# Patient Record
Sex: FEMALE | Race: WHITE | Employment: OTHER | URBAN - METROPOLITAN AREA
[De-identification: names, ages, dates, MRNs, and addresses within clinical notes are randomized per-mention and may not be internally consistent; named-entity substitution may affect disease eponyms.]

---

## 2017-01-04 ENCOUNTER — TELEPHONE (OUTPATIENT)
Dept: CARDIOLOGY CLINIC | Age: 67
End: 2017-01-04

## 2017-01-04 ENCOUNTER — ANTI-COAG VISIT (OUTPATIENT)
Dept: CARDIOLOGY | Age: 67
End: 2017-01-04

## 2017-01-04 DIAGNOSIS — I48.20 CHRONIC ATRIAL FIBRILLATION (HCC): ICD-10-CM

## 2017-01-04 LAB — INR BLD: 1.6

## 2017-01-10 LAB — INR BLD: 2.7

## 2017-01-11 ENCOUNTER — ANTI-COAG VISIT (OUTPATIENT)
Dept: CARDIOLOGY CLINIC | Age: 67
End: 2017-01-11

## 2017-01-11 DIAGNOSIS — I48.20 CHRONIC ATRIAL FIBRILLATION (HCC): ICD-10-CM

## 2017-01-24 LAB — INR BLD: 2.6

## 2017-01-25 ENCOUNTER — ANTI-COAG VISIT (OUTPATIENT)
Dept: CARDIOLOGY CLINIC | Age: 67
End: 2017-01-25

## 2017-01-25 DIAGNOSIS — I48.20 CHRONIC ATRIAL FIBRILLATION (HCC): ICD-10-CM

## 2017-01-31 ENCOUNTER — ANTI-COAG VISIT (OUTPATIENT)
Dept: CARDIOLOGY CLINIC | Age: 67
End: 2017-01-31

## 2017-01-31 DIAGNOSIS — I48.20 CHRONIC ATRIAL FIBRILLATION (HCC): ICD-10-CM

## 2017-01-31 LAB — INR BLD: 2.6

## 2017-02-24 ENCOUNTER — TELEPHONE (OUTPATIENT)
Dept: CARDIOLOGY CLINIC | Age: 67
End: 2017-02-24

## 2017-02-28 ENCOUNTER — ANTI-COAG VISIT (OUTPATIENT)
Dept: CARDIOLOGY CLINIC | Age: 67
End: 2017-02-28

## 2017-02-28 DIAGNOSIS — I48.20 CHRONIC ATRIAL FIBRILLATION (HCC): ICD-10-CM

## 2017-02-28 LAB — INR BLD: 2.7

## 2017-03-10 ENCOUNTER — OFFICE VISIT (OUTPATIENT)
Dept: INTERNAL MEDICINE | Age: 67
End: 2017-03-10

## 2017-03-10 VITALS
SYSTOLIC BLOOD PRESSURE: 134 MMHG | DIASTOLIC BLOOD PRESSURE: 76 MMHG | WEIGHT: 202 LBS | TEMPERATURE: 98.4 F | HEIGHT: 67 IN | BODY MASS INDEX: 31.71 KG/M2 | RESPIRATION RATE: 16 BRPM | HEART RATE: 64 BPM

## 2017-03-10 DIAGNOSIS — Z01.818 PRE-OP EXAM: Primary | ICD-10-CM

## 2017-03-10 DIAGNOSIS — Z86.79 S/P ABLATION OF ATRIAL FLUTTER: ICD-10-CM

## 2017-03-10 DIAGNOSIS — K66.0 ABDOMINAL ADHESIONS: ICD-10-CM

## 2017-03-10 DIAGNOSIS — D05.81: ICD-10-CM

## 2017-03-10 DIAGNOSIS — Z98.890 S/P ABLATION OF ATRIAL FLUTTER: ICD-10-CM

## 2017-03-10 DIAGNOSIS — I48.20 CHRONIC ATRIAL FIBRILLATION (HCC): ICD-10-CM

## 2017-03-10 DIAGNOSIS — K43.9 VENTRAL HERNIA WITHOUT OBSTRUCTION OR GANGRENE: ICD-10-CM

## 2017-03-10 DIAGNOSIS — R13.19 OTHER DYSPHAGIA: ICD-10-CM

## 2017-03-10 PROCEDURE — 99215 OFFICE O/P EST HI 40 MIN: CPT | Performed by: INTERNAL MEDICINE

## 2017-03-10 ASSESSMENT — ENCOUNTER SYMPTOMS
ABDOMINAL PAIN: 1
CONSTIPATION: 1
RESPIRATORY NEGATIVE: 1
EYES NEGATIVE: 1
ALLERGIC/IMMUNOLOGIC NEGATIVE: 1
ABDOMINAL DISTENTION: 1
BACK PAIN: 1

## 2017-04-03 LAB — INR BLD: 3

## 2017-04-04 ENCOUNTER — ANTI-COAG VISIT (OUTPATIENT)
Dept: CARDIOLOGY | Age: 67
End: 2017-04-04

## 2017-04-04 DIAGNOSIS — I48.20 CHRONIC ATRIAL FIBRILLATION (HCC): ICD-10-CM

## 2017-05-02 ENCOUNTER — ANTI-COAG VISIT (OUTPATIENT)
Dept: CARDIOLOGY | Age: 67
End: 2017-05-02

## 2017-05-02 DIAGNOSIS — I48.20 CHRONIC ATRIAL FIBRILLATION (HCC): ICD-10-CM

## 2017-05-02 LAB — INR BLD: 3

## 2017-05-04 ENCOUNTER — ANTI-COAG VISIT (OUTPATIENT)
Dept: CARDIOLOGY CLINIC | Age: 67
End: 2017-05-04

## 2017-05-04 DIAGNOSIS — I48.20 CHRONIC ATRIAL FIBRILLATION (HCC): ICD-10-CM

## 2017-05-04 LAB — INR BLD: 3

## 2017-05-17 ENCOUNTER — OFFICE VISIT (OUTPATIENT)
Dept: INTERNAL MEDICINE | Age: 67
End: 2017-05-17

## 2017-05-17 VITALS
RESPIRATION RATE: 16 BRPM | SYSTOLIC BLOOD PRESSURE: 142 MMHG | WEIGHT: 200 LBS | DIASTOLIC BLOOD PRESSURE: 86 MMHG | BODY MASS INDEX: 31.8 KG/M2 | HEART RATE: 68 BPM

## 2017-05-17 DIAGNOSIS — M47.814 FACET ARTHROPATHY, THORACIC: ICD-10-CM

## 2017-05-17 DIAGNOSIS — M54.6 CHRONIC MIDLINE THORACIC BACK PAIN: Primary | ICD-10-CM

## 2017-05-17 DIAGNOSIS — G89.29 CHRONIC MIDLINE THORACIC BACK PAIN: Primary | ICD-10-CM

## 2017-05-17 PROCEDURE — 99214 OFFICE O/P EST MOD 30 MIN: CPT | Performed by: INTERNAL MEDICINE

## 2017-05-17 RX ORDER — WARFARIN SODIUM 5 MG/1
TABLET ORAL
COMMUNITY
End: 2019-02-07 | Stop reason: SDUPTHER

## 2017-05-17 RX ORDER — PREDNISONE 20 MG/1
TABLET ORAL
Qty: 20 TABLET | Refills: 0 | Status: SHIPPED | OUTPATIENT
Start: 2017-05-17 | End: 2017-05-27

## 2017-05-17 RX ORDER — WARFARIN SODIUM 10 MG/1
10 TABLET ORAL
COMMUNITY
End: 2017-06-15 | Stop reason: SDUPTHER

## 2017-05-17 ASSESSMENT — ENCOUNTER SYMPTOMS
EYES NEGATIVE: 1
BACK PAIN: 1
RESPIRATORY NEGATIVE: 1
ALLERGIC/IMMUNOLOGIC NEGATIVE: 1

## 2017-06-06 LAB — INR BLD: 3.1

## 2017-06-07 ENCOUNTER — OFFICE VISIT (OUTPATIENT)
Dept: INTERNAL MEDICINE | Age: 67
End: 2017-06-07

## 2017-06-07 ENCOUNTER — ANTI-COAG VISIT (OUTPATIENT)
Dept: CARDIOLOGY CLINIC | Age: 67
End: 2017-06-07

## 2017-06-07 VITALS
WEIGHT: 202 LBS | HEART RATE: 68 BPM | BODY MASS INDEX: 32.08 KG/M2 | SYSTOLIC BLOOD PRESSURE: 148 MMHG | RESPIRATION RATE: 16 BRPM | DIASTOLIC BLOOD PRESSURE: 86 MMHG

## 2017-06-07 DIAGNOSIS — M47.814 FACET ARTHROPATHY, THORACIC: Primary | ICD-10-CM

## 2017-06-07 DIAGNOSIS — I48.20 CHRONIC ATRIAL FIBRILLATION (HCC): ICD-10-CM

## 2017-06-07 PROCEDURE — 99213 OFFICE O/P EST LOW 20 MIN: CPT | Performed by: INTERNAL MEDICINE

## 2017-06-07 ASSESSMENT — ENCOUNTER SYMPTOMS
ALLERGIC/IMMUNOLOGIC NEGATIVE: 1
EYES NEGATIVE: 1
RESPIRATORY NEGATIVE: 1
BACK PAIN: 1

## 2017-06-15 ENCOUNTER — OFFICE VISIT (OUTPATIENT)
Dept: CARDIOLOGY CLINIC | Age: 67
End: 2017-06-15

## 2017-06-15 VITALS
SYSTOLIC BLOOD PRESSURE: 152 MMHG | WEIGHT: 201 LBS | HEART RATE: 72 BPM | DIASTOLIC BLOOD PRESSURE: 80 MMHG | BODY MASS INDEX: 31.92 KG/M2

## 2017-06-15 DIAGNOSIS — Z98.890 S/P ABLATION OF ATRIAL FLUTTER: ICD-10-CM

## 2017-06-15 DIAGNOSIS — Z86.79 S/P ABLATION OF ATRIAL FIBRILLATION: ICD-10-CM

## 2017-06-15 DIAGNOSIS — Z98.890 S/P ABLATION OF ATRIAL FIBRILLATION: ICD-10-CM

## 2017-06-15 DIAGNOSIS — I48.0 PAROXYSMAL ATRIAL FIBRILLATION (HCC): Primary | ICD-10-CM

## 2017-06-15 DIAGNOSIS — Z86.79 S/P ABLATION OF ATRIAL FLUTTER: ICD-10-CM

## 2017-06-15 DIAGNOSIS — I48.92 ATRIAL FLUTTER, UNSPECIFIED TYPE (HCC): ICD-10-CM

## 2017-06-15 PROCEDURE — 99214 OFFICE O/P EST MOD 30 MIN: CPT | Performed by: INTERNAL MEDICINE

## 2017-06-15 RX ORDER — WARFARIN SODIUM 10 MG/1
10 TABLET ORAL DAILY
Qty: 90 TABLET | Refills: 3 | Status: SHIPPED | OUTPATIENT
Start: 2017-06-15 | End: 2018-04-27 | Stop reason: SDUPTHER

## 2017-06-15 RX ORDER — EZETIMIBE 10 MG/1
TABLET ORAL
Qty: 90 TABLET | Refills: 3 | Status: SHIPPED | OUTPATIENT
Start: 2017-06-15 | End: 2018-04-27 | Stop reason: SDUPTHER

## 2017-06-15 RX ORDER — FLECAINIDE ACETATE 100 MG/1
100 TABLET ORAL 2 TIMES DAILY
Qty: 180 TABLET | Refills: 3 | Status: SHIPPED | OUTPATIENT
Start: 2017-06-15 | End: 2017-08-07 | Stop reason: SDUPTHER

## 2017-06-15 RX ORDER — DIGOXIN 250 MCG
TABLET ORAL
Qty: 90 TABLET | Refills: 3 | Status: SHIPPED | OUTPATIENT
Start: 2017-06-15 | End: 2018-05-27 | Stop reason: SDUPTHER

## 2017-06-16 RX ORDER — WARFARIN SODIUM 10 MG/1
TABLET ORAL
Qty: 100 TABLET | Refills: 3 | Status: SHIPPED | OUTPATIENT
Start: 2017-06-16 | End: 2018-05-31 | Stop reason: SDUPTHER

## 2017-07-04 LAB
INR BLD: 2.6
PROTHROMBIN TIME: 25.8 SEC (ref 9–11.5)

## 2017-07-05 ENCOUNTER — ANTI-COAG VISIT (OUTPATIENT)
Dept: CARDIOLOGY CLINIC | Age: 67
End: 2017-07-05

## 2017-07-05 DIAGNOSIS — I48.20 CHRONIC ATRIAL FIBRILLATION (HCC): ICD-10-CM

## 2017-07-13 ENCOUNTER — OFFICE VISIT (OUTPATIENT)
Dept: INTERNAL MEDICINE | Age: 67
End: 2017-07-13

## 2017-07-13 VITALS
DIASTOLIC BLOOD PRESSURE: 76 MMHG | BODY MASS INDEX: 32.24 KG/M2 | RESPIRATION RATE: 16 BRPM | SYSTOLIC BLOOD PRESSURE: 146 MMHG | HEART RATE: 64 BPM | WEIGHT: 203 LBS

## 2017-07-13 DIAGNOSIS — M47.816 LUMBAR FACET ARTHROPATHY: Primary | ICD-10-CM

## 2017-07-13 DIAGNOSIS — M47.814 FACET ARTHROPATHY, THORACIC: ICD-10-CM

## 2017-07-13 DIAGNOSIS — M54.32 LEFT SCIATIC NERVE PAIN: ICD-10-CM

## 2017-07-13 PROCEDURE — 99213 OFFICE O/P EST LOW 20 MIN: CPT | Performed by: INTERNAL MEDICINE

## 2017-07-13 ASSESSMENT — ENCOUNTER SYMPTOMS
RESPIRATORY NEGATIVE: 1
ALLERGIC/IMMUNOLOGIC NEGATIVE: 1
BACK PAIN: 1
EYES NEGATIVE: 1

## 2017-08-08 RX ORDER — FLECAINIDE ACETATE 100 MG/1
100 TABLET ORAL 2 TIMES DAILY
Qty: 180 TABLET | Refills: 3 | Status: SHIPPED | OUTPATIENT
Start: 2017-08-08 | End: 2018-04-27 | Stop reason: SDUPTHER

## 2017-08-12 LAB
INR BLD: 3.3
PROTHROMBIN TIME: 32.9 SEC (ref 9–11.5)

## 2017-08-14 ENCOUNTER — ANTI-COAG VISIT (OUTPATIENT)
Dept: CARDIOLOGY CLINIC | Age: 67
End: 2017-08-14

## 2017-08-14 DIAGNOSIS — I48.20 CHRONIC ATRIAL FIBRILLATION (HCC): ICD-10-CM

## 2017-08-28 ENCOUNTER — ANTI-COAG VISIT (OUTPATIENT)
Dept: CARDIOLOGY CLINIC | Age: 67
End: 2017-08-28

## 2017-08-28 DIAGNOSIS — I48.20 CHRONIC ATRIAL FIBRILLATION (HCC): ICD-10-CM

## 2017-09-11 ENCOUNTER — ANTI-COAG VISIT (OUTPATIENT)
Dept: CARDIOLOGY CLINIC | Age: 67
End: 2017-09-11

## 2017-09-11 DIAGNOSIS — I48.20 CHRONIC ATRIAL FIBRILLATION (HCC): ICD-10-CM

## 2017-10-06 ENCOUNTER — ANTI-COAG VISIT (OUTPATIENT)
Dept: CARDIOLOGY CLINIC | Age: 67
End: 2017-10-06

## 2017-10-06 DIAGNOSIS — I48.0 PAROXYSMAL ATRIAL FIBRILLATION (HCC): ICD-10-CM

## 2017-10-06 NOTE — PATIENT INSTRUCTIONS
Old Dose: Take 12.5 mg Wed and Fri; take 10 mg all other days  New Dose: No change  Recheck in 1 month    Spoke with pt, instructions given

## 2017-11-15 ENCOUNTER — ANTI-COAG VISIT (OUTPATIENT)
Dept: CARDIOLOGY CLINIC | Age: 67
End: 2017-11-15

## 2017-11-15 DIAGNOSIS — I48.0 PAROXYSMAL ATRIAL FIBRILLATION (HCC): ICD-10-CM

## 2017-11-20 ENCOUNTER — OFFICE VISIT (OUTPATIENT)
Dept: INTERNAL MEDICINE | Age: 67
End: 2017-11-20

## 2017-11-20 VITALS
BODY MASS INDEX: 30.62 KG/M2 | DIASTOLIC BLOOD PRESSURE: 88 MMHG | SYSTOLIC BLOOD PRESSURE: 150 MMHG | WEIGHT: 202 LBS | OXYGEN SATURATION: 97 % | HEIGHT: 68 IN | HEART RATE: 64 BPM

## 2017-11-20 DIAGNOSIS — Z86.79 S/P ABLATION OF ATRIAL FLUTTER: ICD-10-CM

## 2017-11-20 DIAGNOSIS — E78.6 LOW LEVEL OF HIGH DENSITY LIPOPROTEIN (HDL): ICD-10-CM

## 2017-11-20 DIAGNOSIS — Z00.00 MEDICARE ANNUAL WELLNESS VISIT, SUBSEQUENT: ICD-10-CM

## 2017-11-20 DIAGNOSIS — D05.81: ICD-10-CM

## 2017-11-20 DIAGNOSIS — I48.0 PAROXYSMAL ATRIAL FIBRILLATION (HCC): ICD-10-CM

## 2017-11-20 DIAGNOSIS — I48.0 PAROXYSMAL ATRIAL FIBRILLATION (HCC): Primary | ICD-10-CM

## 2017-11-20 DIAGNOSIS — Z98.890 S/P ABLATION OF ATRIAL FLUTTER: ICD-10-CM

## 2017-11-20 DIAGNOSIS — R03.0 ELEVATED BLOOD PRESSURE READING: ICD-10-CM

## 2017-11-20 LAB
A/G RATIO: 1.7 (ref 1.1–2.2)
ALBUMIN SERPL-MCNC: 4.5 G/DL (ref 3.4–5)
ALP BLD-CCNC: 98 U/L (ref 40–129)
ALT SERPL-CCNC: 18 U/L (ref 10–40)
ANION GAP SERPL CALCULATED.3IONS-SCNC: 16 MMOL/L (ref 3–16)
AST SERPL-CCNC: 16 U/L (ref 15–37)
BILIRUB SERPL-MCNC: 0.3 MG/DL (ref 0–1)
BUN BLDV-MCNC: 10 MG/DL (ref 7–20)
CALCIUM SERPL-MCNC: 9.4 MG/DL (ref 8.3–10.6)
CHLORIDE BLD-SCNC: 98 MMOL/L (ref 99–110)
CHOLESTEROL, TOTAL: 222 MG/DL (ref 0–199)
CO2: 26 MMOL/L (ref 21–32)
CREAT SERPL-MCNC: <0.5 MG/DL (ref 0.6–1.2)
GFR AFRICAN AMERICAN: >60
GFR NON-AFRICAN AMERICAN: >60
GLOBULIN: 2.6 G/DL
GLUCOSE BLD-MCNC: 90 MG/DL (ref 70–99)
HCT VFR BLD CALC: 38.8 % (ref 36–48)
HDLC SERPL-MCNC: 61 MG/DL (ref 40–60)
HEMOGLOBIN: 13 G/DL (ref 12–16)
LDL CHOLESTEROL CALCULATED: 121 MG/DL
MCH RBC QN AUTO: 30.6 PG (ref 26–34)
MCHC RBC AUTO-ENTMCNC: 33.6 G/DL (ref 31–36)
MCV RBC AUTO: 90.9 FL (ref 80–100)
PDW BLD-RTO: 13.8 % (ref 12.4–15.4)
PLATELET # BLD: 301 K/UL (ref 135–450)
PMV BLD AUTO: 7.9 FL (ref 5–10.5)
POTASSIUM SERPL-SCNC: 4.3 MMOL/L (ref 3.5–5.1)
RBC # BLD: 4.27 M/UL (ref 4–5.2)
SODIUM BLD-SCNC: 140 MMOL/L (ref 136–145)
TOTAL PROTEIN: 7.1 G/DL (ref 6.4–8.2)
TRIGL SERPL-MCNC: 202 MG/DL (ref 0–150)
VLDLC SERPL CALC-MCNC: 40 MG/DL
WBC # BLD: 7.2 K/UL (ref 4–11)

## 2017-11-20 PROCEDURE — G8427 DOCREV CUR MEDS BY ELIG CLIN: HCPCS | Performed by: INTERNAL MEDICINE

## 2017-11-20 PROCEDURE — 1123F ACP DISCUSS/DSCN MKR DOCD: CPT | Performed by: INTERNAL MEDICINE

## 2017-11-20 PROCEDURE — 1036F TOBACCO NON-USER: CPT | Performed by: INTERNAL MEDICINE

## 2017-11-20 PROCEDURE — G8399 PT W/DXA RESULTS DOCUMENT: HCPCS | Performed by: INTERNAL MEDICINE

## 2017-11-20 PROCEDURE — 1090F PRES/ABSN URINE INCON ASSESS: CPT | Performed by: INTERNAL MEDICINE

## 2017-11-20 PROCEDURE — G8484 FLU IMMUNIZE NO ADMIN: HCPCS | Performed by: INTERNAL MEDICINE

## 2017-11-20 PROCEDURE — G8417 CALC BMI ABV UP PARAM F/U: HCPCS | Performed by: INTERNAL MEDICINE

## 2017-11-20 PROCEDURE — 3017F COLORECTAL CA SCREEN DOC REV: CPT | Performed by: INTERNAL MEDICINE

## 2017-11-20 PROCEDURE — 4040F PNEUMOC VAC/ADMIN/RCVD: CPT | Performed by: INTERNAL MEDICINE

## 2017-11-20 PROCEDURE — 99213 OFFICE O/P EST LOW 20 MIN: CPT | Performed by: INTERNAL MEDICINE

## 2017-11-20 ASSESSMENT — ENCOUNTER SYMPTOMS
DIARRHEA: 0
NAUSEA: 0
VOMITING: 0
ABDOMINAL PAIN: 0
CHEST TIGHTNESS: 0
SHORTNESS OF BREATH: 0
BLOOD IN STOOL: 0
COUGH: 0

## 2017-11-20 NOTE — PROGRESS NOTES
Outpatient Note    Patient:  Koko Casanova                                               :   Age: 69 y.o. MRN: [id number]  Date : 2017    History Obtained From:  patient, electronic medical record    CHIEF COMPLAINT:    Chief Complaint   Patient presents with    Dysphagia     establish care    Hernia       HISTORY OF PRESENT ILLNESS:   The patient is a 69 y.o. female who presents to be established and for his annual physical exam.   Pt denies CP/SOB/palpitations/abdominal pain/N/V.      Past Medical History:        Diagnosis Date    Allergic     SEASONAL    Atrial fibrillation ([facility] [address] H/O    Cancer ([facility] [address]  AND     BREAST right    Hammertoe     Migraine        Past Surgical History:        Procedure Laterality Date    ANKLE FRACTURE SURGERY  7165,5912    APPENDECTOMY  1983    ATRIAL ABLATION SURGERY  X3    Cryoablation for atrial fib    AXILLARY SURGERY Right     lymph node    BREAST BIOPSY  2011    BREAST BIOPSY  2011    left breast bx - benign     BREAST CYST EXCISION  2003,    BREAST LUMPECTOMY  2009    CARDIAC CATHETERIZATION  2005,2006    COLONOSCOPY  ,,,,    COLONOSCOPY  2011    Dr. Tila Earl - benign polyps     COSMETIC SURGERY      FOOT SURGERY Right 1/15/16    CORRECTION OF HAMMERTOES 1-5 RIGHT FOOT, WEIL OSTEOTOMY 2,3    FOOT SURGERY Right 2016    EXTENSOR TENDON LENGTHENING 4TH AND 5TH TOES RIGHT FOOT    FOOT SURGERY Right 2016    CORRECTION HAMMERTOES 1ST, 4TH AND 5TH DIGITS RIGHT FOOT WITH    HERNIA REPAIR  [id number]    HYSTERECTOMY  1977    JOINT REPLACEMENT  2015    LEFT KNEE    KNEE SURGERY  2015    left knee replacement    LIPOMA RESECTION  2006    MASTECTOMY  3-15-11    bilateral    NEUROMA SURGERY  ,,, ,     Left foot    OTHER SURGICAL HISTORY  , 1984 X 2, 1994 X 2, 2006    Bowel obstructions    RHINOPLASTY  1976    RHINOPLASTY  1995 SYSTEMS:  Review of Systems   Constitutional: Negative for activity change, appetite change, chills and fever. HENT: Negative for congestion, hearing loss and tinnitus. Eyes: Negative for visual disturbance. Respiratory: Negative for cough, chest tightness and shortness of breath. Cardiovascular: Negative for chest pain and palpitations. Gastrointestinal: Negative for abdominal pain, blood in stool, diarrhea, nausea and vomiting. Endocrine: Negative for polyuria. Genitourinary: Negative for difficulty urinating, dysuria and hematuria. Musculoskeletal: Negative for arthralgias. Skin: Negative for rash. Neurological: Negative for weakness, numbness and headaches. Psychiatric/Behavioral: Negative for dysphoric mood, self-injury and suicidal ideas. The patient is not nervous/anxious. Screening:  Colon cancer screenin polyp- due this year     Cervical Cancer screening : last PAP smear     Need screening for lung cancer? No    Need screening for HIV ? No    Annual wellness visit:  1) Patient reports > 2 fall in the past year ? No  2) Patient has safety precautious to prevent falls at home? Yes.     safety precautions tips were given  3) patient reports anxiety/ depression? No  4) patient report limiting vision difficulties? No      She has seen an eye doctor recently   5) patient report limiting hearing difficulties? No  6) patient report eating well and satisfactory diet? Yes - healthy diet tips were given   7) patient reports physical activity? Yes recommended walking 30 min/ day for 5 day a week   8) patient has a living well? Yes living will forms and explanations were       Given  9) Patient lives at: house , by herself       Immunization:    Immunization History   Administered Date(s) Administered    Influenza Vaccine, unspecified formulation 10/24/2015, 10/24/2016    Influenza Virus Vaccine 10/01/2009, 10/06/2010, 10/01/2013, 10/01/2014    Influenza, High Dose 2017   

## 2017-12-04 ENCOUNTER — TELEPHONE (OUTPATIENT)
Dept: CARDIOLOGY CLINIC | Age: 67
End: 2017-12-04

## 2017-12-04 DIAGNOSIS — I48.91 ATRIAL FIBRILLATION, UNSPECIFIED TYPE (HCC): ICD-10-CM

## 2017-12-07 RX ORDER — WARFARIN SODIUM 5 MG/1
TABLET ORAL
Qty: 30 TABLET | Refills: 5 | Status: SHIPPED | OUTPATIENT
Start: 2017-12-07 | End: 2018-05-31 | Stop reason: SDUPTHER

## 2017-12-19 DIAGNOSIS — I48.0 PAROXYSMAL ATRIAL FIBRILLATION (HCC): Primary | ICD-10-CM

## 2017-12-20 ENCOUNTER — ANTI-COAG VISIT (OUTPATIENT)
Dept: CARDIOLOGY CLINIC | Age: 67
End: 2017-12-20

## 2017-12-20 DIAGNOSIS — I48.0 PAROXYSMAL ATRIAL FIBRILLATION (HCC): ICD-10-CM

## 2017-12-20 LAB
INR BLD: 2.2
PROTHROMBIN TIME: 22.2 SEC (ref 9–11.5)

## 2017-12-21 ENCOUNTER — OFFICE VISIT (OUTPATIENT)
Dept: CARDIOLOGY CLINIC | Age: 67
End: 2017-12-21

## 2017-12-21 VITALS
SYSTOLIC BLOOD PRESSURE: 128 MMHG | BODY MASS INDEX: 31.17 KG/M2 | DIASTOLIC BLOOD PRESSURE: 82 MMHG | WEIGHT: 205 LBS | HEART RATE: 62 BPM

## 2017-12-21 DIAGNOSIS — Z86.79 S/P ABLATION OF ATRIAL FLUTTER: ICD-10-CM

## 2017-12-21 DIAGNOSIS — I48.0 PAROXYSMAL ATRIAL FIBRILLATION (HCC): Primary | ICD-10-CM

## 2017-12-21 DIAGNOSIS — Z98.890 S/P ABLATION OF ATRIAL FLUTTER: ICD-10-CM

## 2017-12-21 DIAGNOSIS — Z86.79 S/P ABLATION OF ATRIAL FIBRILLATION: ICD-10-CM

## 2017-12-21 DIAGNOSIS — Z98.890 S/P ABLATION OF ATRIAL FIBRILLATION: ICD-10-CM

## 2017-12-21 DIAGNOSIS — I48.3 TYPICAL ATRIAL FLUTTER (HCC): ICD-10-CM

## 2017-12-21 PROCEDURE — G8427 DOCREV CUR MEDS BY ELIG CLIN: HCPCS | Performed by: INTERNAL MEDICINE

## 2017-12-21 PROCEDURE — G8484 FLU IMMUNIZE NO ADMIN: HCPCS | Performed by: INTERNAL MEDICINE

## 2017-12-21 PROCEDURE — 99214 OFFICE O/P EST MOD 30 MIN: CPT | Performed by: INTERNAL MEDICINE

## 2017-12-21 PROCEDURE — 1036F TOBACCO NON-USER: CPT | Performed by: INTERNAL MEDICINE

## 2017-12-21 PROCEDURE — 1123F ACP DISCUSS/DSCN MKR DOCD: CPT | Performed by: INTERNAL MEDICINE

## 2017-12-21 PROCEDURE — G8399 PT W/DXA RESULTS DOCUMENT: HCPCS | Performed by: INTERNAL MEDICINE

## 2017-12-21 PROCEDURE — 1090F PRES/ABSN URINE INCON ASSESS: CPT | Performed by: INTERNAL MEDICINE

## 2017-12-21 PROCEDURE — 4040F PNEUMOC VAC/ADMIN/RCVD: CPT | Performed by: INTERNAL MEDICINE

## 2017-12-21 PROCEDURE — G8417 CALC BMI ABV UP PARAM F/U: HCPCS | Performed by: INTERNAL MEDICINE

## 2017-12-21 PROCEDURE — 3017F COLORECTAL CA SCREEN DOC REV: CPT | Performed by: INTERNAL MEDICINE

## 2017-12-21 NOTE — PROGRESS NOTES
fee  Subjective:      Patient ID: Lisa Chowdhury is a 69 y.o. female. HPI Tamiko Cervantes is being seen for cardiac follow up for afib/arrhythmia. Doing well. Rhythm stable. No pnd or orthopnea. No palp/syncope. No sob. Continues right sided cp at times. Unpredictable. Unchanged.      Past Medical History:   Diagnosis Date    Allergic     SEASONAL    Atrial fibrillation ([address] H/O    Cancer ([facility] [address] 2009 AND 2011    BREAST right    Hammertoe     Migraine      Past Surgical History:   Procedure Laterality Date    ANKLE FRACTURE SURGERY  6121,3746    APPENDECTOMY  03/1983    ATRIAL ABLATION SURGERY  X3    Cryoablation for atrial fib    AXILLARY SURGERY Right     lymph node    BREAST BIOPSY  01/07/2011    BREAST BIOPSY  7/12/2011    left breast bx - benign     BREAST CYST EXCISION  06/2003,    BREAST LUMPECTOMY  03/30/2009    CARDIAC CATHETERIZATION  5/2005,01/2006    COLONOSCOPY  1993,1995,1999,2003,2006    COLONOSCOPY  11/29/2011    Dr. Lyn Preciado - benign polyps     COSMETIC SURGERY      FOOT SURGERY Right 1/15/16    CORRECTION OF HAMMERTOES 1-5 RIGHT FOOT, WEIL OSTEOTOMY 2,3    FOOT SURGERY Right 05/12/2016    EXTENSOR TENDON LENGTHENING 4TH AND 5TH TOES RIGHT FOOT    FOOT SURGERY Right 08/11/2016    CORRECTION HAMMERTOES 1ST, 4TH AND 5TH DIGITS RIGHT FOOT WITH    HERNIA REPAIR  [id number]    HYSTERECTOMY  12/1977    JOINT REPLACEMENT  June 2015    LEFT KNEE    KNEE SURGERY  6/09/2015    left knee replacement    LIPOMA RESECTION  09/2006    MASTECTOMY  3-15-11    bilateral    NEUROMA SURGERY  1987,1988,1989,1992 , 1995    Left foot    OTHER SURGICAL HISTORY  1983, 1984 X 2, 1994 X 2, 2006    Bowel obstructions    RHINOPLASTY  11/1976    RHINOPLASTY  1995    THORACOTOMY  1998    TONSILLECTOMY  1956    UPPER GASTROINTESTINAL ENDOSCOPY  [id number]    VARICOSE VEIN SURGERY  11/2001     Social History     Social History    Marital status:      Spouse name: N/A    Number of children: 2    Years of education: 15     Occupational History    [employer] finish       Social History Main Topics    Smoking status: Former Smoker     Packs/day: 0.25     Years: 5.00     Types: Cigarettes     Quit date: 1/1/1988    Smokeless tobacco: Never Used      Comment: QUIT 1982    Alcohol use No    Drug use: No    Sexual activity: No     Other Topics Concern    Not on file     Social History Narrative    No narrative on file       Review of Systems   Constitutional: Negative for activity change, appetite change and fatigue. Respiratory: Negative for cough, choking, chest tightness and shortness of breath. Cardiovascular: Negative for chest pain, palpitations and leg swelling. Denies PND or orthopnea. No tachycardia or syncope. Neurological: Negative for dizziness, syncope and light-headedness. Psychiatric/Behavioral: Negative for behavioral problems, confusion and agitation. Other systems reviewed negative as done    Objective:   Physical Exam     Vitals:    12/21/17 1002   BP: 128/82   Pulse: 62         Constitutional: She is oriented to person, place, and time. She appears well-developed and well-nourished. No distress. HENT:   Head: Normocephalic and atraumatic. Eyes: Conjunctivae and EOM are normal. Right eye exhibits no discharge. Left eye exhibits no discharge. Neck: Normal range of motion. No JVD present. Cardiovascular: Normal rate, regular rhythm, S1 normal, S2 normal and normal heart sounds. Exam reveals no gallop. 1/6 syst murmur heard. Pulses:       Radial pulses are 2+ on the right side, and 2+ on the left side. Pulmonary/Chest: Effort normal and breath sounds normal. She has no rales. Abdominal: Soft. Bowel sounds are normal. There is no tenderness. Musculoskeletal: Normal range of motion. She exhibits no edema. Neurological: She is alert and oriented to person, place, and time. Skin: Skin is warm and dry.    Psychiatric:

## 2018-02-02 ENCOUNTER — ANTI-COAG VISIT (OUTPATIENT)
Dept: CARDIOLOGY CLINIC | Age: 68
End: 2018-02-02

## 2018-02-02 DIAGNOSIS — I48.0 PAROXYSMAL ATRIAL FIBRILLATION (HCC): ICD-10-CM

## 2018-02-02 LAB
INR BLD: 3.6
PROTHROMBIN TIME: 35.2 SEC (ref 9–11.5)

## 2018-02-09 ENCOUNTER — ANTI-COAG VISIT (OUTPATIENT)
Dept: CARDIOLOGY CLINIC | Age: 68
End: 2018-02-09

## 2018-02-09 DIAGNOSIS — I48.0 PAROXYSMAL ATRIAL FIBRILLATION (HCC): ICD-10-CM

## 2018-02-09 LAB
INR BLD: 2.5
PROTHROMBIN TIME: 25.3 SEC (ref 9–11.5)

## 2018-02-09 NOTE — PATIENT INSTRUCTIONS
Old Dose: 10 mg everyday    New Dose: no change   Recheck in 2 weeks  Spoke with pt, instructions given

## 2018-02-23 ENCOUNTER — ANTI-COAG VISIT (OUTPATIENT)
Dept: CARDIOLOGY CLINIC | Age: 68
End: 2018-02-23

## 2018-02-23 DIAGNOSIS — I48.0 PAROXYSMAL ATRIAL FIBRILLATION (HCC): ICD-10-CM

## 2018-03-13 ENCOUNTER — OFFICE VISIT (OUTPATIENT)
Dept: INTERNAL MEDICINE | Age: 68
End: 2018-03-13

## 2018-03-13 VITALS
HEIGHT: 67 IN | SYSTOLIC BLOOD PRESSURE: 162 MMHG | OXYGEN SATURATION: 96 % | HEART RATE: 62 BPM | DIASTOLIC BLOOD PRESSURE: 100 MMHG | WEIGHT: 206 LBS | BODY MASS INDEX: 32.33 KG/M2

## 2018-03-13 DIAGNOSIS — E78.5 HYPERLIPIDEMIA, UNSPECIFIED HYPERLIPIDEMIA TYPE: ICD-10-CM

## 2018-03-13 DIAGNOSIS — Z00.00 MEDICARE ANNUAL WELLNESS VISIT, SUBSEQUENT: Primary | ICD-10-CM

## 2018-03-13 DIAGNOSIS — I48.0 PAROXYSMAL ATRIAL FIBRILLATION (HCC): ICD-10-CM

## 2018-03-13 DIAGNOSIS — Z23 NEED FOR PNEUMOCOCCAL VACCINE: ICD-10-CM

## 2018-03-13 DIAGNOSIS — D05.81: ICD-10-CM

## 2018-03-13 DIAGNOSIS — I10 ESSENTIAL HYPERTENSION: ICD-10-CM

## 2018-03-13 PROBLEM — Z90.13 S/P BILATERAL MASTECTOMY: Status: ACTIVE | Noted: 2018-03-13

## 2018-03-13 PROCEDURE — G0009 ADMIN PNEUMOCOCCAL VACCINE: HCPCS | Performed by: INTERNAL MEDICINE

## 2018-03-13 PROCEDURE — 90732 PPSV23 VACC 2 YRS+ SUBQ/IM: CPT | Performed by: INTERNAL MEDICINE

## 2018-03-13 PROCEDURE — G0439 PPPS, SUBSEQ VISIT: HCPCS | Performed by: INTERNAL MEDICINE

## 2018-03-13 RX ORDER — LISINOPRIL 20 MG/1
20 TABLET ORAL DAILY
Qty: 30 TABLET | Refills: 2 | Status: SHIPPED | OUTPATIENT
Start: 2018-03-13 | End: 2018-03-23 | Stop reason: SDUPTHER

## 2018-03-13 ASSESSMENT — LIFESTYLE VARIABLES: HOW OFTEN DO YOU HAVE A DRINK CONTAINING ALCOHOL: 0

## 2018-03-13 ASSESSMENT — ENCOUNTER SYMPTOMS
ABDOMINAL PAIN: 0
NAUSEA: 0
DIARRHEA: 0
CHEST TIGHTNESS: 0
SHORTNESS OF BREATH: 0
COUGH: 0
BLOOD IN STOOL: 0
VOMITING: 0

## 2018-03-13 ASSESSMENT — PATIENT HEALTH QUESTIONNAIRE - PHQ9: SUM OF ALL RESPONSES TO PHQ QUESTIONS 1-9: 0

## 2018-03-13 NOTE — PROGRESS NOTES
SURGERY  2015    left knee replacement    LIPOMA RESECTION  2006    MASTECTOMY  3-15-11    bilateral    NEUROMA SURGERY  [id number] , 1995    Left foot    OTHER SURGICAL HISTORY  , 1984 X 2, 1994 X 2, 2006    Bowel obstructions    RHINOPLASTY  1976    RHINOPLASTY      THORACOTOMY  1998    TONSILLECTOMY  195    UPPER GASTROINTESTINAL ENDOSCOPY  [id number]    VARICOSE VEIN SURGERY  2001       Family History:       Problem Relation Age of Onset    Cancer Mother      Breast cancer with metastatic disease- at 80    Cancer Father       at [de-identified] of bladder cancer and eye cancer    Cancer Brother       at 64 of throat,lung,bone spinal column and brain cancer       Social History:   TOBACCO:   reports that she quit smoking about 30 years ago. Her smoking use included Cigarettes. She has a 1.25 pack-year smoking history. She has never used smokeless tobacco.  ETOH:   reports that she does not drink alcohol. OCCUPATION:  she used to work in [employer]. She used to work with formaldehyde , resins , occasional mask protection     Allergies:  Keflex [cephalexin]; Novocain [procaine]; Amiodarone; Cephalexin; Penicillins; and Sulfa antibiotics    Current Medications:    Prior to Admission medications    Medication Sig Start Date End Date Taking?  Authorizing Provider   lisinopril (PRINIVIL;ZESTRIL) 20 MG tablet Take 1 tablet by mouth daily 3/13/18  Yes Rylee Mccarthy MD   JANTOVEN 5 MG tablet TAKE 1 TABLET BY MOUTH ONE TIME A DAY  17   Leonor Fields MD   flecainide [facility]) 100 MG tablet Take 1 tablet by mouth 2 times daily 17   Leonor Fields MD   warfarin (COUMADIN) 10 MG tablet TAKE 1 TABLET DAILY OR AS DIRECTED BY DOCTOR 17   Leonor Fields MD   warfarin (COUMADIN) 10 MG tablet Take 1 tablet by mouth daily Taking 12.5 mg on Monday, Wednesday and Friday and 10 mg all other days 6/15/17   Leonor Fields MD   digoxin with 10 mg tablet for 12.5 mg oral.  Historical Provider, MD   acetaminophen (TYLENOL) 325 MG tablet Take 325 mg by mouth every 6 hours as needed. As need for headaches. Historical Provider, MD   vitamin D (CHOLECALCIFEROL) 1000 UNIT TABS tablet Take 1,000 Units by mouth daily.     Historical Provider, MD       Past Medical History:   Diagnosis Date    Allergic     SEASONAL    Atrial fibrillation ([address] H/O    Cancer ([facility] [address] 2009 AND 2011    BREAST right    Essential hypertension 3/13/2018    Hammertoe     Migraine        Past Surgical History:   Procedure Laterality Date    ANKLE FRACTURE SURGERY  2004,2008    APPENDECTOMY  03/1983    ATRIAL ABLATION SURGERY  X3    Cryoablation for atrial fib    AXILLARY SURGERY Right     lymph node    BREAST BIOPSY  01/07/2011    BREAST BIOPSY  7/12/2011    left breast bx - benign     BREAST CYST EXCISION  06/2003,    BREAST LUMPECTOMY  03/30/2009    CARDIAC CATHETERIZATION  5/2005,01/2006    COLONOSCOPY  1993,1995,1999,2003,2006    COLONOSCOPY  11/29/2011    Dr. Pete Kilgore - benign polyps     COSMETIC SURGERY      FOOT SURGERY Right 1/15/16    CORRECTION OF HAMMERTOES 1-5 RIGHT FOOT, WEIL OSTEOTOMY 2,3    FOOT SURGERY Right 05/12/2016    EXTENSOR TENDON LENGTHENING 4TH AND 5TH TOES RIGHT FOOT    FOOT SURGERY Right 08/11/2016    CORRECTION HAMMERTOES 1ST, 4TH AND 5TH DIGITS RIGHT FOOT WITH    HERNIA REPAIR  [id number]    HYSTERECTOMY  12/1977    JOINT REPLACEMENT  June 2015    LEFT KNEE    KNEE SURGERY  6/09/2015    left knee replacement    LIPOMA RESECTION  09/2006    MASTECTOMY  3-15-11    bilateral    NEUROMA SURGERY  1987,1988,1989,1992 , 1995    Left foot    OTHER SURGICAL HISTORY  1983, 1984 X 2, 1994 X 2, 2006    Bowel obstructions    RHINOPLASTY  11/1976    RHINOPLASTY  1995    THORACOTOMY  1998    TONSILLECTOMY  1956    UPPER GASTROINTESTINAL ENDOSCOPY  [id number],2389    VARICOSE VEIN SURGERY  11/2001       Family History 02/26/2014    Zoster Live (Zostavax) 08/24/2015    Zoster Subunit (Shingrix) 08/01/2015        Health Maintenance   Topic Date Due    Hepatitis C screen  1950    Shingles Vaccine (2 of 2 - 2 Dose Series) 02/01/2016    Pneumococcal low/med risk (2 of 2 - PPSV23) 01/13/2017    A1C test (Diabetic or Prediabetic)  02/27/2018    Colon cancer screen colonoscopy  11/29/2021    Lipid screen  11/20/2022    DTaP/Tdap/Td vaccine (2 - Td) 02/26/2024    DEXA (modify frequency per FRAX score)  Completed    Flu vaccine  Completed       Recommendations for Preventive Services Due: see orders.   Recommended screening schedule for the next 5-10 years is provided to the patient in written form: see Patient Instructions/AVS.

## 2018-03-13 NOTE — PROGRESS NOTES
MD   vitamin D (CHOLECALCIFEROL) 1000 UNIT TABS tablet Take 1,000 Units by mouth daily. Historical Provider, MD       REVIEW OF SYSTEMS:  Review of Systems   Constitutional: Negative for activity change, appetite change, chills and fever. HENT: Negative for congestion, hearing loss and tinnitus. Eyes: Negative for visual disturbance. Respiratory: Negative for cough, chest tightness and shortness of breath. Cardiovascular: Negative for chest pain and palpitations. Gastrointestinal: Negative for abdominal pain, blood in stool, diarrhea, nausea and vomiting. Endocrine: Negative for polyuria. Genitourinary: Negative for difficulty urinating, dysuria and hematuria. Musculoskeletal: Negative for arthralgias. Skin: Negative for rash. Neurological: Negative for weakness, numbness and headaches. Psychiatric/Behavioral: Negative for dysphoric mood, self-injury and suicidal ideas. The patient is not nervous/anxious. Screening:  Colon cancer screenin polyp- due this year     breast cancer screening: last mammogram ***    Cervical Cancer screening : last PAP smear ***    Need screening for lung cancer? {YES / YJ:48225}    Need screening for HIV ? {YES / GT:64481}    Last eye exam: ***, {norm/abn:63263}      Annual wellness visit:  1) Patient reports > 2 fall in the past year ? No  2) Patient has safety precautious to prevent falls at home? Yes.     safety precautions tips were given  3) patient reports anxiety/ depression? No  4) patient report limiting vision difficulties? No      She has seen an eye doctor recently   5) patient report limiting hearing difficulties? No  6) patient report eating well and satisfactory diet? Yes - healthy diet tips were given   7) patient reports physical activity? Yes recommended walking 30 min/ day for 5 day a week   8) patient has a living well? Yes   9) Patient lives at: house , by herself       Immunization:    Immunization History   Administered Date(s) and normal reflexes. She displays no tremor. No cranial nerve deficit or sensory deficit. She exhibits normal muscle tone. GCS eye subscore is 4. GCS verbal subscore is 5. GCS motor subscore is 6. Normal CN II-XII   Skin: She is not diaphoretic. No erythema. Psychiatric: She has a normal mood and affect. Her behavior is normal. Judgment and thought content normal.        Assessment/Plan:   Venu Telles was seen today for medicare awv. 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 Pt was informed that in urgent cases with difficulties     in scheduling- he can come to the office and he will be seen as soon as possible. - Anticipated follow up in {NUMBERS 0-10:00645} months    Additional patients instructions (if given): There are no Patient Instructions on file for this visit.

## 2018-03-23 ENCOUNTER — ANTI-COAG VISIT (OUTPATIENT)
Dept: CARDIOLOGY CLINIC | Age: 68
End: 2018-03-23

## 2018-03-23 ENCOUNTER — TELEPHONE (OUTPATIENT)
Dept: INTERNAL MEDICINE | Age: 68
End: 2018-03-23

## 2018-03-23 DIAGNOSIS — I48.0 PAROXYSMAL ATRIAL FIBRILLATION (HCC): ICD-10-CM

## 2018-03-23 DIAGNOSIS — I10 ESSENTIAL HYPERTENSION: ICD-10-CM

## 2018-03-23 RX ORDER — FUROSEMIDE 20 MG/1
20 TABLET ORAL DAILY
Qty: 60 TABLET | Refills: 3 | Status: SHIPPED | OUTPATIENT
Start: 2018-03-23 | End: 2018-07-03 | Stop reason: SDUPTHER

## 2018-03-23 RX ORDER — LISINOPRIL 20 MG/1
40 TABLET ORAL DAILY
Qty: 60 TABLET | Refills: 2 | Status: SHIPPED | OUTPATIENT
Start: 2018-03-23 | End: 2018-04-12 | Stop reason: SINTOL

## 2018-03-23 NOTE — TELEPHONE ENCOUNTER
Patient notified  Rx sent for increased dose/Pended to MD to sign    Patient stated everyone that's on a BP medication that she knows Is also on a water pill. Patient states she has gained 6lb since her last visit and wants to know if she should be on a water pill.

## 2018-04-02 ENCOUNTER — ANTI-COAG VISIT (OUTPATIENT)
Dept: CARDIOLOGY CLINIC | Age: 68
End: 2018-04-02

## 2018-04-02 DIAGNOSIS — I48.0 PAROXYSMAL ATRIAL FIBRILLATION (HCC): ICD-10-CM

## 2018-04-03 ENCOUNTER — PATIENT MESSAGE (OUTPATIENT)
Dept: INTERNAL MEDICINE | Age: 68
End: 2018-04-03

## 2018-04-05 DIAGNOSIS — E78.2 MIXED HYPERLIPIDEMIA: Primary | ICD-10-CM

## 2018-04-05 RX ORDER — ROSUVASTATIN CALCIUM 10 MG/1
10 TABLET, COATED ORAL NIGHTLY
Qty: 30 TABLET | Refills: 3 | Status: SHIPPED | OUTPATIENT
Start: 2018-04-05 | End: 2018-05-31

## 2018-04-06 ENCOUNTER — ANTI-COAG VISIT (OUTPATIENT)
Dept: CARDIOLOGY CLINIC | Age: 68
End: 2018-04-06

## 2018-04-06 ENCOUNTER — TELEPHONE (OUTPATIENT)
Dept: CARDIOLOGY CLINIC | Age: 68
End: 2018-04-06

## 2018-04-06 DIAGNOSIS — I48.0 PAROXYSMAL ATRIAL FIBRILLATION (HCC): ICD-10-CM

## 2018-04-12 RX ORDER — AMLODIPINE BESYLATE 10 MG/1
10 TABLET ORAL DAILY
Qty: 30 TABLET | Refills: 3 | Status: SHIPPED | OUTPATIENT
Start: 2018-04-12 | End: 2018-07-03 | Stop reason: SDUPTHER

## 2018-04-13 ENCOUNTER — ANTI-COAG VISIT (OUTPATIENT)
Dept: CARDIOLOGY CLINIC | Age: 68
End: 2018-04-13

## 2018-04-13 DIAGNOSIS — I48.0 PAROXYSMAL ATRIAL FIBRILLATION (HCC): ICD-10-CM

## 2018-04-27 RX ORDER — FLECAINIDE ACETATE 100 MG/1
100 TABLET ORAL 2 TIMES DAILY
Qty: 180 TABLET | Refills: 3 | Status: SHIPPED | OUTPATIENT
Start: 2018-04-27 | End: 2018-10-18 | Stop reason: SDUPTHER

## 2018-04-27 RX ORDER — EZETIMIBE 10 MG/1
TABLET ORAL
Qty: 90 TABLET | Refills: 3 | Status: SHIPPED | OUTPATIENT
Start: 2018-04-27 | End: 2019-02-07 | Stop reason: SDUPTHER

## 2018-04-27 RX ORDER — WARFARIN SODIUM 10 MG/1
10 TABLET ORAL DAILY
Qty: 90 TABLET | Refills: 3 | Status: SHIPPED | OUTPATIENT
Start: 2018-04-27 | End: 2019-02-07 | Stop reason: SDUPTHER

## 2018-04-30 ENCOUNTER — ANTI-COAG VISIT (OUTPATIENT)
Dept: CARDIOLOGY CLINIC | Age: 68
End: 2018-04-30

## 2018-04-30 DIAGNOSIS — I48.0 PAROXYSMAL ATRIAL FIBRILLATION (HCC): ICD-10-CM

## 2018-05-04 ENCOUNTER — ANTI-COAG VISIT (OUTPATIENT)
Dept: CARDIOLOGY CLINIC | Age: 68
End: 2018-05-04

## 2018-05-04 DIAGNOSIS — I48.0 PAROXYSMAL ATRIAL FIBRILLATION (HCC): ICD-10-CM

## 2018-05-11 ENCOUNTER — ANTI-COAG VISIT (OUTPATIENT)
Dept: CARDIOLOGY CLINIC | Age: 68
End: 2018-05-11

## 2018-05-11 DIAGNOSIS — I48.0 PAROXYSMAL ATRIAL FIBRILLATION (HCC): ICD-10-CM

## 2018-05-18 ENCOUNTER — ANTI-COAG VISIT (OUTPATIENT)
Dept: CARDIOLOGY CLINIC | Age: 68
End: 2018-05-18

## 2018-05-18 DIAGNOSIS — I48.0 PAROXYSMAL ATRIAL FIBRILLATION (HCC): ICD-10-CM

## 2018-05-25 ENCOUNTER — ANTI-COAG VISIT (OUTPATIENT)
Dept: CARDIOLOGY CLINIC | Age: 68
End: 2018-05-25

## 2018-05-25 DIAGNOSIS — I48.0 PAROXYSMAL ATRIAL FIBRILLATION (HCC): Primary | ICD-10-CM

## 2018-05-25 DIAGNOSIS — I48.0 PAROXYSMAL ATRIAL FIBRILLATION (HCC): ICD-10-CM

## 2018-05-29 ENCOUNTER — TELEPHONE (OUTPATIENT)
Dept: PHARMACY | Facility: CLINIC | Age: 68
End: 2018-05-29

## 2018-05-29 RX ORDER — DIGOXIN 250 UG/1
TABLET ORAL
Qty: 90 TABLET | Refills: 3 | Status: SHIPPED | OUTPATIENT
Start: 2018-05-29 | End: 2019-02-07 | Stop reason: SDUPTHER

## 2018-05-31 ENCOUNTER — ANTI-COAG VISIT (OUTPATIENT)
Dept: PHARMACY | Facility: CLINIC | Age: 68
End: 2018-05-31

## 2018-05-31 ENCOUNTER — HOSPITAL ENCOUNTER (OUTPATIENT)
Dept: OTHER | Age: 68
Discharge: HOME | End: 2018-05-31
Attending: INTERNAL MEDICINE | Admitting: INTERNAL MEDICINE

## 2018-05-31 DIAGNOSIS — I48.0 PAROXYSMAL ATRIAL FIBRILLATION (HCC): ICD-10-CM

## 2018-05-31 LAB — INTERNATIONAL NORMALIZATION RATIO, POC: 4.2

## 2018-06-01 ENCOUNTER — HOSPITAL ENCOUNTER (OUTPATIENT)
Dept: OTHER | Age: 68
Discharge: HOME | End: 2018-06-30
Attending: INTERNAL MEDICINE | Admitting: INTERNAL MEDICINE

## 2018-06-06 ENCOUNTER — ANTI-COAG VISIT (OUTPATIENT)
Dept: PHARMACY | Facility: CLINIC | Age: 68
End: 2018-06-06

## 2018-06-06 DIAGNOSIS — I48.0 PAROXYSMAL ATRIAL FIBRILLATION (HCC): ICD-10-CM

## 2018-06-06 LAB — INTERNATIONAL NORMALIZATION RATIO, POC: 2.2

## 2018-06-14 ENCOUNTER — ANTI-COAG VISIT (OUTPATIENT)
Dept: PHARMACY | Facility: CLINIC | Age: 68
End: 2018-06-14

## 2018-06-14 DIAGNOSIS — I48.0 PAROXYSMAL ATRIAL FIBRILLATION (HCC): ICD-10-CM

## 2018-06-14 LAB — INTERNATIONAL NORMALIZATION RATIO, POC: 3.1

## 2018-06-22 ENCOUNTER — ANTI-COAG VISIT (OUTPATIENT)
Dept: PHARMACY | Facility: CLINIC | Age: 68
End: 2018-06-22

## 2018-06-22 ENCOUNTER — OFFICE VISIT (OUTPATIENT)
Dept: INTERNAL MEDICINE | Age: 68
End: 2018-06-22

## 2018-06-22 VITALS
WEIGHT: 207 LBS | SYSTOLIC BLOOD PRESSURE: 138 MMHG | HEART RATE: 68 BPM | DIASTOLIC BLOOD PRESSURE: 82 MMHG | OXYGEN SATURATION: 96 % | BODY MASS INDEX: 32.91 KG/M2

## 2018-06-22 DIAGNOSIS — Z00.00 ROUTINE ADULT HEALTH MAINTENANCE: ICD-10-CM

## 2018-06-22 DIAGNOSIS — D05.81: ICD-10-CM

## 2018-06-22 DIAGNOSIS — I10 ESSENTIAL HYPERTENSION: Primary | ICD-10-CM

## 2018-06-22 DIAGNOSIS — I48.0 PAROXYSMAL ATRIAL FIBRILLATION (HCC): ICD-10-CM

## 2018-06-22 DIAGNOSIS — Z90.13 S/P BILATERAL MASTECTOMY: ICD-10-CM

## 2018-06-22 LAB — INTERNATIONAL NORMALIZATION RATIO, POC: 2.4

## 2018-06-22 PROCEDURE — G8417 CALC BMI ABV UP PARAM F/U: HCPCS | Performed by: INTERNAL MEDICINE

## 2018-06-22 PROCEDURE — G8399 PT W/DXA RESULTS DOCUMENT: HCPCS | Performed by: INTERNAL MEDICINE

## 2018-06-22 PROCEDURE — 1090F PRES/ABSN URINE INCON ASSESS: CPT | Performed by: INTERNAL MEDICINE

## 2018-06-22 PROCEDURE — G8427 DOCREV CUR MEDS BY ELIG CLIN: HCPCS | Performed by: INTERNAL MEDICINE

## 2018-06-22 PROCEDURE — 1036F TOBACCO NON-USER: CPT | Performed by: INTERNAL MEDICINE

## 2018-06-22 PROCEDURE — 4040F PNEUMOC VAC/ADMIN/RCVD: CPT | Performed by: INTERNAL MEDICINE

## 2018-06-22 PROCEDURE — 1123F ACP DISCUSS/DSCN MKR DOCD: CPT | Performed by: INTERNAL MEDICINE

## 2018-06-22 PROCEDURE — 3017F COLORECTAL CA SCREEN DOC REV: CPT | Performed by: INTERNAL MEDICINE

## 2018-06-22 PROCEDURE — 99213 OFFICE O/P EST LOW 20 MIN: CPT | Performed by: INTERNAL MEDICINE

## 2018-06-22 ASSESSMENT — ENCOUNTER SYMPTOMS
VOMITING: 0
SHORTNESS OF BREATH: 0
NAUSEA: 0
CHEST TIGHTNESS: 0
ABDOMINAL PAIN: 0

## 2018-07-01 ENCOUNTER — HOSPITAL ENCOUNTER (OUTPATIENT)
Dept: OTHER | Age: 68
Discharge: HOME | End: 2018-07-01
Attending: INTERNAL MEDICINE | Admitting: INTERNAL MEDICINE

## 2018-07-03 DIAGNOSIS — I10 ESSENTIAL HYPERTENSION: Primary | ICD-10-CM

## 2018-07-03 RX ORDER — FUROSEMIDE 20 MG/1
20 TABLET ORAL DAILY
Qty: 90 TABLET | Refills: 3 | Status: SHIPPED | OUTPATIENT
Start: 2018-07-03 | End: 2019-02-07 | Stop reason: SDUPTHER

## 2018-07-03 RX ORDER — AMLODIPINE BESYLATE 10 MG/1
10 TABLET ORAL DAILY
Qty: 90 TABLET | Refills: 3 | Status: SHIPPED | OUTPATIENT
Start: 2018-07-03 | End: 2018-07-24 | Stop reason: SDUPTHER

## 2018-07-06 ENCOUNTER — ANTI-COAG VISIT (OUTPATIENT)
Dept: PHARMACY | Facility: CLINIC | Age: 68
End: 2018-07-06

## 2018-07-06 DIAGNOSIS — I48.0 PAROXYSMAL ATRIAL FIBRILLATION (HCC): ICD-10-CM

## 2018-07-06 LAB — INTERNATIONAL NORMALIZATION RATIO, POC: 1.6

## 2018-07-06 NOTE — PROGRESS NOTES
Yoan Rueda is a 69 y.o. female with PMHx significant for A-fib, HTN, breast CA who presents to clinic 7/6/2018 for anticoagulation monitoring and adjustment. Patient has been taking warfarin for a-fib for at least 20 years.      Anticoagulation Indication(s):  Afib    Referring Physician:  Dr. Deana Centeno  Goal INR Range:  2-3  Duration of Anticoagulation Therapy:  Indefinite  Time of day dose taken:  PM  Product patient has at home:  warfarin 5 mg (peach) + 10 mg (white)    Recent INR Results:  Lab Results   Component Value Date    INR 2.4 06/22/2018    INR 3.1 06/14/2018    INR 2.2 06/06/2018    INR 4.2 05/31/2018       INR Summary                            Warfarin regimen (mg)  Date INR   A/P    Sun Mon Tue Wed Thu Fri Sat Mg/wk  7/6 1.6 Below goal, increase  7.5 10 7.5 7.5 7.5 10 7.5 57.5  6/22 2.4 At goal, no change  7.5 10 7.5 7.5 7.5 7.5 7.5 55  6/14 3.1 Above goal, decrease  7.5 10 7.5 7.5 7.5 7.5 7.5 55  6/6 2.2 At goal, increase  7.5 10 7.5 7.5 7.5 10 7.5 57.5  5/31 4.2 Above goal, hold + dec 7.5 7.5 7.5 7.5 0/7.5 7.5 7.5 52.5  5/24 3.8 Above goal, dec per cardio 10 7.5 10 7.5 10 7.5 7.5 60  5/17 2.6 At goal, cont per cardio 10 7.5 10 7.5 10 7.5 10 62.5    Last CBC:  Lab Results   Component Value Date    RBC 4.27 11/20/2017    HGB 13.0 11/20/2017    HCT 38.8 11/20/2017    MCV 90.9 11/20/2017    MCH 30.6 11/20/2017    MPV 7.9 11/20/2017    RDW 13.8 11/20/2017     11/20/2017       Patient History:  Recent hospitalizations/HC visits 6/22 Dr. Stefanie Collins   Recent medication changes Med rec reviewed at first visit and is up-to-date: removed Crestor and Vitamin D as patient is not taking   Medications taken regularly that may interact with warfarin or alter INR No significant drug interactions identified   Warfarin dose taken as prescribed Yes - uses pillbox   Signs/symptoms of bleeding No h/o bleeding reported   Vitamin K intake Normally has ~2 servings of green, leafy vegetables per

## 2018-07-19 DIAGNOSIS — I48.0 PAROXYSMAL ATRIAL FIBRILLATION (HCC): Primary | ICD-10-CM

## 2018-07-20 ENCOUNTER — ANTI-COAG VISIT (OUTPATIENT)
Dept: CARDIOLOGY CLINIC | Age: 68
End: 2018-07-20

## 2018-07-20 DIAGNOSIS — I48.0 PAROXYSMAL ATRIAL FIBRILLATION (HCC): ICD-10-CM

## 2018-07-20 LAB
INR BLD: 1.8
PROTHROMBIN TIME: 18.1 SEC (ref 9–11.5)

## 2018-07-24 RX ORDER — AMLODIPINE BESYLATE 10 MG/1
TABLET ORAL
Qty: 30 TABLET | Refills: 2 | Status: SHIPPED | OUTPATIENT
Start: 2018-07-24 | End: 2019-02-07 | Stop reason: SDUPTHER

## 2018-07-27 LAB
INR BLD: 1.6
PROTHROMBIN TIME: 16.5 SEC (ref 9–11.5)

## 2018-08-01 ENCOUNTER — TELEPHONE (OUTPATIENT)
Dept: PHARMACY | Age: 68
End: 2018-08-01

## 2018-08-01 ENCOUNTER — ANTI-COAG VISIT (OUTPATIENT)
Dept: CARDIOLOGY CLINIC | Age: 68
End: 2018-08-01

## 2018-08-01 DIAGNOSIS — I48.0 PAROXYSMAL ATRIAL FIBRILLATION (HCC): ICD-10-CM

## 2018-08-01 NOTE — PATIENT INSTRUCTIONS
AF 2.0-3.0  Old dose: Take 10 mg on Mondays and Fridays, Take 7.5 mg all other days  New Dose: 10 mg Monday, Tuesday, Wed and Friday; 7.5 all other days. (This is what Dr. Narciso Diaz office advised)  Recheck: Monday at coumadin clinic. Pt was between insurances and will now resume with coumadin clinic.

## 2018-08-06 ENCOUNTER — ANTI-COAG VISIT (OUTPATIENT)
Dept: PHARMACY | Age: 68
End: 2018-08-06
Payer: MEDICARE

## 2018-08-06 ENCOUNTER — OFFICE VISIT (OUTPATIENT)
Dept: CARDIOLOGY CLINIC | Age: 68
End: 2018-08-06

## 2018-08-06 VITALS
SYSTOLIC BLOOD PRESSURE: 136 MMHG | WEIGHT: 210 LBS | HEART RATE: 72 BPM | BODY MASS INDEX: 33.39 KG/M2 | DIASTOLIC BLOOD PRESSURE: 70 MMHG

## 2018-08-06 DIAGNOSIS — Z98.890 S/P ABLATION OF ATRIAL FLUTTER: ICD-10-CM

## 2018-08-06 DIAGNOSIS — Z86.79 S/P ABLATION OF ATRIAL FIBRILLATION: ICD-10-CM

## 2018-08-06 DIAGNOSIS — I48.0 PAROXYSMAL ATRIAL FIBRILLATION (HCC): ICD-10-CM

## 2018-08-06 DIAGNOSIS — Z98.890 S/P ABLATION OF ATRIAL FIBRILLATION: ICD-10-CM

## 2018-08-06 DIAGNOSIS — I48.0 PAROXYSMAL ATRIAL FIBRILLATION (HCC): Primary | ICD-10-CM

## 2018-08-06 DIAGNOSIS — I48.3 TYPICAL ATRIAL FLUTTER (HCC): ICD-10-CM

## 2018-08-06 DIAGNOSIS — Z86.79 S/P ABLATION OF ATRIAL FLUTTER: ICD-10-CM

## 2018-08-06 LAB — INTERNATIONAL NORMALIZATION RATIO, POC: 2.1

## 2018-08-06 PROCEDURE — 3017F COLORECTAL CA SCREEN DOC REV: CPT | Performed by: INTERNAL MEDICINE

## 2018-08-06 PROCEDURE — 1090F PRES/ABSN URINE INCON ASSESS: CPT | Performed by: INTERNAL MEDICINE

## 2018-08-06 PROCEDURE — G8417 CALC BMI ABV UP PARAM F/U: HCPCS | Performed by: INTERNAL MEDICINE

## 2018-08-06 PROCEDURE — 99211 OFF/OP EST MAY X REQ PHY/QHP: CPT

## 2018-08-06 PROCEDURE — 99214 OFFICE O/P EST MOD 30 MIN: CPT | Performed by: INTERNAL MEDICINE

## 2018-08-06 PROCEDURE — G8427 DOCREV CUR MEDS BY ELIG CLIN: HCPCS | Performed by: INTERNAL MEDICINE

## 2018-08-06 PROCEDURE — 1123F ACP DISCUSS/DSCN MKR DOCD: CPT | Performed by: INTERNAL MEDICINE

## 2018-08-06 PROCEDURE — 1101F PT FALLS ASSESS-DOCD LE1/YR: CPT | Performed by: INTERNAL MEDICINE

## 2018-08-06 PROCEDURE — 85610 PROTHROMBIN TIME: CPT

## 2018-08-06 PROCEDURE — 1036F TOBACCO NON-USER: CPT | Performed by: INTERNAL MEDICINE

## 2018-08-06 PROCEDURE — 4040F PNEUMOC VAC/ADMIN/RCVD: CPT | Performed by: INTERNAL MEDICINE

## 2018-08-06 PROCEDURE — G8399 PT W/DXA RESULTS DOCUMENT: HCPCS | Performed by: INTERNAL MEDICINE

## 2018-08-22 ENCOUNTER — ANTI-COAG VISIT (OUTPATIENT)
Dept: PHARMACY | Age: 68
End: 2018-08-22
Payer: MEDICARE

## 2018-08-22 DIAGNOSIS — I48.0 PAROXYSMAL ATRIAL FIBRILLATION (HCC): ICD-10-CM

## 2018-08-22 LAB — INR BLD: 2

## 2018-08-22 PROCEDURE — 85610 PROTHROMBIN TIME: CPT

## 2018-08-22 PROCEDURE — 99211 OFF/OP EST MAY X REQ PHY/QHP: CPT

## 2018-09-19 ENCOUNTER — ANTI-COAG VISIT (OUTPATIENT)
Dept: PHARMACY | Age: 68
End: 2018-09-19
Payer: MEDICARE

## 2018-09-19 DIAGNOSIS — I48.0 PAROXYSMAL ATRIAL FIBRILLATION (HCC): ICD-10-CM

## 2018-09-19 LAB — INR BLD: 2.8

## 2018-09-19 PROCEDURE — 85610 PROTHROMBIN TIME: CPT

## 2018-09-19 PROCEDURE — 99211 OFF/OP EST MAY X REQ PHY/QHP: CPT

## 2018-10-17 ENCOUNTER — TELEPHONE (OUTPATIENT)
Dept: CARDIOLOGY CLINIC | Age: 68
End: 2018-10-17

## 2018-10-17 NOTE — TELEPHONE ENCOUNTER
Pt is requesting a paper RX for Flecanide 100 mg bid    90 day supply    Wants a paper Rx to  so she can compare prices as she has no RX coverage at this time    PLease  Call her to advise

## 2018-10-18 ENCOUNTER — ANTI-COAG VISIT (OUTPATIENT)
Dept: PHARMACY | Age: 68
End: 2018-10-18
Payer: MEDICARE

## 2018-10-18 DIAGNOSIS — I48.0 PAROXYSMAL ATRIAL FIBRILLATION (HCC): ICD-10-CM

## 2018-10-18 LAB — INTERNATIONAL NORMALIZATION RATIO, POC: 3.1

## 2018-10-18 PROCEDURE — 99211 OFF/OP EST MAY X REQ PHY/QHP: CPT

## 2018-10-18 PROCEDURE — 85610 PROTHROMBIN TIME: CPT

## 2018-10-18 RX ORDER — FLECAINIDE ACETATE 100 MG/1
100 TABLET ORAL 2 TIMES DAILY
Qty: 180 TABLET | Refills: 3 | Status: SHIPPED | OUTPATIENT
Start: 2018-10-18 | End: 2018-10-18 | Stop reason: SDUPTHER

## 2018-10-18 RX ORDER — FLECAINIDE ACETATE 100 MG/1
100 TABLET ORAL 2 TIMES DAILY
Qty: 180 TABLET | Refills: 3 | Status: SHIPPED | OUTPATIENT
Start: 2018-10-18 | End: 2019-02-07 | Stop reason: SDUPTHER

## 2018-10-18 RX ORDER — FLECAINIDE ACETATE 100 MG/1
100 TABLET ORAL 2 TIMES DAILY
Qty: 180 TABLET | Refills: 3 | Status: SHIPPED | OUTPATIENT
Start: 2018-10-18 | End: 2018-11-26 | Stop reason: SDUPTHER

## 2018-10-18 NOTE — PROGRESS NOTES
- uses pillbox   Signs/symptoms of bleeding No h/o bleeding reported   Vitamin K intake Normally has ~2 servings of green, leafy vegetables per week (cabbage, brittany slaw, broccoli)   Recent vomiting/diarrhea/fever, changes in weight or activity level None reported   Tobacco or alcohol use Patient quit smoking 40+ years ago  Patient denies alcohol use   Upcoming surgeries or procedures None reported     Assessment/Plan:  Patient's INR was slightly supratherapeutic today (3.1), but overall appears stable on current warfarin regimen. Patient denies extra/incorrect warfarin doses, diet changes, and medication changes since last visit. Patient was instructed to continue warfarin 10 mg on Mon/Tue/Wed/Fri, and 7.5 mg all other days. Repeat INR in 4 weeks. Patient was reminded to maintain consistent vitamin K intake and call with any bleeding, medication changes, or fever/vomiting/diarrhea. History  INR was therapeutic Aug-Feb (6 months) on dose of 12.5 mg Wed/Fri, and 10 mg all other days. Dose was adjusted slightly to 10 mg daily in February, then back to 12.5 mg once weekly in March. INR started spiking in late April, and warfarin dose reductions were necessary throughout May. Patient understands dosing directions and information discussed. Dosing schedule and follow up appointment given to patient. Progress note routed to referring physician's office. Patient acknowledges working in consult agreement with pharmacist as referred by his/her physician. Next Clinic Appointment:  11/15    Please call [facility] at [phone] with any questions. Thanks! Georges Kanner.  Mina Berg, PharmD  [facility]  Ph: [phone]  10/18/2018 9:23 AM

## 2018-11-16 ENCOUNTER — ANTI-COAG VISIT (OUTPATIENT)
Dept: PHARMACY | Age: 68
End: 2018-11-16
Payer: MEDICARE

## 2018-11-16 DIAGNOSIS — I48.0 PAROXYSMAL ATRIAL FIBRILLATION (HCC): ICD-10-CM

## 2018-11-16 LAB — INTERNATIONAL NORMALIZATION RATIO, POC: 1.8

## 2018-11-16 PROCEDURE — 99211 OFF/OP EST MAY X REQ PHY/QHP: CPT

## 2018-11-16 PROCEDURE — 85610 PROTHROMBIN TIME: CPT

## 2018-11-16 NOTE — PROGRESS NOTES
interactions identified   Warfarin dose taken as prescribed Yes - uses pillbox   Signs/symptoms of bleeding No h/o bleeding reported   Vitamin K intake Normally has ~2 servings of green, leafy vegetables per week (cabbage, brittany slaw, broccoli)   Recent vomiting/diarrhea/fever, changes in weight or activity level None reported   Tobacco or alcohol use Patient quit smoking 40+ years ago  Patient denies alcohol use   Upcoming surgeries or procedures None reported     Assessment/Plan:  Patient's INR was subtherapeutic today (1.8), but overall appears stable on current warfarin regimen. Patient denies missed/incorrect warfarin doses and medication changes since last visit, but does endorse eating more greens than usual in the past week. Patient was instructed to continue warfarin 10 mg on Mon/Tue/Wed/Fri, and 7.5 mg all other days. Repeat INR in 4 weeks (prefers monthly visits due to cost burden). Patient was reminded to maintain consistent vitamin K intake and call with any bleeding, medication changes, or fever/vomiting/diarrhea. History  INR was therapeutic Aug-Feb (6 months) on dose of 12.5 mg Wed/Fri, and 10 mg all other days. Dose was adjusted slightly to 10 mg daily in February, then back to 12.5 mg once weekly in March. INR started spiking in late April, and warfarin dose reductions were necessary throughout May. Patient understands dosing directions and information discussed. Dosing schedule and follow up appointment given to patient. Progress note routed to referring physician's office. Patient acknowledges working in consult agreement with pharmacist as referred by his/her physician. Next Clinic Appointment:  12/14    Please call [facility] at [phone] with any questions. Thanks! Yumi Mirza.  Antonio Khan, PharmD  [facility]  Ph: [phone]  11/16/2018 9:28 AM

## 2018-11-20 NOTE — PROGRESS NOTES
Historical Provider, MD        Allergies:  Keflex [cephalexin]; Novocain [procaine]; Amiodarone; Cephalexin; Penicillins; and Sulfa antibiotics     ROS:   Review of Systems   Constitutional: Positive for fatigue. Respiratory: Positive for shortness of breath. Cardiovascular: Positive for chest pain, palpitations and leg swelling. Gastrointestinal: Negative. Genitourinary: Negative. Musculoskeletal: Positive for myalgias. Skin: Negative. Neurological: Negative. Hematological: Negative. Psychiatric/Behavioral: Negative. Physical Examination:    Vitals:    11/26/18 0935   BP: 124/80   Pulse: 56   Weight: 208 lb (94.3 kg)           Physical Exam   Constitutional: She is oriented to person, place, and time. She appears well-developed and well-nourished. HENT:   Head: Normocephalic and atraumatic. Eyes: Pupils are equal, round, and reactive to light. EOM are normal.   Neck: Normal range of motion. Neck supple. Cardiovascular: Normal rate, regular rhythm, normal heart sounds and intact distal pulses. Pulmonary/Chest: Effort normal and breath sounds normal.   Abdominal: Soft. Musculoskeletal: Normal range of motion. Neurological: She is alert and oriented to person, place, and time. Skin: Skin is warm and dry. Psychiatric: She has a normal mood and affect.  Her behavior is normal. Judgment and thought content normal.       Lab Data:    CBC:   Lab Results   Component Value Date    WBC 7.2 11/20/2017    WBC 8.1 05/23/2017    WBC 8.0 05/24/2016    WBC 8.1 05/04/2016    WBC 8.2 11/24/2015    WBC 6.6 02/27/2014    RBC 4.27 11/20/2017    RBC 4.26 05/23/2017    RBC 4.27 05/24/2016    RBC 4.18 05/04/2016    RBC 3.89 11/24/2015    HGB 13.0 11/20/2017    HGB 12.0 05/23/2017    HGB 12.2 05/24/2016    HCT 38.8 11/20/2017    HCT 39.2 05/23/2017    HCT 39.2 05/24/2016    MCV 90.9 11/20/2017    MCV 92.1 05/23/2017    MCV 91.7 05/24/2016    RDW 13.8 11/20/2017    RDW 13.4 05/23/2017    RDW and reduction of cardiac risk factors. Assessment/Plan:    1. Paroxysmal atrial fibrillation (HCC) - holter monitor, check dig level, labs   2. Hypervolemia, unspecified hypervolemia type - continue lasix, check echo   3. Chest pain, unspecified type - ST             Instructions:   1. Medications: Continue current medications  2. Labs: today  3. Referrals: echo, stress test, and monitor  4. Follow up: with Dr. Kalpesh Kwan after testing        I appreciate the opportunity of cooperating in the care of this individual.    Zain Sierra CNP, 11/20/2018, 2:58 PM    QUALITY MEASURES  1. Tobacco Cessation Counseling: NA  2. Retake of BP if >140/90:   NA  3. Documentation to PCP/referring for new patient:  Sent to PCP at close of office visit  4. CAD patient on anti-platelet:NA  11. CAD patient on STATIN therapy:  NA  6. Patient with CHF and aFib on anticoagulation:  Yes   7. Patient Education:  Yes   8. BB for LVSD :NA   9. ACE/ARB for LVSD:  NA   10.  Left Ventricular Ejection Fraction (LVEF) Assessment:  Yes

## 2018-11-26 ENCOUNTER — OFFICE VISIT (OUTPATIENT)
Dept: CARDIOLOGY CLINIC | Age: 68
End: 2018-11-26
Payer: MEDICARE

## 2018-11-26 VITALS
BODY MASS INDEX: 33.07 KG/M2 | DIASTOLIC BLOOD PRESSURE: 80 MMHG | SYSTOLIC BLOOD PRESSURE: 124 MMHG | WEIGHT: 208 LBS | HEART RATE: 56 BPM

## 2018-11-26 DIAGNOSIS — I50.9 ACUTE CONGESTIVE HEART FAILURE, UNSPECIFIED HEART FAILURE TYPE (HCC): ICD-10-CM

## 2018-11-26 DIAGNOSIS — E87.70 HYPERVOLEMIA, UNSPECIFIED HYPERVOLEMIA TYPE: ICD-10-CM

## 2018-11-26 DIAGNOSIS — I50.31 ACUTE DIASTOLIC CONGESTIVE HEART FAILURE (HCC): ICD-10-CM

## 2018-11-26 DIAGNOSIS — R07.9 CHEST PAIN, UNSPECIFIED TYPE: ICD-10-CM

## 2018-11-26 DIAGNOSIS — I48.0 PAROXYSMAL ATRIAL FIBRILLATION (HCC): Primary | ICD-10-CM

## 2018-11-26 PROBLEM — R07.89 OTHER CHEST PAIN: Status: ACTIVE | Noted: 2018-11-26

## 2018-11-26 LAB
ANION GAP SERPL CALCULATED.3IONS-SCNC: 15 MMOL/L (ref 3–16)
BUN BLDV-MCNC: 9 MG/DL (ref 7–20)
CALCIUM SERPL-MCNC: 9.4 MG/DL (ref 8.3–10.6)
CHLORIDE BLD-SCNC: 99 MMOL/L (ref 99–110)
CO2: 27 MMOL/L (ref 21–32)
CREAT SERPL-MCNC: <0.5 MG/DL (ref 0.6–1.2)
GFR AFRICAN AMERICAN: >60
GFR NON-AFRICAN AMERICAN: >60
GLUCOSE BLD-MCNC: 102 MG/DL (ref 70–99)
HCT VFR BLD CALC: 40.8 % (ref 36–48)
HEMOGLOBIN: 13.5 G/DL (ref 12–16)
MCH RBC QN AUTO: 29.7 PG (ref 26–34)
MCHC RBC AUTO-ENTMCNC: 33.2 G/DL (ref 31–36)
MCV RBC AUTO: 89.5 FL (ref 80–100)
PDW BLD-RTO: 14.1 % (ref 12.4–15.4)
PLATELET # BLD: 319 K/UL (ref 135–450)
PMV BLD AUTO: 8.1 FL (ref 5–10.5)
POTASSIUM SERPL-SCNC: 4 MMOL/L (ref 3.5–5.1)
PRO-BNP: 66 PG/ML (ref 0–124)
RBC # BLD: 4.55 M/UL (ref 4–5.2)
SODIUM BLD-SCNC: 141 MMOL/L (ref 136–145)
T3 TOTAL: 1.43 NG/ML (ref 0.8–2)
T4 TOTAL: 9.2 UG/DL (ref 4.5–10.9)
TSH SERPL DL<=0.05 MIU/L-ACNC: 1.05 UIU/ML (ref 0.27–4.2)
WBC # BLD: 7 K/UL (ref 4–11)

## 2018-11-26 PROCEDURE — G8484 FLU IMMUNIZE NO ADMIN: HCPCS | Performed by: NURSE PRACTITIONER

## 2018-11-26 PROCEDURE — 4040F PNEUMOC VAC/ADMIN/RCVD: CPT | Performed by: NURSE PRACTITIONER

## 2018-11-26 PROCEDURE — 1101F PT FALLS ASSESS-DOCD LE1/YR: CPT | Performed by: NURSE PRACTITIONER

## 2018-11-26 PROCEDURE — 1123F ACP DISCUSS/DSCN MKR DOCD: CPT | Performed by: NURSE PRACTITIONER

## 2018-11-26 PROCEDURE — G8417 CALC BMI ABV UP PARAM F/U: HCPCS | Performed by: NURSE PRACTITIONER

## 2018-11-26 PROCEDURE — 3017F COLORECTAL CA SCREEN DOC REV: CPT | Performed by: NURSE PRACTITIONER

## 2018-11-26 PROCEDURE — G8427 DOCREV CUR MEDS BY ELIG CLIN: HCPCS | Performed by: NURSE PRACTITIONER

## 2018-11-26 PROCEDURE — 99214 OFFICE O/P EST MOD 30 MIN: CPT | Performed by: NURSE PRACTITIONER

## 2018-11-26 PROCEDURE — 1036F TOBACCO NON-USER: CPT | Performed by: NURSE PRACTITIONER

## 2018-11-26 PROCEDURE — G8399 PT W/DXA RESULTS DOCUMENT: HCPCS | Performed by: NURSE PRACTITIONER

## 2018-11-26 PROCEDURE — 1090F PRES/ABSN URINE INCON ASSESS: CPT | Performed by: NURSE PRACTITIONER

## 2018-11-26 ASSESSMENT — ENCOUNTER SYMPTOMS
SHORTNESS OF BREATH: 1
GASTROINTESTINAL NEGATIVE: 1

## 2018-11-30 ENCOUNTER — HOSPITAL ENCOUNTER (OUTPATIENT)
Dept: NON INVASIVE DIAGNOSTICS | Age: 68
Discharge: HOME | End: 2018-11-30
Payer: MEDICARE

## 2018-11-30 DIAGNOSIS — I50.9 ACUTE CONGESTIVE HEART FAILURE, UNSPECIFIED HEART FAILURE TYPE (HCC): ICD-10-CM

## 2018-11-30 DIAGNOSIS — R07.9 CHEST PAIN, UNSPECIFIED TYPE: ICD-10-CM

## 2018-11-30 DIAGNOSIS — E87.70 HYPERVOLEMIA, UNSPECIFIED HYPERVOLEMIA TYPE: ICD-10-CM

## 2018-11-30 DIAGNOSIS — I48.0 PAROXYSMAL ATRIAL FIBRILLATION (HCC): ICD-10-CM

## 2018-11-30 LAB
LV EF: 58 %
LVEF MODALITY: NORMAL

## 2018-11-30 PROCEDURE — 93306 TTE W/DOPPLER COMPLETE: CPT

## 2018-12-04 PROCEDURE — 0298T PR EXT ECG > 48HR TO 21 DAY REVIEW AND INTERPRETATN: CPT | Performed by: INTERNAL MEDICINE

## 2018-12-10 DIAGNOSIS — I48.0 PAROXYSMAL ATRIAL FIBRILLATION (HCC): ICD-10-CM

## 2018-12-14 ENCOUNTER — ANTI-COAG VISIT (OUTPATIENT)
Dept: PHARMACY | Age: 68
End: 2018-12-14
Payer: MEDICARE

## 2018-12-14 DIAGNOSIS — I48.0 PAROXYSMAL ATRIAL FIBRILLATION (HCC): ICD-10-CM

## 2018-12-14 LAB — INTERNATIONAL NORMALIZATION RATIO, POC: 2.1

## 2018-12-14 PROCEDURE — 99211 OFF/OP EST MAY X REQ PHY/QHP: CPT

## 2018-12-14 PROCEDURE — 85610 PROTHROMBIN TIME: CPT

## 2018-12-14 NOTE — PROGRESS NOTES
may interact with warfarin or alter INR No significant drug interactions identified   Warfarin dose taken as prescribed Yes - uses pillbox   Signs/symptoms of bleeding No h/o bleeding reported   Vitamin K intake Normally has ~2 servings of green, leafy vegetables per week (cabbage, brittany slaw, broccoli)   Recent vomiting/diarrhea/fever, changes in weight or activity level None reported   Tobacco or alcohol use Patient quit smoking 40+ years ago  Patient denies alcohol use   Upcoming surgeries or procedures None reported     Assessment/Plan:  Patient's INR was therapeutic today (2.1), and overall appears stable on current warfarin regimen. Patient was instructed to continue warfarin 10 mg on Mon/Tue/Wed/Fri, and 7.5 mg all other days. Repeat INR in 5 weeks (prefers monthly visits due to cost burden). Patient was reminded to maintain consistent vitamin K intake and call with any bleeding, medication changes, or fever/vomiting/diarrhea. Patient understands dosing directions and information discussed. Dosing schedule and follow up appointment given to patient. Progress note routed to referring physician's office. Patient acknowledges working in consult agreement with pharmacist as referred by his/her physician. Next Clinic Appointment:  1/18    Please call [facility] at [phone] with any questions. Thanks! Remus Petties.  [facility], PharmD  [facility]  Ph: [phone]  12/14/2018 9:35 AM

## 2019-01-18 ENCOUNTER — ANTI-COAG VISIT (OUTPATIENT)
Dept: PHARMACY | Age: 69
End: 2019-01-18
Payer: MEDICARE

## 2019-01-18 DIAGNOSIS — I48.0 PAROXYSMAL ATRIAL FIBRILLATION (HCC): ICD-10-CM

## 2019-01-18 LAB — INTERNATIONAL NORMALIZATION RATIO, POC: 2.9

## 2019-01-18 PROCEDURE — 85610 PROTHROMBIN TIME: CPT

## 2019-01-18 PROCEDURE — 99211 OFF/OP EST MAY X REQ PHY/QHP: CPT

## 2019-01-29 ENCOUNTER — TELEPHONE (OUTPATIENT)
Dept: INTERNAL MEDICINE CLINIC | Age: 69
End: 2019-01-29

## 2019-02-07 ENCOUNTER — OFFICE VISIT (OUTPATIENT)
Dept: CARDIOLOGY CLINIC | Age: 69
End: 2019-02-07
Payer: MEDICARE

## 2019-02-07 VITALS
BODY MASS INDEX: 33.23 KG/M2 | DIASTOLIC BLOOD PRESSURE: 70 MMHG | SYSTOLIC BLOOD PRESSURE: 146 MMHG | WEIGHT: 209 LBS | HEART RATE: 55 BPM

## 2019-02-07 DIAGNOSIS — Z98.890 S/P ABLATION OF ATRIAL FLUTTER: ICD-10-CM

## 2019-02-07 DIAGNOSIS — Z98.890 S/P ABLATION OF ATRIAL FIBRILLATION: ICD-10-CM

## 2019-02-07 DIAGNOSIS — Z86.79 S/P ABLATION OF ATRIAL FLUTTER: ICD-10-CM

## 2019-02-07 DIAGNOSIS — I48.0 PAROXYSMAL ATRIAL FIBRILLATION (HCC): Primary | ICD-10-CM

## 2019-02-07 DIAGNOSIS — Z86.79 S/P ABLATION OF ATRIAL FIBRILLATION: ICD-10-CM

## 2019-02-07 DIAGNOSIS — I10 ESSENTIAL HYPERTENSION: ICD-10-CM

## 2019-02-07 DIAGNOSIS — I48.3 TYPICAL ATRIAL FLUTTER (HCC): ICD-10-CM

## 2019-02-07 PROCEDURE — 1036F TOBACCO NON-USER: CPT | Performed by: INTERNAL MEDICINE

## 2019-02-07 PROCEDURE — 1123F ACP DISCUSS/DSCN MKR DOCD: CPT | Performed by: INTERNAL MEDICINE

## 2019-02-07 PROCEDURE — 3017F COLORECTAL CA SCREEN DOC REV: CPT | Performed by: INTERNAL MEDICINE

## 2019-02-07 PROCEDURE — G8484 FLU IMMUNIZE NO ADMIN: HCPCS | Performed by: INTERNAL MEDICINE

## 2019-02-07 PROCEDURE — G8417 CALC BMI ABV UP PARAM F/U: HCPCS | Performed by: INTERNAL MEDICINE

## 2019-02-07 PROCEDURE — 1090F PRES/ABSN URINE INCON ASSESS: CPT | Performed by: INTERNAL MEDICINE

## 2019-02-07 PROCEDURE — 4040F PNEUMOC VAC/ADMIN/RCVD: CPT | Performed by: INTERNAL MEDICINE

## 2019-02-07 PROCEDURE — G8399 PT W/DXA RESULTS DOCUMENT: HCPCS | Performed by: INTERNAL MEDICINE

## 2019-02-07 PROCEDURE — G8428 CUR MEDS NOT DOCUMENT: HCPCS | Performed by: INTERNAL MEDICINE

## 2019-02-07 PROCEDURE — 1101F PT FALLS ASSESS-DOCD LE1/YR: CPT | Performed by: INTERNAL MEDICINE

## 2019-02-07 PROCEDURE — 99214 OFFICE O/P EST MOD 30 MIN: CPT | Performed by: INTERNAL MEDICINE

## 2019-02-07 PROCEDURE — 93000 ELECTROCARDIOGRAM COMPLETE: CPT | Performed by: INTERNAL MEDICINE

## 2019-02-07 RX ORDER — FUROSEMIDE 20 MG/1
20 TABLET ORAL DAILY
Qty: 90 TABLET | Refills: 3 | Status: SHIPPED | OUTPATIENT
Start: 2019-02-07 | End: 2019-12-30

## 2019-02-07 RX ORDER — WARFARIN SODIUM 10 MG/1
10 TABLET ORAL DAILY
Qty: 90 TABLET | Refills: 3 | Status: SHIPPED | OUTPATIENT
Start: 2019-02-07 | End: 2019-12-30

## 2019-02-07 RX ORDER — EZETIMIBE 10 MG/1
TABLET ORAL
Qty: 90 TABLET | Refills: 3 | Status: SHIPPED | OUTPATIENT
Start: 2019-02-07 | End: 2020-03-17

## 2019-02-07 RX ORDER — DIGOXIN 250 MCG
250 TABLET ORAL DAILY
Qty: 90 TABLET | Refills: 3 | Status: SHIPPED | OUTPATIENT
Start: 2019-02-07 | End: 2019-12-30

## 2019-02-07 RX ORDER — WARFARIN SODIUM 5 MG/1
5 TABLET ORAL DAILY
Qty: 90 TABLET | Refills: 3 | Status: SHIPPED | OUTPATIENT
Start: 2019-02-07 | End: 2019-02-14 | Stop reason: SDUPTHER

## 2019-02-07 RX ORDER — FLECAINIDE ACETATE 100 MG/1
100 TABLET ORAL 2 TIMES DAILY
Qty: 180 TABLET | Refills: 3 | Status: SHIPPED | OUTPATIENT
Start: 2019-02-07 | End: 2019-05-01 | Stop reason: SDUPTHER

## 2019-02-07 RX ORDER — AMLODIPINE BESYLATE 10 MG/1
10 TABLET ORAL DAILY
Qty: 90 TABLET | Refills: 3 | Status: SHIPPED | OUTPATIENT
Start: 2019-02-07 | End: 2019-12-30

## 2019-02-08 ENCOUNTER — NURSE ONLY (OUTPATIENT)
Dept: CARDIOLOGY CLINIC | Age: 69
End: 2019-02-08

## 2019-02-08 ENCOUNTER — OFFICE VISIT (OUTPATIENT)
Dept: CARDIOLOGY CLINIC | Age: 69
End: 2019-02-08
Payer: MEDICARE

## 2019-02-08 VITALS
WEIGHT: 209.2 LBS | BODY MASS INDEX: 30.98 KG/M2 | HEIGHT: 69 IN | SYSTOLIC BLOOD PRESSURE: 126 MMHG | HEART RATE: 58 BPM | DIASTOLIC BLOOD PRESSURE: 86 MMHG

## 2019-02-08 DIAGNOSIS — I48.3 TYPICAL ATRIAL FLUTTER (HCC): Primary | ICD-10-CM

## 2019-02-08 DIAGNOSIS — M79.601 RIGHT ARM PAIN: ICD-10-CM

## 2019-02-08 DIAGNOSIS — R94.31 ABNORMAL ECG: ICD-10-CM

## 2019-02-08 DIAGNOSIS — I10 ESSENTIAL HYPERTENSION: ICD-10-CM

## 2019-02-08 DIAGNOSIS — I48.0 PAROXYSMAL ATRIAL FIBRILLATION (HCC): ICD-10-CM

## 2019-02-08 PROCEDURE — G8399 PT W/DXA RESULTS DOCUMENT: HCPCS | Performed by: INTERNAL MEDICINE

## 2019-02-08 PROCEDURE — G8417 CALC BMI ABV UP PARAM F/U: HCPCS | Performed by: INTERNAL MEDICINE

## 2019-02-08 PROCEDURE — 1101F PT FALLS ASSESS-DOCD LE1/YR: CPT | Performed by: INTERNAL MEDICINE

## 2019-02-08 PROCEDURE — 93000 ELECTROCARDIOGRAM COMPLETE: CPT | Performed by: INTERNAL MEDICINE

## 2019-02-08 PROCEDURE — G8427 DOCREV CUR MEDS BY ELIG CLIN: HCPCS | Performed by: INTERNAL MEDICINE

## 2019-02-08 PROCEDURE — 4040F PNEUMOC VAC/ADMIN/RCVD: CPT | Performed by: INTERNAL MEDICINE

## 2019-02-08 PROCEDURE — 1090F PRES/ABSN URINE INCON ASSESS: CPT | Performed by: INTERNAL MEDICINE

## 2019-02-08 PROCEDURE — 1036F TOBACCO NON-USER: CPT | Performed by: INTERNAL MEDICINE

## 2019-02-08 PROCEDURE — 99204 OFFICE O/P NEW MOD 45 MIN: CPT | Performed by: INTERNAL MEDICINE

## 2019-02-08 PROCEDURE — G8484 FLU IMMUNIZE NO ADMIN: HCPCS | Performed by: INTERNAL MEDICINE

## 2019-02-08 PROCEDURE — 3017F COLORECTAL CA SCREEN DOC REV: CPT | Performed by: INTERNAL MEDICINE

## 2019-02-08 PROCEDURE — 1123F ACP DISCUSS/DSCN MKR DOCD: CPT | Performed by: INTERNAL MEDICINE

## 2019-02-13 ENCOUNTER — TELEPHONE (OUTPATIENT)
Dept: CARDIOLOGY CLINIC | Age: 69
End: 2019-02-13

## 2019-02-14 RX ORDER — WARFARIN SODIUM 5 MG/1
TABLET ORAL
Qty: 90 TABLET | Refills: 3 | Status: SHIPPED | OUTPATIENT
Start: 2019-02-14 | End: 2020-04-06 | Stop reason: SDUPTHER

## 2019-02-19 ENCOUNTER — HOSPITAL ENCOUNTER (OUTPATIENT)
Dept: NON INVASIVE DIAGNOSTICS | Age: 69
Discharge: HOME | End: 2019-02-19
Payer: MEDICARE

## 2019-02-19 ENCOUNTER — HOSPITAL ENCOUNTER (OUTPATIENT)
Dept: NUCLEAR MEDICINE | Age: 69
Discharge: HOME | End: 2019-02-19
Payer: MEDICARE

## 2019-02-19 DIAGNOSIS — R94.31 ABNORMAL ECG: ICD-10-CM

## 2019-02-19 DIAGNOSIS — I48.3 TYPICAL ATRIAL FLUTTER (HCC): ICD-10-CM

## 2019-02-19 DIAGNOSIS — M79.601 RIGHT ARM PAIN: ICD-10-CM

## 2019-02-19 DIAGNOSIS — I10 ESSENTIAL HYPERTENSION: ICD-10-CM

## 2019-02-19 DIAGNOSIS — I48.0 PAROXYSMAL ATRIAL FIBRILLATION (HCC): ICD-10-CM

## 2019-02-19 LAB
LV EF: 75 %
LVEF MODALITY: NORMAL

## 2019-02-19 PROCEDURE — 93017 CV STRESS TEST TRACING ONLY: CPT

## 2019-02-19 PROCEDURE — 78452 HT MUSCLE IMAGE SPECT MULT: CPT

## 2019-02-19 PROCEDURE — 3430000000 HC RX DIAGNOSTIC RADIOPHARMACEUTICAL: Performed by: INTERNAL MEDICINE

## 2019-02-19 PROCEDURE — A9502 TC99M TETROFOSMIN: HCPCS | Performed by: INTERNAL MEDICINE

## 2019-02-19 PROCEDURE — 2580000003 HC RX 258: Performed by: INTERNAL MEDICINE

## 2019-02-19 RX ORDER — SODIUM CHLORIDE 0.9 % (FLUSH) 0.9 %
10 SYRINGE (ML) INJECTION PRN
Status: DISCONTINUED | OUTPATIENT
Start: 2019-02-19 | End: 2019-02-20 | Stop reason: HOSPADM

## 2019-02-19 RX ADMIN — TETROFOSMIN 30 MILLICURIE: 1.38 INJECTION, POWDER, LYOPHILIZED, FOR SOLUTION INTRAVENOUS at 10:13

## 2019-02-19 RX ADMIN — TETROFOSMIN 10 MILLICURIE: 1.38 INJECTION, POWDER, LYOPHILIZED, FOR SOLUTION INTRAVENOUS at 08:41

## 2019-02-19 RX ADMIN — Medication 10 ML: at 10:13

## 2019-02-19 RX ADMIN — Medication 10 ML: at 08:42

## 2019-03-01 ENCOUNTER — ANTI-COAG VISIT (OUTPATIENT)
Dept: PHARMACY | Age: 69
End: 2019-03-01
Payer: MEDICARE

## 2019-03-01 LAB — INTERNATIONAL NORMALIZATION RATIO, POC: 2.8

## 2019-03-01 PROCEDURE — 99211 OFF/OP EST MAY X REQ PHY/QHP: CPT | Performed by: PHARMACIST

## 2019-03-01 PROCEDURE — 85610 PROTHROMBIN TIME: CPT | Performed by: PHARMACIST

## 2019-03-12 ENCOUNTER — TELEPHONE (OUTPATIENT)
Dept: CARDIOLOGY CLINIC | Age: 69
End: 2019-03-12

## 2019-03-19 PROCEDURE — 93228 REMOTE 30 DAY ECG REV/REPORT: CPT | Performed by: INTERNAL MEDICINE

## 2019-03-20 DIAGNOSIS — I48.0 PAROXYSMAL ATRIAL FIBRILLATION (HCC): ICD-10-CM

## 2019-04-11 ENCOUNTER — ANTI-COAG VISIT (OUTPATIENT)
Dept: PHARMACY | Age: 69
End: 2019-04-11
Payer: MEDICARE

## 2019-04-11 ENCOUNTER — OFFICE VISIT (OUTPATIENT)
Dept: CARDIOLOGY CLINIC | Age: 69
End: 2019-04-11
Payer: MEDICARE

## 2019-04-11 VITALS
DIASTOLIC BLOOD PRESSURE: 80 MMHG | WEIGHT: 212 LBS | HEART RATE: 68 BPM | BODY MASS INDEX: 31.31 KG/M2 | SYSTOLIC BLOOD PRESSURE: 118 MMHG

## 2019-04-11 DIAGNOSIS — I48.92 ATRIAL FLUTTER, UNSPECIFIED TYPE (HCC): ICD-10-CM

## 2019-04-11 DIAGNOSIS — Z98.890 S/P ABLATION OF ATRIAL FLUTTER: ICD-10-CM

## 2019-04-11 DIAGNOSIS — Z98.890 S/P ABLATION OF ATRIAL FIBRILLATION: ICD-10-CM

## 2019-04-11 DIAGNOSIS — I48.0 PAROXYSMAL ATRIAL FIBRILLATION (HCC): ICD-10-CM

## 2019-04-11 DIAGNOSIS — I48.0 PAROXYSMAL ATRIAL FIBRILLATION (HCC): Primary | ICD-10-CM

## 2019-04-11 DIAGNOSIS — Z86.79 S/P ABLATION OF ATRIAL FIBRILLATION: ICD-10-CM

## 2019-04-11 DIAGNOSIS — Z86.79 S/P ABLATION OF ATRIAL FLUTTER: ICD-10-CM

## 2019-04-11 LAB — INTERNATIONAL NORMALIZATION RATIO, POC: 2.2

## 2019-04-11 PROCEDURE — 85610 PROTHROMBIN TIME: CPT

## 2019-04-11 PROCEDURE — 99211 OFF/OP EST MAY X REQ PHY/QHP: CPT

## 2019-04-11 PROCEDURE — 99214 OFFICE O/P EST MOD 30 MIN: CPT | Performed by: INTERNAL MEDICINE

## 2019-04-11 PROCEDURE — G8417 CALC BMI ABV UP PARAM F/U: HCPCS | Performed by: INTERNAL MEDICINE

## 2019-04-11 PROCEDURE — G8399 PT W/DXA RESULTS DOCUMENT: HCPCS | Performed by: INTERNAL MEDICINE

## 2019-04-11 PROCEDURE — 1123F ACP DISCUSS/DSCN MKR DOCD: CPT | Performed by: INTERNAL MEDICINE

## 2019-04-11 PROCEDURE — 3017F COLORECTAL CA SCREEN DOC REV: CPT | Performed by: INTERNAL MEDICINE

## 2019-04-11 PROCEDURE — G8427 DOCREV CUR MEDS BY ELIG CLIN: HCPCS | Performed by: INTERNAL MEDICINE

## 2019-04-11 PROCEDURE — 1090F PRES/ABSN URINE INCON ASSESS: CPT | Performed by: INTERNAL MEDICINE

## 2019-04-11 PROCEDURE — 1036F TOBACCO NON-USER: CPT | Performed by: INTERNAL MEDICINE

## 2019-04-11 PROCEDURE — 4040F PNEUMOC VAC/ADMIN/RCVD: CPT | Performed by: INTERNAL MEDICINE

## 2019-04-11 NOTE — PROGRESS NOTES
fee  Subjective:      Patient ID: Law Snow is a 77 y.o. female. FAMILIA Grant is being seen for cardiac follow up for afib/arrhythmia. Continues with p afib. Occurring couple times a wk. Has been having for 3 months. No pnd or orthopnea. No palp/syncope. No sob. No chest pain.          Past Medical History:   Diagnosis Date    Acute diastolic congestive heart failure ([facility] 11/26/2018    Allergic     SEASONAL    Atrial fibrillation ([address] H/O    Cancer ([facility] [facility] 2009 AND 2011    BREAST right    Essential hypertension 3/13/2018    Hammertoe     Migraine      Past Surgical History:   Procedure Laterality Date    ANKLE FRACTURE SURGERY  2004,2008    APPENDECTOMY  03/1983    ATRIAL ABLATION SURGERY  X3    Cryoablation for atrial fib    AXILLARY SURGERY Right     lymph node    BREAST BIOPSY  01/07/2011    BREAST BIOPSY  7/12/2011    left breast bx - benign     BREAST CYST EXCISION  06/2003,    BREAST LUMPECTOMY  03/30/2009    CARDIAC CATHETERIZATION  5/2005,01/2006    COLONOSCOPY  1993,1995,1999,2003,2006    COLONOSCOPY  11/29/2011    Dr. Arnel Ambrose - benign polyps     COSMETIC SURGERY      FOOT SURGERY Right 1/15/16    CORRECTION OF HAMMERTOES 1-5 RIGHT FOOT, WEIL OSTEOTOMY 2,3    FOOT SURGERY Right 05/12/2016    EXTENSOR TENDON LENGTHENING 4TH AND 5TH TOES RIGHT FOOT    FOOT SURGERY Right 08/11/2016    CORRECTION HAMMERTOES 1ST, 4TH AND 5TH DIGITS RIGHT FOOT WITH    HERNIA REPAIR  [id number]    HYSTERECTOMY  12/1977    JOINT REPLACEMENT  June 2015    LEFT KNEE    KNEE SURGERY  6/09/2015    left knee replacement    LIPOMA RESECTION  09/2006    MASTECTOMY  3-15-11    bilateral    NEUROMA SURGERY  1987,1988,1989,1992 , 1995    Left foot    OTHER SURGICAL HISTORY  1983, 1984 X 2, 1994 X 2, 2006    Bowel obstructions    RHINOPLASTY  11/1976    RHINOPLASTY  1995    THORACOTOMY  1998    TONSILLECTOMY  1956    UPPER GASTROINTESTINAL ENDOSCOPY  [id number]    VARICOSE VEIN SURGERY  2001     Social History     Socioeconomic History    Marital status:      Spouse name: Not on file    Number of children: 2    Years of education: 15    Highest education level: Not on file   Occupational History    Occupation: [employer] finish    Social Needs    Financial resource strain: Not on file    Food insecurity:     Worry: Not on file     Inability: Not on file    Transportation needs:     Medical: Not on file     Non-medical: Not on file   Tobacco Use    Smoking status: Former Smoker     Packs/day: 0.25     Years: 8.00     Pack years: 2.00     Types: Cigarettes     Start date:      Last attempt to quit: 1988     Years since quittin.2    Smokeless tobacco: Never Used    Tobacco comment: QUIT    Substance and Sexual Activity    Alcohol use: No    Drug use: No    Sexual activity: Never   Lifestyle    Physical activity:     Days per week: Not on file     Minutes per session: Not on file    Stress: Not on file   Relationships    Social connections:     Talks on phone: Not on file     Gets together: Not on file     Attends Restorationism service: Not on file     Active member of club or organization: Not on file     Attends meetings of clubs or organizations: Not on file     Relationship status: Not on file    Intimate partner violence:     Fear of current or ex partner: Not on file     Emotionally abused: Not on file     Physically abused: Not on file     Forced sexual activity: Not on file   Other Topics Concern    Not on file   Social History Narrative    Not on file       Family hx reviewed, noncontributory      Wt 210    Review of Systems   Constitutional: Negative for activity change, appetite change and fatigue. Respiratory: Negative for cough, choking, chest tightness and shortness of breath. Cardiovascular: Negative for chest pain, palpitations and leg swelling. Denies PND or orthopnea. No tachycardia or syncope. Neurological: Negative for dizziness, syncope and light-headedness. Psychiatric/Behavioral: Negative for behavioral problems, confusion and agitation. Other systems reviewed negative as done    Objective:   Physical Exam     Constitutional: She is oriented to person, place, and time. She appears well-developed and well-nourished. No distress. HENT:   Head: Normocephalic and atraumatic. Eyes: Conjunctivae and EOM are normal. Right eye exhibits no discharge. Left eye exhibits no discharge. Neck: Normal range of motion. No JVD present. Cardiovascular: Normal rate, regular rhythm, S1 normal, S2 normal and normal heart sounds. Exam reveals no gallop. 1/6 syst murmur heard. Pulses:       Radial pulses are 2+ on the right side, and 2+ on the left side. Pulmonary/Chest: Effort normal and breath sounds normal. She has no rales. Abdominal: Soft. Bowel sounds are normal. There is no tenderness. Musculoskeletal: Normal range of motion. She exhibits no edema. Neurological: She is alert and oriented to person, place, and time. Skin: Skin is warm and dry. Psychiatric: She has a normal mood and affect. Her behavior is normal.       Assessment:       Diagnosis Orders   1. Paroxysmal atrial fibrillation (HCC)     2. Atrial flutter, unspecified type ([address]     3. S/P ablation of atrial fibrillation     4. S/P ablation of atrial flutter             Plan:      Continues with P afib 2x weekly. Relatively brief. BP good. No exertional sx. On AC, no bleeding issues. EP following. No changes. Reviewed previous records and testing including myoview3/19 and 30 day monitor. Follow up 3 months. Routine INR.

## 2019-04-11 NOTE — PROGRESS NOTES
Yoan Rueda is a 77 y.o. female with PMHx significant for A-fib, HTN, breast CA who presents to clinic 4/11/2019 for anticoagulation monitoring and adjustment. Patient has been taking warfarin for a-fib for at least 20 years.      Anticoagulation Indication(s):  Afib    Referring Physician:  Dr. Juanjose Kinsey  Goal INR Range:  2-3  Duration of Anticoagulation Therapy:  Indefinite  Time of day dose taken:  PM  Product patient has at home:  warfarin 5 mg (peach) + 10 mg (white)    INR Summary                            Warfarin regimen (mg)  Date INR   A/P    Sun Mon Tue Wed Thu Fri Sat Mg/wk  4/11 2.2 At goal, no change  7.5 10 10 10 7.5 10 7.5 62.5  3/1 2.8 At goal, no change  7.5 10 10 10 7.5 10 7.5 62.5  1/18 2.9 At goal, no change  7.5 10 10 10 7.5 10 7.5 62.5  12/14 2.1 At goal, no change  7.5 10 10 10 7.5 10 7.5 62.5  11/16 1.8 Below goal, continue  7.5 10 10 10 7.5 10 7.5 62.5  10/18 3.1 Above goal, continue  7.5 10 10 10 7.5 10 7.5 62.5  9/19 2.8 At goal, no change  7.5 10 10 10 7.5 10 7.5 62.5  8/22 2.0 At goal, no change  7.5 10 10 10 7.5 10 7.5 62.5  8/6 2.1 At goal, no change  7.5 10 10 10 7.5 10 7.5 62.5  7/26 1.6 Per cardio, increase  7.5 10 10 10 7.5 10 7.5 62.5   7/19 1.8 Per cardio, no change  7.5 10 7.5 7.5 7.5 10 7.5 57.5  7/6 1.6 Below goal, increase  7.5 10 7.5 7.5 7.5 10 7.5 57.5  6/22 2.4 At goal, no change  7.5 10 7.5 7.5 7.5 7.5 7.5 55  6/14 3.1 Above goal, decrease  7.5 10 7.5 7.5 7.5 7.5 7.5 55  6/6 2.2 At goal, increase  7.5 10 7.5 7.5 7.5 10 7.5 57.5  5/31 4.2 Above goal, hold + dec 7.5 7.5 7.5 7.5 0/7.5 7.5 7.5 52.5  5/24 3.8 Above goal, dec per cardio 10 7.5 10 7.5 10 7.5 7.5 60  5/17 2.6 At goal, cont per cardio 10 7.5 10 7.5 10 7.5 10 62.5    Last CBC:  Lab Results   Component Value Date    RBC 4.55 11/26/2018    HGB 13.5 11/26/2018    HCT 40.8 11/26/2018    MCV 89.5 11/26/2018    MCH 29.7 11/26/2018    MPV 8.1 11/26/2018    RDW 14.1 11/26/2018     11/26/2018       Patient History:  Recent hospitalizations/HC visits 4/11 Dr. Marleny Alford  2/8 Dr. Yeung Began: no med changes   Recent medication changes None reported today   Medications taken regularly that may interact with warfarin or alter INR No significant drug interactions identified   Warfarin dose taken as prescribed Yes - uses pillbox   Signs/symptoms of bleeding No h/o bleeding reported   Vitamin K intake Normally has ~2 servings of green, leafy vegetables per week (cabbage, brittany slaw, broccoli)   Recent vomiting/diarrhea/fever, changes in weight or activity level Started working out at [employer] 5 days/week   Tobacco or alcohol use Patient quit smoking 40+ years ago  Patient denies alcohol use   Upcoming surgeries or procedures None reported     Assessment/Plan:  Patient's INR was therapeutic today (2.2), and overall stable on current warfarin regimen. Patient was instructed to continue warfarin 10 mg on Mon/Tue/Wed/Fri, and 7.5 mg all other days. Repeat INR in 6 weeks (prefers extended visits due to cost burden). Patient was reminded to maintain consistent vitamin K intake and call with any bleeding, medication changes, or fever/vomiting/diarrhea. Patient understands dosing directions and information discussed. Dosing schedule and follow up appointment given to patient. Progress note routed to referring physician's office. Patient acknowledges working in consult agreement with pharmacist as referred by his/her physician. Next Clinic Appointment:  5/23    Please call [facility] at [phone] with any questions. Thanks! Paulino Pagan, PharmD  [facility]  Ph: [phone]  4/11/2019 9:17 AM

## 2019-04-30 NOTE — PROGRESS NOTES
Wednesday and Friday and 10 mg all other days 90 tablet 3    flecainide (TAMBOCOR) 100 MG tablet Take 1 tablet by mouth 2 times daily 180 tablet 3    furosemide (LASIX) 20 MG tablet Take 1 tablet by mouth daily 90 tablet 3    amLODIPine (NORVASC) 10 MG tablet Take 1 tablet by mouth daily 90 tablet 3    ezetimibe (ZETIA) 10 MG tablet TAKE 1 TABLET DAILY 90 tablet 3    acetaminophen (TYLENOL) 325 MG tablet Take 325 mg by mouth every 6 hours as needed. As need for headaches. No current facility-administered medications for this visit. ECG 5/1/19, personally reviewed. SB 65, QRS 95 sec    Echo 11/30/18  Summary   Left ventricular cavity size is normal. There is mild concentric left   ventricular hypertrophy. Overall left ventricular systolic function appears   normal with an ejection fraction of 55-60%. No regional wall motion   abnormalities are noted.   Indeterminate diastolic function.   Trivial mitral regurgitation is present.   The aortic valve leaflets appear slightly thickened but opens well.   Mild tricuspid regurgitation.   Estimated pulmonary artery systolic pressure is normal at 30 mmHg assuming a   right atrial pressure of 8 mmHg. Camila [place] 2/19/19  Summary    There is normal isotope uptake at stress and rest. There is no evidence of    myocardial ischemia or scar.    Normal LV size and systolic function.    Left ventricular ejection fraction of 75 %.    There are no regional wall motion abnormalities.    Normal myocardial perfusion study.           Objective:   Physical Exam   Constitutional: She is oriented to person, place, and time. She appears well-developed and well-nourished. HENT:   Head: Normocephalic and atraumatic. Eyes: Pupils are equal, round, and reactive to light. Neck: Normal range of motion. Cardiovascular: Normal rate, regular rhythm,II/VI systolic ejection murmur  Pulmonary/Chest: Effort normal and breath sounds normal.   Abdominal: Soft. No tenderness. Musculoskeletal: Normal range of motion. She exhibits no edema. Neurological: She is alert and oriented to person, place, and time. Skin: Skin is warm and dry. Psychiatric: She has a normal mood and affect. BP (!) 152/82   Pulse 64   Ht 5' 9\" (1.753 m)   Wt 210 lb (95.3 kg)   BMI 31.01 kg/m²       Assessment:      1. Atrial flutter - S/P RFCA in 2005 (Dr. Tierney Talavera) and 2006 (myself)  2. PAF - s/p RFCA in 2013 (Dr. Cj Michelle). 30 day MCOT showed AF burden of 4% with conversion pauses up to 3.5 sec. Discussed repeat ablation vs increasing flecainide dose to 150 mg BID. She is not interested in repeat ablation at this time. Plan:      1. Increase flecainide to 150 mg BID  2. Obtain ECG after 5th dose of flecainide   3. Encouraged use of ParQnow mobile device to monitor for AF episodes  4. Follow up in 3 months     Thank you for the opportunity to participate in the care of this patient. Stephane Richey RN, am scribing for and in the presence of Dr. Corina Christopher. 05/01/19 2:35 PM  Scooter Iraheta RN    I, Dr. Corina Christopher, personally performed the services described in this documentation as scribed by Scooter Iraheta RN in my presence, and it is both accurate and complete.       Geovany Hogue M.D.

## 2019-05-01 ENCOUNTER — OFFICE VISIT (OUTPATIENT)
Dept: CARDIOLOGY CLINIC | Age: 69
End: 2019-05-01
Payer: MEDICARE

## 2019-05-01 VITALS
DIASTOLIC BLOOD PRESSURE: 82 MMHG | HEART RATE: 64 BPM | SYSTOLIC BLOOD PRESSURE: 152 MMHG | WEIGHT: 210 LBS | BODY MASS INDEX: 31.1 KG/M2 | HEIGHT: 69 IN

## 2019-05-01 DIAGNOSIS — Z86.79 S/P ABLATION OF ATRIAL FLUTTER: ICD-10-CM

## 2019-05-01 DIAGNOSIS — I48.0 PAROXYSMAL ATRIAL FIBRILLATION (HCC): Primary | ICD-10-CM

## 2019-05-01 DIAGNOSIS — Z98.890 S/P ABLATION OF ATRIAL FIBRILLATION: ICD-10-CM

## 2019-05-01 DIAGNOSIS — Z86.79 S/P ABLATION OF ATRIAL FIBRILLATION: ICD-10-CM

## 2019-05-01 DIAGNOSIS — I10 ESSENTIAL HYPERTENSION: ICD-10-CM

## 2019-05-01 DIAGNOSIS — I48.3 TYPICAL ATRIAL FLUTTER (HCC): ICD-10-CM

## 2019-05-01 DIAGNOSIS — Z98.890 S/P ABLATION OF ATRIAL FLUTTER: ICD-10-CM

## 2019-05-01 PROCEDURE — 93000 ELECTROCARDIOGRAM COMPLETE: CPT | Performed by: INTERNAL MEDICINE

## 2019-05-01 PROCEDURE — 1123F ACP DISCUSS/DSCN MKR DOCD: CPT | Performed by: INTERNAL MEDICINE

## 2019-05-01 PROCEDURE — 3017F COLORECTAL CA SCREEN DOC REV: CPT | Performed by: INTERNAL MEDICINE

## 2019-05-01 PROCEDURE — G8417 CALC BMI ABV UP PARAM F/U: HCPCS | Performed by: INTERNAL MEDICINE

## 2019-05-01 PROCEDURE — 1036F TOBACCO NON-USER: CPT | Performed by: INTERNAL MEDICINE

## 2019-05-01 PROCEDURE — 99214 OFFICE O/P EST MOD 30 MIN: CPT | Performed by: INTERNAL MEDICINE

## 2019-05-01 PROCEDURE — G8399 PT W/DXA RESULTS DOCUMENT: HCPCS | Performed by: INTERNAL MEDICINE

## 2019-05-01 PROCEDURE — G8427 DOCREV CUR MEDS BY ELIG CLIN: HCPCS | Performed by: INTERNAL MEDICINE

## 2019-05-01 PROCEDURE — 4040F PNEUMOC VAC/ADMIN/RCVD: CPT | Performed by: INTERNAL MEDICINE

## 2019-05-01 PROCEDURE — 1090F PRES/ABSN URINE INCON ASSESS: CPT | Performed by: INTERNAL MEDICINE

## 2019-05-01 RX ORDER — FLECAINIDE ACETATE 150 MG/1
150 TABLET ORAL 2 TIMES DAILY
Qty: 180 TABLET | Refills: 3 | Status: SHIPPED | OUTPATIENT
Start: 2019-05-01 | End: 2020-04-01 | Stop reason: SDUPTHER

## 2019-05-06 ENCOUNTER — NURSE ONLY (OUTPATIENT)
Dept: CARDIOLOGY CLINIC | Age: 69
End: 2019-05-06
Payer: MEDICARE

## 2019-05-06 DIAGNOSIS — I10 ESSENTIAL HYPERTENSION: ICD-10-CM

## 2019-05-06 DIAGNOSIS — I48.0 PAROXYSMAL ATRIAL FIBRILLATION (HCC): Primary | ICD-10-CM

## 2019-05-06 DIAGNOSIS — R94.31 ABNORMAL ECG: ICD-10-CM

## 2019-05-06 PROCEDURE — 93000 ELECTROCARDIOGRAM COMPLETE: CPT | Performed by: INTERNAL MEDICINE

## 2019-05-23 ENCOUNTER — ANTI-COAG VISIT (OUTPATIENT)
Dept: PHARMACY | Age: 69
End: 2019-05-23
Payer: MEDICARE

## 2019-05-23 DIAGNOSIS — I48.0 PAROXYSMAL ATRIAL FIBRILLATION (HCC): ICD-10-CM

## 2019-05-23 LAB — INTERNATIONAL NORMALIZATION RATIO, POC: 1.8

## 2019-05-23 PROCEDURE — 85610 PROTHROMBIN TIME: CPT

## 2019-05-23 PROCEDURE — 99211 OFF/OP EST MAY X REQ PHY/QHP: CPT

## 2019-05-23 NOTE — PROGRESS NOTES
patient. Progress note routed to referring physician's office. Patient acknowledges working in consult agreement with pharmacist as referred by his/her physician. Next Clinic Appointment:  7/3    Please call [facility] at [phone] with any questions. Thanks! Dolores Kim.  Shasha Quiroz, PharmD  [facility]  Ph: [phone]  5/23/2019 9:02 AM

## 2019-07-03 ENCOUNTER — ANTI-COAG VISIT (OUTPATIENT)
Dept: PHARMACY | Age: 69
End: 2019-07-03
Payer: MEDICARE

## 2019-07-03 DIAGNOSIS — I48.0 PAROXYSMAL ATRIAL FIBRILLATION (HCC): ICD-10-CM

## 2019-07-03 LAB — INTERNATIONAL NORMALIZATION RATIO, POC: 2.1

## 2019-07-03 PROCEDURE — 99211 OFF/OP EST MAY X REQ PHY/QHP: CPT

## 2019-07-03 PROCEDURE — 85610 PROTHROMBIN TIME: CPT

## 2019-07-03 NOTE — PROGRESS NOTES
Management Clinic at [phone] with any questions. Thanks! Schuyler Parker.  Christian Fox, PharmD  [facility]  Ph: [phone]  7/3/2019 9:33 AM

## 2019-07-31 NOTE — PROGRESS NOTES
mouth daily or as directed by physician 90 tablet 3    digoxin (DIGOX) 250 MCG tablet Take 1 tablet by mouth daily 90 tablet 3    warfarin (COUMADIN) 10 MG tablet Take 1 tablet by mouth daily Taking 12.5 mg on Monday, Wednesday and Friday and 10 mg all other days 90 tablet 3    furosemide (LASIX) 20 MG tablet Take 1 tablet by mouth daily 90 tablet 3    amLODIPine (NORVASC) 10 MG tablet Take 1 tablet by mouth daily 90 tablet 3    ezetimibe (ZETIA) 10 MG tablet TAKE 1 TABLET DAILY 90 tablet 3    acetaminophen (TYLENOL) 325 MG tablet Take 325 mg by mouth every 6 hours as needed. As need for headaches. No current facility-administered medications for this visit. ECG 8/12/19, personally reviewed. SB 58, QRS 99 sec    Echo 11/30/18  Summary   Left ventricular cavity size is normal. There is mild concentric left   ventricular hypertrophy. Overall left ventricular systolic function appears   normal with an ejection fraction of 55-60%. No regional wall motion   abnormalities are noted.   Indeterminate diastolic function.   Trivial mitral regurgitation is present.   The aortic valve leaflets appear slightly thickened but opens well.   Mild tricuspid regurgitation.   Estimated pulmonary artery systolic pressure is normal at 30 mmHg assuming a   right atrial pressure of 8 mmHg. Jennifer  2/19/19  Summary    There is normal isotope uptake at stress and rest. There is no evidence of    myocardial ischemia or scar.    Normal LV size and systolic function.    Left ventricular ejection fraction of 75 %.    There are no regional wall motion abnormalities.    Normal myocardial perfusion study.           Objective:   Physical Exam   Constitutional: She is oriented to person, place, and time. She appears well-developed and well-nourished. HENT:   Head: Normocephalic and atraumatic. Eyes: Pupils are equal, round, and reactive to light. Neck: Normal range of motion.    Cardiovascular: Normal rate, regular

## 2019-08-02 ENCOUNTER — OFFICE VISIT (OUTPATIENT)
Dept: CARDIOLOGY CLINIC | Age: 69
End: 2019-08-02
Payer: MEDICARE

## 2019-08-02 VITALS
WEIGHT: 207 LBS | DIASTOLIC BLOOD PRESSURE: 80 MMHG | SYSTOLIC BLOOD PRESSURE: 130 MMHG | HEART RATE: 60 BPM | BODY MASS INDEX: 30.57 KG/M2

## 2019-08-02 DIAGNOSIS — I48.3 TYPICAL ATRIAL FLUTTER (HCC): ICD-10-CM

## 2019-08-02 DIAGNOSIS — Z86.79 S/P ABLATION OF ATRIAL FIBRILLATION: ICD-10-CM

## 2019-08-02 DIAGNOSIS — I48.0 PAROXYSMAL ATRIAL FIBRILLATION (HCC): Primary | ICD-10-CM

## 2019-08-02 DIAGNOSIS — Z86.79 S/P ABLATION OF ATRIAL FLUTTER: ICD-10-CM

## 2019-08-02 DIAGNOSIS — Z98.890 S/P ABLATION OF ATRIAL FIBRILLATION: ICD-10-CM

## 2019-08-02 DIAGNOSIS — Z98.890 S/P ABLATION OF ATRIAL FLUTTER: ICD-10-CM

## 2019-08-02 PROCEDURE — 1036F TOBACCO NON-USER: CPT | Performed by: INTERNAL MEDICINE

## 2019-08-02 PROCEDURE — 4040F PNEUMOC VAC/ADMIN/RCVD: CPT | Performed by: INTERNAL MEDICINE

## 2019-08-02 PROCEDURE — 99214 OFFICE O/P EST MOD 30 MIN: CPT | Performed by: INTERNAL MEDICINE

## 2019-08-02 PROCEDURE — G8417 CALC BMI ABV UP PARAM F/U: HCPCS | Performed by: INTERNAL MEDICINE

## 2019-08-02 PROCEDURE — 1090F PRES/ABSN URINE INCON ASSESS: CPT | Performed by: INTERNAL MEDICINE

## 2019-08-02 PROCEDURE — G8427 DOCREV CUR MEDS BY ELIG CLIN: HCPCS | Performed by: INTERNAL MEDICINE

## 2019-08-02 PROCEDURE — 1123F ACP DISCUSS/DSCN MKR DOCD: CPT | Performed by: INTERNAL MEDICINE

## 2019-08-02 PROCEDURE — G8399 PT W/DXA RESULTS DOCUMENT: HCPCS | Performed by: INTERNAL MEDICINE

## 2019-08-02 PROCEDURE — 3017F COLORECTAL CA SCREEN DOC REV: CPT | Performed by: INTERNAL MEDICINE

## 2019-08-02 NOTE — PROGRESS NOTES
orthopnea. No tachycardia or syncope. Neurological: Negative for dizziness, syncope and light-headedness. Psychiatric/Behavioral: Negative for behavioral problems, confusion and agitation. All other systems reviewed negative as done    Objective:   Physical Exam     Constitutional: She is oriented to person, place, and time. She appears well-developed and well-nourished. No distress. HENT:   Head: Normocephalic and atraumatic. Eyes: Conjunctivae and EOM are normal. Right eye exhibits no discharge. Left eye exhibits no discharge. Neck: Normal range of motion. No JVD present. Cardiovascular: Normal rate, regular rhythm, S1 normal, S2 normal and normal heart sounds. Exam reveals no gallop. 1/6 syst murmur heard. Pulses:       Radial pulses are 2+ on the right side, and 2+ on the left side. Pulmonary/Chest: Effort normal and breath sounds normal. She has no rales. Abdominal: Soft. Bowel sounds are normal. There is no tenderness. Musculoskeletal: Normal range of motion. She exhibits no edema. Neurological: She is alert and oriented to person, place, and time. Skin: Skin is warm and dry. Psychiatric: She has a normal mood and affect. Her behavior is normal.       Assessment:       Diagnosis Orders   1. Paroxysmal atrial fibrillation (HCC)     2. Typical atrial flutter ([address]     3. S/P ablation of atrial fibrillation     4. S/P ablation of atrial flutter             Plan:      Continues with P afib 2x weekly. Relatively brief. BP good. No exertional sx. On AC, no bleeding issues. EP following. No changes. Reviewed previous records and testing including myoview3/19 and 30 day monitor. Follow up 6 months. Routine INR.

## 2019-08-12 ENCOUNTER — OFFICE VISIT (OUTPATIENT)
Dept: CARDIOLOGY CLINIC | Age: 69
End: 2019-08-12
Payer: MEDICARE

## 2019-08-12 ENCOUNTER — ANTI-COAG VISIT (OUTPATIENT)
Dept: PHARMACY | Age: 69
End: 2019-08-12
Payer: MEDICARE

## 2019-08-12 VITALS
HEIGHT: 69 IN | DIASTOLIC BLOOD PRESSURE: 82 MMHG | SYSTOLIC BLOOD PRESSURE: 160 MMHG | HEART RATE: 58 BPM | WEIGHT: 207.6 LBS | BODY MASS INDEX: 30.75 KG/M2

## 2019-08-12 DIAGNOSIS — I48.3 TYPICAL ATRIAL FLUTTER (HCC): ICD-10-CM

## 2019-08-12 DIAGNOSIS — I10 ESSENTIAL HYPERTENSION: ICD-10-CM

## 2019-08-12 DIAGNOSIS — I48.0 PAROXYSMAL ATRIAL FIBRILLATION (HCC): Primary | ICD-10-CM

## 2019-08-12 DIAGNOSIS — I48.0 PAROXYSMAL ATRIAL FIBRILLATION (HCC): ICD-10-CM

## 2019-08-12 LAB — INTERNATIONAL NORMALIZATION RATIO, POC: 2

## 2019-08-12 PROCEDURE — G8399 PT W/DXA RESULTS DOCUMENT: HCPCS | Performed by: INTERNAL MEDICINE

## 2019-08-12 PROCEDURE — 1036F TOBACCO NON-USER: CPT | Performed by: INTERNAL MEDICINE

## 2019-08-12 PROCEDURE — 3017F COLORECTAL CA SCREEN DOC REV: CPT | Performed by: INTERNAL MEDICINE

## 2019-08-12 PROCEDURE — 1090F PRES/ABSN URINE INCON ASSESS: CPT | Performed by: INTERNAL MEDICINE

## 2019-08-12 PROCEDURE — G8417 CALC BMI ABV UP PARAM F/U: HCPCS | Performed by: INTERNAL MEDICINE

## 2019-08-12 PROCEDURE — 1123F ACP DISCUSS/DSCN MKR DOCD: CPT | Performed by: INTERNAL MEDICINE

## 2019-08-12 PROCEDURE — G8427 DOCREV CUR MEDS BY ELIG CLIN: HCPCS | Performed by: INTERNAL MEDICINE

## 2019-08-12 PROCEDURE — 85610 PROTHROMBIN TIME: CPT

## 2019-08-12 PROCEDURE — 99211 OFF/OP EST MAY X REQ PHY/QHP: CPT

## 2019-08-12 PROCEDURE — 99214 OFFICE O/P EST MOD 30 MIN: CPT | Performed by: INTERNAL MEDICINE

## 2019-08-12 PROCEDURE — 93000 ELECTROCARDIOGRAM COMPLETE: CPT | Performed by: INTERNAL MEDICINE

## 2019-08-12 PROCEDURE — 4040F PNEUMOC VAC/ADMIN/RCVD: CPT | Performed by: INTERNAL MEDICINE

## 2019-09-25 ENCOUNTER — ANTI-COAG VISIT (OUTPATIENT)
Dept: PHARMACY | Age: 69
End: 2019-09-25
Payer: MEDICARE

## 2019-09-25 DIAGNOSIS — I48.0 PAROXYSMAL ATRIAL FIBRILLATION (HCC): ICD-10-CM

## 2019-09-25 LAB — INTERNATIONAL NORMALIZATION RATIO, POC: 2.3

## 2019-09-25 PROCEDURE — 85610 PROTHROMBIN TIME: CPT

## 2019-09-25 PROCEDURE — 99211 OFF/OP EST MAY X REQ PHY/QHP: CPT

## 2019-09-25 NOTE — PROGRESS NOTES
Yoan Rueda is a 68 y.o. female with PMHx significant for A-fib, HTN, breast CA who presents to clinic 9/25/2019 for anticoagulation monitoring and adjustment. Patient has been taking warfarin for a-fib for at least 20 years.      Anticoagulation Indication(s):  Afib    Referring Physician:  Dr. Vaishnavi Mcmahon  Goal INR Range:  2-3  Duration of Anticoagulation Therapy:  Indefinite  Time of day dose taken:  PM  Product patient has at home:  warfarin 5 mg (peach) + 10 mg (white)    INR Summary                            Warfarin regimen (mg)  Date INR   A/P    Sun Mon Tue Wed Thu Fri Sat Mg/wk  9/25 2.3 At goal, no change  7.5 10 10 10 7.5 10 7.5 62.5  8/12 2.0 At goal, no change  7.5 10 10 10 7.5 10 7.5 62.5  7/3 2.1 At goal, no change  7.5 10 10 10 7.5 10 7.5 62.5  5/23 1.8 Below goal, continue  7.5 10 10 10 7.5 10 7.5 62.5    4/11 2.2 At goal, no change  7.5 10 10 10 7.5 10 7.5 62.5  3/1 2.8 At goal, no change  7.5 10 10 10 7.5 10 7.5 62.5  1/18 2.9 At goal, no change  7.5 10 10 10 7.5 10 7.5 62.5  12/14 2.1 At goal, no change  7.5 10 10 10 7.5 10 7.5 62.5  11/16 1.8 Below goal, continue  7.5 10 10 10 7.5 10 7.5 62.5  10/18 3.1 Above goal, continue  7.5 10 10 10 7.5 10 7.5 62.5  9/19 2.8 At goal, no change  7.5 10 10 10 7.5 10 7.5 62.5  8/22 2.0 At goal, no change  7.5 10 10 10 7.5 10 7.5 62.5  8/6 2.1 At goal, no change  7.5 10 10 10 7.5 10 7.5 62.5  7/26 1.6 Per cardio, increase  7.5 10 10 10 7.5 10 7.5 62.5   7/19 1.8 Per cardio, no change  7.5 10 7.5 7.5 7.5 10 7.5 57.5  7/6 1.6 Below goal, increase  7.5 10 7.5 7.5 7.5 10 7.5 57.5  6/22 2.4 At goal, no change  7.5 10 7.5 7.5 7.5 7.5 7.5 55  6/14 3.1 Above goal, decrease  7.5 10 7.5 7.5 7.5 7.5 7.5 55  6/6 2.2 At goal, increase  7.5 10 7.5 7.5 7.5 10 7.5 57.5  5/31 4.2 Above goal, hold + dec 7.5 7.5 7.5 7.5 0/7.5 7.5 7.5 52.5  5/24 3.8 Above goal, dec per cardio 10 7.5 10 7.5 10 7.5 7.5 60  5/17 2.6 At goal, cont per

## 2019-11-13 ENCOUNTER — ANTI-COAG VISIT (OUTPATIENT)
Dept: PHARMACY | Age: 69
End: 2019-11-13
Payer: MEDICARE

## 2019-11-13 DIAGNOSIS — I48.0 PAROXYSMAL ATRIAL FIBRILLATION (HCC): ICD-10-CM

## 2019-11-13 LAB — INTERNATIONAL NORMALIZATION RATIO, POC: 2.5

## 2019-11-13 PROCEDURE — 85610 PROTHROMBIN TIME: CPT

## 2019-11-13 PROCEDURE — 99211 OFF/OP EST MAY X REQ PHY/QHP: CPT

## 2019-12-30 ENCOUNTER — ANTI-COAG VISIT (OUTPATIENT)
Dept: PHARMACY | Age: 69
End: 2019-12-30
Payer: MEDICARE

## 2019-12-30 DIAGNOSIS — I48.0 PAROXYSMAL ATRIAL FIBRILLATION (HCC): ICD-10-CM

## 2019-12-30 LAB — INTERNATIONAL NORMALIZATION RATIO, POC: 2.9

## 2019-12-30 PROCEDURE — 85610 PROTHROMBIN TIME: CPT

## 2019-12-30 PROCEDURE — 99211 OFF/OP EST MAY X REQ PHY/QHP: CPT

## 2020-01-02 RX ORDER — DIGOXIN 250 MCG
TABLET ORAL
Qty: 90 TABLET | Refills: 3 | Status: SHIPPED | OUTPATIENT
Start: 2020-01-02 | End: 2020-11-23 | Stop reason: HOSPADM

## 2020-01-02 RX ORDER — FUROSEMIDE 20 MG/1
TABLET ORAL
Qty: 90 TABLET | Refills: 3 | Status: SHIPPED | OUTPATIENT
Start: 2020-01-02 | End: 2021-01-06

## 2020-01-02 RX ORDER — AMLODIPINE BESYLATE 10 MG/1
TABLET ORAL
Qty: 90 TABLET | Refills: 3 | Status: SHIPPED | OUTPATIENT
Start: 2020-01-02 | End: 2020-12-10

## 2020-01-02 RX ORDER — WARFARIN SODIUM 10 MG/1
TABLET ORAL
Qty: 90 TABLET | Refills: 3 | Status: SHIPPED | OUTPATIENT
Start: 2020-01-02 | End: 2020-03-31 | Stop reason: SDUPTHER

## 2020-02-07 ENCOUNTER — ANTI-COAG VISIT (OUTPATIENT)
Dept: PHARMACY | Age: 70
End: 2020-02-07
Payer: MEDICARE

## 2020-02-07 ENCOUNTER — OFFICE VISIT (OUTPATIENT)
Dept: CARDIOLOGY CLINIC | Age: 70
End: 2020-02-07
Payer: MEDICARE

## 2020-02-07 VITALS
DIASTOLIC BLOOD PRESSURE: 76 MMHG | SYSTOLIC BLOOD PRESSURE: 138 MMHG | BODY MASS INDEX: 31.19 KG/M2 | HEART RATE: 66 BPM | WEIGHT: 211.2 LBS

## 2020-02-07 LAB — INR BLD: 2

## 2020-02-07 PROCEDURE — G8484 FLU IMMUNIZE NO ADMIN: HCPCS | Performed by: INTERNAL MEDICINE

## 2020-02-07 PROCEDURE — 85610 PROTHROMBIN TIME: CPT

## 2020-02-07 PROCEDURE — G8417 CALC BMI ABV UP PARAM F/U: HCPCS | Performed by: INTERNAL MEDICINE

## 2020-02-07 PROCEDURE — 1036F TOBACCO NON-USER: CPT | Performed by: INTERNAL MEDICINE

## 2020-02-07 PROCEDURE — G9708 BILAT MAST/HX BI /UNILAT MAS: HCPCS | Performed by: INTERNAL MEDICINE

## 2020-02-07 PROCEDURE — 1090F PRES/ABSN URINE INCON ASSESS: CPT | Performed by: INTERNAL MEDICINE

## 2020-02-07 PROCEDURE — 1123F ACP DISCUSS/DSCN MKR DOCD: CPT | Performed by: INTERNAL MEDICINE

## 2020-02-07 PROCEDURE — 99211 OFF/OP EST MAY X REQ PHY/QHP: CPT

## 2020-02-07 PROCEDURE — 3017F COLORECTAL CA SCREEN DOC REV: CPT | Performed by: INTERNAL MEDICINE

## 2020-02-07 PROCEDURE — G8427 DOCREV CUR MEDS BY ELIG CLIN: HCPCS | Performed by: INTERNAL MEDICINE

## 2020-02-07 PROCEDURE — G8399 PT W/DXA RESULTS DOCUMENT: HCPCS | Performed by: INTERNAL MEDICINE

## 2020-02-07 PROCEDURE — 4040F PNEUMOC VAC/ADMIN/RCVD: CPT | Performed by: INTERNAL MEDICINE

## 2020-02-07 PROCEDURE — 99214 OFFICE O/P EST MOD 30 MIN: CPT | Performed by: INTERNAL MEDICINE

## 2020-02-07 NOTE — PROGRESS NOTES
SURGERY  2001     Social History     Socioeconomic History    Marital status:      Spouse name: Not on file    Number of children: 2    Years of education: 15    Highest education level: Not on file   Occupational History    Occupation: [employer] finish    Social Needs    Financial resource strain: Not on file    Food insecurity:     Worry: Not on file     Inability: Not on file    Transportation needs:     Medical: Not on file     Non-medical: Not on file   Tobacco Use    Smoking status: Former Smoker     Packs/day: 0.25     Years: 8.00     Pack years: 2.00     Types: Cigarettes     Start date:      Last attempt to quit: 1988     Years since quittin.1    Smokeless tobacco: Never Used    Tobacco comment: QUIT    Substance and Sexual Activity    Alcohol use: No    Drug use: No    Sexual activity: Never   Lifestyle    Physical activity:     Days per week: Not on file     Minutes per session: Not on file    Stress: Not on file   Relationships    Social connections:     Talks on phone: Not on file     Gets together: Not on file     Attends Voodoo service: Not on file     Active member of club or organization: Not on file     Attends meetings of clubs or organizations: Not on file     Relationship status: Not on file    Intimate partner violence:     Fear of current or ex partner: Not on file     Emotionally abused: Not on file     Physically abused: Not on file     Forced sexual activity: Not on file   Other Topics Concern    Not on file   Social History Narrative    Not on file       Family hx reviewed, denies FH cardiac issues    Vitals:    20 0915   BP: 138/76   Pulse: 66       Wt 211    Review of Systems   Constitutional: Negative for activity change, appetite change and fatigue. Respiratory: Negative for cough, choking, chest tightness and shortness of breath. Cardiovascular: Negative for chest pain, palpitations and leg swelling.         Denies

## 2020-02-07 NOTE — PROGRESS NOTES
Value Date    RBC 4.55 11/26/2018    HGB 13.5 11/26/2018    HCT 40.8 11/26/2018    MCV 89.5 11/26/2018    MCH 29.7 11/26/2018    MPV 8.1 11/26/2018    RDW 14.1 11/26/2018     11/26/2018       Patient History:  Recent hospitalizations/HC visits -11/19 colonoscopy: held warfarin x 5 days PTP  -8/12 Dr. Ronny Mcdowell: no med changes  -8/2 Dr. Rosita Cushing: no med changes   Recent medication changes None reported today   Medications taken regularly that may interact with warfarin or alter INR No significant drug interactions identified   Warfarin dose taken as prescribed Yes - uses pillbox   Signs/symptoms of bleeding No h/o bleeding reported   Vitamin K intake -Now doing Weight Watchers  -Normally has ~2 servings of green, leafy vegetables per week (cabbage, brittany slaw, broccoli)  2/7 - Patient reported eating more broccoli than usual. Several servings in past week   Recent vomiting/diarrhea/fever, changes in weight or activity level None reported   Tobacco or alcohol use Patient quit smoking 40+ years ago  Patient denies alcohol use   Upcoming surgeries or procedures None reported     Assessment/Plan:  Patient's INR was therapeutic today (2.0), and overall stable on current warfarin regimen. Patient denies any missed/extra warfarin doses or medication changes. Patient did report a change in diet as noted above. Patient was instructed to continue warfarin 10 mg daily, except 7.5 mg on Sun/Thur/Sat. Repeat INR in 6 weeks (prefers extended visits due to cost burden). Patient was reminded to maintain consistent vitamin K intake and call with any bleeding, medication changes, or fever/vomiting/diarrhea. Patient understands dosing directions and information discussed. Dosing schedule and follow up appointment given to patient. Progress note routed to referring physician's office. Patient acknowledges working in consult agreement with pharmacist as referred by his/her physician.      Next Clinic Appointment: 3/27    Please call [facility] at [phone] with any questions. Thanks!   Magali Goode  PharmD Candidate 2020 2/7/2020 8:29 AM

## 2020-02-24 NOTE — PROGRESS NOTES
Subjective:      Patient ID: Hao Bergman is a 68 y.o. female. HPI:  Jimenez Gan is a 59year old woman with a history of atrial arrhythmias. S/p remote RFCA for AFL in 2006. No recurrence of AFL since ablation. S/p cryo-ablation for AF (11/1/13, Dr. Brendan Rojas). 30 day event monitor (5/2014) showed frequent episodes of AF with 3 second conversion pause. She has been on flecainide since at least 2011.  3 day Holter (11/26-11/29/18) showed SR with average HR of 62 ( bpm). No AF noted. She had a normal Myoview in 2010. Family h/o CAD (father had an MI). 30 day MCOT (2/15-3/16/19) showed PAF with average rate of 104 ( bpm) when in AF, burden of 4%, post conversion pauses up to 3.5 sec. She is symptomatic with episodes of AF. No evidence of myocardial ischemia seen on Lexiscan (2/2019). Her flecainide was increased at last OV on 5/1/19 due to increase in AF episodes. Currently taking flecainide 150 mg BID, amlodipine 10 mg daily, digoxin 250 mcg daily, and warfarin managed by [facility]. Here today for follow up, presenting in SR. She is doing well, denies complaints of palpitations, dizziness, CP, SOB, orthopnea, presyncope, or syncope. She has been using [email or web address] mobile to monitor her rhythm and denies recurrence of AF/AFL. Review of Systems   Constitutional: Negative. HENT: Negative. Eyes: Negative. Respiratory: Negative. Cardiovascular: Negative. Gastrointestinal: Negative. Genitourinary: Negative. Musculoskeletal: Negative. Skin: Negative. Neurological: Negative. Hematological: Negative. Psychiatric/Behavioral: Negative. Current Outpatient Medications   Medication Sig Dispense Refill    warfarin (COUMADIN) 10 MG tablet TAKE 1 TABLET DAILY. TAKE 12.5 MG ON MONDAY, WEDNESDAY AND FRIDAY, AND 10 MG ALL OTHER DAYS.  90 tablet 3    furosemide (LASIX) 20 MG tablet TAKE 1 TABLET DAILY 90 tablet 3    amLODIPine (NORVASC) 10 MG tablet TAKE 1 TABLET DAILY 90 motion. She exhibits no edema. Neurological: She is alert and oriented to person, place, and time. Skin: Skin is warm and dry. Psychiatric: She has a normal mood and affect. BP (!) 142/70   Pulse 60   Wt 210 lb 9.6 oz (95.5 kg)   BMI 31.10 kg/m²     Assessment:      1. Atrial flutter - S/P RFCA in 2005 (Dr. Berna Ramey) and 2006 (myself)  2. PAF - s/p RFCA in 2013 (Dr. Brenton Krishnan). 30 day MCOT showed AF burden of 4% with conversion pauses up to 3.5 sec. Discussed repeat ablation vs increasing flecainide dose. She is not interested in repeat ablation. Flecainide was increased to 150 mg BID on 5/1/19 with significant symptomatic suppression of AF episodes. In SR today with stable QRS. No apparent recurrence of AF. Plan:      1. Continue flecainide 150 mg BID and warfarin  2. Continue use of [employer] mobile device to monitor rhythm during palpitations  3. Continue to follow up with Dr. Jaime Hawk in 6 months with ECG  4. Follow up in 1 year with me    I, Robbin Manriquez RN, am scribing for and in the presence of Dr. Bailey Meza. 02/25/20 2:30 PM  IGLESIA Tyler, Dr. Bailey Meza, personally performed the services described in this documentation as scribed by Robbin Manriquez RN in my presence, and it is both accurate and complete.     Bailey Meza M.D.

## 2020-02-25 ENCOUNTER — OFFICE VISIT (OUTPATIENT)
Dept: CARDIOLOGY CLINIC | Age: 70
End: 2020-02-25
Payer: MEDICARE

## 2020-02-25 VITALS
BODY MASS INDEX: 31.1 KG/M2 | DIASTOLIC BLOOD PRESSURE: 70 MMHG | WEIGHT: 210.6 LBS | HEART RATE: 60 BPM | SYSTOLIC BLOOD PRESSURE: 142 MMHG

## 2020-02-25 PROCEDURE — 1036F TOBACCO NON-USER: CPT | Performed by: INTERNAL MEDICINE

## 2020-02-25 PROCEDURE — G8417 CALC BMI ABV UP PARAM F/U: HCPCS | Performed by: INTERNAL MEDICINE

## 2020-02-25 PROCEDURE — 4040F PNEUMOC VAC/ADMIN/RCVD: CPT | Performed by: INTERNAL MEDICINE

## 2020-02-25 PROCEDURE — 1090F PRES/ABSN URINE INCON ASSESS: CPT | Performed by: INTERNAL MEDICINE

## 2020-02-25 PROCEDURE — G9708 BILAT MAST/HX BI /UNILAT MAS: HCPCS | Performed by: INTERNAL MEDICINE

## 2020-02-25 PROCEDURE — 3017F COLORECTAL CA SCREEN DOC REV: CPT | Performed by: INTERNAL MEDICINE

## 2020-02-25 PROCEDURE — 93000 ELECTROCARDIOGRAM COMPLETE: CPT | Performed by: INTERNAL MEDICINE

## 2020-02-25 PROCEDURE — G8484 FLU IMMUNIZE NO ADMIN: HCPCS | Performed by: INTERNAL MEDICINE

## 2020-02-25 PROCEDURE — G8399 PT W/DXA RESULTS DOCUMENT: HCPCS | Performed by: INTERNAL MEDICINE

## 2020-02-25 PROCEDURE — 1123F ACP DISCUSS/DSCN MKR DOCD: CPT | Performed by: INTERNAL MEDICINE

## 2020-02-25 PROCEDURE — G8427 DOCREV CUR MEDS BY ELIG CLIN: HCPCS | Performed by: INTERNAL MEDICINE

## 2020-02-25 PROCEDURE — 99214 OFFICE O/P EST MOD 30 MIN: CPT | Performed by: INTERNAL MEDICINE

## 2020-03-17 RX ORDER — EZETIMIBE 10 MG/1
TABLET ORAL
Qty: 90 TABLET | Refills: 3 | Status: SHIPPED | OUTPATIENT
Start: 2020-03-17 | End: 2021-03-29

## 2020-03-23 ENCOUNTER — ANTI-COAG VISIT (OUTPATIENT)
Dept: PHARMACY | Age: 70
End: 2020-03-23
Payer: MEDICARE

## 2020-03-23 LAB — INTERNATIONAL NORMALIZATION RATIO, POC: 2

## 2020-03-23 PROCEDURE — 99211 OFF/OP EST MAY X REQ PHY/QHP: CPT

## 2020-03-23 PROCEDURE — 85610 PROTHROMBIN TIME: CPT

## 2020-03-23 NOTE — PROGRESS NOTES
change  7.5 10 10 10 7.5 10 7.5 62.5    Last CBC:  Lab Results   Component Value Date    RBC 4.55 11/26/2018    HGB 13.5 11/26/2018    HCT 40.8 11/26/2018    MCV 89.5 11/26/2018    MCH 29.7 11/26/2018    MPV 8.1 11/26/2018    RDW 14.1 11/26/2018     11/26/2018       Patient History:  Recent hospitalizations/HC visits -11/19 colonoscopy: held warfarin x 5 days PTP  -8/12 Dr. Wilmer Gonzalez: no med changes  -8/2 Dr. Hubbard Mention: no med changes   Recent medication changes None reported today   Medications taken regularly that may interact with warfarin or alter INR No significant drug interactions identified   Warfarin dose taken as prescribed Yes - uses pillbox   Signs/symptoms of bleeding No h/o bleeding reported   Vitamin K intake -Now doing Weight Watchers  -Normally has ~2 servings of green, leafy vegetables per week (cabbage, brittany slaw, broccoli)  2/7,3/23 - Patient reported eating more broccoli than usual. Several servings in past week   Recent vomiting/diarrhea/fever, changes in weight or activity level None reported   Tobacco or alcohol use Patient quit smoking 40+ years ago  Patient denies alcohol use   Upcoming surgeries or procedures None reported     Assessment/Plan:  Patient's INR was therapeutic today (2.0), and overall stable on current warfarin regimen. Patient denies any missed/extra warfarin doses or medication changes. Patient did report a change in diet as noted above. Patient was instructed to continue warfarin 10 mg daily, except 7.5 mg on Sun/Thur/Sat. Repeat INR in 8 weeks (prefers extended visits due to cost burden) due to COVID-19 epidemic. Patient was reminded to maintain consistent vitamin K intake and call with any bleeding, medication changes, or fever/vomiting/diarrhea. Patient understands dosing directions and information discussed. Dosing schedule and follow up appointment given to patient. Progress note routed to referring physician's office.   Patient acknowledges working in

## 2020-04-02 RX ORDER — FLECAINIDE ACETATE 150 MG/1
150 TABLET ORAL 2 TIMES DAILY
Qty: 180 TABLET | Refills: 5 | Status: SHIPPED | OUTPATIENT
Start: 2020-04-02 | End: 2020-04-06 | Stop reason: SDUPTHER

## 2020-04-02 RX ORDER — WARFARIN SODIUM 10 MG/1
TABLET ORAL
Qty: 90 TABLET | Refills: 3 | Status: SHIPPED | OUTPATIENT
Start: 2020-04-02 | End: 2020-11-23 | Stop reason: HOSPADM

## 2020-04-03 NOTE — TELEPHONE ENCOUNTER
Requested Prescriptions     Pending Prescriptions Disp Refills    warfarin (COUMADIN) 5 MG tablet [Pharmacy Med Name: WARFARIN TABS 5MG] 90 tablet 3     Sig: TAKE 1 TABLET DAILY OR AS DIRECTED BY PHYSICIAN       Last Office Visit: 2/25/20    Next Office Visit: 8/13/20

## 2020-04-06 RX ORDER — WARFARIN SODIUM 5 MG/1
TABLET ORAL
Qty: 90 TABLET | Refills: 3 | Status: SHIPPED | OUTPATIENT
Start: 2020-04-06 | End: 2020-09-17

## 2020-04-07 RX ORDER — FLECAINIDE ACETATE 150 MG/1
150 TABLET ORAL 2 TIMES DAILY
Qty: 180 TABLET | Refills: 3 | Status: SHIPPED | OUTPATIENT
Start: 2020-04-07 | End: 2020-12-07 | Stop reason: SINTOL

## 2020-04-07 RX ORDER — WARFARIN SODIUM 5 MG/1
TABLET ORAL
Qty: 90 TABLET | Refills: 3 | Status: SHIPPED | OUTPATIENT
Start: 2020-04-07 | End: 2020-11-23 | Stop reason: HOSPADM

## 2020-05-29 ENCOUNTER — TELEPHONE (OUTPATIENT)
Dept: PHARMACY | Age: 70
End: 2020-05-29

## 2020-05-29 NOTE — TELEPHONE ENCOUNTER
Call placed to schedule patient for drive thru INR check. COVID-19 screening: negative  Make/model/color of car: black Keith Lev phone number: [phone]    Patient scheduled for drive thru INR check on 6/1 at 1400. Patient given address to drive-thru location: [address] [place] [address]. Patient instructed to wear a mask and stay in car for entire visit. Jose Roy.  Maria Victoria Galvez, PharmD, BCPS  [facility]  Ph: [phone]  5/29/2020 10:30 AM

## 2020-06-01 ENCOUNTER — ANTI-COAG VISIT (OUTPATIENT)
Dept: PHARMACY | Age: 70
End: 2020-06-01
Payer: MEDICARE

## 2020-06-01 LAB — INTERNATIONAL NORMALIZATION RATIO, POC: 2.2

## 2020-06-01 PROCEDURE — 99211 OFF/OP EST MAY X REQ PHY/QHP: CPT

## 2020-06-01 PROCEDURE — 85610 PROTHROMBIN TIME: CPT

## 2020-06-01 NOTE — PROGRESS NOTES
change  7.5 10 10 10 7.5 10 7.5 62.5  8/6 2.1 At goal, no change  7.5 10 10 10 7.5 10 7.5 62.5    Last CBC:  Lab Results   Component Value Date    RBC 4.55 11/26/2018    HGB 13.5 11/26/2018    HCT 40.8 11/26/2018    MCV 89.5 11/26/2018    MCH 29.7 11/26/2018    MPV 8.1 11/26/2018    RDW 14.1 11/26/2018     11/26/2018       Patient History:  Recent hospitalizations/HC visits -11/19 colonoscopy: held warfarin x 5 days PTP  -8/12 Dr. Dona Alas: no med changes  -8/2 Dr. Anthony Dickens: no med changes   Recent medication changes None reported today   Medications taken regularly that may interact with warfarin or alter INR No significant drug interactions identified   Warfarin dose taken as prescribed Yes - uses pillbox   Signs/symptoms of bleeding No h/o bleeding reported   Vitamin K intake -Now doing Weight Watchers  -Normally has ~2 servings of green, leafy vegetables per week (cabbage, brittany slaw, broccoli)   Recent vomiting/diarrhea/fever, changes in weight or activity level None reported   Tobacco or alcohol use Patient quit smoking 40+ years ago  Patient denies alcohol use   Upcoming surgeries or procedures None reported     Assessment/Plan:  Patient's INR was therapeutic today (2.2), and overall stable on current warfarin regimen. Patient denies any missed/extra warfarin doses or medication changes. She does report eating fewer greens than usual lately, but it has not affected her INR. Patient was instructed to continue warfarin 10 mg daily, except 7.5 mg on Sun/Thur/Sat. Repeat INR in 8 weeks due to COVID-19 pandemic (patient prefers extended visits due to cost burden). Patient was reminded to maintain consistent vitamin K intake and call with any bleeding, medication changes, or fever/vomiting/diarrhea. Patient understands dosing directions and information discussed. Dosing schedule and follow up appointment given to patient. Progress note routed to referring physician's office.   Patient acknowledges working in consult agreement with pharmacist as referred by his/her physician. Next Clinic Appointment:  8/3    Please call [facility] at [phone] with any questions. Thanks! Eileen Jasso.  Renae Retana, PharmD, [facility]  [facility]  Ph: [phone]  6/1/2020 2:03 PM    CLINICAL PHARMACY CONSULT: MED RECONCILIATION/REVIEW ADDENDUM    For Pharmacy Admin Tracking Only    PHSO: Yes  Total # of Interventions Recommended: 0  - Maintenance Safety Lab Monitoring #: 1  Total Interventions Accepted: 0  Time Spent (min): 15

## 2020-08-03 ENCOUNTER — TELEPHONE (OUTPATIENT)
Dept: PHARMACY | Age: 70
End: 2020-08-03

## 2020-08-03 NOTE — TELEPHONE ENCOUNTER
Patient called to cancel INR check today. She recently returned from a wedding in [place], and currently has fever of 101. She also reports finding out that another guest at the wedding reception was hospitalized for COVID-19. Since patient has fever and potential exposure to COVID-19, recommended that she get tested for COVID-19. Informed her that COVID-19 testing is available at [facility] by appointment, and that she may need a physician order. Provided her with [facility] main phone number ([phone]) to call for instructions. Laura Mclean.  Emerson Jon, PharmD, BCPS  [facility]  Ph: [phone]  8/3/2020 9:38 AM

## 2020-08-10 NOTE — TELEPHONE ENCOUNTER
Called patient to check in and LVM. Subsequently noticed in chart that she was admitted to [facility] 8/3-8/8 after a fall with head trauma 2/2 dizziness. Patient was found to be positive for COVID-19 and was discharged on dexamethasone and zinc.  On 8/9, she presented to [facility] with generalized weakness after another fall with head trauma. She remains admitted currently. Unknown Regan.  Bennie Esposito, PharmD, [facility]  [facility]  Ph: [phone]  8/10/2020 8:53 AM

## 2020-08-20 NOTE — TELEPHONE ENCOUNTER
Patient was discharged home on . She was treated for COVID-19 pneumonia, A-fib with RVR, and Klebsiella UTI. She was treated with 7-day courses of dexamethasone and Levaquin, which improved her symptoms. Medication changes on discharge include the followin. Start amiodarone 200 mg BID (increases INR)  2. Start diltiazem 120 mg daily  3. D/c digoxin  4. D/c flecainide  5. D/c dexamethasone     Patient's INR on day of discharge was 2.5. Amiodarone will likely increase INR level, so INR check is recommended within 1 week of discharge. However, patient's discharge summary states that she was instructed to self-quarantine for 2 weeks following hospital discharge. Another note states that patient was discharged with home health care through [facility] ([phone]). Will check in with home care agency to determine their plans for seeing patient. Bull Quinn.  Osito Huitron, PharmD, BCPS  [facility]  Ph: [phone]  2020 9:39 AM

## 2020-08-21 ENCOUNTER — TELEPHONE (OUTPATIENT)
Dept: CARDIOLOGY CLINIC | Age: 70
End: 2020-08-21

## 2020-08-21 NOTE — TELEPHONE ENCOUNTER
[address] [facility] ([phone]) and spoke with Jose Guadalupe Escoto. Patient was enrolled for home care yesterday. Ordered weekly INR checks for patient with results called to Blake Bowles at [phone]. First INR will be drawn at patient's next visit sometime next week (date unknown). Milagro Devine.  Mark Anthony Hu, PharmD, BCPS  [facility] [place] [facility]  Ph: [phone]  8/21/2020 9:30 AM

## 2020-08-21 NOTE — TELEPHONE ENCOUNTER
Pt was recently discharged on Wed from  for Covid and pneumonia. At that time she was in and out of AF during her stay and her medications were changed. She is in SR now with HR in the 70s and wants to resume her previous medications because the new meds are making her feel dizzy and lightheaded and sick. They stopped her flecainide and digoxin, and started her on Amio 200 BID and diltiazem 120 QD.      Please advise- Elle

## 2020-08-21 NOTE — TELEPHONE ENCOUNTER
The patient called and wants to know if she can be put back on her flecainide medication. The patient states that the doctors at [facility] ED changed her medication. Please call the patient back as soon as possible to advise. [phone].

## 2020-08-24 ENCOUNTER — ANTI-COAG VISIT (OUTPATIENT)
Dept: PHARMACY | Age: 70
End: 2020-08-24

## 2020-08-24 LAB — INR BLD: 3.8

## 2020-08-24 NOTE — TELEPHONE ENCOUNTER
Patient called to notify clinic that INR will be drawn at home health visit today. Paolo Baker.  Mike Garcia, PharmD, BCPS  [facility]  Ph: [phone]  8/24/2020 8:06 AM

## 2020-08-24 NOTE — PROGRESS NOTES
Yoan Rueda is a 68 y.o. female with PMHx significant for A-fib, CHF, HTN, breast CA who had INR drawn by [facility] on 8/24/2020 for anticoagulation monitoring and adjustment. Patient has been taking warfarin for a-fib for at least 20 years.      Anticoagulation Indication(s):  Afib    Referring Physician:  Dr. Jd Ravi  Goal INR Range:  2-3  Duration of Anticoagulation Therapy:  Indefinite  Time of day dose taken:  PM  Product patient has at home:  warfarin 5 mg (peach) + 10 mg (white)    [id number] Score for Atrial Fibrillation Stroke Risk   Risk   Factors  Component Value   C CHF Yes 1   H HTN Yes 1   A2 Age >= 75 No,  (73 y.o.) 0   D DM No 0   S2 Prior Stroke/TIA No 0   V Vascular Disease No 0   A Age 74-69 Yes,  (73 y.o.) 1   Sc Sex female 1    [id number]  Score  4   Score last updated 11/13/19 10:00 AM    INR Summary                            Warfarin regimen (mg)  Date INR   A/P    Sun Mon Tue Wed Thu Fri Sat Mg/wk  8/24 3.8 Above goal, hold x 1  7.5 0/10 10 10 7.5 10 7.5 62.5  6/1 2.2 At goal, no change  7.5 10 10 10 7.5 10 7.5 62.5  3/23 2.0 At goal, no change  7.5 10 10 10 7.5 10 7.5 62.5  2/7 2.0 At goal, no change  7.5 10 10 10 7.5 10 7.5 62.5  12/30 2.9 At goal, no change  7.5 10 10 10 7.5 10 7.5 62.5  11/13 2.5 At goal, no change  7.5 10 10 10 7.5 10 7.5 62.5  9/25 2.3 At goal, no change  7.5 10 10 10 7.5 10 7.5 62.5  8/12 2.0 At goal, no change  7.5 10 10 10 7.5 10 7.5 62.5  7/3 2.1 At goal, no change  7.5 10 10 10 7.5 10 7.5 62.5  5/23 1.8 Below goal, continue  7.5 10 10 10 7.5 10 7.5 62.5    4/11 2.2 At goal, no change  7.5 10 10 10 7.5 10 7.5 62.5  3/1 2.8 At goal, no change  7.5 10 10 10 7.5 10 7.5 62.5  1/18 2.9 At goal, no change  7.5 10 10 10 7.5 10 7.5 62.5  12/14 2.1 At goal, no change  7.5 10 10 10 7.5 10 7.5 62.5  11/16 1.8 Below goal, continue  7.5 10 10 10 7.5 10 7.5 62.5  10/18 3.1 Above goal, continue  7.5 10 10 10 7.5 10 7.5 62.5  9/19 2.8 At goal, no change  7.5 10 10 10 7.5 10 7.5 62.5  8/22 2.0 At goal, no change  7.5 10 10 10 7.5 10 7.5 62.5  8/6 2.1 At goal, no change  7.5 10 10 10 7.5 10 7.5 62.5    Last CBC:  Lab Results   Component Value Date    RBC 4.55 11/26/2018    HGB 13.5 11/26/2018    HCT 40.8 11/26/2018    MCV 89.5 11/26/2018    MCH 29.7 11/26/2018    MPV 8.1 11/26/2018    RDW 14.1 11/26/2018     11/26/2018       Patient History:  Recent hospitalizations/HC visits -8/9-8/19 Admit [address] [place] [address] for generalized weakness after another fall with head trauma: treated for COVID-19 pneumonia, A-fib with RVR, Klebsiella UTI; treated with 7-day courses of dexamethasone and Levaquin, which improved symptoms; cardiac meds adjusted as below    -8/3-8/8 Admit [address] [place] [address] for fall with head trauma 2/2 dizziness: found to be positive for COVID-19 and was discharged on dexamethasone and zinc   Recent medication changes 8/21 per Dr. Covarrubias Mess: okay to switch cardio meds back to old regimen (digoxin + flecainide instead of diltiazem + amio)     On discharge from [address] [place] [address] 8/19:  -start amiodarone 200 mg BID (increases INR)  -start diltiazem 120 mg daily  -d/c digoxin, flecainide, dexamethasone     On discharge from [address] [place] [address] 8/8:  -dexamethasone 6 mg BID x 6 days  -zinc, vitamin C, Zofran PRN   Medications taken regularly that may interact with warfarin or alter INR No significant drug interactions identified   Warfarin dose taken as prescribed Yes - uses pillbox   Signs/symptoms of bleeding No h/o bleeding reported   Vitamin K intake -Now doing Weight Watchers  -Normally has ~2 servings of green, leafy vegetables per week (cabbage, brittany slaw, broccoli)   Recent vomiting/diarrhea/fever, changes in weight or activity level None reported   Tobacco or alcohol use Patient quit smoking 40+ years ago  Patient denies alcohol use   Upcoming surgeries or procedures None reported     Assessment/Plan:  Patient's INR was drawn by [address] [place] [address] Vargas: [phone]) and was supratherapeutic today (3.8). Patient was recently hospitalized twice as detailed above, and was found to have COVID-19. During the second admission, her cardiac medications were adjusted due to A-fib. However, she only took the new regimen for a couple of days because her EP approved switching back to the old regimen on 8/21. Patient was not discharged on any antibiotics and dexamethasone was discontinued on 8/19. Unclear why INR is elevated today. RN was instructed to hold warfarin today, then resume previously stable warfarin dose of 10 mg daily, except 7.5 mg on Sun/Thur/Sat. Repeat INR in 1 week per [facility] (patient prefers extended visits due to cost burden). Patient was reminded to maintain consistent vitamin K intake and call with any bleeding, medication changes, or fever/vomiting/diarrhea. Patient understands dosing directions and information discussed. Dosing schedule and follow up appointment given to patient. Progress note routed to referring physician's office. Patient acknowledges working in consult agreement with pharmacist as referred by his/her physician. Next INR Check:  8/31    Please call [facility] at [phone] with any questions. Thanks! Claudia Kirk.  Josh Willett, MiguelinaD, [facility]  [place] [address] [facility]  Ph: [phone]  8/24/2020 11:08 AM    CLINICAL PHARMACY CONSULT: MED RECONCILIATION/REVIEW ADDENDUM    For Pharmacy Admin Tracking Only    PHSO: Yes  Total # of Interventions Recommended: 1  - Decreased Dose #: 1  - Maintenance Safety Lab Monitoring #: 1  Total Interventions Accepted: 1  Time Spent (min): 15

## 2020-08-27 ENCOUNTER — VIRTUAL VISIT (OUTPATIENT)
Dept: CARDIOLOGY CLINIC | Age: 70
End: 2020-08-27
Payer: MEDICARE

## 2020-08-27 PROCEDURE — 99214 OFFICE O/P EST MOD 30 MIN: CPT | Performed by: NURSE PRACTITIONER

## 2020-08-27 NOTE — PATIENT INSTRUCTIONS
She states her b/p has been elevated lately  / HR 72 at this time   She is on norvasc and she want taking it / started again today   She states she was in hosp at [facility] 8/8/18 - 8/19/20 with Kali  quarantined until next Wednesday     feels she is having PAF  /   remains on flecainide  / lanoxin / coumadin    Discussed DOAC/ wants to think about it    Blood work and fu with Dr. Wesly Mcdonough in approx 2 weeks

## 2020-08-27 NOTE — PROGRESS NOTES
Consent:  She & health care decision maker is aware that that he may receive a bill for this virual service, depending on his insurance coverage, and has provided verbal consent to proceed: yes   Documentation:  I communicated with the patient and/or health care decision maker about our discussions of care. Details of this discussion including any medical advice provided:    I affirm this is a Patient Initiated Episode with an Established Patient who has not had a related appointment within my department in the past 7 days or scheduled within the next 24 hours. Total Time:>20-25 minutes       [address]       In Patient  Progress note                       Mykelsoto Melinda APRN FNP [address]       Cardiology     [address]   Doxy. me virtual visit  Note    CC: Interval Hx: Ms. Diya Lewis follows cardiology for palpitations /PAF/ HTN/  Today Ms Diya Lewis states she has had palpitations wax and wanes and she feels it PAF and \"needs another ablation\"    remains on flecainide  / lanoxin / coumadin    She states her b/p has been elevated lately  / HR 72 at this time   She is on norvasc and she want taking it / started again today   She states she was in hosp at [facility] 8/8/18 - 8/19/20 with Kali  quarantined until next Wednesday     c/o recent fall while had COVID and PNA      PMH:  1. Atrial flutter - S/P RFCA in 2005 (Dr. Rosalinda Lund) and 2006 (myself)  2. PAF - s/p RFCA in 2013 (Dr. Juan J Harrison). 30 day MCOT showed AF burden of 4% with conversion pauses up to 3.5 sec. Discussed repeat ablation vs increasing flecainide dose. She is not interested in repeat ablation. Flecainide was increased to 150 mg BID on 5/1/19 with significant symptomatic suppression of AF episodes. In SR today with stable QRS. No apparent recurrence of AF.   I have reviewed notes / any lab work EKGs stress test, angiograms, & images reviewed  I  have spent >20 minutes with pt on phone or on video visit with the patient with more than 50% spent counseling and coordinating care this patient. Cyotaiscan 2/2019    There is normal isotope uptake at stress and rest. There is no evidence of     myocardial ischemia or scar.     Normal LV size and systolic function.     Left ventricular ejection fraction of 75 %.     There are no regional wall motion abnormalities.     Normal myocardial perfusion study. 11/2018 echo  Left ventricular cavity size is normal. There is mild concentric left   ventricular hypertrophy. Overall left ventricular systolic function appears   normal with an ejection fraction of 55-60%. No regional wall motion   abnormalities are noted. Indeterminate diastolic function. Trivial mitral regurgitation is present. The aortic valve leaflets appear slightly thickened but opens well. Mild tricuspid regurgitation. Estimated pulmonary artery systolic pressure is normal at 30 mmHg assuming a   right atrial pressure of 8 mmHg. Objective: There were no vitals taken for this visit. No intake or output data in the 24 hours ending 08/27/20 1226  Wt Readings from Last 3 Encounters:   02/25/20 210 lb 9.6 oz (95.5 kg)   02/07/20 211 lb 3.2 oz (95.8 kg)   08/12/19 207 lb 9.6 oz (94.2 kg)       Physical Exam:   There were no vitals taken for this visit.   BP Readings from Last 3 Encounters:   02/25/20 (!) 142/70   02/07/20 138/76   08/12/19 (!) 160/82     Pulse Readings from Last 3 Encounters:   02/25/20 60   02/07/20 66   08/12/19 58     No intake or output data in the 24 hours ending 08/27/20 1226  Wt Readings from Last 2 Encounters:   02/25/20 210 lb 9.6 oz (95.5 kg)   02/07/20 211 lb 3.2 oz (95.8 kg)     Constitutional: She is oriented to person, place, and time / in NAD   Vitals /wt per patient at home     Reviewed  available lab work,  EKGs, images, C       Assessment    PAF /aflutter hx   Today Ms Gabrielle Haines states she has had palpitations wax and wanes and she feels it PAF and \"needs another ablation\"    remains on flecainide  / lanoxin She states her b/p has been elevated lately  / HR 72 at this time   She is on norvasc and she want taking it / started again today   She states she was in hosp at [facility] 8/8/18 - 8/19/20 with Kali  quarantined until next Wednesday     feels she is having PAF  /   remains on flecainide  / lanoxin / coumadin    Discussed DOAC/ wants to think about it    Blood work and fu with Dr. Roque Carolina in approx 2 weeks    Will see Dr. Yumiko Ingram in 2-3 weeks d/t location     [facility] APRN, [address] [facility]

## 2020-08-31 ENCOUNTER — ANTI-COAG VISIT (OUTPATIENT)
Dept: PHARMACY | Age: 70
End: 2020-08-31

## 2020-08-31 LAB — INR BLD: 4.5

## 2020-08-31 NOTE — PROGRESS NOTES
Yoan Rueda is a 68 y.o. female with PMHx significant for A-fib, CHF, HTN, breast CA who had INR drawn by [facility] on 8/31/2020 for anticoagulation monitoring and adjustment. Patient has been taking warfarin for a-fib for at least 20 years.      Anticoagulation Indication(s):  Afib    Referring Physician:  Dr. Homa Guzman  Goal INR Range:  2-3  Duration of Anticoagulation Therapy:  Indefinite  Time of day dose taken:  PM  Product patient has at home:  warfarin 5 mg (peach) + 10 mg (white)    [id number] Score for Atrial Fibrillation Stroke Risk   Risk   Factors  Component Value   C CHF Yes 1   H HTN Yes 1   A2 Age >= 75 No,  (73 y.o.) 0   D DM No 0   S2 Prior Stroke/TIA No 0   V Vascular Disease No 0   A Age 74-69 Yes,  (73 y.o.) 1   Sc Sex female 1    [id number]  Score  4   Score last updated 11/13/19 10:00 AM    INR Summary                            Warfarin regimen (mg)  Date INR   A/P    Sun Mon Tue Wed Thu Fri Sat Mg/wk  8/31 4.5 Above goal, hold x 2   0 0 10 INR  8/24 3.8 Above goal, hold x 1  7.5 0/10 10 10 7.5 10 7.5 62.5  6/1 2.2 At goal, no change  7.5 10 10 10 7.5 10 7.5 62.5  3/23 2.0 At goal, no change  7.5 10 10 10 7.5 10 7.5 62.5  2/7 2.0 At goal, no change  7.5 10 10 10 7.5 10 7.5 62.5  12/30 2.9 At goal, no change  7.5 10 10 10 7.5 10 7.5 62.5  11/13 2.5 At goal, no change  7.5 10 10 10 7.5 10 7.5 62.5  9/25 2.3 At goal, no change  7.5 10 10 10 7.5 10 7.5 62.5  8/12 2.0 At goal, no change  7.5 10 10 10 7.5 10 7.5 62.5  7/3 2.1 At goal, no change  7.5 10 10 10 7.5 10 7.5 62.5  5/23 1.8 Below goal, continue  7.5 10 10 10 7.5 10 7.5 62.5    4/11 2.2 At goal, no change  7.5 10 10 10 7.5 10 7.5 62.5  3/1 2.8 At goal, no change  7.5 10 10 10 7.5 10 7.5 62.5  1/18 2.9 At goal, no change  7.5 10 10 10 7.5 10 7.5 62.5  12/14 2.1 At goal, no change  7.5 10 10 10 7.5 10 7.5 62.5  11/16 1.8 Below goal, continue  7.5 10 10 10 7.5 10 7.5 62.5  10/18 3.1 Above goal, continue  7.5 10 10 10 7.5 10 7.5 62.5  9/19 2.8 At goal, no change  7.5 10 10 10 7.5 10 7.5 62.5  8/22 2.0 At goal, no change  7.5 10 10 10 7.5 10 7.5 62.5  8/6 2.1 At goal, no change  7.5 10 10 10 7.5 10 7.5 62.5    Last CBC:  Lab Results   Component Value Date    RBC 4.55 11/26/2018    HGB 13.5 11/26/2018    HCT 40.8 11/26/2018    MCV 89.5 11/26/2018    MCH 29.7 11/26/2018    MPV 8.1 11/26/2018    RDW 14.1 11/26/2018     11/26/2018       Patient History:  Recent hospitalizations/HC visits -8/9-8/19 Admit [address] [place] [address] for generalized weakness after another fall with head trauma: treated for COVID-19 pneumonia, A-fib with RVR, Klebsiella UTI; treated with 7-day courses of dexamethasone and Levaquin, which improved symptoms; cardiac meds adjusted as below    -8/3-8/8 Admit [address] [place] [address] for fall with head trauma 2/2 dizziness: found to be positive for COVID-19 and was discharged on dexamethasone and zinc   Recent medication changes 8/21 per Dr. Chyna Irving: okay to switch cardio meds back to old regimen (digoxin + flecainide instead of diltiazem + amio)     On discharge from [address] [place] [address] 8/19:  -start amiodarone 200 mg BID (increases INR)  -start diltiazem 120 mg daily  -d/c digoxin, flecainide, dexamethasone     On discharge from [address] [place] [address] 8/8:  -dexamethasone 6 mg BID x 6 days  -zinc, vitamin C, Zofran PRN   Medications taken regularly that may interact with warfarin or alter INR No significant drug interactions identified   Warfarin dose taken as prescribed Yes - uses pillbox   Signs/symptoms of bleeding No h/o bleeding reported   Vitamin K intake -Now doing Weight Watchers  -Normally has ~2 servings of green, leafy vegetables per week (cabbage, brittany slaw, broccoli)   Recent vomiting/diarrhea/fever, changes in weight or activity level None reported   Tobacco or alcohol use Patient quit smoking 40+ years ago  Patient denies alcohol use   Upcoming surgeries or procedures None reported     Assessment/Plan:  Patient's INR was drawn by [facility] [address] [facility] RN Stefan Gut: [phone]) and was supratherapeutic today (4.5), despite holding a dose of warfarin last week. Spoke with patient and RN together over the phone. Patient was recently hospitalized twice as detailed above, and was found to have COVID-19. During the second admission, her cardiac medications were adjusted due to A-fib. However, she only took the new regimen for a couple of days because her EP approved switching back to the old regimen on 8/21. Patient was not discharged on any antibiotics and dexamethasone was discontinued on 8/19. Today, patient reports that she is eating well and feeling better. Unclear why INR remains elevated today. RN and patient were instructed to hold warfarin today and tomorrow, then resume previously stable warfarin dose of 10 mg daily, except 7.5 mg on Sun/Thur/Sat. Repeat INR in 3 days per [facility] (patient prefers extended visits due to cost burden). Patient was reminded to maintain consistent vitamin K intake and call with any bleeding, medication changes, or fever/vomiting/diarrhea. Patient understands dosing directions and information discussed. Dosing schedule and follow up appointment given to patient. Progress note routed to referring physician's office. Patient acknowledges working in consult agreement with pharmacist as referred by his/her physician. Next INR Check:  9/3    Please call [facility] at [phone] with any questions. Thanks! Bryn Li.  Chintan Huynh, MiguelinaD, [facility]  [facility]  Ph: [phone]  8/31/2020 10:46 AM    CLINICAL PHARMACY CONSULT: MED RECONCILIATION/REVIEW ADDENDUM    For Pharmacy Admin Tracking Only    PHSO: Yes  Total # of Interventions Recommended: 1  - Decreased Dose #: 1  - Maintenance Safety Lab Monitoring #: 1  Total Interventions Accepted: 1  Time Spent (min): 15

## 2020-09-03 ENCOUNTER — ANTI-COAG VISIT (OUTPATIENT)
Dept: PHARMACY | Age: 70
End: 2020-09-03

## 2020-09-03 LAB — INR BLD: 1.3

## 2020-09-03 NOTE — PROGRESS NOTES
Yoan Rueda is a 68 y.o. female with PMHx significant for A-fib, CHF, HTN, breast CA who had INR drawn by [facility] on 9/3/2020 for anticoagulation monitoring and adjustment. Patient has been taking warfarin for a-fib for at least 20 years.      Anticoagulation Indication(s):  Afib    Referring Physician:  Dr. Maile Feldman  Goal INR Range:  2-3  Duration of Anticoagulation Therapy:  Indefinite  Time of day dose taken:  PM  Product patient has at home:  warfarin 5 mg (peach) + 10 mg (white)    [id number] Score for Atrial Fibrillation Stroke Risk   Risk   Factors  Component Value   C CHF Yes 1   H HTN Yes 1   A2 Age >= 75 No,  (73 y.o.) 0   D DM No 0   S2 Prior Stroke/TIA No 0   V Vascular Disease No 0   A Age 74-69 Yes,  (73 y.o.) 1   Sc Sex female 1    [id number]  Score  4   Score last updated 11/13/19 10:00 AM    INR Summary                            Warfarin regimen (mg)  Date INR   A/P    Sun Mon Tue Wed Thu Fri Sat Mg/wk  9/3 1.3 Below goal, resume  7.5 10 10 10 7.5 10 7.5 62.5  8/31 4.5 Above goal, hold x 2   0 0 10 INR  8/24 3.8 Above goal, hold x 1  7.5 0/10 10 10 7.5 10 7.5 62.5  6/1 2.2 At goal, no change  7.5 10 10 10 7.5 10 7.5 62.5  3/23 2.0 At goal, no change  7.5 10 10 10 7.5 10 7.5 62.5  2/7 2.0 At goal, no change  7.5 10 10 10 7.5 10 7.5 62.5  12/30 2.9 At goal, no change  7.5 10 10 10 7.5 10 7.5 62.5  11/13 2.5 At goal, no change  7.5 10 10 10 7.5 10 7.5 62.5  9/25 2.3 At goal, no change  7.5 10 10 10 7.5 10 7.5 62.5  8/12 2.0 At goal, no change  7.5 10 10 10 7.5 10 7.5 62.5  7/3 2.1 At goal, no change  7.5 10 10 10 7.5 10 7.5 62.5  5/23 1.8 Below goal, continue  7.5 10 10 10 7.5 10 7.5 62.5    4/11 2.2 At goal, no change  7.5 10 10 10 7.5 10 7.5 62.5  3/1 2.8 At goal, no change  7.5 10 10 10 7.5 10 7.5 62.5  1/18 2.9 At goal, no change  7.5 10 10 10 7.5 10 7.5 62.5  12/14 2.1 At goal, no change  7.5 10 10 10 7.5 10 7.5 62.5  11/16 1.8 Below goal, [phone] with any questions. Thanks! Eleazar Flowers.  Nura Pope, PharmD, BCPS  [address] [facility]  Ph: [phone]  9/3/2020 10:16 AM    CLINICAL PHARMACY CONSULT: MED RECONCILIATION/REVIEW ADDENDUM    For Pharmacy Admin Tracking Only    PHSO: Yes  Total # of Interventions Recommended: 0  - Maintenance Safety Lab Monitoring #: 1  Total Interventions Accepted: 0  Time Spent (min): 15

## 2020-09-08 DIAGNOSIS — E55.9 VITAMIN D DEFICIENCY: ICD-10-CM

## 2020-09-08 DIAGNOSIS — I48.0 PAROXYSMAL ATRIAL FIBRILLATION (HCC): ICD-10-CM

## 2020-09-08 LAB
A/G RATIO: 1.8 (ref 1.1–2.2)
ALBUMIN SERPL-MCNC: 3.9 G/DL (ref 3.4–5)
ALP BLD-CCNC: 87 U/L (ref 40–129)
ALT SERPL-CCNC: 17 U/L (ref 10–40)
ANION GAP SERPL CALCULATED.3IONS-SCNC: 12 MMOL/L (ref 3–16)
AST SERPL-CCNC: 21 U/L (ref 15–37)
BILIRUB SERPL-MCNC: <0.2 MG/DL (ref 0–1)
BILIRUBIN DIRECT: <0.2 MG/DL (ref 0–0.3)
BILIRUBIN, INDIRECT: ABNORMAL MG/DL (ref 0–1)
BUN BLDV-MCNC: 10 MG/DL (ref 7–20)
CALCIUM SERPL-MCNC: 8.8 MG/DL (ref 8.3–10.6)
CHLORIDE BLD-SCNC: 100 MMOL/L (ref 99–110)
CHOLESTEROL, TOTAL: 195 MG/DL (ref 0–199)
CO2: 25 MMOL/L (ref 21–32)
CREAT SERPL-MCNC: <0.5 MG/DL (ref 0.6–1.2)
GFR AFRICAN AMERICAN: >60
GFR NON-AFRICAN AMERICAN: >60
GLOBULIN: 2.2 G/DL
GLUCOSE BLD-MCNC: 98 MG/DL (ref 70–99)
HCT VFR BLD CALC: 34.3 % (ref 36–48)
HDLC SERPL-MCNC: 38 MG/DL (ref 40–60)
HEMOGLOBIN: 11.5 G/DL (ref 12–16)
LDL CHOLESTEROL CALCULATED: 99 MG/DL
MAGNESIUM: 2.1 MG/DL (ref 1.8–2.4)
MCH RBC QN AUTO: 30.4 PG (ref 26–34)
MCHC RBC AUTO-ENTMCNC: 33.5 G/DL (ref 31–36)
MCV RBC AUTO: 90.8 FL (ref 80–100)
PDW BLD-RTO: 15.8 % (ref 12.4–15.4)
PLATELET # BLD: 384 K/UL (ref 135–450)
PMV BLD AUTO: 7.3 FL (ref 5–10.5)
POTASSIUM SERPL-SCNC: 4 MMOL/L (ref 3.5–5.1)
RBC # BLD: 3.77 M/UL (ref 4–5.2)
SODIUM BLD-SCNC: 137 MMOL/L (ref 136–145)
TOTAL PROTEIN: 6.1 G/DL (ref 6.4–8.2)
TRIGL SERPL-MCNC: 292 MG/DL (ref 0–150)
TSH REFLEX FT4: 1.17 UIU/ML (ref 0.27–4.2)
VITAMIN D 25-HYDROXY: 21.8 NG/ML
VLDLC SERPL CALC-MCNC: 58 MG/DL
WBC # BLD: 6.1 K/UL (ref 4–11)

## 2020-09-09 ENCOUNTER — TELEPHONE (OUTPATIENT)
Dept: CARDIOLOGY CLINIC | Age: 70
End: 2020-09-09

## 2020-09-09 ENCOUNTER — ANTI-COAG VISIT (OUTPATIENT)
Dept: PHARMACY | Age: 70
End: 2020-09-09

## 2020-09-09 LAB — INR BLD: 3.7

## 2020-09-09 NOTE — PROGRESS NOTES
Yoan Rueda is a 68 y.o. female with PMHx significant for A-fib, CHF, HTN, breast CA who had INR drawn by [facility] on 9/9/2020 for anticoagulation monitoring and adjustment. Patient has been taking warfarin for a-fib for at least 20 years.      Anticoagulation Indication(s):  Afib    Referring Physician:  Dr. Jamie Aviles  Goal INR Range:  2-3  Duration of Anticoagulation Therapy:  Indefinite  Time of day dose taken:  PM  Product patient has at home:  warfarin 5 mg (peach) + 10 mg (white)    [id number] Score for Atrial Fibrillation Stroke Risk   Risk   Factors  Component Value   C CHF Yes 1   H HTN Yes 1   A2 Age >= 75 No,  (73 y.o.) 0   D DM No 0   S2 Prior Stroke/TIA No 0   V Vascular Disease No 0   A Age 74-69 Yes,  (73 y.o.) 1   Sc Sex female 1    [id number]  Score  4   Score last updated 11/13/19 10:00 AM    INR Summary                            Warfarin regimen (mg)  Date INR   A/P    Sun Mon Tue Wed Thu Fri Sat Mg/wk  9/9 3.7 Above goal, hold x 1  7.5 10 10 0/10 7.5 10 7.5 62.5  9/3 1.3 Below goal, resume  7.5 10 10 10 7.5 10 7.5 62.5  8/31 4.5 Above goal, hold x 2   0 0 10 INR  8/24 3.8 Above goal, hold x 1  7.5 0/10 10 10 7.5 10 7.5 62.5  6/1 2.2 At goal, no change  7.5 10 10 10 7.5 10 7.5 62.5  3/23 2.0 At goal, no change  7.5 10 10 10 7.5 10 7.5 62.5  2/7 2.0 At goal, no change  7.5 10 10 10 7.5 10 7.5 62.5  12/30 2.9 At goal, no change  7.5 10 10 10 7.5 10 7.5 62.5  11/13 2.5 At goal, no change  7.5 10 10 10 7.5 10 7.5 62.5  9/25 2.3 At goal, no change  7.5 10 10 10 7.5 10 7.5 62.5  8/12 2.0 At goal, no change  7.5 10 10 10 7.5 10 7.5 62.5  7/3 2.1 At goal, no change  7.5 10 10 10 7.5 10 7.5 62.5  5/23 1.8 Below goal, continue  7.5 10 10 10 7.5 10 7.5 62.5    4/11 2.2 At goal, no change  7.5 10 10 10 7.5 10 7.5 62.5  3/1 2.8 At goal, no change  7.5 10 10 10 7.5 10 7.5 62.5  1/18 2.9 At goal, no change  7.5 10 10 10 7.5 10 7.5 62.5  12/14 2.1 At goal, no change  7.5 10 10 10 7.5 10 7.5 62.5  11/16 1.8 Below goal, continue  7.5 10 10 10 7.5 10 7.5 62.5  10/18 3.1 Above goal, continue  7.5 10 10 10 7.5 10 7.5 62.5  9/19 2.8 At goal, no change  7.5 10 10 10 7.5 10 7.5 62.5  8/22 2.0 At goal, no change  7.5 10 10 10 7.5 10 7.5 62.5  8/6 2.1 At goal, no change  7.5 10 10 10 7.5 10 7.5 62.5    Last CBC:  Lab Results   Component Value Date    RBC 3.77 (L) 09/08/2020    HGB 11.5 (L) 09/08/2020    HCT 34.3 (L) 09/08/2020    MCV 90.8 09/08/2020    MCH 30.4 09/08/2020    MPV 7.3 09/08/2020    RDW 15.8 (H) 09/08/2020     09/08/2020       Patient History:  Recent hospitalizations/HC visits -8/9-8/19 Admit [facility] for generalized weakness after another fall with head trauma: treated for COVID-19 pneumonia, A-fib with RVR, Klebsiella UTI; treated with 7-day courses of dexamethasone and Levaquin, which improved symptoms; cardiac meds adjusted as below    -8/3-8/8 Admit [facility] for fall with head trauma 2/2 dizziness: found to be positive for COVID-19 and was discharged on dexamethasone and zinc   Recent medication changes 8/21 per Dr. Gavino Barnes: okay to switch cardio meds back to old regimen (digoxin + flecainide instead of diltiazem + amio)     On discharge from [facility] 8/19:  -start amiodarone 200 mg BID (increases INR)  -start diltiazem 120 mg daily  -d/c digoxin, flecainide, dexamethasone     On discharge from [facility] 8/8:  -dexamethasone 6 mg BID x 6 days  -zinc, vitamin C, Zofran PRN   Medications taken regularly that may interact with warfarin or alter INR No significant drug interactions identified   Warfarin dose taken as prescribed Yes - uses pillbox   Signs/symptoms of bleeding No h/o bleeding reported   Vitamin K intake Normally has ~2 servings of green, leafy vegetables per week (cabbage, brittany slaw, broccoli)   Recent vomiting/diarrhea/fever, changes in weight or activity level None reported   Tobacco or alcohol use Patient quit smoking 40+ years ago  Patient denies alcohol use   Upcoming surgeries or procedures None reported     Assessment/Plan:  Patient's INR was drawn by [address] [place] [address] Jose Exon: [phone]) and was supratherapeutic today (3.7). Spoke with patient over the phone. INR has been fluctuating widely since she was discharged from the hospital.  Over the past 10 days, INR dropped from 4.5 to 1.3, then increased back to 3.7. She has not eaten any dark, leafy greens since last INR check. Typically, she eats ~2 servings of greens/week. Potentially, INR is elevated due to decreased vitamin K intake? Patient was recently hospitalized twice as detailed above, and was found to have COVID-19. During the second admission, her cardiac medications were adjusted due to A-fib. However, she only took the new regimen for a couple of days because her EP approved switching back to the old regimen on 8/21. Patient was not discharged on any antibiotics and dexamethasone was discontinued on 8/19. Given that patient's INR has historically been very stable on current warfarin regimen, I hesitate to adjust the maintenance dose. RN and patient were instructed to hold warfarin today, then continue previously stable dose of 10 mg daily, except 7.5 mg on Sun/Thur/Sat. Patient will resume her normal vitamin K intake. Repeat INR in 1 week per [facility] (patient prefers extended visits due to cost burden). Patient was reminded to maintain consistent vitamin K intake and call with any bleeding, medication changes, or fever/vomiting/diarrhea. Patient understands dosing directions and information discussed. Dosing schedule and follow up appointment given to patient. Progress note routed to referring physician's office. Patient acknowledges working in consult agreement with pharmacist as referred by his/her physician. Next INR Check:  9/16    Please call [facility] at [phone] with any questions. Thanks! Sudhir Robles.  Nano Thao, PharmD, [facility]  Ph: [phone]  9/9/2020 2:19 PM    CLINICAL PHARMACY CONSULT: MED RECONCILIATION/REVIEW ADDENDUM    For Pharmacy Admin Tracking Only    PHSO: Yes  Total # of Interventions Recommended: 1  - Decreased Dose #: 1  - Maintenance Safety Lab Monitoring #: 1  Total Interventions Accepted: 1  Time Spent (min): 30

## 2020-09-16 ENCOUNTER — ANTI-COAG VISIT (OUTPATIENT)
Dept: PHARMACY | Age: 70
End: 2020-09-16

## 2020-09-16 LAB — INR BLD: 3.3

## 2020-09-16 NOTE — PROGRESS NOTES
Cardiac Electrophysiology Consultation  Via  Telehealth Evaluation - Audio/Visual (during QHUNL-49 public health emergency)        Date: 2020  Reason for Consultation: AF  Consult Requesting Physician: No att. providers found     Chief Complaint:   Chief Complaint   Patient presents with    Atrial Fibrillation      HPI:  Flor Dominguez (:  1950) is a 65 y.o. female who has requested an audio/video evaluation and has a history of PAF. PMH significant for HTN, s/p RFCA for AFL in , s/p cryo-ablation for AF (13, Dr. Miguel Shanks). She has been on flecainide since at least  and was increased to 150 mg BID in 2019 with relatively adequate suppression of AF. Interval History: Today, she is being seen as a referral from [place] NP. She has noticed an increase in palpitations lately with episodes that can last up to a week. Review of the e[employer]aring[employer] mobile device tracings confirm episodes of AF. She was positive for COVID the beginning of Aug and since that time, she has had trouble with her voice. She is retired , assembling laminate countertops. During this episodes of atrial fibrillation, she is highly symptomatic with palpitations, shortness of breath and easy fatigability. She feels that she is so exhausted and could not live like this. After recovery from [facility], she does have hoarseness of voice intermittently. She still have loss of smell and taste.     Past Medical History:   Diagnosis Date    Acute diastolic congestive heart failure ([address] 2018    Allergic     SEASONAL    Atrial fibrillation ([address] H/O    Cancer ([address]  AND     BREAST right    Essential hypertension 3/13/2018    Hammertoe     Migraine         Past Surgical History:   Procedure Laterality Date    ANKLE FRACTURE SURGERY  ,    APPENDECTOMY  1983    ATRIAL ABLATION SURGERY  X3    Cryoablation for atrial fib    AXILLARY SURGERY Right     lymph node    BREAST BIOPSY 01/07/2011    BREAST BIOPSY  7/12/2011    left breast bx - benign     BREAST CYST EXCISION  06/2003,    BREAST LUMPECTOMY  03/30/2009    CARDIAC CATHETERIZATION  5/2005,01/2006    COLONOSCOPY  1993,1995,1999,2003,2006    COLONOSCOPY  11/29/2011    Dr. Arline Lovell - benign polyps     COSMETIC SURGERY      FOOT SURGERY Right 1/15/16    CORRECTION OF HAMMERTOES 1-5 RIGHT FOOT, WEIL OSTEOTOMY 2,3    FOOT SURGERY Right 05/12/2016    EXTENSOR TENDON LENGTHENING 4TH AND 5TH TOES RIGHT FOOT    FOOT SURGERY Right 08/11/2016    CORRECTION HAMMERTOES 1ST, 4TH AND 5TH DIGITS RIGHT FOOT WITH    HERNIA REPAIR  [id number]    HYSTERECTOMY  12/1977    JOINT REPLACEMENT  June 2015    LEFT KNEE    KNEE SURGERY  6/09/2015    left knee replacement    LIPOMA RESECTION  09/2006    MASTECTOMY  3-15-11    bilateral    NEUROMA SURGERY  1987,1988,1989,1992 , 1995    Left foot    OTHER SURGICAL HISTORY  1983, 1984 X 2, 1994 X 2, 2006    Bowel obstructions    RHINOPLASTY  11/1976    RHINOPLASTY  1995    THORACOTOMY  1998    TONSILLECTOMY  1956    UPPER GASTROINTESTINAL ENDOSCOPY  [id number]    VARICOSE VEIN SURGERY  11/2001       Allergies: Allergies   Allergen Reactions    Keflex [Cephalexin] Shortness Of Breath and Nausea Only    Novocain [Procaine] Shortness Of Breath and Swelling     LIDOCAINE OK    Amiodarone Other (See Comments)     dizziness    Cephalexin     Penicillins Swelling    Sulfa Antibiotics Nausea Only and Swelling       Medication:   Prior to Admission medications    Medication Sig Start Date End Date Taking? Authorizing Provider   warfarin (COUMADIN) 5 MG tablet TAKE 1 TABLET DAILY OR AS DIRECTED BY PHYSICIAN 4/7/20  Yes Keli Castañeda MD   flecainide [facility]) 150 MG tablet Take 1 tablet by mouth 2 times daily 4/7/20  Yes MAILE Byrne - CNP   warfarin (COUMADIN) 10 MG tablet TAKE 1 TABLET DAILY.  TAKE 12.5 MG ON MONDAY, WEDNESDAY AND FRIDAY, AND 10 MG ALL OTHER DAYS.  Patient taking differently: TAKE 1 TABLET DAILY. TAKE 12.5 MG ON MONDAY,Tuesday, WEDNESDAY AND FRIDAY, AND 10 MG ALL OTHER DAYS. 4/2/20  Yes Cheryl Rivera MD   ezetimibe (ZETIA) 10 MG tablet TAKE 1 TABLET BY MOUTH ONE TIME A DAY 3/17/20  Yes MAILE Laureano CNP   furosemide (LASIX) 20 MG tablet TAKE 1 TABLET DAILY 1/2/20  Yes Cheryl Rivera MD   amLODIPine (NORVASC) 10 MG tablet TAKE 1 TABLET DAILY 1/2/20  Yes Cheryl Rivera MD   digoxin (LANOXIN) 250 MCG tablet TAKE 1 TABLET DAILY 1/2/20  Yes Cheryl Rivera MD   acetaminophen (TYLENOL) 325 MG tablet Take 325 mg by mouth every 6 hours as needed. As need for headaches. Yes Historical Provider, MD       Social History:   reports that she quit smoking about 32 years ago. Her smoking use included cigarettes. She started smoking about 40 years ago. She has a 2.00 pack-year smoking history. She has never used smokeless tobacco. She reports that she does not drink alcohol or use drugs. Family History:  family history includes Cancer in her brother, father, and mother. Reviewed. Denies family history of sudden cardiac death, arrhythmia, premature CAD    Review of System:    · General ROS: negative for - chills, fever   · Psychological ROS: negative for - anxiety or depression  · Ophthalmic ROS: negative for - eye pain or loss of vision  · ENT ROS: negative for - epistaxis, headaches, nasal discharge, sore throat   · Allergy and Immunology ROS: negative for - hives, nasal congestion   · Hematological and Lymphatic ROS: negative for - bleeding problems, blood clots, bruising or jaundice  · Endocrine ROS: negative for - skin changes, temperature intolerance or unexpected weight changes  · Respiratory ROS: negative for - cough, hemoptysis, pleuritic pain, SOB, sputum changes or wheezing  · Cardiovascular ROS: Per HPI.    · Gastrointestinal ROS: negative for - abdominal pain, blood in stools, diarrhea, hematemesis, melena, nausea/vomiting or swallowing difficulty/pain  · Genito-Urinary ROS: negative for - dysuria or incontinence  · Musculoskeletal ROS: negative for - joint swelling or muscle pain  · Neurological ROS: negative for - confusion, dizziness, gait disturbance, headaches, numbness/tingling, seizures, speech problems, tremors, visual changes or weakness  · Dermatological ROS: negative for - rash        PHYSICAL EXAMINATION:  Vital Signs: (As obtained by patient/caregiver or practitioner observation)    There were no vitals filed for this visit. Constitutional: [x] Appears well-developed and well-nourished [] No apparent distress      [x] Abnormal-   Mental status  [x] Alert and awake  [] Oriented to person/place/time []Able to follow commands      Eyes:  EOM    [x]  Normal  [] Abnormal-  Sclera  [x]  Normal  [] Abnormal -         Discharge [x]  None visible  [] Abnormal -    HENT:   [x] Normocephalic, atraumatic. [] Abnormal   [x] Mouth/Throat: Mucous membranes are moist.     External Ears [x] Normal  [] Abnormal-     Neck: [] No visualized mass     Pulmonary/Chest: [x] Respiratory effort normal.  [x] No visualized signs of difficulty breathing or respiratory distress        [] Abnormal-      Musculoskeletal:   [x] Normal gait with no signs of ataxia         [x] Normal range of motion of neck        [] Abnormal-       Neurological:        [x] No Facial Asymmetry (Cranial nerve 7 motor function) (limited exam to video visit)          [x] No gaze palsy        [] Abnormal-         Skin:        [x] No significant exanthematous lesions or discoloration noted on facial skin         [] Abnormal-            Psychiatric:       [x] Normal Affect [x] No Hallucinations        [] Abnormal-     Labs:  Reviewed. ECG: deferred due to VV     Studies:   1.  30 day event monitor (5/2014):  SR with frequent episodes of AF with 3 second conversion pause. 3 day Holter (11/26-11/29/18):  SR with average HR of 62 ( bpm). No AF noted.        30 day MCOT (2/15-3/16/19):  SR with PAF burden of 4%, AF average rate of 104 ( bpm), post conversion pauses up to 3.5 sec. 2. Echo 11/30/18:   Summary   Left ventricular cavity size is normal. There is mild concentric left   ventricular hypertrophy. Overall left ventricular systolic function appears   normal with an ejection fraction of 55-60%. No regional wall motion   abnormalities are noted. Indeterminate diastolic function. Trivial mitral regurgitation is present. The aortic valve leaflets appear slightly thickened but opens well. Mild tricuspid regurgitation. Estimated pulmonary artery systolic pressure is normal at 30 mmHg assuming a   right atrial pressure of 8 mmHg. 3. Stress Test 2/19/19:    Summary     There is normal isotope uptake at stress and rest. There is no evidence of     myocardial ischemia or scar.     Normal LV size and systolic function.     Left ventricular ejection fraction of 75 %.     There are no regional wall motion abnormalities.     Normal myocardial perfusion study. The MCOT, echocardiogram, stress test, and coronary angiography/PCI were reviewed by myself and used for my plan of care. Procedures:  1. RFA/PVI of AF    ASSESSMENT/PLAN:  1. Highly symptomatic and recurrent PAF, s/p cyro-ablation 2013  2. Typical AFL, s/p RFA 2006  3. HTN, stable on amlodipine  4. COVID-19, resolving  5. Hoarseness of voice, new, probably secondary to COVID    On flecainide 150 mg BID and warfarin managed by [facility]  Frequent episodes of breakthrough AF despite flecainide  Discussed the options of changing antiarrythmic vs catheter ablation. We educated the patient that atrial fibrillation and atrial flutter are both progressive diseases, with more frequent episodes that will ensue. Subsequent episodes usually become more sustained to the extent that many individuals would then develop persistent atrial fibrillation/atrial flutter.  Once persistence is reached, permanent atrial dysrhythmia is inevitable. Treatment options including cardioversion, anti-arrhythmic medication therapy, rate control strategy, oral anticoagulation, and atrial fibrillation ablation were discussed with patient. Risks, benefits and alternative of each treatment options were explained. We discussed different management options for both atrial tachydysrhythmia including their risks and benefits. These options include use of cardioversion (mainly for persisting atrial fibrillation or atrial flutter) which provides an effective immediate therapy with success rates of 75% or higher, but it provides no short nor long term efficacy. Anti-arrhythmic medications provide a very effective short term therapy, but even with our most potent anti-arrhythmic medication there is limited long term efficacy (clinical studies have shown that 40% of patients remain atrial fibrillation-free after 4 years of follow-up after starting one of the more powerful anti-arrhythmic medication (amiodarone), and, if extrapolated, may have further diminishing success as time goes on). Against atrial flutter, any anti-arrhythmic medication would be nearly ineffective. Atrial fibrillation and atrial flutter ablation is a potentially curative therapy with very reasonable success rate after a first time procedure and with improving success rates with subsequent procedures. The risks, benefits and alternatives of the ablation procedure were discussed with the patient. The risks including, but not limited to, the risks of bleeding, infection, radiation exposure, injury to vascular, cardiac and surrounding structures (including pneumothorax), stroke, cardiac perforation, tamponade, need for emergent open heart surgery, need for pacemaker implantation, injury to the phrenic nerve, injury to the esophagus, myocardial infarction and death were discussed in detail.      The patient wishes to proceed with both ablations, one for right atrial flutter and the other for persistent atrial fibrillation. We will also schedule the atrial fibrillation and atrial flutter ablation with general anaesthesia. He will need to hold flecainide for 2 days prior to. He will continue Coumadin as per our protocol. Will schedule for RF ablation with [employer] system. He would need Cardiac CTA, 3 days prior to procedure for pulmonary vein mapping. Will order BMP, CBC, PT/INR prior to the procedure. Discussed changing from warfarin to Eliquis 5 mg BID. She will call her insurance company to see what the cost will be. If it is not cost prohibitive, we can make that change. Follow up in 1 week and 1 month with Emmy Soriano NP, after the ablation  Follow up in 3 months with me    Thank you for allowing me to participate in the care of Singh Lockhart. All questions and concerns were addressed to the patient/family. Alternatives to my treatment were discussed. Hamzah Samson MD, personally performed the services prescribed in this documentation as scribed by Ms. Shira Maya RN in my presence and it is both accurate and complete. Xena Byrne RN, am scribing for and in the presence of Dr. Mickie Soto. 09/17/20 2:08 PM  Shira Maya RN    Singh Lockhart is a 65 y.o. female being evaluated by a Virtual Visit (video visit) encounter to address concerns as mentioned above. A caregiver was present when appropriate. Due to this being a TeleHealth encounter (During ZKCQM-48 public health emergency), evaluation of the following organ systems was limited: Vitals/Constitutional/EENT/Resp/CV/GI//MS/Neuro/Skin/Heme-Lymph-Imm.  Pursuant to the emergency declaration under the [address] [facility] [address] [facility] waiver authority and the [employer] General Act, this Virtual Visit was conducted with patient's (and/or legal guardian's) consent, to reduce the patient's risk of exposure to COVID-19 and provide necessary medical care. The patient (and/or legal guardian) has also been advised to contact this office for worsening conditions or problems, and seek emergency medical treatment and/or call 911 if deemed necessary. Services were provided through a video synchronous discussion virtually to substitute for in-person clinic visit. Patient and provider were located at their individual distinct locations. [facility]  Cardiac Electrophysiology  [address] [place] [address] [phone]  [employer]        An electronic signature was used to authenticate this note.

## 2020-09-16 NOTE — PROGRESS NOTES
Yoan Rueda is a 65 y.o. female with PMHx significant for A-fib, CHF, HTN, breast CA who had INR drawn by [facility] on 9/16/2020 for anticoagulation monitoring and adjustment. Patient has been taking warfarin for a-fib for at least 20 years.      Anticoagulation Indication(s):  Afib    Referring Physician:  Dr. Natasha Pink  Goal INR Range:  2-3  Duration of Anticoagulation Therapy:  Indefinite  Time of day dose taken:  PM  Product patient has at home:  warfarin 5 mg (peach) + 10 mg (white)    [id number] Score for Atrial Fibrillation Stroke Risk   Risk   Factors  Component Value   C CHF Yes 1   H HTN Yes 1   A2 Age >= 75 No,  (71 y.o.) 0   D DM No 0   S2 Prior Stroke/TIA No 0   V Vascular Disease No 0   A Age 74-69 Yes,  (71 y.o.) 1   Sc Sex female 1    [id number]  Score  4   Score last updated 11/13/19 10:00 AM    INR Summary                            Warfarin regimen (mg)  Date INR   A/P    Sun Mon Tue Wed Thu Fri Sat Mg/wk  9/16 3.3 Above goal, reduce x 1 7.5 10 10 5/10 7.5 10 7.5 62.5  9/9 3.7 Above goal, hold x 1  7.5 10 10 0/10 7.5 10 7.5 62.5  9/3 1.3 Below goal, resume  7.5 10 10 10 7.5 10 7.5 62.5  8/31 4.5 Above goal, hold x 2   0 0 10 INR  8/24 3.8 Above goal, hold x 1  7.5 0/10 10 10 7.5 10 7.5 62.5  6/1 2.2 At goal, no change  7.5 10 10 10 7.5 10 7.5 62.5  3/23 2.0 At goal, no change  7.5 10 10 10 7.5 10 7.5 62.5  2/7 2.0 At goal, no change  7.5 10 10 10 7.5 10 7.5 62.5  12/30 2.9 At goal, no change  7.5 10 10 10 7.5 10 7.5 62.5  11/13 2.5 At goal, no change  7.5 10 10 10 7.5 10 7.5 62.5  9/25 2.3 At goal, no change  7.5 10 10 10 7.5 10 7.5 62.5  8/12 2.0 At goal, no change  7.5 10 10 10 7.5 10 7.5 62.5  7/3 2.1 At goal, no change  7.5 10 10 10 7.5 10 7.5 62.5  5/23 1.8 Below goal, continue  7.5 10 10 10 7.5 10 7.5 62.5    4/11 2.2 At goal, no change  7.5 10 10 10 7.5 10 7.5 62.5  3/1 2.8 At goal, no change  7.5 10 10 10 7.5 10 7.5 62.5  1/18 2.9 At goal, no change  7.5 10 10 10 7.5 10 7.5 62.5  12/14 2.1 At goal, no change  7.5 10 10 10 7.5 10 7.5 62.5  11/16 1.8 Below goal, continue  7.5 10 10 10 7.5 10 7.5 62.5  10/18 3.1 Above goal, continue  7.5 10 10 10 7.5 10 7.5 62.5  9/19 2.8 At goal, no change  7.5 10 10 10 7.5 10 7.5 62.5  8/22 2.0 At goal, no change  7.5 10 10 10 7.5 10 7.5 62.5  8/6 2.1 At goal, no change  7.5 10 10 10 7.5 10 7.5 62.5    Last CBC:  Lab Results   Component Value Date    RBC 3.77 (L) 09/08/2020    HGB 11.5 (L) 09/08/2020    HCT 34.3 (L) 09/08/2020    MCV 90.8 09/08/2020    MCH 30.4 09/08/2020    MPV 7.3 09/08/2020    RDW 15.8 (H) 09/08/2020     09/08/2020       Patient History:  Recent hospitalizations/HC visits -8/9-8/19 Admit [address] [place] [address] for generalized weakness after another fall with head trauma: treated for COVID-19 pneumonia, A-fib with RVR, Klebsiella UTI; treated with 7-day courses of dexamethasone and Levaquin, which improved symptoms; cardiac meds adjusted as below    -8/3-8/8 Admit [address] [place] [address] for fall with head trauma 2/2 dizziness: found to be positive for COVID-19 and was discharged on dexamethasone and zinc   Recent medication changes 8/21 per Dr. Murali Santana: okay to switch cardio meds back to old regimen (digoxin + flecainide instead of diltiazem + amio)     On discharge from [address] [place] [address] 8/19:  -start amiodarone 200 mg BID (increases INR)  -start diltiazem 120 mg daily  -d/c digoxin, flecainide, dexamethasone     On discharge from [address] [place] [address] 8/8:  -dexamethasone 6 mg BID x 6 days  -zinc, vitamin C, Zofran PRN   Medications taken regularly that may interact with warfarin or alter INR No significant drug interactions identified   Warfarin dose taken as prescribed Yes - uses pillbox   Signs/symptoms of bleeding No h/o bleeding reported   Vitamin K intake Normally has ~2 servings of green, leafy vegetables per week (cabbage, brittany slaw, broccoli)   Recent vomiting/diarrhea/fever, changes in weight or activity level None reported   Tobacco or alcohol use Patient quit smoking 40+ years ago  Patient denies alcohol use   Upcoming surgeries or procedures None reported     Assessment/Plan:  Patient's INR was drawn by [facility]: [phone]) and was supratherapeutic today (3.3). INR has been fluctuating since she was discharged from the hospital.  Patient reports eating less dark, leafy greens since last INR check, but has been getting back to her normal diet more recently. Typically, she eats ~2 servings of greens/week. Patient was recently hospitalized twice, and was found to have COVID-19. During the second admission, her cardiac medications were adjusted due to A-fib. However, she only took the new regimen for a couple of days because her EP approved switching back to the old regimen on 8/21. Patient was not discharged on any antibiotics and dexamethasone was discontinued on 8/19. Given that patient's INR has historically been very stable on current warfarin regimen, I hesitate to adjust the maintenance dose. RN and patient were instructed to take 5 mg today (instead of 10 mg), then continue previously stable dose of 10 mg daily, except 7.5 mg on Sun/Thur/Sat. Patient will resume her normal vitamin K intake. Repeat INR in 1 week per [facility] (patient prefers extended visits due to cost burden). Patient was reminded to maintain consistent vitamin K intake and call with any bleeding, medication changes, or fever/vomiting/diarrhea. Patient understands dosing directions and information discussed. Dosing schedule and follow up appointment given to patient. Progress note routed to referring physician's office. Patient acknowledges working in consult agreement with pharmacist as referred by his/her physician. Next INR Check:  9/23    Please call [facility] at [phone] with any questions. Thanks!   Annie Hernandez, PharmD  PGY1 Pharmacy Resident  [facility]  Ph:

## 2020-09-17 ENCOUNTER — VIRTUAL VISIT (OUTPATIENT)
Dept: CARDIOLOGY CLINIC | Age: 70
End: 2020-09-17
Payer: MEDICARE

## 2020-09-17 PROCEDURE — 1090F PRES/ABSN URINE INCON ASSESS: CPT | Performed by: INTERNAL MEDICINE

## 2020-09-17 PROCEDURE — 1036F TOBACCO NON-USER: CPT | Performed by: INTERNAL MEDICINE

## 2020-09-17 PROCEDURE — 99215 OFFICE O/P EST HI 40 MIN: CPT | Performed by: INTERNAL MEDICINE

## 2020-09-17 PROCEDURE — G8417 CALC BMI ABV UP PARAM F/U: HCPCS | Performed by: INTERNAL MEDICINE

## 2020-09-17 PROCEDURE — G8399 PT W/DXA RESULTS DOCUMENT: HCPCS | Performed by: INTERNAL MEDICINE

## 2020-09-17 PROCEDURE — 4040F PNEUMOC VAC/ADMIN/RCVD: CPT | Performed by: INTERNAL MEDICINE

## 2020-09-17 PROCEDURE — G8427 DOCREV CUR MEDS BY ELIG CLIN: HCPCS | Performed by: INTERNAL MEDICINE

## 2020-09-17 PROCEDURE — 1123F ACP DISCUSS/DSCN MKR DOCD: CPT | Performed by: INTERNAL MEDICINE

## 2020-09-17 PROCEDURE — G9708 BILAT MAST/HX BI /UNILAT MAS: HCPCS | Performed by: INTERNAL MEDICINE

## 2020-09-17 PROCEDURE — 3017F COLORECTAL CA SCREEN DOC REV: CPT | Performed by: INTERNAL MEDICINE

## 2020-09-17 NOTE — LETTER
[facility], [place], operated by [facility]  EP Procedure Sheet  9/17/20    [place]  1950    Physician: Dr. Celina Riley    EP Procedures   Pacemaker implant x EP Study    ICD implant  Atrial flutter ablation     Biv implant x Atrial fibrillation ablation    Generator Change  SVT ablation    Lead revision  VT ablation    Lead extraction +/- upgrade  AVN ablation    Loop implant  Cardioversion     Other:   ELSIE     Equipment   [employer]tronic   PAVEL Mapping System    [place] x [address]  CryoAblation    Biotronik  Laser Lead Extraction    Special Equipment       EP Procedures Scheduling Request  Time Requested  0730   Specific Day 11/23/20   Anesthesia xxx   CT surgery backup    Location [facility]     Pre-Procedure Labs / Imaging  x PT/INR  Type & cross   x CBC  Units PRBC   x BMP/Mg  Units FFP    Venogram  CXR    Echo x Pulmonary CTA for Pulmonary vein mapping     Patient Instructions

## 2020-09-21 ENCOUNTER — PREP FOR PROCEDURE (OUTPATIENT)
Dept: CARDIOLOGY CLINIC | Age: 70
End: 2020-09-21

## 2020-09-21 RX ORDER — SODIUM CHLORIDE 0.9 % (FLUSH) 0.9 %
10 SYRINGE (ML) INJECTION PRN
Status: CANCELLED | OUTPATIENT
Start: 2020-09-21

## 2020-09-21 RX ORDER — SODIUM CHLORIDE 9 MG/ML
INJECTION, SOLUTION INTRAVENOUS CONTINUOUS
Status: CANCELLED | OUTPATIENT
Start: 2020-09-21

## 2020-09-21 RX ORDER — SODIUM CHLORIDE 0.9 % (FLUSH) 0.9 %
10 SYRINGE (ML) INJECTION EVERY 12 HOURS SCHEDULED
Status: CANCELLED | OUTPATIENT
Start: 2020-09-21

## 2020-09-30 ENCOUNTER — ANTI-COAG VISIT (OUTPATIENT)
Dept: PHARMACY | Age: 70
End: 2020-09-30

## 2020-09-30 ENCOUNTER — OFFICE VISIT (OUTPATIENT)
Dept: CARDIOLOGY CLINIC | Age: 70
End: 2020-09-30
Payer: MEDICARE

## 2020-09-30 VITALS
DIASTOLIC BLOOD PRESSURE: 80 MMHG | BODY MASS INDEX: 29.83 KG/M2 | HEART RATE: 60 BPM | WEIGHT: 202 LBS | TEMPERATURE: 98.2 F | SYSTOLIC BLOOD PRESSURE: 132 MMHG

## 2020-09-30 LAB — INR BLD: 3.9

## 2020-09-30 PROCEDURE — G8427 DOCREV CUR MEDS BY ELIG CLIN: HCPCS | Performed by: INTERNAL MEDICINE

## 2020-09-30 PROCEDURE — G8399 PT W/DXA RESULTS DOCUMENT: HCPCS | Performed by: INTERNAL MEDICINE

## 2020-09-30 PROCEDURE — G8417 CALC BMI ABV UP PARAM F/U: HCPCS | Performed by: INTERNAL MEDICINE

## 2020-09-30 PROCEDURE — 3017F COLORECTAL CA SCREEN DOC REV: CPT | Performed by: INTERNAL MEDICINE

## 2020-09-30 PROCEDURE — 1036F TOBACCO NON-USER: CPT | Performed by: INTERNAL MEDICINE

## 2020-09-30 PROCEDURE — G9708 BILAT MAST/HX BI /UNILAT MAS: HCPCS | Performed by: INTERNAL MEDICINE

## 2020-09-30 PROCEDURE — 1090F PRES/ABSN URINE INCON ASSESS: CPT | Performed by: INTERNAL MEDICINE

## 2020-09-30 PROCEDURE — 1123F ACP DISCUSS/DSCN MKR DOCD: CPT | Performed by: INTERNAL MEDICINE

## 2020-09-30 PROCEDURE — 4040F PNEUMOC VAC/ADMIN/RCVD: CPT | Performed by: INTERNAL MEDICINE

## 2020-09-30 PROCEDURE — 99214 OFFICE O/P EST MOD 30 MIN: CPT | Performed by: INTERNAL MEDICINE

## 2020-09-30 NOTE — PROGRESS NOTES
Yoan Rueda is a 65 y.o. female with PMHx significant for A-fib, CHF, HTN, breast CA who had INR drawn by [facility] on 9/30/2020 for anticoagulation monitoring and adjustment. Patient has been taking warfarin for a-fib for at least 20 years.      Anticoagulation Indication(s):  Afib    Referring Physician:  Dr. Amaya Jc  Goal INR Range:  2-3  Duration of Anticoagulation Therapy:  Indefinite  Time of day dose taken:  PM  Product patient has at home:  warfarin 5 mg (peach) + 10 mg (white)    [id number] Score for Atrial Fibrillation Stroke Risk   Risk   Factors  Component Value   C CHF Yes 1   H HTN Yes 1   A2 Age >= 75 No,  (71 y.o.) 0   D DM No 0   S2 Prior Stroke/TIA No 0   V Vascular Disease No 0   A Age 74-69 Yes,  (71 y.o.) 1   Sc Sex female 1    [id number]  Score  4   Score last updated 11/13/19 10:00 AM    INR Summary                            Warfarin regimen (mg)  Date INR   A/P    Sun Mon Tue Wed Thu Fri Sat Mg/wk  9/30 3.9 Above goal, hold + dec 7.5 7.5 7.5 0/7.5 7.5 7.5 7.5 52.5  9/23 3.8 Above goal, hold + dec 7.5 7.5 10 0/7.5 7.5 10 7.5 57.5  9/16 3.3 Above goal, reduce x 1 7.5 10 10 5/10 7.5 10 7.5 62.5  9/9 3.7 Above goal, hold x 1  7.5 10 10 0/10 7.5 10 7.5 62.5  9/3 1.3 Below goal, resume  7.5 10 10 10 7.5 10 7.5 62.5  8/31 4.5 Above goal, hold x 2   0 0 10 INR  8/24 3.8 Above goal, hold x 1  7.5 0/10 10 10 7.5 10 7.5 62.5  8/3-8/19  Hospitalized  6/1 2.2 At goal, no change  7.5 10 10 10 7.5 10 7.5 62.5  3/23 2.0 At goal, no change  7.5 10 10 10 7.5 10 7.5 62.5  2/7 2.0 At goal, no change  7.5 10 10 10 7.5 10 7.5 62.5  12/30 2.9 At goal, no change  7.5 10 10 10 7.5 10 7.5 62.5  11/13 2.5 At goal, no change  7.5 10 10 10 7.5 10 7.5 62.5  9/25 2.3 At goal, no change  7.5 10 10 10 7.5 10 7.5 62.5  8/12 2.0 At goal, no change  7.5 10 10 10 7.5 10 7.5 62.5  7/3 2.1 At goal, no change  7.5 10 10 10 7.5 10 7.5 62.5  5/23 1.8 Below goal, continue  7.5 10 10 10 7.5 10 7.5 62.5 4/11 2.2 At goal, no change  7.5 10 10 10 7.5 10 7.5 62.5  3/1 2.8 At goal, no change  7.5 10 10 10 7.5 10 7.5 62.5  1/18 2.9 At goal, no change  7.5 10 10 10 7.5 10 7.5 62.5  12/14 2.1 At goal, no change  7.5 10 10 10 7.5 10 7.5 62.5  11/16 1.8 Below goal, continue  7.5 10 10 10 7.5 10 7.5 62.5  10/18 3.1 Above goal, continue  7.5 10 10 10 7.5 10 7.5 62.5  9/19 2.8 At goal, no change  7.5 10 10 10 7.5 10 7.5 62.5  8/22 2.0 At goal, no change  7.5 10 10 10 7.5 10 7.5 62.5  8/6 2.1 At goal, no change  7.5 10 10 10 7.5 10 7.5 62.5    Last CBC:  Lab Results   Component Value Date    RBC 3.77 (L) 09/08/2020    HGB 11.5 (L) 09/08/2020    HCT 34.3 (L) 09/08/2020    MCV 90.8 09/08/2020    MCH 30.4 09/08/2020    MPV 7.3 09/08/2020    RDW 15.8 (H) 09/08/2020     09/08/2020       Patient History:  Recent hospitalizations/HC visits -8/9-8/19 Admit [address] [place] [address] for generalized weakness after another fall with head trauma: treated for COVID-19 pneumonia, A-fib with RVR, Klebsiella UTI; treated with 7-day courses of dexamethasone and Levaquin, which improved symptoms; cardiac meds adjusted as below    -8/3-8/8 Admit [address] [place] [address] for fall with head trauma 2/2 dizziness: found to be positive for COVID-19 and was discharged on dexamethasone and zinc   Recent medication changes 8/21 per Dr. Gavino Barnes: okay to switch cardio meds back to old regimen (digoxin + flecainide instead of diltiazem + amio)     On discharge from [address] [place] [address] 8/19:  -start amiodarone 200 mg BID (increases INR)  -start diltiazem 120 mg daily  -d/c digoxin, flecainide, dexamethasone     On discharge from [address] [place] [address] 8/8:  -dexamethasone 6 mg BID x 6 days  -zinc, vitamin C, Zofran PRN   Medications taken regularly that may interact with warfarin or alter INR No significant drug interactions identified   Warfarin dose taken as prescribed Yes - uses pillbox   Signs/symptoms of bleeding No h/o bleeding reported   Vitamin K intake Normally has ~2 servings of green, leafy vegetables per week (cabbage, brittany slaw, broccoli)   Recent vomiting/diarrhea/fever, changes in weight or activity level None reported   Tobacco or alcohol use Patient quit smoking 40+ years ago  Patient denies alcohol use   Upcoming surgeries or procedures None reported     Assessment/Plan:  Patient's INR was drawn by Pepe L[facility]pez Offer: [phone]) and was supratherapeutic again today (3.9), despite held warfarin dose and maintenance dose reduction last week. INR has been fluctuating since patient was discharged from the hospital, but overall is trending high. She denies any warfarin dosing errors or medication changes since last check. She reports eating the normal amount of green, leafy vegetables since last check (~2 servings/week). Patient was recently hospitalized twice, and was found to have COVID-19. During the second admission, her cardiac medications were adjusted due to A-fib. However, she only took the new regimen for a couple of days because her EP approved switching back to the old regimen on 8/21. Patient was not discharged on any antibiotics and dexamethasone was discontinued on 8/19. Although patient's INR has historically been very stable on warfarin 62.5 mg/week, it continues to trend high. Therefore, RN was instructed to hold warfarin today, then decrease dose to 7.5 mg daily. Repeat INR in 1 week per [facility] (patient prefers extended visits due to cost burden). Patient was reminded to maintain consistent vitamin K intake and call with any bleeding, medication changes, or fever/vomiting/diarrhea. Patient understands dosing directions and information discussed. Dosing schedule and follow up appointment given to patient. Progress note routed to referring physician's office. Patient acknowledges working in consult agreement with pharmacist as referred by his/her physician.      Next INR Check:  10/7    Please call [facility] at [phone] with any questions. Thanks! Félix Wiseman.  Robbin Wetzel, PharmD, BCPS  [facility]  Ph: [phone]  9/30/2020 12:00 PM    CLINICAL PHARMACY CONSULT: MED RECONCILIATION/REVIEW ADDENDUM    For Pharmacy Admin Tracking Only    PHSO: Yes  Total # of Interventions Recommended: 1  - Decreased Dose #: 1  - Maintenance Safety Lab Monitoring #: 1  Total Interventions Accepted: 1  Time Spent (min): 15

## 2020-09-30 NOTE — PROGRESS NOTES
fee  Subjective:      Patient ID: Regis Armstrong is a 65 y.o. female. HPI Aliyah Romero is being seen for cardiac follow up for afib/arrhythmia. INR issues. Was hosp x 2 for COVID in August.  Rhythm more frequent. Was on amio out of the hospital.  Now back on flecanide. Feeling good. No pnd or orthopnea. No palp/syncope. No sob. No chest pain.          Past Medical History:   Diagnosis Date    Acute diastolic congestive heart failure ([address] 11/26/2018    Allergic     SEASONAL    Atrial fibrillation ([facility] H/O    Cancer ([facility] [address] 2009 AND 2011    BREAST right    Essential hypertension 3/13/2018    Hammertoe     Migraine      Past Surgical History:   Procedure Laterality Date    ANKLE FRACTURE SURGERY  2004,2008    APPENDECTOMY  03/1983    ATRIAL ABLATION SURGERY  X3    Cryoablation for atrial fib    AXILLARY SURGERY Right     lymph node    BREAST BIOPSY  01/07/2011    BREAST BIOPSY  7/12/2011    left breast bx - benign     BREAST CYST EXCISION  06/2003,    BREAST LUMPECTOMY  03/30/2009    CARDIAC CATHETERIZATION  5/2005,01/2006    COLONOSCOPY  1993,1995,1999,2003,2006    COLONOSCOPY  11/29/2011    Dr. Lisy Sinha - benign polyps     COSMETIC SURGERY      FOOT SURGERY Right 1/15/16    CORRECTION OF HAMMERTOES 1-5 RIGHT FOOT, WEIL OSTEOTOMY 2,3    FOOT SURGERY Right 05/12/2016    EXTENSOR TENDON LENGTHENING 4TH AND 5TH TOES RIGHT FOOT    FOOT SURGERY Right 08/11/2016    CORRECTION HAMMERTOES 1ST, 4TH AND 5TH DIGITS RIGHT FOOT WITH    HERNIA REPAIR  [id number]    HYSTERECTOMY  12/1977    JOINT REPLACEMENT  June 2015    LEFT KNEE    KNEE SURGERY  6/09/2015    left knee replacement    LIPOMA RESECTION  09/2006    MASTECTOMY  3-15-11    bilateral    NEUROMA SURGERY  1987,1988,1989,1992 , 1995    Left foot    OTHER SURGICAL HISTORY  1983, 1984 X 2, 1994 X 2, 2006    Bowel obstructions    RHINOPLASTY  11/1976    RHINOPLASTY  1995    THORACOTOMY  1998    TONSILLECTOMY  1956    UPPER breath. Cardiovascular: Negative for chest pain, palpitations and leg swelling. Denies PND or orthopnea. No tachycardia or syncope. Neurological: Negative for dizziness, syncope and light-headedness. Psychiatric/Behavioral: Negative for behavioral problems, confusion and agitation. All other systems reviewed negative as done    Objective:   Physical Exam     Constitutional: She is oriented to person, place, and time. She appears well-developed and well-nourished. No distress. HENT:   Head: Normocephalic and atraumatic. Eyes: Conjunctivae and EOM are normal. Right eye exhibits no discharge. Left eye exhibits no discharge. Neck: Normal range of motion. No JVD present. Cardiovascular: Normal rate, regular rhythm, S1 normal, S2 normal and normal heart sounds. Exam reveals no gallop. 1/6 syst murmur heard. Pulses:       Radial pulses are 2+ on the right side, and 2+ on the left side. Pulmonary/Chest: Effort normal and breath sounds normal. She has no rales. Abdominal: Soft. Bowel sounds are normal. There is no tenderness. Musculoskeletal: Normal range of motion. She exhibits no edema. Neurological: She is alert and oriented to person, place, and time. Skin: Skin is warm and dry. Psychiatric: She has a normal mood and affect. Her behavior is normal.       Assessment:       Diagnosis Orders   1. Paroxysmal atrial fibrillation (HCC)     2. Typical atrial flutter ([address]     3. S/P ablation of atrial flutter     4. S/P ablation of atrial fibrillation             Plan:      Hosp with COVID in August. Went home and had to go back. Continues with P afib rare. Relatively brief. BP good. No exertional sx. Exercising. On AC, no bleeding issues. INR difficult to control recently. EP following. No changes. Reviewed previous records and testing including myoview 3/19 and 30 day monitor. Follow up 3 -4 wks. Routine INR. (changed to 7.5mg daily) j to have ablation in Nov per Dr Franklin Bal. May have to increase meds. Will try lopressor 12.5 mg bid.

## 2020-10-07 ENCOUNTER — ANTI-COAG VISIT (OUTPATIENT)
Dept: PHARMACY | Age: 70
End: 2020-10-07

## 2020-10-07 LAB — INR BLD: 2.1

## 2020-10-07 NOTE — PROGRESS NOTES
Yoan Rueda is a 65 y.o. female with PMHx significant for A-fib, CHF, HTN, breast CA who had INR drawn by [facility] on 10/7/2020 for anticoagulation monitoring and adjustment. Patient has been taking warfarin for a-fib for at least 20 years.      Anticoagulation Indication(s):  Afib    Referring Physician:  Dr. Lamin Gudino  Goal INR Range:  2-3  Duration of Anticoagulation Therapy:  Indefinite  Time of day dose taken:  PM  Product patient has at home:  warfarin 5 mg (peach) + 10 mg (white)    [id number] Score for Atrial Fibrillation Stroke Risk   Risk   Factors  Component Value   C CHF Yes 1   H HTN Yes 1   A2 Age >= 75 No,  (71 y.o.) 0   D DM No 0   S2 Prior Stroke/TIA No 0   V Vascular Disease No 0   A Age 74-69 Yes,  (71 y.o.) 1   Sc Sex female 1    [id number]  Score  4   Score last updated 11/13/19 10:00 AM    INR Summary                            Warfarin regimen (mg)  Date INR   A/P    Sun Mon Tue Wed Thu Fri Sat Mg/wk  10/7 2.1 At goal, no change  7.5 7.5 7.5 0/7.5 7.5 7.5 7.5 52.5  9/30 3.9 Above goal, hold + dec 7.5 7.5 7.5 0/7.5 7.5 7.5 7.5 52.5  9/23 3.8 Above goal, hold + dec 7.5 7.5 10 0/7.5 7.5 10 7.5 57.5  9/16 3.3 Above goal, reduce x 1 7.5 10 10 5/10 7.5 10 7.5 62.5  9/9 3.7 Above goal, hold x 1  7.5 10 10 0/10 7.5 10 7.5 62.5  9/3 1.3 Below goal, resume  7.5 10 10 10 7.5 10 7.5 62.5  8/31 4.5 Above goal, hold x 2   0 0 10 INR  8/24 3.8 Above goal, hold x 1  7.5 0/10 10 10 7.5 10 7.5 62.5  8/3-8/19  Hospitalized  6/1 2.2 At goal, no change  7.5 10 10 10 7.5 10 7.5 62.5  3/23 2.0 At goal, no change  7.5 10 10 10 7.5 10 7.5 62.5  2/7 2.0 At goal, no change  7.5 10 10 10 7.5 10 7.5 62.5  12/30 2.9 At goal, no change  7.5 10 10 10 7.5 10 7.5 62.5  11/13 2.5 At goal, no change  7.5 10 10 10 7.5 10 7.5 62.5  9/25 2.3 At goal, no change  7.5 10 10 10 7.5 10 7.5 62.5  8/12 2.0 At goal, no change  7.5 10 10 10 7.5 10 7.5 62.5  7/3 2.1 At goal, no change  7.5 10 10 10 7.5 10 7.5 62.5  5/23 1.8 Below goal, continue  7.5 10 10 10 7.5 10 7.5 62.5    4/11 2.2 At goal, no change  7.5 10 10 10 7.5 10 7.5 62.5  3/1 2.8 At goal, no change  7.5 10 10 10 7.5 10 7.5 62.5  1/18 2.9 At goal, no change  7.5 10 10 10 7.5 10 7.5 62.5  12/14 2.1 At goal, no change  7.5 10 10 10 7.5 10 7.5 62.5  11/16 1.8 Below goal, continue  7.5 10 10 10 7.5 10 7.5 62.5  10/18 3.1 Above goal, continue  7.5 10 10 10 7.5 10 7.5 62.5  9/19 2.8 At goal, no change  7.5 10 10 10 7.5 10 7.5 62.5  8/22 2.0 At goal, no change  7.5 10 10 10 7.5 10 7.5 62.5  8/6 2.1 At goal, no change  7.5 10 10 10 7.5 10 7.5 62.5    Last CBC:  Lab Results   Component Value Date    RBC 3.77 (L) 09/08/2020    HGB 11.5 (L) 09/08/2020    HCT 34.3 (L) 09/08/2020    MCV 90.8 09/08/2020    MCH 30.4 09/08/2020    MPV 7.3 09/08/2020    RDW 15.8 (H) 09/08/2020     09/08/2020       Patient History:  Recent hospitalizations/HC visits -8/9-8/19 Admit [address] [place] [address] for generalized weakness after another fall with head trauma: treated for COVID-19 pneumonia, A-fib with RVR, Klebsiella UTI; treated with 7-day courses of dexamethasone and Levaquin, which improved symptoms; cardiac meds adjusted as below    -8/3-8/8 Admit [address] [place] [address] for fall with head trauma 2/2 dizziness: found to be positive for COVID-19 and was discharged on dexamethasone and zinc   Recent medication changes 8/21 per Dr. Jb Beverly: okay to switch cardio meds back to old regimen (digoxin + flecainide instead of diltiazem + amio)     On discharge from [address] [place] [address] 8/19:  -start amiodarone 200 mg BID (increases INR)  -start diltiazem 120 mg daily  -d/c digoxin, flecainide, dexamethasone     On discharge from [address] [place] [address] 8/8:  -dexamethasone 6 mg BID x 6 days  -zinc, vitamin C, Zofran PRN   Medications taken regularly that may interact with warfarin or alter INR No significant drug interactions identified   Warfarin dose taken as prescribed Yes - uses pillbox   Signs/symptoms of bleeding No Thanks!   Yuly Alaniz, PharmD  PGY1 Pharmacy Resident   10/7/2020 3:18 PM    CLINICAL PHARMACY CONSULT: MED RECONCILIATION/REVIEW ADDENDUM    For Pharmacy Admin Tracking Only    PHSO: Yes  Total # of Interventions Recommended: 0  Total Interventions Accepted: 0  Time Spent (min): 15

## 2020-10-14 ENCOUNTER — TELEPHONE (OUTPATIENT)
Dept: PHARMACY | Age: 70
End: 2020-10-14

## 2020-10-14 ENCOUNTER — ANTI-COAG VISIT (OUTPATIENT)
Dept: PHARMACY | Age: 70
End: 2020-10-14

## 2020-10-14 LAB — INR BLD: 2.4

## 2020-10-14 NOTE — TELEPHONE ENCOUNTER
Patient called stating she will no longer be getting home health services. Scheduled f/u appointment in clinic in 2 weeks.      Nahum Cline, PharmD, BCPS  [facility]  [place]: [phone]  [place]: [phone]  10/14/2020 11:46 AM

## 2020-10-14 NOTE — PROGRESS NOTES
Yoan Rueda is a 65 y.o. female with PMHx significant for A-fib, CHF, HTN, breast CA who had INR drawn by [facility] on 10/14/2020 for anticoagulation monitoring and adjustment. Patient has been taking warfarin for a-fib for at least 20 years.      Anticoagulation Indication(s):  Afib    Referring Physician:  Dr. Ellie Hashimoto  Goal INR Range:  2-3  Duration of Anticoagulation Therapy:  Indefinite  Time of day dose taken:  PM  Product patient has at home:  warfarin 5 mg (peach) + 10 mg (white)    [id number] Score for Atrial Fibrillation Stroke Risk   Risk   Factors  Component Value   C CHF Yes 1   H HTN Yes 1   A2 Age >= 75 No,  (71 y.o.) 0   D DM No 0   S2 Prior Stroke/TIA No 0   V Vascular Disease No 0   A Age 74-69 Yes,  (71 y.o.) 1   Sc Sex female 1    [id number]  Score  4   Score last updated 11/13/19 10:00 AM    INR Summary                            Warfarin regimen (mg)  Date INR   A/P    Sun Mon Tue Wed Thu Fri Sat Mg/wk  10/14 2.4 At goal, no change  7.5 7.5 7.5 7.5 7.5 7.5 7.5 52.5  10/7 2.1 At goal, no change  7.5 7.5 7.5 7.5 7.5 7.5 7.5 52.5  9/30 3.9 Above goal, hold + dec 7.5 7.5 7.5 0/7.5 7.5 7.5 7.5 52.5  9/23 3.8 Above goal, hold + dec 7.5 7.5 10 0/7.5 7.5 10 7.5 57.5  9/16 3.3 Above goal, reduce x 1 7.5 10 10 5/10 7.5 10 7.5 62.5  9/9 3.7 Above goal, hold x 1  7.5 10 10 0/10 7.5 10 7.5 62.5  9/3 1.3 Below goal, resume  7.5 10 10 10 7.5 10 7.5 62.5  8/31 4.5 Above goal, hold x 2   0 0 10 INR  8/24 3.8 Above goal, hold x 1  7.5 0/10 10 10 7.5 10 7.5 62.5  8/3-8/19  Hospitalized  6/1 2.2 At goal, no change  7.5 10 10 10 7.5 10 7.5 62.5  3/23 2.0 At goal, no change  7.5 10 10 10 7.5 10 7.5 62.5  2/7 2.0 At goal, no change  7.5 10 10 10 7.5 10 7.5 62.5  12/30 2.9 At goal, no change  7.5 10 10 10 7.5 10 7.5 62.5  11/13 2.5 At goal, no change  7.5 10 10 10 7.5 10 7.5 62.5  9/25 2.3 At goal, no change  7.5 10 10 10 7.5 10 7.5 62.5  8/12 2.0 At goal, no change  7.5 10 10 10 7.5 10 7.5 62.5  7/3 2.1 At goal, no change  7.5 10 10 10 7.5 10 7.5 62.5  5/23 1.8 Below goal, continue  7.5 10 10 10 7.5 10 7.5 62.5    4/11 2.2 At goal, no change  7.5 10 10 10 7.5 10 7.5 62.5  3/1 2.8 At goal, no change  7.5 10 10 10 7.5 10 7.5 62.5  1/18 2.9 At goal, no change  7.5 10 10 10 7.5 10 7.5 62.5  12/14 2.1 At goal, no change  7.5 10 10 10 7.5 10 7.5 62.5  11/16 1.8 Below goal, continue  7.5 10 10 10 7.5 10 7.5 62.5  10/18 3.1 Above goal, continue  7.5 10 10 10 7.5 10 7.5 62.5  9/19 2.8 At goal, no change  7.5 10 10 10 7.5 10 7.5 62.5  8/22 2.0 At goal, no change  7.5 10 10 10 7.5 10 7.5 62.5  8/6 2.1 At goal, no change  7.5 10 10 10 7.5 10 7.5 62.5    Last CBC:  Lab Results   Component Value Date    RBC 3.77 (L) 09/08/2020    HGB 11.5 (L) 09/08/2020    HCT 34.3 (L) 09/08/2020    MCV 90.8 09/08/2020    MCH 30.4 09/08/2020    MPV 7.3 09/08/2020    RDW 15.8 (H) 09/08/2020     09/08/2020       Patient History:  Recent hospitalizations/HC visits -8/9-8/19 Admit [address] [place] [address] for generalized weakness after another fall with head trauma: treated for COVID-19 pneumonia, A-fib with RVR, Klebsiella UTI; treated with 7-day courses of dexamethasone and Levaquin, which improved symptoms; cardiac meds adjusted as below    -8/3-8/8 Admit [address] [place] [address] for fall with head trauma 2/2 dizziness: found to be positive for COVID-19 and was discharged on dexamethasone and zinc   Recent medication changes 8/21 per Dr. Betsy Fitzpatrick: okay to switch cardio meds back to old regimen (digoxin + flecainide instead of diltiazem + amio)     On discharge from [address] [place] [address] 8/19:  -start amiodarone 200 mg BID (increases INR)  -start diltiazem 120 mg daily  -d/c digoxin, flecainide, dexamethasone     On discharge from [address] [place] [address] 8/8:  -dexamethasone 6 mg BID x 6 days  -zinc, vitamin C, Zofran PRN   Medications taken regularly that may interact with warfarin or alter INR No significant drug interactions identified   Warfarin dose taken as prescribed Yes - uses pillbox   Signs/symptoms of bleeding No h/o bleeding reported   Vitamin K intake Normally has ~2 servings of green, leafy vegetables per week (cabbage, brittany slaw, broccoli)   Recent vomiting/diarrhea/fever, changes in weight or activity level None reported   Tobacco or alcohol use Patient quit smoking 40+ years ago  Patient denies alcohol use   Upcoming surgeries or procedures None reported     Assessment/Plan:  Patient's INR was drawn by Pepe Rosas: [phone]) and was therapeutic today (2.1). INR has been fluctuating since patient was discharged from the hospital.  She denies any warfarin dosing errors or medication changes since last check. She reports eating the normal amount of green, leafy vegetables since last check (~2 servings/week). Patient was recently hospitalized twice, and was found to have COVID-19. During the second admission, her cardiac medications were adjusted due to A-fib. However, she only took the new regimen for a couple of days because her EP approved switching back to the old regimen on 8/21. Patient was not discharged on any antibiotics and dexamethasone was discontinued on 8/19. Patient's INR has been trending high for several weeks, but has now been stable the past 2 weeks. RN was instructed to continue dose of 7.5 mg daily. Repeat INR in 1 week per [facility] (patient prefers extended visits due to cost burden). Patient was reminded to maintain consistent vitamin K intake and call with any bleeding, medication changes, or fever/vomiting/diarrhea. Patient understands dosing directions and information discussed. Dosing schedule and follow up appointment given to patient. Progress note routed to referring physician's office. Patient acknowledges working in consult agreement with pharmacist as referred by his/her physician.      Next INR Check:  10/21    Please call [facility] at [phone] with any questions. Thanks!   Shakir Bradford, PharmD, BCPS  [facility]  [place]: [phone]  [place]: [phone]  10/14/2020 10:57 AM    CLINICAL PHARMACY CONSULT: MED RECONCILIATION/REVIEW ADDENDUM    For Pharmacy Admin Tracking Only    PHSO: Yes  Total # of Interventions Recommended: 0  Total Interventions Accepted: 0  Time Spent (min): 15

## 2020-10-19 ENCOUNTER — OFFICE VISIT (OUTPATIENT)
Dept: PRIMARY CARE CLINIC | Age: 70
End: 2020-10-19
Payer: MEDICARE

## 2020-10-19 VITALS
RESPIRATION RATE: 16 BRPM | WEIGHT: 204.6 LBS | BODY MASS INDEX: 30.3 KG/M2 | SYSTOLIC BLOOD PRESSURE: 114 MMHG | DIASTOLIC BLOOD PRESSURE: 72 MMHG | TEMPERATURE: 98 F | OXYGEN SATURATION: 98 % | HEIGHT: 69 IN | HEART RATE: 68 BPM

## 2020-10-19 PROBLEM — E55.9 VITAMIN D DEFICIENCY: Status: ACTIVE | Noted: 2020-10-19

## 2020-10-19 PROBLEM — R73.03 PREDIABETES: Status: ACTIVE | Noted: 2020-10-19

## 2020-10-19 PROBLEM — E78.2 MIXED HYPERLIPIDEMIA: Status: ACTIVE | Noted: 2020-10-19

## 2020-10-19 PROBLEM — K59.00 CONSTIPATION: Status: ACTIVE | Noted: 2020-10-19

## 2020-10-19 LAB — HBA1C MFR BLD: 5.4 %

## 2020-10-19 PROCEDURE — 83036 HEMOGLOBIN GLYCOSYLATED A1C: CPT | Performed by: INTERNAL MEDICINE

## 2020-10-19 PROCEDURE — G8399 PT W/DXA RESULTS DOCUMENT: HCPCS | Performed by: INTERNAL MEDICINE

## 2020-10-19 PROCEDURE — 99214 OFFICE O/P EST MOD 30 MIN: CPT | Performed by: INTERNAL MEDICINE

## 2020-10-19 PROCEDURE — G8427 DOCREV CUR MEDS BY ELIG CLIN: HCPCS | Performed by: INTERNAL MEDICINE

## 2020-10-19 PROCEDURE — G9708 BILAT MAST/HX BI /UNILAT MAS: HCPCS | Performed by: INTERNAL MEDICINE

## 2020-10-19 PROCEDURE — 1036F TOBACCO NON-USER: CPT | Performed by: INTERNAL MEDICINE

## 2020-10-19 PROCEDURE — G8417 CALC BMI ABV UP PARAM F/U: HCPCS | Performed by: INTERNAL MEDICINE

## 2020-10-19 PROCEDURE — 1090F PRES/ABSN URINE INCON ASSESS: CPT | Performed by: INTERNAL MEDICINE

## 2020-10-19 PROCEDURE — 3017F COLORECTAL CA SCREEN DOC REV: CPT | Performed by: INTERNAL MEDICINE

## 2020-10-19 PROCEDURE — 1123F ACP DISCUSS/DSCN MKR DOCD: CPT | Performed by: INTERNAL MEDICINE

## 2020-10-19 PROCEDURE — G8482 FLU IMMUNIZE ORDER/ADMIN: HCPCS | Performed by: INTERNAL MEDICINE

## 2020-10-19 PROCEDURE — 4040F PNEUMOC VAC/ADMIN/RCVD: CPT | Performed by: INTERNAL MEDICINE

## 2020-10-19 RX ORDER — FAMOTIDINE 20 MG
1 TABLET ORAL DAILY
COMMUNITY
Start: 2020-10-19

## 2020-10-19 RX ORDER — ZOSTER VACCINE RECOMBINANT, ADJUVANTED 50 MCG/0.5
0.5 KIT INTRAMUSCULAR SEE ADMIN INSTRUCTIONS
Qty: 0.5 ML | Refills: 1 | Status: SHIPPED | OUTPATIENT
Start: 2020-10-19 | End: 2020-11-23

## 2020-10-19 SDOH — ECONOMIC STABILITY: FOOD INSECURITY: WITHIN THE PAST 12 MONTHS, YOU WORRIED THAT YOUR FOOD WOULD RUN OUT BEFORE YOU GOT MONEY TO BUY MORE.: NEVER TRUE

## 2020-10-19 SDOH — ECONOMIC STABILITY: INCOME INSECURITY: HOW HARD IS IT FOR YOU TO PAY FOR THE VERY BASICS LIKE FOOD, HOUSING, MEDICAL CARE, AND HEATING?: NOT HARD AT ALL

## 2020-10-19 SDOH — ECONOMIC STABILITY: FOOD INSECURITY: WITHIN THE PAST 12 MONTHS, THE FOOD YOU BOUGHT JUST DIDN'T LAST AND YOU DIDN'T HAVE MONEY TO GET MORE.: NEVER TRUE

## 2020-10-19 SDOH — ECONOMIC STABILITY: TRANSPORTATION INSECURITY
IN THE PAST 12 MONTHS, HAS LACK OF TRANSPORTATION KEPT YOU FROM MEETINGS, WORK, OR FROM GETTING THINGS NEEDED FOR DAILY LIVING?: NO

## 2020-10-19 SDOH — ECONOMIC STABILITY: TRANSPORTATION INSECURITY
IN THE PAST 12 MONTHS, HAS THE LACK OF TRANSPORTATION KEPT YOU FROM MEDICAL APPOINTMENTS OR FROM GETTING MEDICATIONS?: NO

## 2020-10-19 ASSESSMENT — ENCOUNTER SYMPTOMS
SINUS PRESSURE: 0
CONSTIPATION: 1
VOMITING: 0
SHORTNESS OF BREATH: 1
WHEEZING: 0
CHEST TIGHTNESS: 0
DIARRHEA: 0
BLOOD IN STOOL: 0
ABDOMINAL PAIN: 0
COUGH: 0
NAUSEA: 0
SORE THROAT: 0
RHINORRHEA: 0

## 2020-10-19 ASSESSMENT — PATIENT HEALTH QUESTIONNAIRE - PHQ9
SUM OF ALL RESPONSES TO PHQ QUESTIONS 1-9: 0
SUM OF ALL RESPONSES TO PHQ QUESTIONS 1-9: 0
1. LITTLE INTEREST OR PLEASURE IN DOING THINGS: 0
SUM OF ALL RESPONSES TO PHQ9 QUESTIONS 1 & 2: 0
SUM OF ALL RESPONSES TO PHQ QUESTIONS 1-9: 0
2. FEELING DOWN, DEPRESSED OR HOPELESS: 0

## 2020-10-19 NOTE — PATIENT INSTRUCTIONS
1. Essential hypertension  -stable  -Continue same medications  -Low sodium diet  -Regular aerobic exercise as tolerated    2. Paroxysmal atrial fibrillation (HCC)  -Continue same medications prescribed by Cardiology  -Continue care per Cardiology    3. Vitamin D deficiency  -Continue Vitamin D, Cholecalciferol, 25 MCG (1000 UT) CAPS; Take 1,000 Units by mouth daily  - Vitamin D 25 Hydroxy; Future    4. Mixed hyperlipidemia  -Continue same medications  -Low fat, low cholesterol diet  -Regular aerobic exercise as tolerated    5. Constipation, unspecified constipation type  -Colace 100mg 2 times daily  -Miralax once daily as needed  -Metamucil 1 tablespoon daily  -high fiber diet    6. At high risk for falls  On the basis of positive falls risk screening, assessment and plan is as follows: home safety tips provided. 7. Need for shingles vaccine  -Patient given prescription for Shingrix vaccine to have done at their pharmacy  - zoster recombinant adjuvanted vaccine [facility]) 50 MCG/0.5ML SUSR injection; Inject 0.5 mLs into the muscle See Admin Instructions 1 dose now and repeat in 2-6 months  Dispense: 0.5 mL; Refill: 1    8. Need for hepatitis C screening test  - HEPATITIS C ANTIBODY; Future    9. Prediabetes  - Hemoglobin A1c of 5.4% is normal and shows improvement  -Low carbohydrate diet  -Regular aerobic exercise  - POCT glycosylated hemoglobin (Hb A1C)    Patient Education        Constipation: Care Instructions  Your Care Instructions     Constipation means that you have a hard time passing stools (bowel movements). People pass stools from 3 times a day to once every 3 days. What is normal for you may be different. Constipation may occur with pain in the rectum and cramping. The pain may get worse when you try to pass stools. Sometimes there are small amounts of bright red blood on toilet paper or the surface of stools. This is because of enlarged veins near the rectum (hemorrhoids).   A few changes in your diet and lifestyle may help you avoid ongoing constipation. Your doctor may also prescribe medicine to help loosen your stool. Some medicines can cause constipation. These include pain medicines and antidepressants. Tell your doctor about all the medicines you take. Your doctor may want to make a medicine change to ease your symptoms. Follow-up care is a key part of your treatment and safety. Be sure to make and go to all appointments, and call your doctor if you are having problems. It's also a good idea to know your test results and keep a list of the medicines you take. How can you care for yourself at home? · Drink plenty of fluids, enough so that your urine is light yellow or clear like water. If you have kidney, heart, or liver disease and have to limit fluids, talk with your doctor before you increase the amount of fluids you drink. · Include high-fiber foods in your diet each day. These include fruits, vegetables, beans, and whole grains. · Get at least 30 minutes of exercise on most days of the week. Walking is a good choice. You also may want to do other activities, such as running, swimming, cycling, or playing tennis or team sports. · Take a fiber supplement, such as Citrucel or Metamucil, every day. Read and follow all instructions on the label. · Schedule time each day for a bowel movement. A daily routine may help. Take your time having your bowel movement. · Support your feet with a small step stool when you sit on the toilet. This helps flex your hips and places your pelvis in a squatting position. · Your doctor may recommend an over-the-counter laxative to relieve your constipation. Examples are Milk of Magnesia and MiraLax. Read and follow all instructions on the label. Do not use laxatives on a long-term basis. When should you call for help? Call your doctor now or seek immediate medical care if:    · You have new or worse belly pain.     · You have new or worse nausea or vomiting.   · You have blood in your stools. Watch closely for changes in your health, and be sure to contact your doctor if:    · Your constipation is getting worse.     · You do not get better as expected. Where can you learn more? Go to [email or web address] org and sign in to your [employer] account. Enter [phone] in the [facility] box to learn more about \"Constipation: Care Instructions. \"     If you do not have an account, please click on the \"Sign Up Now\" link. Current as of: June 26, 2019               Content Version: 12.6  © 5224-9206 [employer]. Care instructions adapted under license by [facility] ([facility]). If you have questions about a medical condition or this instruction, always ask your healthcare professional. [address] any warranty or liability for your use of this information. Patient Education        Preventing Falls: Care Instructions  Your Care Instructions     Getting around your home safely can be a challenge if you have injuries or health problems that make it easy for you to fall. Loose rugs and furniture in walkways are among the dangers for many older people who have problems walking or who have poor eyesight. People who have conditions such as arthritis, osteoporosis, or dementia also have to be careful not to fall. You can make your home safer with a few simple measures. Follow-up care is a key part of your treatment and safety. Be sure to make and go to all appointments, and call your doctor if you are having problems. It's also a good idea to know your test results and keep a list of the medicines you take. How can you care for yourself at home? Taking care of yourself  · You may get dizzy if you do not drink enough water. To prevent dehydration, drink plenty of fluids, enough so that your urine is light yellow or clear like water. Choose water and other caffeine-free clear liquids.  If you have kidney, heart, or liver disease and have to limit fluids, talk with your doctor before you increase the amount of fluids you drink. · Exercise regularly to improve your strength, muscle tone, and balance. Walk if you can. Swimming may be a good choice if you cannot walk easily. · Have your vision and hearing checked each year or any time you notice a change. If you have trouble seeing and hearing, you might not be able to avoid objects and could lose your balance. · Know the side effects of the medicines you take. Ask your doctor or pharmacist whether the medicines you take can affect your balance. Sleeping pills or sedatives can affect your balance. · Limit the amount of alcohol you drink. Alcohol can impair your balance and other senses. · Ask your doctor whether calluses or corns on your feet need to be removed. If you wear loose-fitting shoes because of calluses or corns, you can lose your balance and fall. · Talk to your doctor if you have numbness in your feet. Preventing falls at home  · Remove raised doorway thresholds, throw rugs, and clutter. Repair loose carpet or raised areas in the floor. · Move furniture and electrical cords to keep them out of walking paths. · Use nonskid floor wax, and wipe up spills right away, especially on ceramic tile floors. · If you use a walker or cane, put rubber tips on it. If you use crutches, clean the bottoms of them regularly with an abrasive pad, such as steel wool. · Keep your house well lit, especially [place], and outside walkways. Use night-lights in areas such as hallways and bathrooms. Add extra light switches or use remote switches (such as switches that go on or off when you clap your hands) to make it easier to turn lights on if you have to get up during the night. · Install sturdy handrails on stairways. · Move items in your cabinets so that the things you use a lot are on the lower shelves (about waist level).   · Keep a cordless phone and a flashlight with
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
## 2020-10-22 ENCOUNTER — OFFICE VISIT (OUTPATIENT)
Dept: CARDIOLOGY CLINIC | Age: 70
End: 2020-10-22
Payer: MEDICARE

## 2020-10-22 VITALS
SYSTOLIC BLOOD PRESSURE: 134 MMHG | HEART RATE: 60 BPM | DIASTOLIC BLOOD PRESSURE: 70 MMHG | WEIGHT: 203 LBS | BODY MASS INDEX: 29.98 KG/M2 | TEMPERATURE: 98 F

## 2020-10-22 PROCEDURE — 1123F ACP DISCUSS/DSCN MKR DOCD: CPT | Performed by: INTERNAL MEDICINE

## 2020-10-22 PROCEDURE — G8417 CALC BMI ABV UP PARAM F/U: HCPCS | Performed by: INTERNAL MEDICINE

## 2020-10-22 PROCEDURE — G8427 DOCREV CUR MEDS BY ELIG CLIN: HCPCS | Performed by: INTERNAL MEDICINE

## 2020-10-22 PROCEDURE — 3017F COLORECTAL CA SCREEN DOC REV: CPT | Performed by: INTERNAL MEDICINE

## 2020-10-22 PROCEDURE — 1036F TOBACCO NON-USER: CPT | Performed by: INTERNAL MEDICINE

## 2020-10-22 PROCEDURE — G9708 BILAT MAST/HX BI /UNILAT MAS: HCPCS | Performed by: INTERNAL MEDICINE

## 2020-10-22 PROCEDURE — 99214 OFFICE O/P EST MOD 30 MIN: CPT | Performed by: INTERNAL MEDICINE

## 2020-10-22 PROCEDURE — 1090F PRES/ABSN URINE INCON ASSESS: CPT | Performed by: INTERNAL MEDICINE

## 2020-10-22 PROCEDURE — 4040F PNEUMOC VAC/ADMIN/RCVD: CPT | Performed by: INTERNAL MEDICINE

## 2020-10-22 PROCEDURE — G8482 FLU IMMUNIZE ORDER/ADMIN: HCPCS | Performed by: INTERNAL MEDICINE

## 2020-10-22 PROCEDURE — G8399 PT W/DXA RESULTS DOCUMENT: HCPCS | Performed by: INTERNAL MEDICINE

## 2020-10-22 NOTE — PROGRESS NOTES
fee  Subjective:      Patient ID: Petra Bermudez is a 65 y.o. female. HPI Evelyn Medina is being seen for cardiac follow up for afib/arrhythmia. Did not tolerate metoprolol. Rhythm some better. Still breaking through. No pnd or orthopnea. No palp/syncope. No sob. No chest pain.          Past Medical History:   Diagnosis Date    Acute diastolic congestive heart failure ([address] 11/26/2018    Allergic     SEASONAL    Atrial fibrillation ([address] H/O    Cancer ([address] 2009 AND 2011    BREAST right    Essential hypertension 3/13/2018    Hammertoe     Migraine     Mixed hyperlipidemia 10/19/2020     Past Surgical History:   Procedure Laterality Date    ANKLE FRACTURE SURGERY  2004,2008    APPENDECTOMY  03/1983    ATRIAL ABLATION SURGERY  X3    Cryoablation for atrial fib    AXILLARY SURGERY Right     lymph node    BREAST BIOPSY  01/07/2011    BREAST BIOPSY  7/12/2011    left breast bx - benign     BREAST CYST EXCISION  06/2003,    BREAST LUMPECTOMY  03/30/2009    CARDIAC CATHETERIZATION  5/2005,01/2006    COLONOSCOPY  1993,1995,1999,2003,2006    COLONOSCOPY  11/29/2011    Dr. Liberty Abernathy - benign polyps     COSMETIC SURGERY      FOOT SURGERY Right 1/15/16    CORRECTION OF HAMMERTOES 1-5 RIGHT FOOT, WEIL OSTEOTOMY 2,3    FOOT SURGERY Right 05/12/2016    EXTENSOR TENDON LENGTHENING 4TH AND 5TH TOES RIGHT FOOT    FOOT SURGERY Right 08/11/2016    CORRECTION HAMMERTOES 1ST, 4TH AND 5TH DIGITS RIGHT FOOT WITH    HERNIA REPAIR  [id number]    HYSTERECTOMY  12/1977    JOINT REPLACEMENT  June 2015    LEFT KNEE    KNEE SURGERY  6/09/2015    left knee replacement    LIPOMA RESECTION  09/2006    MASTECTOMY  3-15-11    bilateral    NEUROMA SURGERY  1987,1988,1989,1992 , 1995    Left foot    OTHER SURGICAL HISTORY  1983, 1984 X 2, 1994 X 2, 2006    Bowel obstructions    RHINOPLASTY  11/1976    RHINOPLASTY  1995    THORACOTOMY  1998    TONSILLECTOMY  1956    UPPER GASTROINTESTINAL ENDOSCOPY [address] SURGERY  2001     Social History     Socioeconomic History    Marital status:      Spouse name: Not on file    Number of children: 2    Years of education: 15    Highest education level: Not on file   Occupational History    Occupation: [employer] finish    Social Needs    Financial resource strain: Not hard at all   Jonatan-Alisia insecurity     Worry: Never true     Inability: Never true   [employer] needs     Medical: No     Non-medical: No   Tobacco Use    Smoking status: Former Smoker     Packs/day: 0.25     Years: 8.00     Pack years: 2.00     Types: Cigarettes     Start date:      Last attempt to quit: 1988     Years since quittin.8    Smokeless tobacco: Never Used    Tobacco comment: QUIT    Substance and Sexual Activity    Alcohol use: No    Drug use: No    Sexual activity: Never   Lifestyle    Physical activity     Days per week: Not on file     Minutes per session: Not on file    Stress: Not on file   Relationships    Social connections     Talks on phone: Not on file     Gets together: Not on file     Attends Zoroastrian service: Not on file     Active member of club or organization: Not on file     Attends meetings of clubs or organizations: Not on file     Relationship status: Not on file    Intimate partner violence     Fear of current or ex partner: Not on file     Emotionally abused: Not on file     Physically abused: Not on file     Forced sexual activity: Not on file   Other Topics Concern    Not on file   Social History Narrative    Not on file       Family hx reviewed, denies FH cardiac issues    Vitals:    10/22/20 1012   BP: 134/70   Pulse: 60   Temp: 98 °F (36.7 °C)       Wt 203    Review of Systems   Constitutional: Negative for activity change, appetite change and fatigue. Respiratory: Negative for cough, choking, chest tightness and shortness of breath.     Cardiovascular: Negative for chest pain, palpitations and leg swelling. Denies PND or orthopnea. No tachycardia or syncope. Neurological: Negative for dizziness, syncope and light-headedness. Psychiatric/Behavioral: Negative for behavioral problems, confusion and agitation. All other systems reviewed negative as done    Objective:   Physical Exam     Constitutional: She is oriented to person, place, and time. She appears well-developed and well-nourished. No distress. HENT:   Head: Normocephalic and atraumatic. Eyes: Conjunctivae and EOM are normal. Right eye exhibits no discharge. Left eye exhibits no discharge. Neck: Normal range of motion. No JVD present. Cardiovascular: Normal rate, regular rhythm, S1 normal, S2 normal and normal heart sounds. Exam reveals no gallop. 1/6 syst murmur heard. Pulses:       Radial pulses are 2+ on the right side, and 2+ on the left side. Pulmonary/Chest: Effort normal and breath sounds normal. She has no rales. Abdominal: Soft. Bowel sounds are normal. There is no tenderness. Musculoskeletal: Normal range of motion. She exhibits no edema. Neurological: She is alert and oriented to person, place, and time. Skin: Skin is warm and dry. Psychiatric: She has a normal mood and affect. Her behavior is normal.       Assessment:       Diagnosis Orders   1. Paroxysmal atrial fibrillation (HCC)     2. Typical atrial flutter ([address]     3. S/P ablation of atrial flutter     4. S/P ablation of atrial fibrillation             Plan:      Continues with P afib still but better. Has appt for ablation in Nov.  Relatively brief. BP good. No exertional sx. On AC, no bleeding issues. INR difficult to control recently. EP following. No changes. Reviewed previous records and testing including myoview 3/19 and 30 day monitor. Follow up 3 months. Did not tolerate metoprolol but will continue current regimen until ablation.

## 2020-10-28 ENCOUNTER — ANTI-COAG VISIT (OUTPATIENT)
Dept: PHARMACY | Age: 70
End: 2020-10-28
Payer: MEDICARE

## 2020-10-28 LAB — INR BLD: 1.7

## 2020-10-28 PROCEDURE — 85610 PROTHROMBIN TIME: CPT

## 2020-10-28 PROCEDURE — 99212 OFFICE O/P EST SF 10 MIN: CPT

## 2020-10-28 NOTE — PROGRESS NOTES
Yoan Rueda is a 65 y.o. female with PMHx significant for A-fib, CHF, HTN, breast CA who presents to clinic on  10/28/2020 for anticoagulation monitoring and adjustment. Patient has been taking warfarin for a-fib for at least 20 years.      Anticoagulation Indication(s):  Afib    Referring Physician:  Dr. Linda Arredondo  Goal INR Range:  2-3  Duration of Anticoagulation Therapy:  Indefinite  Time of day dose taken:  PM  Product patient has at home:  warfarin 5 mg (peach) + 10 mg (white)    [id number] Score for Atrial Fibrillation Stroke Risk   Risk   Factors  Component Value   C CHF Yes 1   H HTN Yes 1   A2 Age >= 75 No,  (71 y.o.) 0   D DM No 0   S2 Prior Stroke/TIA No 0   V Vascular Disease No 0   A Age 74-69 Yes,  (71 y.o.) 1   Sc Sex female 1    [id number]  Score  4   Score last updated 11/13/19 10:00 AM    INR Summary                            Warfarin regimen (mg)  Date INR   A/P    Sun Mon Tue Wed Thu Fri Sat Mg/wk  10/28 1.7 Below goal, increase  7.5 7.5 7.5 10 7.5 10 7.5 57.5  10/14 2.4 At goal, no change  7.5 7.5 7.5 7.5 7.5 7.5 7.5 52.5  10/7 2.1 At goal, no change  7.5 7.5 7.5 7.5 7.5 7.5 7.5 52.5  9/30 3.9 Above goal, hold + dec 7.5 7.5 7.5 0/7.5 7.5 7.5 7.5 52.5  9/23 3.8 Above goal, hold + dec 7.5 7.5 10 0/7.5 7.5 10 7.5 57.5  9/16 3.3 Above goal, reduce x 1 7.5 10 10 5/10 7.5 10 7.5 62.5  9/9 3.7 Above goal, hold x 1  7.5 10 10 0/10 7.5 10 7.5 62.5  9/3 1.3 Below goal, resume  7.5 10 10 10 7.5 10 7.5 62.5  8/31 4.5 Above goal, hold x 2   0 0 10 INR  8/24 3.8 Above goal, hold x 1  7.5 0/10 10 10 7.5 10 7.5 62.5  8/3-8/19  Hospitalized  6/1 2.2 At goal, no change  7.5 10 10 10 7.5 10 7.5 62.5  3/23 2.0 At goal, no change  7.5 10 10 10 7.5 10 7.5 62.5  2/7 2.0 At goal, no change  7.5 10 10 10 7.5 10 7.5 62.5  12/30 2.9 At goal, no change  7.5 10 10 10 7.5 10 7.5 62.5  11/13 2.5 At goal, no change  7.5 10 10 10 7.5 10 7.5 62.5  9/25 2.3 At goal, no change  7.5 10 10 10 7.5 10 7.5 62.5  8/12 2.0 At goal, no change  7.5 10 10 10 7.5 10 7.5 62.5  7/3 2.1 At goal, no change  7.5 10 10 10 7.5 10 7.5 62.5  5/23 1.8 Below goal, continue  7.5 10 10 10 7.5 10 7.5 62.5    4/11 2.2 At goal, no change  7.5 10 10 10 7.5 10 7.5 62.5  3/1 2.8 At goal, no change  7.5 10 10 10 7.5 10 7.5 62.5  1/18 2.9 At goal, no change  7.5 10 10 10 7.5 10 7.5 62.5  12/14 2.1 At goal, no change  7.5 10 10 10 7.5 10 7.5 62.5  11/16 1.8 Below goal, continue  7.5 10 10 10 7.5 10 7.5 62.5  10/18 3.1 Above goal, continue  7.5 10 10 10 7.5 10 7.5 62.5  9/19 2.8 At goal, no change  7.5 10 10 10 7.5 10 7.5 62.5  8/22 2.0 At goal, no change  7.5 10 10 10 7.5 10 7.5 62.5  8/6 2.1 At goal, no change  7.5 10 10 10 7.5 10 7.5 62.5    Last CBC:  Lab Results   Component Value Date    RBC 3.77 (L) 09/08/2020    HGB 11.5 (L) 09/08/2020    HCT 34.3 (L) 09/08/2020    MCV 90.8 09/08/2020    MCH 30.4 09/08/2020    MPV 7.3 09/08/2020    RDW 15.8 (H) 09/08/2020     09/08/2020       Patient History:  Recent hospitalizations/HC visits -8/9-8/19 Admit [facility] for generalized weakness after another fall with head trauma: treated for COVID-19 pneumonia, A-fib with RVR, Klebsiella UTI; treated with 7-day courses of dexamethasone and Levaquin, which improved symptoms; cardiac meds adjusted as below    -8/3-8/8 Admit [facility] for fall with head trauma 2/2 dizziness: found to be positive for COVID-19 and was discharged on dexamethasone and zinc   Recent medication changes 10/19 prescribed vitamin D per PCP    8/21 per Dr. Jb Beverly: okay to switch cardio meds back to old regimen (digoxin + flecainide instead of diltiazem + amio)     On discharge from [facility] 8/19:  -start amiodarone 200 mg BID (increases INR)  -start diltiazem 120 mg daily  -d/c digoxin, flecainide, dexamethasone     On discharge from [facility] 8/8:  -dexamethasone 6 mg BID x 6 days  -zinc, vitamin C, Zofran PRN   Medications taken regularly that may interact with warfarin or alter INR No significant drug interactions identified   Warfarin dose taken as prescribed Yes - uses pillbox   Signs/symptoms of bleeding No h/o bleeding reported   Vitamin K intake Normally has ~2 servings of green, leafy vegetables per week (cabbage, brittany slaw, broccoli)   Recent vomiting/diarrhea/fever, changes in weight or activity level None reported   Tobacco or alcohol use Patient quit smoking 40+ years ago  Patient denies alcohol use   Upcoming surgeries or procedures 11/23 ablation per Dr. Starla Bautista @ [facility]: patient reports she will not be holding warfarin     Assessment/Plan:  Patient's INR was subtherapeutic today (1.7). INR has been fluctuating since patient was discharged from the hospital.  She denies any warfarin dosing errors or medication changes since last check. She reports eating the normal amount of green, leafy vegetables since last check (~2 servings/week). Patient was recently hospitalized twice, and was found to have COVID-19. During the second admission, her cardiac medications were adjusted due to A-fib. However, she only took the new regimen for a couple of days because her EP approved switching back to the old regimen on 8/21. Patient was not discharged on any antibiotics and dexamethasone was discontinued on 8/19. Patient's INR had been trending high for several weeks following hospitalization, then stabilized on lower dose of 7.5 mg daily. However, INR was stable on higher warfarin maintenance dose prior to hospitalization, so would expect to eventually resume patient's historical warfarin regimen. Patient was instructed to increase warfarin dose to 7.5 mg daily, except 10 mg on Wed+Fri. Repeat INR in 2 weeks. Patient was reminded to maintain consistent vitamin K intake and call with any bleeding, medication changes, or fever/vomiting/diarrhea. Patient understands dosing directions and information discussed.   Dosing schedule and follow up appointment given

## 2020-11-12 ENCOUNTER — ANTI-COAG VISIT (OUTPATIENT)
Dept: PHARMACY | Age: 70
End: 2020-11-12
Payer: MEDICARE

## 2020-11-12 LAB — INTERNATIONAL NORMALIZATION RATIO, POC: 1.8

## 2020-11-12 PROCEDURE — 99212 OFFICE O/P EST SF 10 MIN: CPT

## 2020-11-12 PROCEDURE — 85610 PROTHROMBIN TIME: CPT

## 2020-11-12 NOTE — PROGRESS NOTES
Yoan Rueda is a 65 y.o. female with PMHx significant for A-fib, CHF, HTN, breast CA who presents to clinic on  11/12/2020 for anticoagulation monitoring and adjustment. Patient has been taking warfarin for a-fib for at least 20 years.      Anticoagulation Indication(s):  Afib    Referring Physician:  Dr. Soila Scanlon  Goal INR Range:  2-3  Duration of Anticoagulation Therapy:  Indefinite  Time of day dose taken:  PM  Product patient has at home:  warfarin 5 mg (peach) + 10 mg (white)    [id number] Score for Atrial Fibrillation Stroke Risk   Risk   Factors  Component Value   C CHF Yes 1   H HTN Yes 1   A2 Age >= 75 No,  (71 y.o.) 0   D DM No 0   S2 Prior Stroke/TIA No 0   V Vascular Disease No 0   A Age 74-69 Yes,  (71 y.o.) 1   Sc Sex female 1    [id number]  Score  4   Score last updated 11/13/19 10:00 AM    INR Summary                            Warfarin regimen (mg)  Date INR   A/P    Sun Mon Tue Wed Thu Fri Sat Mg/wk  11/12 1.8 Below goal, increase  7.5 10 10 10 7.5 10 7.5 62.5  10/28 1.7 Below goal, increase  7.5 7.5 7.5 10 7.5 10 7.5 57.5  10/14 2.4 At goal, no change  7.5 7.5 7.5 7.5 7.5 7.5 7.5 52.5  10/7 2.1 At goal, no change  7.5 7.5 7.5 7.5 7.5 7.5 7.5 52.5  9/30 3.9 Above goal, hold + dec 7.5 7.5 7.5 0/7.5 7.5 7.5 7.5 52.5  9/23 3.8 Above goal, hold + dec 7.5 7.5 10 0/7.5 7.5 10 7.5 57.5  9/16 3.3 Above goal, reduce x 1 7.5 10 10 5/10 7.5 10 7.5 62.5  9/9 3.7 Above goal, hold x 1  7.5 10 10 0/10 7.5 10 7.5 62.5  9/3 1.3 Below goal, resume  7.5 10 10 10 7.5 10 7.5 62.5  8/31 4.5 Above goal, hold x 2   0 0 10 INR  8/24 3.8 Above goal, hold x 1  7.5 0/10 10 10 7.5 10 7.5 62.5  8/3-8/19  Hospitalized  6/1 2.2 At goal, no change  7.5 10 10 10 7.5 10 7.5 62.5  3/23 2.0 At goal, no change  7.5 10 10 10 7.5 10 7.5 62.5  2/7 2.0 At goal, no change  7.5 10 10 10 7.5 10 7.5 62.5  12/30 2.9 At goal, no change  7.5 10 10 10 7.5 10 7.5 62.5  11/13 2.5 At goal, no change  7.5 10 10 10 7.5 10 7.5 62.5  9/25 2.3 At goal, no change  7.5 10 10 10 7.5 10 7.5 62.5  8/12 2.0 At goal, no change  7.5 10 10 10 7.5 10 7.5 62.5  7/3 2.1 At goal, no change  7.5 10 10 10 7.5 10 7.5 62.5  5/23 1.8 Below goal, continue  7.5 10 10 10 7.5 10 7.5 62.5    4/11 2.2 At goal, no change  7.5 10 10 10 7.5 10 7.5 62.5  3/1 2.8 At goal, no change  7.5 10 10 10 7.5 10 7.5 62.5  1/18 2.9 At goal, no change  7.5 10 10 10 7.5 10 7.5 62.5  12/14 2.1 At goal, no change  7.5 10 10 10 7.5 10 7.5 62.5  11/16 1.8 Below goal, continue  7.5 10 10 10 7.5 10 7.5 62.5  10/18 3.1 Above goal, continue  7.5 10 10 10 7.5 10 7.5 62.5  9/19 2.8 At goal, no change  7.5 10 10 10 7.5 10 7.5 62.5  8/22 2.0 At goal, no change  7.5 10 10 10 7.5 10 7.5 62.5  8/6 2.1 At goal, no change  7.5 10 10 10 7.5 10 7.5 62.5    Last CBC:  Lab Results   Component Value Date    RBC 3.77 (L) 09/08/2020    HGB 11.5 (L) 09/08/2020    HCT 34.3 (L) 09/08/2020    MCV 90.8 09/08/2020    MCH 30.4 09/08/2020    MPV 7.3 09/08/2020    RDW 15.8 (H) 09/08/2020     09/08/2020       Patient History:  Recent hospitalizations/HC visits -8/9-8/19 Admit [facility] for generalized weakness after another fall with head trauma: treated for COVID-19 pneumonia, A-fib with RVR, Klebsiella UTI; treated with 7-day courses of dexamethasone and Levaquin, which improved symptoms; cardiac meds adjusted as below    -8/3-8/8 Admit [facility] for fall with head trauma 2/2 dizziness: found to be positive for COVID-19 and was discharged on dexamethasone and zinc   Recent medication changes 10/19 prescribed vitamin D per PCP   Medications taken regularly that may interact with warfarin or alter INR No significant drug interactions identified   Warfarin dose taken as prescribed Yes - uses pillbox   Signs/symptoms of bleeding No h/o bleeding reported   Vitamin K intake Normally has ~2 servings of green, leafy vegetables per week (cabbage, brittany slaw, broccoli)   Recent [phone]  11/12/2020 10:17 AM    CLINICAL PHARMACY CONSULT: MED RECONCILIATION/REVIEW ADDENDUM    For Pharmacy Admin Tracking Only    PHSO: Yes  Total # of Interventions Recommended: 1  - Increased Dose #: 1  - Maintenance Safety Lab Monitoring #: 1  Total Interventions Accepted: 1  Time Spent (min): 15

## 2020-11-16 ENCOUNTER — NURSE ONLY (OUTPATIENT)
Dept: CARDIOLOGY CLINIC | Age: 70
End: 2020-11-16

## 2020-11-16 ENCOUNTER — CARE COORDINATION (OUTPATIENT)
Dept: CARE COORDINATION | Age: 70
End: 2020-11-16

## 2020-11-16 NOTE — CARE COORDINATION
Reached patient by phone call to provide information regarding CM. Offered enrollment and patient declined. Plan  No further outreach is necessary    Nellie Snellen.  Jessica Kramer RN, BSN, [address] [place] [address]  [phone]

## 2020-11-17 ENCOUNTER — OFFICE VISIT (OUTPATIENT)
Dept: PRIMARY CARE CLINIC | Age: 70
End: 2020-11-17
Payer: MEDICARE

## 2020-11-17 ENCOUNTER — HOSPITAL ENCOUNTER (OUTPATIENT)
Dept: CT IMAGING | Age: 70
Discharge: HOME | End: 2020-11-17
Payer: MEDICARE

## 2020-11-17 LAB
GFR AFRICAN AMERICAN: >60
GFR NON-AFRICAN AMERICAN: >60
PERFORMED ON: NORMAL
POC CREATININE: 0.7 MG/DL (ref 0.6–1.2)
POC SAMPLE TYPE: NORMAL

## 2020-11-17 PROCEDURE — 6360000004 HC RX CONTRAST MEDICATION: Performed by: INTERNAL MEDICINE

## 2020-11-17 PROCEDURE — 82565 ASSAY OF CREATININE: CPT

## 2020-11-17 PROCEDURE — 71275 CT ANGIOGRAPHY CHEST: CPT

## 2020-11-17 PROCEDURE — 99211 OFF/OP EST MAY X REQ PHY/QHP: CPT | Performed by: NURSE PRACTITIONER

## 2020-11-17 RX ADMIN — IOPAMIDOL 85 ML: 755 INJECTION, SOLUTION INTRAVENOUS at 08:56

## 2020-11-18 LAB — SARS-COV-2: NOT DETECTED

## 2020-11-20 ENCOUNTER — ANESTHESIA EVENT (OUTPATIENT)
Dept: CARDIAC CATH/INVASIVE PROCEDURES | Age: 70
End: 2020-11-20

## 2020-11-23 ENCOUNTER — HOSPITAL ENCOUNTER (OUTPATIENT)
Dept: CARDIAC CATH/INVASIVE PROCEDURES | Age: 70
Discharge: HOME | End: 2020-11-25
Payer: MEDICARE

## 2020-11-23 ENCOUNTER — ANESTHESIA (OUTPATIENT)
Dept: CARDIAC CATH/INVASIVE PROCEDURES | Age: 70
End: 2020-11-23

## 2020-11-23 VITALS — WEIGHT: 202 LBS | HEIGHT: 69 IN | BODY MASS INDEX: 29.92 KG/M2 | TEMPERATURE: 98.4 F

## 2020-11-23 VITALS — OXYGEN SATURATION: 98 % | TEMPERATURE: 98.6 F | SYSTOLIC BLOOD PRESSURE: 161 MMHG | DIASTOLIC BLOOD PRESSURE: 75 MMHG

## 2020-11-23 LAB
EKG ATRIAL RATE: 312 BPM
EKG DIAGNOSIS: NORMAL
EKG Q-T INTERVAL: 266 MS
EKG QRS DURATION: 94 MS
EKG QTC CALCULATION (BAZETT): 344 MS
EKG R AXIS: -2 DEGREES
EKG T AXIS: 168 DEGREES
EKG VENTRICULAR RATE: 101 BPM
INR BLD: 2.48 (ref 0.86–1.14)
PROTHROMBIN TIME: 29 SEC (ref 10–13.2)

## 2020-11-23 PROCEDURE — 93622 COMP EP EVAL L VENTR PAC&REC: CPT | Performed by: INTERNAL MEDICINE

## 2020-11-23 PROCEDURE — C1730 CATH, EP, 19 OR FEW ELECT: HCPCS

## 2020-11-23 PROCEDURE — 93005 ELECTROCARDIOGRAM TRACING: CPT | Performed by: INTERNAL MEDICINE

## 2020-11-23 PROCEDURE — 93010 ELECTROCARDIOGRAM REPORT: CPT | Performed by: INTERNAL MEDICINE

## 2020-11-23 PROCEDURE — 85610 PROTHROMBIN TIME: CPT

## 2020-11-23 PROCEDURE — 93622 COMP EP EVAL L VENTR PAC&REC: CPT

## 2020-11-23 PROCEDURE — 93613 INTRACARDIAC EPHYS 3D MAPG: CPT | Performed by: INTERNAL MEDICINE

## 2020-11-23 PROCEDURE — 6360000002 HC RX W HCPCS: Performed by: NURSE ANESTHETIST, CERTIFIED REGISTERED

## 2020-11-23 PROCEDURE — C1732 CATH, EP, DIAG/ABL, 3D/VECT: HCPCS

## 2020-11-23 PROCEDURE — C1894 INTRO/SHEATH, NON-LASER: HCPCS

## 2020-11-23 PROCEDURE — 2580000003 HC RX 258: Performed by: ANESTHESIOLOGY

## 2020-11-23 PROCEDURE — 93656 COMPRE EP EVAL ABLTJ ATR FIB: CPT | Performed by: INTERNAL MEDICINE

## 2020-11-23 PROCEDURE — 6370000000 HC RX 637 (ALT 250 FOR IP): Performed by: INTERNAL MEDICINE

## 2020-11-23 PROCEDURE — 93613 INTRACARDIAC EPHYS 3D MAPG: CPT

## 2020-11-23 PROCEDURE — 93655 ICAR CATH ABLTJ DSCRT ARRHYT: CPT | Performed by: INTERNAL MEDICINE

## 2020-11-23 PROCEDURE — 3700000001 HC ADD 15 MINUTES (ANESTHESIA)

## 2020-11-23 PROCEDURE — 6360000002 HC RX W HCPCS

## 2020-11-23 PROCEDURE — 93662 INTRACARDIAC ECG (ICE): CPT | Performed by: INTERNAL MEDICINE

## 2020-11-23 PROCEDURE — C1759 CATH, INTRA ECHOCARDIOGRAPHY: HCPCS

## 2020-11-23 PROCEDURE — 2720000010 HC SURG SUPPLY STERILE

## 2020-11-23 PROCEDURE — 93657 TX L/R ATRIAL FIB ADDL: CPT | Performed by: INTERNAL MEDICINE

## 2020-11-23 PROCEDURE — 93662 INTRACARDIAC ECG (ICE): CPT

## 2020-11-23 PROCEDURE — 2580000003 HC RX 258: Performed by: NURSE ANESTHETIST, CERTIFIED REGISTERED

## 2020-11-23 PROCEDURE — G0269 OCCLUSIVE DEVICE IN VEIN ART: HCPCS

## 2020-11-23 PROCEDURE — 93657 TX L/R ATRIAL FIB ADDL: CPT

## 2020-11-23 PROCEDURE — 3700000000 HC ANESTHESIA ATTENDED CARE

## 2020-11-23 PROCEDURE — 93656 COMPRE EP EVAL ABLTJ ATR FIB: CPT

## 2020-11-23 PROCEDURE — 2500000003 HC RX 250 WO HCPCS

## 2020-11-23 PROCEDURE — 85347 COAGULATION TIME ACTIVATED: CPT

## 2020-11-23 PROCEDURE — 2709999900 HC NON-CHARGEABLE SUPPLY

## 2020-11-23 PROCEDURE — 2500000003 HC RX 250 WO HCPCS: Performed by: NURSE ANESTHETIST, CERTIFIED REGISTERED

## 2020-11-23 RX ORDER — ACETAMINOPHEN 325 MG/1
650 TABLET ORAL EVERY 4 HOURS PRN
Status: DISCONTINUED | OUTPATIENT
Start: 2020-11-23 | End: 2020-11-26 | Stop reason: HOSPADM

## 2020-11-23 RX ORDER — SODIUM CHLORIDE 0.9 % (FLUSH) 0.9 %
10 SYRINGE (ML) INJECTION PRN
Status: DISCONTINUED | OUTPATIENT
Start: 2020-11-23 | End: 2020-11-26 | Stop reason: HOSPADM

## 2020-11-23 RX ORDER — ONDANSETRON 2 MG/ML
INJECTION INTRAMUSCULAR; INTRAVENOUS PRN
Status: DISCONTINUED | OUTPATIENT
Start: 2020-11-23 | End: 2020-11-23 | Stop reason: SDUPTHER

## 2020-11-23 RX ORDER — LIDOCAINE HYDROCHLORIDE 20 MG/ML
INJECTION, SOLUTION INFILTRATION; PERINEURAL PRN
Status: DISCONTINUED | OUTPATIENT
Start: 2020-11-23 | End: 2020-11-23 | Stop reason: SDUPTHER

## 2020-11-23 RX ORDER — SODIUM CHLORIDE 0.9 % (FLUSH) 0.9 %
10 SYRINGE (ML) INJECTION EVERY 12 HOURS SCHEDULED
Status: DISCONTINUED | OUTPATIENT
Start: 2020-11-23 | End: 2020-11-26 | Stop reason: HOSPADM

## 2020-11-23 RX ORDER — SODIUM CHLORIDE 9 MG/ML
INJECTION, SOLUTION INTRAVENOUS CONTINUOUS
Status: DISCONTINUED | OUTPATIENT
Start: 2020-11-23 | End: 2020-11-26 | Stop reason: HOSPADM

## 2020-11-23 RX ORDER — PROPOFOL 10 MG/ML
INJECTION, EMULSION INTRAVENOUS PRN
Status: DISCONTINUED | OUTPATIENT
Start: 2020-11-23 | End: 2020-11-23 | Stop reason: SDUPTHER

## 2020-11-23 RX ORDER — SODIUM CHLORIDE, SODIUM LACTATE, POTASSIUM CHLORIDE, CALCIUM CHLORIDE 600; 310; 30; 20 MG/100ML; MG/100ML; MG/100ML; MG/100ML
INJECTION, SOLUTION INTRAVENOUS CONTINUOUS
Status: DISCONTINUED | OUTPATIENT
Start: 2020-11-23 | End: 2020-11-26 | Stop reason: HOSPADM

## 2020-11-23 RX ORDER — PROTAMINE SULFATE 10 MG/ML
INJECTION, SOLUTION INTRAVENOUS PRN
Status: DISCONTINUED | OUTPATIENT
Start: 2020-11-23 | End: 2020-11-23 | Stop reason: SDUPTHER

## 2020-11-23 RX ORDER — FENTANYL CITRATE 50 UG/ML
INJECTION, SOLUTION INTRAMUSCULAR; INTRAVENOUS PRN
Status: DISCONTINUED | OUTPATIENT
Start: 2020-11-23 | End: 2020-11-23 | Stop reason: SDUPTHER

## 2020-11-23 RX ORDER — METOPROLOL SUCCINATE 50 MG/1
50 TABLET, EXTENDED RELEASE ORAL DAILY
Qty: 30 TABLET | Refills: 3 | Status: SHIPPED | OUTPATIENT
Start: 2020-11-23 | End: 2020-12-02

## 2020-11-23 RX ORDER — ROCURONIUM BROMIDE 10 MG/ML
INJECTION, SOLUTION INTRAVENOUS PRN
Status: DISCONTINUED | OUTPATIENT
Start: 2020-11-23 | End: 2020-11-23 | Stop reason: SDUPTHER

## 2020-11-23 RX ORDER — SUCCINYLCHOLINE/SOD CL,ISO/PF 100 MG/5ML
SYRINGE (ML) INTRAVENOUS PRN
Status: DISCONTINUED | OUTPATIENT
Start: 2020-11-23 | End: 2020-11-23 | Stop reason: SDUPTHER

## 2020-11-23 RX ORDER — HEPARIN SODIUM 10000 [USP'U]/ML
INJECTION, SOLUTION INTRAVENOUS; SUBCUTANEOUS PRN
Status: DISCONTINUED | OUTPATIENT
Start: 2020-11-23 | End: 2020-11-23 | Stop reason: SDUPTHER

## 2020-11-23 RX ORDER — EPHEDRINE SULFATE 50 MG/ML
INJECTION INTRAVENOUS PRN
Status: DISCONTINUED | OUTPATIENT
Start: 2020-11-23 | End: 2020-11-23 | Stop reason: SDUPTHER

## 2020-11-23 RX ADMIN — PHENYLEPHRINE HYDROCHLORIDE 100 MCG: 10 INJECTION, SOLUTION INTRAMUSCULAR; INTRAVENOUS; SUBCUTANEOUS at 08:48

## 2020-11-23 RX ADMIN — EPHEDRINE SULFATE 5 MG: 50 INJECTION INTRAVENOUS at 08:42

## 2020-11-23 RX ADMIN — FENTANYL CITRATE 50 MCG: 50 INJECTION, SOLUTION INTRAMUSCULAR; INTRAVENOUS at 08:05

## 2020-11-23 RX ADMIN — HEPARIN SODIUM 8000 UNITS: 10000 INJECTION, SOLUTION INTRAVENOUS; SUBCUTANEOUS at 08:41

## 2020-11-23 RX ADMIN — Medication 120 MG: at 08:05

## 2020-11-23 RX ADMIN — ACETAMINOPHEN 650 MG: 325 TABLET ORAL at 12:39

## 2020-11-23 RX ADMIN — PROTAMINE SULFATE 30 MG: 10 INJECTION, SOLUTION INTRAVENOUS at 10:55

## 2020-11-23 RX ADMIN — ROCURONIUM BROMIDE 30 MG: 10 INJECTION INTRAVENOUS at 08:21

## 2020-11-23 RX ADMIN — ROCURONIUM BROMIDE 10 MG: 10 INJECTION INTRAVENOUS at 08:05

## 2020-11-23 RX ADMIN — SODIUM CHLORIDE, SODIUM LACTATE, POTASSIUM CHLORIDE, AND CALCIUM CHLORIDE: 600; 310; 30; 20 INJECTION, SOLUTION INTRAVENOUS at 07:52

## 2020-11-23 RX ADMIN — EPHEDRINE SULFATE 10 MG: 50 INJECTION INTRAVENOUS at 08:16

## 2020-11-23 RX ADMIN — ONDANSETRON 4 MG: 2 INJECTION INTRAMUSCULAR; INTRAVENOUS at 10:57

## 2020-11-23 RX ADMIN — LIDOCAINE HYDROCHLORIDE 100 MG: 20 INJECTION, SOLUTION INFILTRATION; PERINEURAL at 08:05

## 2020-11-23 RX ADMIN — HEPARIN SODIUM 3000 UNITS: 10000 INJECTION, SOLUTION INTRAVENOUS; SUBCUTANEOUS at 09:47

## 2020-11-23 RX ADMIN — HEPARIN SODIUM 2000 UNITS: 10000 INJECTION, SOLUTION INTRAVENOUS; SUBCUTANEOUS at 09:25

## 2020-11-23 RX ADMIN — EPHEDRINE SULFATE 5 MG: 50 INJECTION INTRAVENOUS at 08:47

## 2020-11-23 RX ADMIN — PROPOFOL 200 MG: 10 INJECTION, EMULSION INTRAVENOUS at 08:05

## 2020-11-23 RX ADMIN — SUGAMMADEX 200 MG: 100 INJECTION, SOLUTION INTRAVENOUS at 11:02

## 2020-11-23 ASSESSMENT — PULMONARY FUNCTION TESTS
PIF_VALUE: 2
PIF_VALUE: 16
PIF_VALUE: 1
PIF_VALUE: 16
PIF_VALUE: 18
PIF_VALUE: 17
PIF_VALUE: 18
PIF_VALUE: 16
PIF_VALUE: 16
PIF_VALUE: 18
PIF_VALUE: 17
PIF_VALUE: 16
PIF_VALUE: 1
PIF_VALUE: 1
PIF_VALUE: 16
PIF_VALUE: 17
PIF_VALUE: 1
PIF_VALUE: 17
PIF_VALUE: 17
PIF_VALUE: 18
PIF_VALUE: 17
PIF_VALUE: 18
PIF_VALUE: 16
PIF_VALUE: 29
PIF_VALUE: 18
PIF_VALUE: 17
PIF_VALUE: 18
PIF_VALUE: 17
PIF_VALUE: 16
PIF_VALUE: 5
PIF_VALUE: 18
PIF_VALUE: 17
PIF_VALUE: 16
PIF_VALUE: 1
PIF_VALUE: 18
PIF_VALUE: 1
PIF_VALUE: 17
PIF_VALUE: 5
PIF_VALUE: 18
PIF_VALUE: 16
PIF_VALUE: 18
PIF_VALUE: 17
PIF_VALUE: 18
PIF_VALUE: 18
PIF_VALUE: 17
PIF_VALUE: 18
PIF_VALUE: 17
PIF_VALUE: 17
PIF_VALUE: 16
PIF_VALUE: 17
PIF_VALUE: 16
PIF_VALUE: 25
PIF_VALUE: 18
PIF_VALUE: 17
PIF_VALUE: 18
PIF_VALUE: 18
PIF_VALUE: 2
PIF_VALUE: 16
PIF_VALUE: 38
PIF_VALUE: 1
PIF_VALUE: 17
PIF_VALUE: 4
PIF_VALUE: 16
PIF_VALUE: 18
PIF_VALUE: 1
PIF_VALUE: 1
PIF_VALUE: 17
PIF_VALUE: 17
PIF_VALUE: 3
PIF_VALUE: 17
PIF_VALUE: 18
PIF_VALUE: 17
PIF_VALUE: 18
PIF_VALUE: 17
PIF_VALUE: 18
PIF_VALUE: 1
PIF_VALUE: 18
PIF_VALUE: 20
PIF_VALUE: 17
PIF_VALUE: 1
PIF_VALUE: 18
PIF_VALUE: 17
PIF_VALUE: 17
PIF_VALUE: 1
PIF_VALUE: 17
PIF_VALUE: 17
PIF_VALUE: 1
PIF_VALUE: 17
PIF_VALUE: 16
PIF_VALUE: 18
PIF_VALUE: 17
PIF_VALUE: 1
PIF_VALUE: 17
PIF_VALUE: 18
PIF_VALUE: 1
PIF_VALUE: 17
PIF_VALUE: 17
PIF_VALUE: 18
PIF_VALUE: 1
PIF_VALUE: 18
PIF_VALUE: 18
PIF_VALUE: 17
PIF_VALUE: 18
PIF_VALUE: 16
PIF_VALUE: 17
PIF_VALUE: 1
PIF_VALUE: 18
PIF_VALUE: 1
PIF_VALUE: 16
PIF_VALUE: 18
PIF_VALUE: 18
PIF_VALUE: 17
PIF_VALUE: 1
PIF_VALUE: 16
PIF_VALUE: 1
PIF_VALUE: 7
PIF_VALUE: 17
PIF_VALUE: 17
PIF_VALUE: 18
PIF_VALUE: 17
PIF_VALUE: 16
PIF_VALUE: 17
PIF_VALUE: 17
PIF_VALUE: 21
PIF_VALUE: 18
PIF_VALUE: 17
PIF_VALUE: 17
PIF_VALUE: 18
PIF_VALUE: 1
PIF_VALUE: 1
PIF_VALUE: 18
PIF_VALUE: 1
PIF_VALUE: 17
PIF_VALUE: 18
PIF_VALUE: 17
PIF_VALUE: 18
PIF_VALUE: 18
PIF_VALUE: 16
PIF_VALUE: 17
PIF_VALUE: 1
PIF_VALUE: 1
PIF_VALUE: 18
PIF_VALUE: 1
PIF_VALUE: 17
PIF_VALUE: 17
PIF_VALUE: 18
PIF_VALUE: 18
PIF_VALUE: 17
PIF_VALUE: 16
PIF_VALUE: 2
PIF_VALUE: 17
PIF_VALUE: 18
PIF_VALUE: 18
PIF_VALUE: 1
PIF_VALUE: 15
PIF_VALUE: 4
PIF_VALUE: 18
PIF_VALUE: 18
PIF_VALUE: 1
PIF_VALUE: 1
PIF_VALUE: 18
PIF_VALUE: 17
PIF_VALUE: 1
PIF_VALUE: 1
PIF_VALUE: 17
PIF_VALUE: 18
PIF_VALUE: 17
PIF_VALUE: 17
PIF_VALUE: 18
PIF_VALUE: 17
PIF_VALUE: 17
PIF_VALUE: 18
PIF_VALUE: 17
PIF_VALUE: 18
PIF_VALUE: 16
PIF_VALUE: 18
PIF_VALUE: 17
PIF_VALUE: 16
PIF_VALUE: 18
PIF_VALUE: 17
PIF_VALUE: 5
PIF_VALUE: 18
PIF_VALUE: 16
PIF_VALUE: 17
PIF_VALUE: 18
PIF_VALUE: 17
PIF_VALUE: 18
PIF_VALUE: 16
PIF_VALUE: 16
PIF_VALUE: 18

## 2020-11-23 ASSESSMENT — PAIN SCALES - GENERAL: PAINLEVEL_OUTOF10: 4

## 2020-11-23 NOTE — H&P
[facility]   Cardiac Electrophysiology H&P   Date: 11/23/2020  Admit Date:  11/23/2020  Reason for Admission: Afib  Consult Requesting Physician: No att. providers found     No chief complaint on file. HPI: Quentin Hernandez is a 65 y.o. with PMH significant for HTN, s/p RFCA for AFL in 2006, s/p cryo-ablation for AF (11/1/13, Dr. Kevin Allan). She has been on flecainide since at least 2011 and was increased to 150 mg BID in 5/2019 with frequent breakthroughs of AF.     Here for Afib ablation      Past Medical History:   Diagnosis Date    Acute diastolic congestive heart failure ([facility] [address] 11/26/2018    Allergic     SEASONAL    Atrial fibrillation ([facility] [address] H/O    Cancer ([facility] [address] 2009 AND 2011    BREAST right    Essential hypertension 3/13/2018    Hammertoe     Migraine     Mixed hyperlipidemia 10/19/2020        Past Surgical History:   Procedure Laterality Date    ANKLE FRACTURE SURGERY  2004,2008    APPENDECTOMY  03/1983    ATRIAL ABLATION SURGERY  X3    Cryoablation for atrial fib    AXILLARY SURGERY Right     lymph node    BREAST BIOPSY  01/07/2011    BREAST BIOPSY  7/12/2011    left breast bx - benign     BREAST CYST EXCISION  06/2003,    BREAST LUMPECTOMY  03/30/2009    CARDIAC CATHETERIZATION  5/2005,01/2006    COLONOSCOPY  1993,1995,1999,2003,2006    COLONOSCOPY  11/29/2011    Dr. Carrie Aguilar - benign polyps     COSMETIC SURGERY      FOOT SURGERY Right 1/15/16    CORRECTION OF HAMMERTOES 1-5 RIGHT FOOT, WEIL OSTEOTOMY 2,3    FOOT SURGERY Right 05/12/2016    EXTENSOR TENDON LENGTHENING 4TH AND 5TH TOES RIGHT FOOT    FOOT SURGERY Right 08/11/2016    CORRECTION HAMMERTOES 1ST, 4TH AND 5TH DIGITS RIGHT FOOT WITH    HERNIA REPAIR  [id number]    HYSTERECTOMY  12/1977    JOINT REPLACEMENT  June 2015    LEFT KNEE    KNEE SURGERY  6/09/2015    left knee replacement    LIPOMA RESECTION  09/2006    MASTECTOMY  3-15-11    bilateral    NEUROMA SURGERY  1987,1988,1989,1992 , 1995    Left foot    11/23/20 0619  No intake/output data recorded. Wt Readings from Last 3 Encounters:   10/22/20 203 lb (92.1 kg)   10/19/20 204 lb 9.6 oz (92.8 kg)   09/30/20 202 lb (91.6 kg)     No data recorded    · Telemetry:Sinus rhythm   · Constitutional: Alert. Oriented to person, place, and time. No distress. · Head: Normocephalic and atraumatic. · Mouth/Throat: Lips appear moist. Oropharynx is clear and moist.  · Eyes: Conjunctivae normal. EOM are normal.   · Neck: Neck supple. No lymphadenopathy. No rigidity. No JVD present. · Cardiovascular: Normal rate, regular rhythm. Normal S1&S2. Carotid pulse 2+ bilaterally. · Pulmonary/Chest: Bilateral respiratory sounds present. No respiratory accessory muscle use. No wheezes, No rhonchi. · Abdominal: Soft. Normal bowel sounds present. No distension, No tenderness. No splenomegaly. No hernia. · Musculoskeletal: No tenderness. No edema    · Lymphadenopathy: Has no cervical adenopathy. · Neurological: Alert and oriented. Cranial nerve II-XII grossly intact, No gross deficit to touch. · Skin: Skin is warm and dry. No rash, lesions, ulcerations noted. · Psychiatric: No anxiety nor agitation. Labs:  Reviewed. No results for input(s): NA, K, CL, CO2, PHOS, BUN, CREATININE in the last 72 hours. Invalid input(s): CA  No results for input(s): WBC, HGB, HCT, MCV, PLT in the last 72 hours. No results found for: CKTOTAL, CKMB, CKMBINDEX, TROPONINI  No results found for: BNP  Lab Results   Component Value Date    PROTIME 19.4 11/17/2020    PROTIME 16.5 07/26/2018    PROTIME 18.1 07/19/2018    PROTIME 31.5 03/08/2012    PROTIME 21.4 02/09/2012    PROTIME 18.8 12/16/2011    PROTIME 23.5 10/06/2010    INR 1.66 11/17/2020    INR 1.8 11/12/2020    INR 1.7 10/28/2020    INR 2.40 10/14/2020     Lab Results   Component Value Date    CHOL 195 09/08/2020    HDL 38 09/08/2020    TRIG 292 09/08/2020       Diagnostic and imaging results reviewed.      I independently reviewed the ECG and telemetry. 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 12/20/2010     Priority: High    Abdominal adhesions 02/26/2014     Priority: Medium    Osteopenia 02/26/2014     Priority: Medium    Lumbar facet arthropathy 02/26/2014     Priority: Medium    Tremor 06/15/2012     Priority: Medium    S/P ablation of atrial flutter 06/15/2012     Priority: Medium    A-fib ([facility] [address] 03/08/2011     Priority: Medium    Ventral hernia 08/28/2013     Priority: Low    Seborrheic keratosis 10/06/2010     Priority: Low    Vitamin D deficiency 10/19/2020    Mixed hyperlipidemia 10/19/2020    Constipation 10/19/2020    Prediabetes 10/19/2020    Hypervolemia 11/26/2018    Other chest pain 11/26/2018    Acute diastolic congestive heart failure ([facility] [address] 11/26/2018    S/P bilateral mastectomy 03/13/2018    Essential hypertension 03/13/2018    Facet arthropathy, thoracic 05/17/2017    Hammer toe of right foot 08/01/2016    Concussion 03/31/2015      There are no active hospital problems to display for this patient. Recommendation (s):    We educated the patient that atrial fibrillation and atrial flutter are both progressive diseases, with more frequent episodes that will ensue. Subsequent episodes usually become more sustained to the extent that many individuals would then develop persistent atrial fibrillation/atrial flutter. Once persistence is reached, permanent atrial dysrhythmia is inevitable.  Treatment options including cardioversion, anti-arrhythmic medication therapy, rate control strategy, oral anticoagulation, and atrial fibrillation ablation were discussed with patient. Risks, benefits and alternative of each treatment options were explained. We discussed different management options for both atrial tachydysrhythmia including their risks and benefits. These options include use of cardioversion (mainly for persisting atrial fibrillation or atrial flutter) which provides an effective immediate therapy with success rates of 75% or higher, but it provides no short nor long term efficacy. Anti-arrhythmic medications provide a very effective short term therapy, but even with our most potent anti-arrhythmic medication there is limited long term efficacy (clinical studies have shown that 40% of patients remain atrial fibrillation-free after 4 years of follow-up after starting one of the more powerful anti-arrhythmic medication (amiodarone), and, if extrapolated, may have further diminishing success as time goes on). Against atrial flutter, any anti-arrhythmic medication would be nearly ineffective. Atrial fibrillation and atrial flutter ablation is a potentially curative therapy with very reasonable success rate after a first time procedure and with improving success rates with subsequent procedures. The risks, benefits and alternatives of the ablation procedure were discussed with the patient. The risks including, but not limited to, the risks of bleeding, infection, radiation exposure, injury to vascular, cardiac and surrounding structures (including pneumothorax), stroke, cardiac perforation, tamponade, need for emergent open heart surgery, need for pacemaker implantation, injury to the phrenic nerve, injury to the esophagus, myocardial infarction and death were discussed in detail. The patient wishes to proceed. Thank you for allowing me to participate in the care of Kristyn Chowdhury . If you have any questions/comments, please do not hesitate to contact us.       Ortega Hale Tamara LEES   Cardiac Electrophysiology  [facility]    For any EP related issues after 5 PM, contact [facility] on call cardiology through .

## 2020-11-23 NOTE — H&P (VIEW-ONLY)
[address]   Cardiac Electrophysiology H&P   Date: 11/23/2020  Admit Date:  11/23/2020  Reason for Admission: Afib  Consult Requesting Physician: No att. providers found     No chief complaint on file. HPI: Edu Pedraza is a 65 y.o. with PMH significant for HTN, s/p RFCA for AFL in 2006, s/p cryo-ablation for AF (11/1/13, Dr. Jin Briones). She has been on flecainide since at least 2011 and was increased to 150 mg BID in 5/2019 with frequent breakthroughs of AF.     Here for Afib ablation      Past Medical History:   Diagnosis Date    Acute diastolic congestive heart failure ([address] 11/26/2018    Allergic     SEASONAL    Atrial fibrillation ([facility] H/O    Cancer ([facility] [address] 2009 AND 2011    BREAST right    Essential hypertension 3/13/2018    Hammertoe     Migraine     Mixed hyperlipidemia 10/19/2020        Past Surgical History:   Procedure Laterality Date    ANKLE FRACTURE SURGERY  2004,2008    APPENDECTOMY  03/1983    ATRIAL ABLATION SURGERY  X3    Cryoablation for atrial fib    AXILLARY SURGERY Right     lymph node    BREAST BIOPSY  01/07/2011    BREAST BIOPSY  7/12/2011    left breast bx - benign     BREAST CYST EXCISION  06/2003,    BREAST LUMPECTOMY  03/30/2009    CARDIAC CATHETERIZATION  5/2005,01/2006    COLONOSCOPY  1993,1995,1999,2003,2006    COLONOSCOPY  11/29/2011    Dr. Debbie Desouza - benign polyps     COSMETIC SURGERY      FOOT SURGERY Right 1/15/16    CORRECTION OF HAMMERTOES 1-5 RIGHT FOOT, WEIL OSTEOTOMY 2,3    FOOT SURGERY Right 05/12/2016    EXTENSOR TENDON LENGTHENING 4TH AND 5TH TOES RIGHT FOOT    FOOT SURGERY Right 08/11/2016    CORRECTION HAMMERTOES 1ST, 4TH AND 5TH DIGITS RIGHT FOOT WITH    HERNIA REPAIR  [id number]    HYSTERECTOMY  12/1977    JOINT REPLACEMENT  June 2015    LEFT KNEE    KNEE SURGERY  6/09/2015    left knee replacement    LIPOMA RESECTION  09/2006    MASTECTOMY  3-15-11    bilateral    NEUROMA SURGERY  1987,1988,1989,1992 , 1995    Left foot    11/23/20 0619  No intake/output data recorded. Wt Readings from Last 3 Encounters:   10/22/20 203 lb (92.1 kg)   10/19/20 204 lb 9.6 oz (92.8 kg)   09/30/20 202 lb (91.6 kg)     No data recorded    · Telemetry:Sinus rhythm   · Constitutional: Alert. Oriented to person, place, and time. No distress. · Head: Normocephalic and atraumatic. · Mouth/Throat: Lips appear moist. Oropharynx is clear and moist.  · Eyes: Conjunctivae normal. EOM are normal.   · Neck: Neck supple. No lymphadenopathy. No rigidity. No JVD present. · Cardiovascular: Normal rate, regular rhythm. Normal S1&S2. Carotid pulse 2+ bilaterally. · Pulmonary/Chest: Bilateral respiratory sounds present. No respiratory accessory muscle use. No wheezes, No rhonchi. · Abdominal: Soft. Normal bowel sounds present. No distension, No tenderness. No splenomegaly. No hernia. · Musculoskeletal: No tenderness. No edema    · Lymphadenopathy: Has no cervical adenopathy. · Neurological: Alert and oriented. Cranial nerve II-XII grossly intact, No gross deficit to touch. · Skin: Skin is warm and dry. No rash, lesions, ulcerations noted. · Psychiatric: No anxiety nor agitation. Labs:  Reviewed. No results for input(s): NA, K, CL, CO2, PHOS, BUN, CREATININE in the last 72 hours. Invalid input(s): CA  No results for input(s): WBC, HGB, HCT, MCV, PLT in the last 72 hours. No results found for: CKTOTAL, CKMB, CKMBINDEX, TROPONINI  No results found for: BNP  Lab Results   Component Value Date    PROTIME 19.4 11/17/2020    PROTIME 16.5 07/26/2018    PROTIME 18.1 07/19/2018    PROTIME 31.5 03/08/2012    PROTIME 21.4 02/09/2012    PROTIME 18.8 12/16/2011    PROTIME 23.5 10/06/2010    INR 1.66 11/17/2020    INR 1.8 11/12/2020    INR 1.7 10/28/2020    INR 2.40 10/14/2020     Lab Results   Component Value Date    CHOL 195 09/08/2020    HDL 38 09/08/2020    TRIG 292 09/08/2020       Diagnostic and imaging results reviewed.      I independently reviewed the ECG and telemetry. 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 12/20/2010     Priority: High    Abdominal adhesions 02/26/2014     Priority: Medium    Osteopenia 02/26/2014     Priority: Medium    Lumbar facet arthropathy 02/26/2014     Priority: Medium    Tremor 06/15/2012     Priority: Medium    S/P ablation of atrial flutter 06/15/2012     Priority: Medium    A-fib ([facility] 03/08/2011     Priority: Medium    Ventral hernia 08/28/2013     Priority: Low    Seborrheic keratosis 10/06/2010     Priority: Low    Vitamin D deficiency 10/19/2020    Mixed hyperlipidemia 10/19/2020    Constipation 10/19/2020    Prediabetes 10/19/2020    Hypervolemia 11/26/2018    Other chest pain 11/26/2018    Acute diastolic congestive heart failure ([facility] [address] 11/26/2018    S/P bilateral mastectomy 03/13/2018    Essential hypertension 03/13/2018    Facet arthropathy, thoracic 05/17/2017    Hammer toe of right foot 08/01/2016    Concussion 03/31/2015      There are no active hospital problems to display for this patient. Recommendation (s):    We educated the patient that atrial fibrillation and atrial flutter are both progressive diseases, with more frequent episodes that will ensue. Subsequent episodes usually become more sustained to the extent that many individuals would then develop persistent atrial fibrillation/atrial flutter. Once persistence is reached, permanent atrial dysrhythmia is inevitable.  Treatment options including cardioversion, anti-arrhythmic medication therapy, rate Tamara LEES   Cardiac Electrophysiology  [address]    For any EP related issues after 5 PM, [facility] on call cardiology through .

## 2020-11-23 NOTE — ANESTHESIA POSTPROCEDURE EVALUATION
Department of Anesthesiology  Postprocedure Note    Patient: Marce Mendez  MRN: [id number]  YOB: 1950  Date of evaluation: 11/23/2020  Time:  12:57 PM     Procedure Summary     Date:  11/23/20 Room / Location:  [facility] Cath Lab    Anesthesia Start:  0740 Anesthesia Stop:  1119    Procedure:  [facility] A-FIB ABLATION W ANES Diagnosis:      Scheduled Providers:  Chioma Tomlinson MD Responsible Provider:  Chioma Tomlinson MD    Anesthesia Type:  general ASA Status:  3          Anesthesia Type: general    Rambo Phase I:      Rambo Phase II:      Last vitals: Reviewed and per EMR flowsheets.        Anesthesia Post Evaluation    Patient location during evaluation: PACU  Patient participation: complete - patient participated  Level of consciousness: awake  Pain score: 2  Airway patency: patent  Nausea & Vomiting: no nausea and no vomiting  Complications: no  Cardiovascular status: hemodynamically stable  Respiratory status: acceptable  Hydration status: euvolemic

## 2020-11-23 NOTE — PLAN OF CARE
Last INR 1.66  On Warfarin  Needs ELSIE due to subtherapeutic INR    Allen Ochoa MD   Cardiac Electrophysiology  [facility] [phone]

## 2020-11-23 NOTE — ANESTHESIA PRE PROCEDURE
Department of Anesthesiology  Preprocedure Note       Name:  Chioma Boswell   Age:  65 y.o.  :  1950                                          MRN:  [id number]         Date:  2020      Surgeon: * Surgery not found *    Procedure:     Medications prior to admission:   Prior to Admission medications    Medication Sig Start Date End Date Taking? Authorizing Provider   Vitamin D, Cholecalciferol, 25 MCG (1000 UT) CAPS Take 1,000 Units by mouth daily 10/19/20  Yes Kiara Chu MD   warfarin (COUMADIN) 10 MG tablet TAKE 1 TABLET DAILY. TAKE 12.5 MG ON MONDAY, WEDNESDAY AND FRIDAY, AND 10 MG ALL OTHER DAYS. Patient taking differently: Take 7.5mg daily 20  Yes Maciej Dodson MD   ezetimibe (ZETIA) 10 MG tablet TAKE 1 TABLET BY MOUTH ONE TIME A DAY 3/17/20  Yes Conard Hatchet, APRN - CNP   furosemide (LASIX) 20 MG tablet TAKE 1 TABLET DAILY 20  Yes Maciej Dodson MD   amLODIPine (NORVASC) 10 MG tablet TAKE 1 TABLET DAILY 20  Yes Maciej Dodson MD   digoxin (LANOXIN) 250 MCG tablet TAKE 1 TABLET DAILY 20  Yes Maciej Dodson MD   acetaminophen (TYLENOL) 325 MG tablet Take 325 mg by mouth every 6 hours as needed. As need for headaches. Yes Historical Provider, MD   warfarin (COUMADIN) 5 MG tablet TAKE 1 TABLET DAILY OR AS DIRECTED BY PHYSICIAN 20   Maciej Dodson MD   [facility] [place] 150 MG tablet Take 1 tablet by mouth 2 times daily 20   MAILE Jennings CNP       Current medications:    Current Outpatient Medications   Medication Sig Dispense Refill    Vitamin D, Cholecalciferol, 25 MCG (1000 UT) CAPS Take 1,000 Units by mouth daily      warfarin (COUMADIN) 10 MG tablet TAKE 1 TABLET DAILY.  TAKE 12.5 MG ON MONDAY, WEDNESDAY AND FRIDAY, AND 10 MG ALL OTHER DAYS. (Patient taking differently: Take 7.5mg daily) 90 tablet 3    ezetimibe (ZETIA) 10 MG tablet TAKE 1 TABLET BY MOUTH ONE TIME A DAY 90 tablet 3    furosemide (LASIX) 20 MG tablet TAKE 1 TABLET DAILY 90 tablet 3  amLODIPine (NORVASC) 10 MG tablet TAKE 1 TABLET DAILY 90 tablet 3    digoxin (LANOXIN) 250 MCG tablet TAKE 1 TABLET DAILY 90 tablet 3    acetaminophen (TYLENOL) 325 MG tablet Take 325 mg by mouth every 6 hours as needed. As need for headaches.  warfarin (COUMADIN) 5 MG tablet TAKE 1 TABLET DAILY OR AS DIRECTED BY PHYSICIAN 90 tablet 3    flecainide (TAMBOCOR) 150 MG tablet Take 1 tablet by mouth 2 times daily 180 tablet 3     Current Facility-Administered Medications   Medication Dose Route Frequency Provider Last Rate Last Dose    sodium chloride flush 0.9 % injection 10 mL  10 mL Intravenous 2 times per day Xaun Bowels, DO        sodium chloride flush 0.9 % injection 10 mL  10 mL Intravenous PRN Xuan Bowels, DO        0.9 % sodium chloride infusion   Intravenous Continuous Xuan Bowels, DO        lactated ringers infusion   Intravenous Continuous Xuan Bowels, DO        0.9 % sodium chloride infusion   Intravenous Continuous Veto Avitia MD        sodium chloride flush 0.9 % injection 10 mL  10 mL Intravenous 2 times per day Veto Avitia MD        sodium chloride flush 0.9 % injection 10 mL  10 mL Intravenous PRN Veto Avitia MD           Allergies:     Allergies   Allergen Reactions    Keflex [Cephalexin] Shortness Of Breath and Nausea Only    Novocain [Procaine] Shortness Of Breath and Swelling     LIDOCAINE OK    Amiodarone Other (See Comments)     dizziness    Cephalexin     Diltiazem Other (See Comments)     dizziness    Penicillins Swelling    Sulfa Antibiotics Nausea Only and Swelling       Problem List:    Patient Active Problem List   Diagnosis Code    Seborrheic keratosis L82.1    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 Coags:   Lab Results   Component Value Date    PROTIME 19.4 11/17/2020    PROTIME 31.5 03/08/2012    PROTIME 23.5 10/06/2010    INR 1.66 11/17/2020       HCG (If Applicable): No results found for: PREGTESTUR, PREGSERUM, HCG, HCGQUANT     ABGs: No results found for: PHART, PO2ART, ZXY2MFU, TEG0TLL, BEART, M9HSMDZD     Type & Screen (If Applicable):  No results found for: LABABO, LABRH    Drug/Infectious Status (If Applicable):  No results found for: HIV, HEPCAB    COVID-19 Screening (If Applicable):   Lab Results   Component Value Date    COVID19 Not Detected 11/17/2020         Anesthesia Evaluation    Airway: Mallampati: III  TM distance: <3 FB   Neck ROM: full  Mouth opening: > = 3 FB Dental:          Pulmonary:       (-) asthma                           Cardiovascular:  Exercise tolerance: good (>4 METS),   (+) hypertension:, dysrhythmias: atrial fibrillation,                   Neuro/Psych:      (-) seizures and CVA           GI/Hepatic/Renal:   (+) GERD:,           Endo/Other:        (-) diabetes mellitus               Abdominal:           Vascular:                                        Anesthesia Plan      general     ASA 3     (-npo MN    Echo 2017      Summary   Left ventricular cavity size is normal. There is mild concentric left   ventricular hypertrophy. Overall left ventricular systolic function appears   normal with an ejection fraction of 55-60%. No regional wall motion   abnormalities are noted. Indeterminate diastolic function. Trivial mitral regurgitation is present. The aortic valve leaflets appear slightly thickened but opens well. Mild tricuspid regurgitation. Estimated pulmonary artery systolic pressure is normal at 30 mmHg assuming a   right atrial pressure of 8 mmHg.)  Induction: intravenous. MIPS: Postoperative opioids intended.   Anesthetic plan and risks discussed with patient. Plan discussed with CRNA.                   Sangeetha Albright MD   11/23/2020

## 2020-11-24 LAB
POC ACT LR: 313 SEC
POC ACT LR: 321 SEC
POC ACT LR: 349 SEC
POC ACT LR: 386 SEC
POC ACT LR: >400 SEC
POC ACT LR: >400 SEC

## 2020-11-30 ENCOUNTER — ANTI-COAG VISIT (OUTPATIENT)
Dept: PHARMACY | Age: 70
End: 2020-11-30
Payer: MEDICARE

## 2020-11-30 LAB — INTERNATIONAL NORMALIZATION RATIO, POC: 1

## 2020-11-30 PROCEDURE — 99212 OFFICE O/P EST SF 10 MIN: CPT

## 2020-11-30 PROCEDURE — 85610 PROTHROMBIN TIME: CPT

## 2020-11-30 NOTE — PROGRESS NOTES
Yoan Rueda is a 65 y.o. female with PMHx significant for A-fib, CHF, HTN, breast CA who presents to clinic on  11/30/2020 for anticoagulation monitoring and adjustment. Patient has been taking warfarin for a-fib for at least 20 years.      Anticoagulation Indication(s):  Afib    Referring Physician:  Dr. Esparza Poster  Goal INR Range:  2-3  Duration of Anticoagulation Therapy:  Indefinite  Time of day dose taken:  PM  Product patient has at home:  warfarin 5 mg (peach) + 10 mg (white)    [id number] Score for Atrial Fibrillation Stroke Risk   Risk   Factors  Component Value   C CHF Yes 1   H HTN Yes 1   A2 Age >= 75 No,  (71 y.o.) 0   D DM No 0   S2 Prior Stroke/TIA No 0   V Vascular Disease No 0   A Age 74-69 Yes,  (71 y.o.) 1   Sc Sex female 1    [id number]  Score  4   Score last updated 11/13/19 10:00 AM    INR Summary                            Warfarin regimen (mg)  Date INR   A/P    Sun Mon Tue Wed Thu Fri Sat Mg/wk  11/30 1.0 Off warfarin, taking Eliquis - - - - - - - ---  11/12 1.8 Below goal, increase  7.5 10 10 10 7.5 10 7.5 62.5  10/28 1.7 Below goal, increase  7.5 7.5 7.5 10 7.5 10 7.5 57.5  10/14 2.4 At goal, no change  7.5 7.5 7.5 7.5 7.5 7.5 7.5 52.5  10/7 2.1 At goal, no change  7.5 7.5 7.5 7.5 7.5 7.5 7.5 52.5  9/30 3.9 Above goal, hold + dec 7.5 7.5 7.5 0/7.5 7.5 7.5 7.5 52.5  9/23 3.8 Above goal, hold + dec 7.5 7.5 10 0/7.5 7.5 10 7.5 57.5  9/16 3.3 Above goal, reduce x 1 7.5 10 10 5/10 7.5 10 7.5 62.5  9/9 3.7 Above goal, hold x 1  7.5 10 10 0/10 7.5 10 7.5 62.5  9/3 1.3 Below goal, resume  7.5 10 10 10 7.5 10 7.5 62.5  8/31 4.5 Above goal, hold x 2   0 0 10 INR  8/24 3.8 Above goal, hold x 1  7.5 0/10 10 10 7.5 10 7.5 62.5  8/3-8/19  Hospitalized  6/1 2.2 At goal, no change  7.5 10 10 10 7.5 10 7.5 62.5  3/23 2.0 At goal, no change  7.5 10 10 10 7.5 10 7.5 62.5  2/7 2.0 At goal, no change  7.5 10 10 10 7.5 10 7.5 62.5  12/30 2.9 At goal, no bleeding reported   Vitamin K intake Normally has ~2 servings of green, leafy vegetables per week (cabbage, brittany slaw, broccoli)   Recent vomiting/diarrhea/fever, changes in weight or activity level None reported   Tobacco or alcohol use Patient quit smoking 40+ years ago  Patient denies alcohol use   Upcoming surgeries or procedures None reported     Assessment/Plan:  Patient's INR was subtherapeutic today (1.0) as expected since she stopped taking warfarin on 11/22 prior to ablation on 11/23. After the ablation on 11/23, Dr. Lorri Stauffer discontinued warfarin & digoxin and started patient on Eliquis & metoprolol. Patient has been taking Eliquis as prescribed, but expressed concern today about her ability to afford the medication going forward (>$600 for 90-day supply). She also reports dizziness today, which is more likely due to the metoprolol than the Eliquis. She will discuss with cardiology at follow-up appointment on 12/7. She denies any bleeding today. Patient was instructed to continue Eliquis 5 mg BID. Discussed that INR monitoring is no longer necessary with Eliquis. Also informed patient that she no longer needs to maintain consistent vitamin K in her diet as Eliquis is not affected by vitamin K. She was instructed to monitor closely and seek emergency medical treatment for any abnormal bleeding. She was instructed to call clinic with any questions, concerns, adverse effects, etc.          Patient understands dosing directions and information discussed. Dosing schedule and follow up appointment given to patient. Progress note routed to referring physician's office. Patient acknowledges working in consult agreement with pharmacist as referred by his/her physician. Next INR Check:  N/A    Please call [facility] at [phone] with any questions. Thanks! Odell Martinez.  Beryl McintyreD, [facility]  [facility]  Ph: [phone]  11/30/2020 9:59

## 2020-12-02 ENCOUNTER — TELEPHONE (OUTPATIENT)
Dept: CARDIOLOGY CLINIC | Age: 70
End: 2020-12-02

## 2020-12-02 RX ORDER — METOPROLOL SUCCINATE 50 MG/1
25 TABLET, EXTENDED RELEASE ORAL DAILY
Qty: 30 TABLET | Refills: 3 | Status: SHIPPED | OUTPATIENT
Start: 2020-12-02 | End: 2021-03-04 | Stop reason: SDUPTHER

## 2020-12-02 NOTE — TELEPHONE ENCOUNTER
PT unknown to me. She is an EP and Dr Osbaldo Craig patient who recently had ablation. Takes metoprolol and flecainide. I will defer to EP.

## 2020-12-02 NOTE — TELEPHONE ENCOUNTER
The patient called requesting to speak with Merle Guzmán NP. The patient states she feels her HR is running low it's between 45 & 50 B? P 121/60. The patient states she is also having some dizziness. Please call the patient back at [phone] to advise.

## 2020-12-07 ENCOUNTER — OFFICE VISIT (OUTPATIENT)
Dept: CARDIOLOGY CLINIC | Age: 70
End: 2020-12-07
Payer: MEDICARE

## 2020-12-07 VITALS
HEIGHT: 69 IN | SYSTOLIC BLOOD PRESSURE: 118 MMHG | DIASTOLIC BLOOD PRESSURE: 72 MMHG | BODY MASS INDEX: 30.54 KG/M2 | TEMPERATURE: 96.9 F | WEIGHT: 206.2 LBS | HEART RATE: 93 BPM

## 2020-12-07 PROCEDURE — 1090F PRES/ABSN URINE INCON ASSESS: CPT | Performed by: NURSE PRACTITIONER

## 2020-12-07 PROCEDURE — 1036F TOBACCO NON-USER: CPT | Performed by: NURSE PRACTITIONER

## 2020-12-07 PROCEDURE — 4040F PNEUMOC VAC/ADMIN/RCVD: CPT | Performed by: NURSE PRACTITIONER

## 2020-12-07 PROCEDURE — 1123F ACP DISCUSS/DSCN MKR DOCD: CPT | Performed by: NURSE PRACTITIONER

## 2020-12-07 PROCEDURE — 99214 OFFICE O/P EST MOD 30 MIN: CPT | Performed by: NURSE PRACTITIONER

## 2020-12-07 PROCEDURE — G9708 BILAT MAST/HX BI /UNILAT MAS: HCPCS | Performed by: NURSE PRACTITIONER

## 2020-12-07 PROCEDURE — G8399 PT W/DXA RESULTS DOCUMENT: HCPCS | Performed by: NURSE PRACTITIONER

## 2020-12-07 PROCEDURE — G8482 FLU IMMUNIZE ORDER/ADMIN: HCPCS | Performed by: NURSE PRACTITIONER

## 2020-12-07 PROCEDURE — 3017F COLORECTAL CA SCREEN DOC REV: CPT | Performed by: NURSE PRACTITIONER

## 2020-12-07 PROCEDURE — G8427 DOCREV CUR MEDS BY ELIG CLIN: HCPCS | Performed by: NURSE PRACTITIONER

## 2020-12-07 PROCEDURE — G8417 CALC BMI ABV UP PARAM F/U: HCPCS | Performed by: NURSE PRACTITIONER

## 2020-12-07 PROCEDURE — 93000 ELECTROCARDIOGRAM COMPLETE: CPT | Performed by: NURSE PRACTITIONER

## 2020-12-07 RX ORDER — DIGOXIN 250 MCG
250 TABLET ORAL DAILY
Qty: 30 TABLET | Refills: 3 | Status: SHIPPED | OUTPATIENT
Start: 2020-12-07 | End: 2020-12-10

## 2020-12-07 NOTE — PROGRESS NOTES
[facility]   Electrophysiology  Office Visit  Date: 12/7/2020    Chief Complaint   Patient presents with    Atrial Fibrillation    Atrial Flutter    Palpitations    Fatigue       Cardiac HX: Shahab Bland is a 65 y.o. woman with a h/o breast CA, HLD,  chronic dCHF, atypical AFL, pAF, s/p RFA/PVI of AF/typical AFL/atypical AFL (11/23/2020, Dr. Pattie Hu). Interval History/HPI: Patient is here for follow-up for paroxysmal atrial fibrillation and typical/atypical AFL. Patient was placed on metoprolol after the procedure and was complaining of dizziness, lightheadedness and bradycardia. Her dose was decreased to 25 mg daily. She then went into AF with elevated HRs and increased the dose back to 50 mg daily. Review of her home EKG log shows what appears to be atrial fibrillation for most of the time post procedure. There is also intermittent widening of the QRS and today she presents in AF with a left bundle branch block and a QRS of 260 ms. She has not been dizzy or lightheaded and has had no syncopal events. She denies chest pain, states she has shortness of breath and fatigue when she is out of rhythm. She was placed on Eliquis post procedure and states that it cost her more than $500 for 3-month supply. This is cost prohibitive for her.   She does have paperwork today for the patient assistance program.      Home medications:   Current Outpatient Medications on File Prior to Visit   Medication Sig Dispense Refill    metoprolol succinate (TOPROL XL) 50 MG extended release tablet Take 0.5 tablets by mouth daily 30 tablet 3    apixaban (ELIQUIS) 5 MG TABS tablet Take 1 tablet by mouth 2 times daily 180 tablet 1    Vitamin D, Cholecalciferol, 25 MCG (1000 UT) CAPS Take 1,000 Units by mouth daily      ezetimibe (ZETIA) 10 MG tablet TAKE 1 TABLET BY MOUTH ONE TIME A DAY 90 tablet 3    furosemide (LASIX) 20 MG tablet TAKE 1 TABLET DAILY 90 tablet 3    amLODIPine (NORVASC) 10 MG tablet TAKE 1 TABLET DAILY 90 tablet 3    acetaminophen (TYLENOL) 325 MG tablet Take 325 mg by mouth every 6 hours as needed. As need for headaches. No current facility-administered medications on file prior to visit.         Past Medical History:   Diagnosis Date    Acute diastolic congestive heart failure ([facility] [address] 11/26/2018    Allergic     SEASONAL    Atrial fibrillation ([facility] [address] H/O    Cancer ([facility] [address] 2009 AND 2011    BREAST right    Essential hypertension 3/13/2018    Hammertoe     Migraine     Mixed hyperlipidemia 10/19/2020        Past Surgical History:   Procedure Laterality Date    ANKLE FRACTURE SURGERY  2004,2008    APPENDECTOMY  03/1983    ATRIAL ABLATION SURGERY  X3    Cryoablation for atrial fib    AXILLARY SURGERY Right     lymph node    BREAST BIOPSY  01/07/2011    BREAST BIOPSY  7/12/2011    left breast bx - benign     BREAST CYST EXCISION  06/2003,    BREAST LUMPECTOMY  03/30/2009    CARDIAC CATHETERIZATION  5/2005,01/2006    COLONOSCOPY  1993,1995,1999,2003,2006    COLONOSCOPY  11/29/2011    Dr. Yolanda Hernandez - benign polyps     COSMETIC SURGERY      FOOT SURGERY Right 1/15/16    CORRECTION OF HAMMERTOES 1-5 RIGHT FOOT, WEIL OSTEOTOMY 2,3    FOOT SURGERY Right 05/12/2016    EXTENSOR TENDON LENGTHENING 4TH AND 5TH TOES RIGHT FOOT    FOOT SURGERY Right 08/11/2016    CORRECTION HAMMERTOES 1ST, 4TH AND 5TH DIGITS RIGHT FOOT WITH    HERNIA REPAIR  [id number],0151    HYSTERECTOMY  12/1977    JOINT REPLACEMENT  June 2015    LEFT KNEE    KNEE SURGERY  6/09/2015    left knee replacement    LIPOMA RESECTION  09/2006    MASTECTOMY  3-15-11    bilateral    NEUROMA SURGERY  1987,1988,1989,1992 , 1995    Left foot    OTHER SURGICAL HISTORY  1983, 1984 X 2, 1994 X 2, 2006    Bowel obstructions    RHINOPLASTY  11/1976    RHINOPLASTY  1995    THORACOTOMY  1998    TONSILLECTOMY  1956    UPPER GASTROINTESTINAL ENDOSCOPY  [id number],2196,7064,2107    VARICOSE VEIN SURGERY  11/2001       Allergies Allergen Reactions    Keflex [Cephalexin] Shortness Of Breath and Nausea Only    Novocain [Procaine] Shortness Of Breath and Swelling     LIDOCAINE OK    Amiodarone Other (See Comments)     dizziness    Cephalexin     Diltiazem Other (See Comments)     dizziness    Penicillins Swelling    Sulfa Antibiotics Nausea Only and Swelling       Social History:  Reviewed. reports that she quit smoking about 32 years ago. Her smoking use included cigarettes. She started smoking about 40 years ago. She has a 2.00 pack-year smoking history. She has never used smokeless tobacco. She reports that she does not drink alcohol or use drugs. Family History:  Reviewed. family history includes Cancer in her brother, father, and mother. Review of System:    · Constitutional: No fevers, chills. · Eyes: No visual changes or diplopia. No scleral icterus. · ENT: No Headaches. No mouth sores or sore throat. · Cardiovascular: No for chest pain, Yes for dyspnea on exertion, Yes for palpitations or No for loss of consciousness. No cough, hemoptysis, No for pleuritic pain, or phlebitis. · Respiratory: No for cough or wheezing. No hematemesis. · Gastrointestinal: No abdominal pain, blood in stools. · Genitourinary: No dysuria, or hematuria. · Musculoskeletal: No gait disturbance,    · Integumentary: No rash or pruritis. · Neurological: No headache, change in muscle strength, numbness or tingling. · Psychiatric: No anxiety, or depression. · Endocrine: No temperature intolerance. No excessive thirst, fluid intake, or urination. · Hem/Lymph: No abnormal bruising or bleeding, blood clots or swollen lymph nodes. · Allergic/Immunologic: No nasal congestion or hives.     Physical Examination:  Vitals:    12/07/20 1054   BP: 118/72   Pulse: 93   Temp: 96.9 °F (36.1 °C)         Wt Readings from Last 3 Encounters:   12/07/20 206 lb 3.2 oz (93.5 kg)   11/23/20 202 lb (91.6 kg)   10/22/20 203 lb (92.1 kg) thickened but opens well. Mild tricuspid regurgitation. Estimated pulmonary artery systolic pressure is normal at 30 mmHg assuming a   right atrial pressure of 8 mmHg. Stress Test: 2/19/2019  Summary     There is normal isotope uptake at stress and rest. There is no evidence of     myocardial ischemia or scar.     Normal LV size and systolic function.     Left ventricular ejection fraction of 75 %.     There are no regional wall motion abnormalities.     Normal myocardial perfusion study. 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 12/20/2010     Priority: High    Abdominal adhesions 02/26/2014     Priority: Medium    Osteopenia 02/26/2014     Priority: Medium    Lumbar facet arthropathy 02/26/2014     Priority: Medium    Tremor 06/15/2012     Priority: Medium    S/P ablation of atrial flutter 06/15/2012     Priority: Medium    A-fib ([facility] [address] 03/08/2011     Priority: Medium    Ventral hernia 08/28/2013     Priority: Low    Seborrheic keratosis 10/06/2010     Priority: Low    Atypical atrial flutter ([facility] [address]     Vitamin D deficiency 10/19/2020    Mixed hyperlipidemia 10/19/2020    Constipation 10/19/2020    Prediabetes 10/19/2020    Hypervolemia 11/26/2018    Other chest pain 11/26/2018    Acute diastolic congestive heart failure ([address] 11/26/2018    S/P bilateral mastectomy 03/13/2018    Essential hypertension 03/13/2018    Facet arthropathy, thoracic 05/17/2017    Hammer toe of right foot 08/01/2016    Concussion 03/31/2015        Assessment:   1. Paroxysmal atrial fibrillation (HCC)    2. Atypical atrial flutter (HCC)    3. Palpitations    4.  Fatigue, unspecified type      Cardiac HX: Vince Price is a 65 y.o. woman with a h/o breast CA, HLD,  chronic dCHF, atypical AFL, pAF, s/p RFA/PVI of AF/typical AFL/atypical AFL (11/23/2020, Dr. Pattie Hu). [id number] 2. TSH 1.17 (9/8/2020). AF  - In AF - HR in the low 100's  - S/p RFA/PVI  11/23/2020  - On Eliquis - no s/s bleeding - continue, paperwork filled out for patient assistance, if cost prohibitive then will change to warfarin  - Reinforced not missing any doses  - On flecainide 100 mg BID -  - will d/c, intolerant to amio and dilt  - Will restart dig 0.25 for HR control  - Will schedule for DCCV  - ECG ordered and results personally reviewed     EF of 01-66%  No systolic HF  No known CAD  Anticoagulation for AF   No Tobacco use. All questions and concerns were addressed to the patient/family. Alternatives to my treatment were discussed. The note was completed using EMR. Every effort was made to ensure accuracy; however, inadvertent computerized transcription errors may be present. Patient received education regarding their diagnosis, treatment and medications while in the office today.       Magda Malone [address]

## 2020-12-07 NOTE — LETTER
[facility]  EP Procedure Sheet  12/7/20    Regis Culprui  1950    Physician: Dr. An Van    EP Procedures   Pacemaker implant  EP Study    ICD implant  Atrial flutter ablation     Biv implant  Atrial fibrillation ablation    Generator Change  SVT ablation    Lead revision  VT ablation    Lead extraction +/- upgrade  AVN ablation    Loop implant  Cardioversion     Other:   ELSIE     Equipment   Medtronic   PAVEL [facility] [employer]  CryoAblation    Biotronik  Laser Lead Extraction    Special Equipment       EP Procedures Scheduling Request  Time Requested  0730   Specific Day 12/9/20   Anesthesia    CT surgery backup    Location [facility]     Pre-Procedure Labs / Imaging   PT/INR  Type & cross    CBC  Units PRBC    BMP/Mg  Units FFP    Venogram  CXR    Echo  Pulmonary CTA for Pulmonary vein mapping     Patient Instructions

## 2020-12-09 ENCOUNTER — HOSPITAL ENCOUNTER (OUTPATIENT)
Dept: CARDIAC CATH/INVASIVE PROCEDURES | Age: 70
Discharge: HOME | End: 2020-12-09
Attending: INTERNAL MEDICINE | Admitting: INTERNAL MEDICINE
Payer: MEDICARE

## 2020-12-09 VITALS — OXYGEN SATURATION: 98 % | BODY MASS INDEX: 30.36 KG/M2 | HEIGHT: 69 IN | WEIGHT: 205 LBS | TEMPERATURE: 98 F

## 2020-12-09 LAB
ANION GAP SERPL CALCULATED.3IONS-SCNC: 12 MMOL/L (ref 3–16)
BUN BLDV-MCNC: 18 MG/DL (ref 7–20)
CALCIUM SERPL-MCNC: 9.5 MG/DL (ref 8.3–10.6)
CHLORIDE BLD-SCNC: 102 MMOL/L (ref 99–110)
CO2: 26 MMOL/L (ref 21–32)
CREAT SERPL-MCNC: 0.6 MG/DL (ref 0.6–1.2)
EKG ATRIAL RATE: 122 BPM
EKG ATRIAL RATE: 53 BPM
EKG DIAGNOSIS: NORMAL
EKG DIAGNOSIS: NORMAL
EKG P AXIS: 50 DEGREES
EKG P-R INTERVAL: 192 MS
EKG Q-T INTERVAL: 290 MS
EKG Q-T INTERVAL: 400 MS
EKG QRS DURATION: 88 MS
EKG QRS DURATION: 90 MS
EKG QTC CALCULATION (BAZETT): 375 MS
EKG QTC CALCULATION (BAZETT): 407 MS
EKG R AXIS: 13 DEGREES
EKG R AXIS: 6 DEGREES
EKG T AXIS: 109 DEGREES
EKG T AXIS: 197 DEGREES
EKG VENTRICULAR RATE: 119 BPM
EKG VENTRICULAR RATE: 53 BPM
GFR AFRICAN AMERICAN: >60
GFR NON-AFRICAN AMERICAN: >60
GLUCOSE BLD-MCNC: 125 MG/DL (ref 70–99)
INR BLD: 1.41 (ref 0.86–1.14)
POTASSIUM SERPL-SCNC: 4.2 MMOL/L (ref 3.5–5.1)
PROTHROMBIN TIME: 16.4 SEC (ref 10–13.2)
SODIUM BLD-SCNC: 140 MMOL/L (ref 136–145)

## 2020-12-09 PROCEDURE — 92960 CARDIOVERSION ELECTRIC EXT: CPT

## 2020-12-09 PROCEDURE — 92960 CARDIOVERSION ELECTRIC EXT: CPT | Performed by: INTERNAL MEDICINE

## 2020-12-09 PROCEDURE — 94761 N-INVAS EAR/PLS OXIMETRY MLT: CPT

## 2020-12-09 PROCEDURE — 93005 ELECTROCARDIOGRAM TRACING: CPT | Performed by: INTERNAL MEDICINE

## 2020-12-09 PROCEDURE — 85610 PROTHROMBIN TIME: CPT

## 2020-12-09 PROCEDURE — 93010 ELECTROCARDIOGRAM REPORT: CPT | Performed by: INTERNAL MEDICINE

## 2020-12-09 PROCEDURE — 80048 BASIC METABOLIC PNL TOTAL CA: CPT

## 2020-12-09 PROCEDURE — 99152 MOD SED SAME PHYS/QHP 5/>YRS: CPT | Performed by: INTERNAL MEDICINE

## 2020-12-09 PROCEDURE — 94770 HC ETCO2 MONITOR DAILY: CPT

## 2020-12-09 PROCEDURE — 2700000000 HC OXYGEN THERAPY PER DAY

## 2020-12-09 ASSESSMENT — LIFESTYLE VARIABLES
HOW OFTEN DO YOU HAVE A DRINK CONTAINING ALCOHOL: 0
AUDIT-C TOTAL SCORE: INCOMPLETE
HOW OFTEN DO YOU HAVE A DRINK CONTAINING ALCOHOL: NEVER
AUDIT TOTAL SCORE: INCOMPLETE

## 2020-12-09 ASSESSMENT — PATIENT HEALTH QUESTIONNAIRE - PHQ9
SUM OF ALL RESPONSES TO PHQ9 QUESTIONS 1 & 2: 0
SUM OF ALL RESPONSES TO PHQ QUESTIONS 1-9: 0
1. LITTLE INTEREST OR PLEASURE IN DOING THINGS: 0
SUM OF ALL RESPONSES TO PHQ QUESTIONS 1-9: 0
2. FEELING DOWN, DEPRESSED OR HOPELESS: 0
SUM OF ALL RESPONSES TO PHQ QUESTIONS 1-9: 0

## 2020-12-09 NOTE — PRE SEDATION
Sedation Pre-Procedure Note    Patient Name: Kristyn Chowdhury   YOB: 1950  Room/Bed: Cath Pool/NONE  Medical Record Number: [id number]  Date: 12/9/2020   Time: 11:42 AM       Indication:  Afib    Consent: I have discussed with the patient and/or the patient representative the indication, alternatives, and the possible risks and/or complications of the planned procedure and the anesthesia methods. The patient and/or patient representative appear to understand and agree to proceed. Vital Signs:   Vitals:    12/09/20 0850   Temp:    SpO2: 98%       Past Medical History:   has a past medical history of Acute diastolic congestive heart failure ([address] Allergic, Atrial fibrillation ([facility] [address] Cancer ([facility] [address] Essential hypertension, Hammertoe, Migraine, and Mixed hyperlipidemia. Past Surgical History:   has a past surgical history that includes Tonsillectomy (1956); rhinoplasty (11/1976); Hysterectomy (12/1977); Appendectomy (03/1983); other surgical history (1983, 1984 X 2, 1994 X 2, 2006); Neuroma surgery (8099,5790,6149,2660 , 1995); rhinoplasty (1995); hernia repair (1993,1998,2000,2009); thoracotomy (1998); Upper gastrointestinal endoscopy (6376,6658,5647,6286,1361,5871); Colonoscopy (1993,1995,1999,2003,2006); Varicose vein surgery (11/2001); Breast cyst excision (06/2003,); Ankle fracture surgery (4488,1872); Cardiac catheterization (5/2005,01/2006); lipoma resection (09/2006); Breast lumpectomy (03/30/2009); Breast biopsy (01/07/2011); Mastectomy (3-15-11); Cosmetic surgery; Breast biopsy (7/12/2011); Colonoscopy (11/29/2011); Atrial ablation surgery (X3); knee surgery (6/09/2015); joint replacement (June 2015); Axillary Surgery (Right); Foot surgery (Right, 1/15/16); Foot surgery (Right, 05/12/2016); and Foot surgery (Right, 08/11/2016). Medications:   Scheduled Meds:   Continuous Infusions:   PRN Meds:   Home Meds:   Prior to Admission medications    Medication Sig Start Date End Date Taking?

## 2020-12-09 NOTE — PROCEDURES
[address]     Electrophysiology Procedure Note       Date of Procedure: 12/9/2020  Patient's Name: Nicolas Maier  YOB: 1950   Medical Record Number: [id number]  Referring Physician: Christine att. providers found  Procedure Performed by: Anurag Valenzuela MD    Procedures performed:  · Anesthesia: Monitored Anesthesia Care  · Level of sedation plan: Moderate sedation (conscious sedation) with intravenous Propofol 80 mg  · Start time: 835  · Stop time: 845  · Mallampati airway assessment class: I  · ASA class: 1  · An independent trained observer pushed medications at my direction. We monitored the patient's level of consciousness and vital signs/physiologic status throughout the procedure duration  · External Electrical cardioversion     Indication of the procedure: Symptomatic, persistent atrial fibrillation      Details of procedure: The patient was brought to the cath lab area in a fasting and non-sedated state. The risks, benefits and alternatives of the procedure were discussed with the patient. The patient opted to proceed with the procedure. Written informed consent was signed and placed in the chart. A timeout protocol was completed to identify the patient and the procedure being performed. Patient is on chronic anticoagulation therapy. The patient was monitored continuously with ECG, pulse oximetry, blood pressure monitoring, and direct observation. We then administered intravenous propofol for sedation, and electrical DC cardioversion was performed using 200J, synchronized shock. Patient was converted to sinus rhythm. The patient tolerated the procedure well and there were no complications. Conclusion:   Successful external DC cardioversion of symptomatic, persistent atrial fibrillation     Plan:   The patient can be discharged if remains stable. Will continue with Eliquis.  The patient will follow-up with Dr. Madhav Hackett as an outpatient    Thank you for allowing me to participate in the care of this patient. If you have any questions, please feel free to contact me.     Marylee Gee MD   Cardiac Electrophysiology  [address] [place] [address] [phone]  [facility]

## 2020-12-09 NOTE — INTERVAL H&P NOTE
Update History & Physical    The patient's History and Physical of November 23, by me was reviewed with the patient and I examined the patient. There was no change. The surgical site was confirmed by the patient and me. Plan: The risks, benefits, expected outcome, and alternative to the recommended procedure have been discussed with the patient. Patient understands and wants to proceed with the procedure.      Electronically signed by Matteo Faith MD on 12/9/2020 at 11:42 AM

## 2020-12-09 NOTE — PROGRESS NOTES
ETCO2  Monitoring done for conscious sedation. Patient is on 4 liters/min via nasal cannula for procedure.     Baseline information:  HR: 107   RR: 19 LOC: alert  ETCO2: 38 SpO2: 100    5 minutes after sedation administered:  HR: 115   RR: 18 LOC: lethargic  ETCO2: 37 SpO2: 96    10 min after sedation administered:  HR: 54 BP: 114/59  RR: 20 LOC: obtunded  ETCO2: 39 SpO2: 98        Post-Procedure:  HR: 55 BP: 112/61  RR: 21 LOC: alert  ETCO2: 39 SpO2: 98  well    Patient/caregiver was educated on the proper method of use:  Yes      Level of patient/caregiver understanding able to:   [] Verbalize understanding   [] Demonstrate understanding       [] Teach back        [] Needs reinforcement       []  No available caregiver               []  Other:     Response to education:  Good

## 2020-12-10 ENCOUNTER — TELEPHONE (OUTPATIENT)
Dept: CARDIOLOGY CLINIC | Age: 70
End: 2020-12-10

## 2020-12-10 ENCOUNTER — OFFICE VISIT (OUTPATIENT)
Dept: PRIMARY CARE CLINIC | Age: 70
End: 2020-12-10
Payer: MEDICARE

## 2020-12-10 VITALS
DIASTOLIC BLOOD PRESSURE: 75 MMHG | WEIGHT: 205.6 LBS | OXYGEN SATURATION: 99 % | RESPIRATION RATE: 14 BRPM | HEIGHT: 69 IN | BODY MASS INDEX: 30.45 KG/M2 | SYSTOLIC BLOOD PRESSURE: 125 MMHG | HEART RATE: 54 BPM | TEMPERATURE: 97.6 F

## 2020-12-10 PROCEDURE — G0439 PPPS, SUBSEQ VISIT: HCPCS | Performed by: INTERNAL MEDICINE

## 2020-12-10 PROCEDURE — 1123F ACP DISCUSS/DSCN MKR DOCD: CPT | Performed by: INTERNAL MEDICINE

## 2020-12-10 PROCEDURE — G8482 FLU IMMUNIZE ORDER/ADMIN: HCPCS | Performed by: INTERNAL MEDICINE

## 2020-12-10 PROCEDURE — 4040F PNEUMOC VAC/ADMIN/RCVD: CPT | Performed by: INTERNAL MEDICINE

## 2020-12-10 PROCEDURE — 3017F COLORECTAL CA SCREEN DOC REV: CPT | Performed by: INTERNAL MEDICINE

## 2020-12-10 RX ORDER — VERAPAMIL HYDROCHLORIDE 80 MG/1
80 TABLET ORAL 3 TIMES DAILY
Qty: 90 TABLET | Refills: 3 | Status: SHIPPED | OUTPATIENT
Start: 2020-12-10 | End: 2021-03-04 | Stop reason: SDUPTHER

## 2020-12-10 RX ORDER — DIGOXIN 250 MCG
250 TABLET ORAL DAILY
Qty: 30 TABLET | Refills: 3 | Status: SHIPPED | OUTPATIENT
Start: 2020-12-10 | End: 2021-01-06

## 2020-12-10 ASSESSMENT — PATIENT HEALTH QUESTIONNAIRE - PHQ9
SUM OF ALL RESPONSES TO PHQ QUESTIONS 1-9: 0
2. FEELING DOWN, DEPRESSED OR HOPELESS: 0
SUM OF ALL RESPONSES TO PHQ QUESTIONS 1-9: 0
SUM OF ALL RESPONSES TO PHQ9 QUESTIONS 1 & 2: 0
SUM OF ALL RESPONSES TO PHQ QUESTIONS 1-9: 0
1. LITTLE INTEREST OR PLEASURE IN DOING THINGS: 0

## 2020-12-10 ASSESSMENT — LIFESTYLE VARIABLES: HOW OFTEN DO YOU HAVE A DRINK CONTAINING ALCOHOL: 0

## 2020-12-10 NOTE — PROGRESS NOTES
Medicare Annual Wellness Visit  Name: Leisa Peacock Date: 12/10/2020   MRN: [id number] Sex: Female   Age: 65 y.o. Ethnicity: Non-/Non    : 1950 Race: Barrera Amin is here for Medicare AWV    Screenings for behavioral, psychosocial and functional/safety risks, and cognitive dysfunction are all negative except as indicated below. These results, as well as other patient data from the [address] [facility] [address] form, are documented in Flowsheets linked to this Encounter. Patient has Hypertension. Pt takes Amlodipine 10mg po q day. Pt decreases salt. Patient has Vitamin D deficiency. Pt takes over the counter Vitamin D 1,000 IU po q day. Patient states she can't afford the Shingrix vaccine because it cost $170 after insurance. Patient states she has fatigue due to A.fib. Patient states her heart rate has been 129-169. Patient states she had cardioversion yesterday. Patient states generic Miralax helps her constipation. Patient also takes a stool softener. Allergies   Allergen Reactions    Keflex [Cephalexin] Shortness Of Breath and Nausea Only    Novocain [Procaine] Shortness Of Breath and Swelling     LIDOCAINE OK    Amiodarone Other (See Comments)     dizziness    Cephalexin     Diltiazem Other (See Comments)     dizziness    Penicillins Swelling    Sulfa Antibiotics Nausea Only and Swelling         Prior to Visit Medications    Medication Sig Taking?  Authorizing Provider   digoxin (LANOXIN) 250 MCG tablet Take 1 tablet by mouth daily  Sonja Alvarado MD   verapamil (CALAN) 80 MG tablet Take 1 tablet by mouth 3 times daily  Sonja Alvarado MD   metoprolol succinate (TOPROL XL) 50 MG extended release tablet Take 0.5 tablets by mouth daily  MAILE Duncan CNP   apixaban (ELIQUIS) 5 MG TABS tablet Take 1 tablet by mouth 2 times daily  Sonja Alvarado MD Vitamin D, Cholecalciferol, 25 MCG (1000 UT) CAPS Take 1,000 Units by mouth daily  Matthew Munoz MD   ezetimibe (ZETIA) 10 MG tablet TAKE 1 TABLET BY MOUTH ONE TIME A DAY  MAILE Munguia - CNP   furosemide (LASIX) 20 MG tablet TAKE 1 TABLET DAILY  Monik Meza MD   acetaminophen (TYLENOL) 325 MG tablet Take 325 mg by mouth every 6 hours as needed. As need for headaches.   Historical Provider, MD         Past Medical History:   Diagnosis Date    Acute diastolic congestive heart failure ([facility] [address] 11/26/2018    Allergic     SEASONAL    Atrial fibrillation ([facility] [address] [facility] H/O    Cancer ([facility] [address] 2009 AND 2011    BREAST right    Essential hypertension 3/13/2018    Hammertoe     Migraine     Mixed hyperlipidemia 10/19/2020       Past Surgical History:   Procedure Laterality Date    ANKLE FRACTURE SURGERY  2004,2008    APPENDECTOMY  03/1983    ATRIAL ABLATION SURGERY  X3    Cryoablation for atrial fib    AXILLARY SURGERY Right     lymph node    BREAST BIOPSY  01/07/2011    BREAST BIOPSY  7/12/2011    left breast bx - benign     BREAST CYST EXCISION  06/2003,    BREAST LUMPECTOMY  03/30/2009    CARDIAC CATHETERIZATION  5/2005,01/2006    COLONOSCOPY  1993,1995,1999,2003,2006    COLONOSCOPY  11/29/2011    Dr. Blaze Brooks - benign polyps     COSMETIC SURGERY      FOOT SURGERY Right 1/15/16    CORRECTION OF HAMMERTOES 1-5 RIGHT FOOT, WEIL OSTEOTOMY 2,3    FOOT SURGERY Right 05/12/2016    EXTENSOR TENDON LENGTHENING 4TH AND 5TH TOES RIGHT FOOT    FOOT SURGERY Right 08/11/2016    CORRECTION HAMMERTOES 1ST, 4TH AND 5TH DIGITS RIGHT FOOT WITH    HERNIA REPAIR  [id number]    HYSTERECTOMY  12/1977    JOINT REPLACEMENT  June 2015    LEFT KNEE    KNEE SURGERY  6/09/2015    left knee replacement    LIPOMA RESECTION  09/2006    MASTECTOMY  3-15-11    bilateral    NEUROMA SURGERY  1987,1988,1989,1992 , 1995    Left foot    OTHER SURGICAL HISTORY  1983, 1984 X 2, 1994 X 2, 2006    Bowel obstructions  RHINOPLASTY  1976    [facility]    TONSILLECTOMY      UPPER GASTROINTESTINAL ENDOSCOPY  [id number]    VARICOSE VEIN SURGERY  2001         Family History   Problem Relation Age of Onset    Cancer Mother         Breast cancer with metastatic disease- at 80    Cancer Father          at [de-identified] of bladder cancer and eye cancer    Cancer Brother          at 64 of throat,lung,bone spinal column and brain cancer       CareTeam (Including outside providers/suppliers regularly involved in providing care):   Patient Care Team:  Danica Branch MD as PCP - [facility]  Claudia Patterson MD as Consulting Physician (Electrophysiology)  Luisa Parmar MD as Consulting Physician (Medical Oncology)    Wt Readings from Last 3 Encounters:   12/10/20 205 lb 9.6 oz (93.3 kg)   20 205 lb (93 kg)   20 206 lb 3.2 oz (93.5 kg)     Vitals:    12/10/20 1009   BP: 125/75   Site: Left Upper Arm   Position: Sitting   Cuff Size: Large Adult   Pulse: 54   Resp: 14   Temp: 97.6 °F (36.4 °C)   TempSrc: Skin   SpO2: 99%   Weight: 205 lb 9.6 oz (93.3 kg)   Height: 5' 9\" (1.753 m)     Body mass index is 30.36 kg/m². Based upon direct observation of the patient, evaluation of cognition reveals recent and remote memory intact.     General Appearance: alert and oriented to person, place and time, well-developed and well-nourished, in no acute distress  Head: normocephalic and atraumatic  Eyes: pupils equal, round, and reactive to light, extraocular eye movements intact, conjunctivae normal  ENT: tympanic membrane, external ear and ear canal normal bilaterally  Neck: neck supple and non tender without mass, no thyromegaly or thyroid nodules, no cervical lymphadenopathy   Pulmonary/Chest: clear to auscultation bilaterally- no wheezes, rales or rhonchi, normal air movement, no respiratory distress Cardiovascular: normal rate, regular rhythm, normal S1 and S2, no murmurs and no carotid bruits  Abdomen: soft, non-tender, non-distended, normal bowel sounds, no masses or organomegaly  Extremities: no edema  Musculoskeletal: normal range of motion, no joint swelling, deformity or tenderness  Neurologic: reflexes normal and symmetric, no cranial nerve deficit, gait, coordination and speech normal    Patient's complete Health Risk Assessment and screening values have been reviewed and are found in Flowsheets. The following problems were reviewed today and where indicated follow up appointments were made and/or referrals ordered. Positive Risk Factor Screenings with Interventions:     Fall Risk:  Timed Up and Go Test > 12 seconds? (Complete if either Fall Risk answers are Yes): no  2 or more falls in past year?: (!) yes  Fall with injury in past year?: (!) yes  Fall Risk Interventions:    · Patient declines any further evaluation/treatment for this issue    General Health and ACP:  General  In general, how would you say your health is?: Good  In the past 7 days, have you experienced any of the following? New or Increased Pain, New or Increased Fatigue, Loneliness, Social Isolation, Stress or Anger?: (!) New or Increased Fatigue  Do you get the social and emotional support that you need?: Yes  Do you have a Living Will?: Yes  Advance Directives     Power of  Living Will ACP-Advance Directive ACP-Power of     Not on File Not on File Filed Not on File      General Health Risk Interventions:  · Fatigue: Patient states she has fatigue due to Atrial Fibrillation.  Pt sees Cardiology and is s/p cardioversion    Health Habits/Nutrition:  Health Habits/Nutrition  Do you exercise for at least 20 minutes 2-3 times per week?: Yes  Have you lost any weight without trying in the past 3 months?: No  Do you eat fewer than 2 meals per day?: No  Have you seen a dentist within the past year?: Yes Body mass index: (!) 30.36  Health Habits/Nutrition Interventions:  · Nutritional issues:  Patient states she eats salmon and chicken. Recommend low salt, low fat, low cholesterol, low carbohydrate diet and regular aerobic exercise. Personalized Preventive Plan   Current Health Maintenance Status  Immunization History   Administered Date(s) Administered    Influenza 10/06/2010    Influenza Vaccine, unspecified formulation 10/24/2015, 10/24/2016    Influenza Virus Vaccine 10/01/2009, 10/01/2013, 10/01/2014    Influenza, High Dose (Fluzone 65 yrs and older) 09/20/2017, 10/04/2020    Pneumococcal Conjugate 13-valent (Arville ) 01/13/2016, 10/04/2020    Pneumococcal Polysaccharide ([id number]) 03/15/2011, 03/13/2018    Tdap (Boostrix, Adacel) 02/26/2014    Zoster Live (Zostavax) 08/24/2015    Zoster Recombinant (Shingrix) 08/01/2015        Health Maintenance   Topic Date Due    Annual Wellness Visit (AWV)  06/20/2019    Shingles Vaccine (2 of 2) 12/17/2021 (Originally 10/19/2015)    A1C test (Diabetic or Prediabetic)  10/19/2021    Colon cancer screen colonoscopy  11/29/2021    Potassium monitoring  12/09/2021    Creatinine monitoring  12/09/2021    DTaP/Tdap/Td vaccine (2 - Td) 02/26/2024    Lipid screen  09/08/2025    DEXA (modify frequency per FRAX score)  Completed    Flu vaccine  Completed    Pneumococcal 65+ years Vaccine  Completed    Hepatitis C screen  Completed    Hepatitis A vaccine  Aged Out    Hepatitis B vaccine  Aged Out    Hib vaccine  Aged Out    Meningococcal (ACWY) vaccine  Aged Out     Recommendations for [employer] Due: see orders and patient instructions/AVS.  . Recommended screening schedule for the next 5-10 years is provided to the patient in written form: see Patient Farhan Ravi was seen today for medicare awv. Diagnoses and all orders for this visit:    1.  Routine general medical examination at a health care facility

## 2020-12-10 NOTE — PATIENT INSTRUCTIONS
Always wear a helmet when riding a bicycle or motorcycle. Patient Education        Learning About Low-Carbohydrate Diets  What is a low-carbohydrate diet? A low-carbohydrate (or \"low-carb\") diet limits foods and drinks that have carbohydrates. This includes grains, fruits, milk and yogurt, and starchy vegetables like potatoes, beans, and corn. It also avoids foods and drinks that have added sugar. Instead, low-carb diets include foods that are high in protein and fat. Why might you follow a low-carb diet? Low-carb diets may be used for a variety of reasons, such as for weight loss. People who have diabetes may use a low-carb diet to help manage their blood sugar levels. What should you do before you start the diet? Talk to your doctor before you try any diet. This is even more important if you have health problems like kidney disease, heart disease, or diabetes. Your doctor may suggest that you meet with a registered dietitian. A dietitian can help you make an eating plan that works for you. What foods do you eat on a low-carb diet? On a low-carb diet, you choose foods that are high in protein and fat. Examples of these are:  · Meat, poultry, and fish. · Eggs. · Nuts, such as walnuts, pecans, almonds, and peanuts. · Peanut butter and other nut butters. · Tofu. · Avocado. · Jonita Holster. · Non-starchy vegetables like broccoli, cauliflower, green beans, mushrooms, peppers, lettuce, and spinach. · Unsweetened non-dairy milks like almond milk and coconut milk. · Cheese, cottage cheese, and cream cheese. Current as of: August 22, 2019               Content Version: 12.6  © 5274-3422 [employer]. Care instructions adapted under license by [facility] ([facility]). If you have questions about a medical condition or this instruction, always ask your healthcare professional. [address] any warranty or liability for your use of this information.

## 2020-12-22 NOTE — PROGRESS NOTES
[address]   Electrophysiology  Office Visit  Date: 12/23/2020    Chief Complaint   Patient presents with    Atrial Fibrillation    Atrial Flutter    Palpitations       Cardiac HX: Elkin Chavez is a 65 y.o. woman with a h/o breast CA, HLD,  chronic dCHF, atypical AFL, pAF, S/p RFA (2005, Dr. Chandrika Akins, 2006, Dr. Samara Orellana, 2013, Dr. Milagro Kuo), s/p RFA/PVI of AF/typical AFL/atypical AFL (11/23/2020, Dr. Tsering Johnson). Interval History/HPI: She is here for follow-up for paroxysmal atrial fibrillation and atypical atrial flutter. She underwent a catheter ablation and pulmonary vein isolation of her AF and typical AFL, atypical AFL on November 23, 2020. In follow-up she was noted to be in atrial fibrillation and review of her log showed that she had been in atrial fibrillation most of the time since her procedure. She was placed on digoxin 0.25 mg for heart rate control and the flecainide was discontinued for a QRS of 260 ms. She underwent DCCV to NSR on 12/9/2020. She developed recurrent AF on 12/10/2020, was taken off amlodipine and started on verapamil 80 mg every 8 hours and her Toprol was increased to 50 mg daily. Today presents in NSR. Forgot to bring her iPad to show me the amount of episodes that she has had. She states that she has had 4-5 tachycardic episodes last week with the longest lasting about an hour and her heart rates were in the 138-144 range. She had an episode this morning that lasted about 45 minutes and her heart rate was up in the 120s. It makes her exhausted and fatigued with a heart rate elevated. She feels the episodes may be getting less.     Home medications:   Current Outpatient Medications on File Prior to Visit   Medication Sig Dispense Refill    digoxin (LANOXIN) 250 MCG tablet Take 1 tablet by mouth daily 30 tablet 3    verapamil (CALAN) 80 MG tablet Take 1 tablet by mouth 3 times daily 90 tablet 3    metoprolol succinate (TOPROL XL) 50 MG extended release tablet Take 0.5 tablets by mouth daily (Patient taking differently: Take 50 mg by mouth daily ) 30 tablet 3    apixaban (ELIQUIS) 5 MG TABS tablet Take 1 tablet by mouth 2 times daily 180 tablet 1    Vitamin D, Cholecalciferol, 25 MCG (1000 UT) CAPS Take 1,000 Units by mouth daily      ezetimibe (ZETIA) 10 MG tablet TAKE 1 TABLET BY MOUTH ONE TIME A DAY 90 tablet 3    furosemide (LASIX) 20 MG tablet TAKE 1 TABLET DAILY 90 tablet 3    acetaminophen (TYLENOL) 325 MG tablet Take 325 mg by mouth every 6 hours as needed. As need for headaches. No current facility-administered medications on file prior to visit.         Past Medical History:   Diagnosis Date    Acute diastolic congestive heart failure ([facility] 11/26/2018    Allergic     SEASONAL    Atrial fibrillation ([facility] [address] [facility] H/O    Cancer ([facility] [address] 2009 AND 2011    BREAST right    Essential hypertension 3/13/2018    Hammertoe     Migraine     Mixed hyperlipidemia 10/19/2020        Past Surgical History:   Procedure Laterality Date    ANKLE FRACTURE SURGERY  2004,2008    APPENDECTOMY  03/1983    ATRIAL ABLATION SURGERY  X3    Cryoablation for atrial fib    AXILLARY SURGERY Right     lymph node    BREAST BIOPSY  01/07/2011    BREAST BIOPSY  7/12/2011    left breast bx - benign     BREAST CYST EXCISION  06/2003,    BREAST LUMPECTOMY  03/30/2009    CARDIAC CATHETERIZATION  5/2005,01/2006    COLONOSCOPY  1993,1995,1999,2003,2006    COLONOSCOPY  11/29/2011    Dr. Quinones Levels - benign polyps     COSMETIC SURGERY      FOOT SURGERY Right 1/15/16    CORRECTION OF HAMMERTOES 1-5 RIGHT FOOT, WEIL OSTEOTOMY 2,3    FOOT SURGERY Right 05/12/2016    EXTENSOR TENDON LENGTHENING 4TH AND 5TH TOES RIGHT FOOT    FOOT SURGERY Right 08/11/2016    CORRECTION HAMMERTOES 1ST, 4TH AND 5TH DIGITS RIGHT FOOT WITH    HERNIA REPAIR  [id number]    HYSTERECTOMY  12/1977    JOINT REPLACEMENT  June 2015    LEFT KNEE    KNEE SURGERY  6/09/2015    left knee replacement    LIPOMA RESECTION  09/2006    MASTECTOMY  3-15-11    bilateral    NEUROMA SURGERY  [id number] , 1995    Left foot    OTHER SURGICAL HISTORY  1983, 1984 X 2, 1994 X 2, 2006    Bowel obstructions    RHINOPLASTY  11/1976    RHINOPLASTY  1995    THORACOTOMY  1998    TONSILLECTOMY  1956    UPPER GASTROINTESTINAL ENDOSCOPY  [id number]    VARICOSE VEIN SURGERY  11/2001       Allergies   Allergen Reactions    Keflex [Cephalexin] Shortness Of Breath and Nausea Only    Novocain [Procaine] Shortness Of Breath and Swelling     LIDOCAINE OK    Amiodarone Other (See Comments)     dizziness    Cephalexin     Diltiazem Other (See Comments)     dizziness    Penicillins Swelling    Sulfa Antibiotics Nausea Only and Swelling       Social History:  Reviewed. reports that she quit smoking about 33 years ago. Her smoking use included cigarettes. She started smoking about 41 years ago. She has a 2.00 pack-year smoking history. She has never used smokeless tobacco. She reports that she does not drink alcohol or use drugs. Family History:  Reviewed. family history includes Cancer in her brother, father, and mother. Review of System:    · Constitutional: No fevers, chills. · Eyes: No visual changes or diplopia. No scleral icterus. · ENT: No Headaches. No mouth sores or sore throat. · Cardiovascular: No for chest pain, Yes for dyspnea on exertion, Yes for palpitations or No for loss of consciousness. No cough, hemoptysis, No for pleuritic pain, or phlebitis. · Respiratory: No for cough or wheezing. No hematemesis. · Gastrointestinal: No abdominal pain, blood in stools. · Genitourinary: No dysuria, or hematuria. · Musculoskeletal: No gait disturbance,    · Integumentary: No rash or pruritis. · Neurological: No headache, change in muscle strength, numbness or tingling. · Psychiatric: No anxiety, or depression. · Endocrine: No temperature intolerance.  No excessive thirst, fluid intake, or urination. · Hem/Lymph: No abnormal bruising or bleeding, blood clots or swollen lymph nodes. · Allergic/Immunologic: No nasal congestion or hives. Physical Examination:  Vitals:    12/23/20 1332   BP: 126/68   Pulse: 60         Wt Readings from Last 3 Encounters:   12/23/20 205 lb 12.8 oz (93.4 kg)   12/10/20 205 lb 9.6 oz (93.3 kg)   12/09/20 205 lb (93 kg)       · Constitutional: Oriented. No distress. · Head: Normocephalic and atraumatic. · Mouth/Throat: Oropharynx is clear and moist.   · Eyes: Conjunctivae clear without jaunduice. PERRL. · Neck: Neck supple. No rigidity. No JVD present. · Cardiovascular: Normal rate, regular rhythm, S1&S2. · Pulmonary/Chest: Bilateral respiratory sounds. No wheezes, No rhonchi. · Abdominal: Soft. Bowel sounds present. No distension, No tenderness. · Musculoskeletal: No tenderness. No edema    · Lymphadenopathy: Has no cervical adenopathy. · Neurological: Alert and oriented. Cranial nerve appears intact, No Gross deficit   · Skin: Skin is warm and dry. No rash noted. · Psychiatric: Has a normal mood, affect and behavior     Labs:  Reviewed. No results for input(s): NA, K, CL, CO2, PHOS, BUN, CREATININE in the last 72 hours. Invalid input(s): CA,  TSH  No results for input(s): WBC, HGB, HCT, MCV, PLT in the last 72 hours.   No results found for: CKTOTAL, CKMB, CKMBINDEX, TROPONINI  No results found for: BNP  Lab Results   Component Value Date    PROTIME 16.4 12/09/2020    PROTIME 29.0 11/23/2020    PROTIME 19.4 11/17/2020    PROTIME 31.5 03/08/2012    PROTIME 21.4 02/09/2012    PROTIME 18.8 12/16/2011    PROTIME 23.5 10/06/2010    INR 1.41 12/09/2020    INR 1.0 11/30/2020    INR 2.48 11/23/2020    INR 1.66 11/17/2020     Lab Results   Component Value Date    CHOL 195 09/08/2020    HDL 38 09/08/2020    TRIG 292 09/08/2020       ECG: Personally reviewed: NSR, HR 60, , QRS 98, QTc 406    ECHO: 11/30/2018    Summary   Left ventricular cavity right foot 08/01/2016    Concussion 03/31/2015        Assessment:   1. Paroxysmal atrial fibrillation (HCC)    2. Atypical atrial flutter (HCC)    3. Tachycardia    4. Palpitations         Cardiac HX: Peg Encarnacion is a 65 y.o. woman with a h/o breast CA, HLD,  chronic dCHF, atypical AFL, pAF, S/p RFA (2005, Dr. Jazmin Barba, 2006, Dr. Min Mcdaniel, 2013, Dr. Christina Sheffield), s/p RFA/PVI of AF/typical AFL/atypical AFL (11/23/2020, Dr. Seun Fisher). [id number] 2. TSH 1.17 (9/8/2020). AF  - In NSR today  - S/p RFA/PVI 11/23/2020  - S/p DCCV to NSR (12/9/2020) then back in AF the next day   - On Eliquis - no s/s bleeding - continue, patient working on patient assistance, if unable to receive we will need to end up changing to Coumadin due to to Eliquis being cost prohibitive  - Reinforced not missing any doses of [address]  - Intolerant to flecainide -  - intolerant to amio and dilt  - On dig 0.25, verapamil 80 mg 3 times daily, Toprol XL 50 mg daily - will take an extra 40 of verapamil for sustained AF with rates greater than 100 lasting more than an hour  - Remains with recurrent atrial arrhythmias will consider repeat RFA versus ablate and pace  - Patient to follow-up with Dr. Seun Fisher in March  - ECG ordered and results personally reviewed     EF of 41-10%  No systolic HF  No known CAD  Anticoagulation for AF   No Tobacco use. All questions and concerns were addressed to the patient/family. Alternatives to my treatment were discussed. The note was completed using EMR. Every effort was made to ensure accuracy; however, inadvertent computerized transcription errors may be present. Patient received education regarding their diagnosis, treatment and medications while in the office today.       Autumn [address]

## 2020-12-23 ENCOUNTER — OFFICE VISIT (OUTPATIENT)
Dept: CARDIOLOGY CLINIC | Age: 70
End: 2020-12-23
Payer: MEDICARE

## 2020-12-23 VITALS
BODY MASS INDEX: 30.39 KG/M2 | SYSTOLIC BLOOD PRESSURE: 126 MMHG | DIASTOLIC BLOOD PRESSURE: 68 MMHG | HEART RATE: 60 BPM | WEIGHT: 205.8 LBS

## 2020-12-23 PROCEDURE — 93000 ELECTROCARDIOGRAM COMPLETE: CPT | Performed by: NURSE PRACTITIONER

## 2020-12-23 PROCEDURE — G8482 FLU IMMUNIZE ORDER/ADMIN: HCPCS | Performed by: NURSE PRACTITIONER

## 2020-12-23 PROCEDURE — G8417 CALC BMI ABV UP PARAM F/U: HCPCS | Performed by: NURSE PRACTITIONER

## 2020-12-23 PROCEDURE — G8427 DOCREV CUR MEDS BY ELIG CLIN: HCPCS | Performed by: NURSE PRACTITIONER

## 2020-12-23 PROCEDURE — 3017F COLORECTAL CA SCREEN DOC REV: CPT | Performed by: NURSE PRACTITIONER

## 2020-12-23 PROCEDURE — 1090F PRES/ABSN URINE INCON ASSESS: CPT | Performed by: NURSE PRACTITIONER

## 2020-12-23 PROCEDURE — G8399 PT W/DXA RESULTS DOCUMENT: HCPCS | Performed by: NURSE PRACTITIONER

## 2020-12-23 PROCEDURE — 99214 OFFICE O/P EST MOD 30 MIN: CPT | Performed by: NURSE PRACTITIONER

## 2020-12-23 PROCEDURE — 4040F PNEUMOC VAC/ADMIN/RCVD: CPT | Performed by: NURSE PRACTITIONER

## 2020-12-23 PROCEDURE — G9708 BILAT MAST/HX BI /UNILAT MAS: HCPCS | Performed by: NURSE PRACTITIONER

## 2020-12-23 PROCEDURE — 1036F TOBACCO NON-USER: CPT | Performed by: NURSE PRACTITIONER

## 2020-12-23 PROCEDURE — 1123F ACP DISCUSS/DSCN MKR DOCD: CPT | Performed by: NURSE PRACTITIONER

## 2021-01-03 DIAGNOSIS — I10 ESSENTIAL HYPERTENSION: ICD-10-CM

## 2021-01-04 NOTE — TELEPHONE ENCOUNTER
Requested Prescriptions     Pending Prescriptions Disp Refills    furosemide (LASIX) 20 MG tablet [Pharmacy Med Name: FUROSEMIDE TABS 20MG] 90 tablet 3     Sig: TAKE 1 TABLET DAILY    digoxin (LANOXIN) 250 MCG tablet [Pharmacy Med Name: DIGOXIN TABS 0.25MG] 90 tablet 3     Sig: TAKE 1 TABLET DAILY              Last Office Visit: 12/23/2020     Next Office Visit: 1/28/2021

## 2021-01-06 RX ORDER — DIGOXIN 250 MCG
TABLET ORAL
Qty: 90 TABLET | Refills: 3 | Status: ON HOLD | OUTPATIENT
Start: 2021-01-06 | End: 2021-03-10 | Stop reason: HOSPADM

## 2021-01-06 RX ORDER — FUROSEMIDE 20 MG/1
TABLET ORAL
Qty: 90 TABLET | Refills: 3 | Status: SHIPPED | OUTPATIENT
Start: 2021-01-06 | End: 2021-11-09 | Stop reason: SDUPTHER

## 2021-01-14 ENCOUNTER — TELEPHONE (OUTPATIENT)
Dept: ADMINISTRATIVE | Age: 71
End: 2021-01-14

## 2021-01-28 ENCOUNTER — OFFICE VISIT (OUTPATIENT)
Dept: CARDIOLOGY CLINIC | Age: 71
End: 2021-01-28
Payer: MEDICARE

## 2021-01-28 VITALS
HEART RATE: 60 BPM | DIASTOLIC BLOOD PRESSURE: 70 MMHG | BODY MASS INDEX: 30.42 KG/M2 | WEIGHT: 206 LBS | SYSTOLIC BLOOD PRESSURE: 122 MMHG | TEMPERATURE: 97.1 F

## 2021-01-28 DIAGNOSIS — Z86.79 S/P ABLATION OF ATRIAL FLUTTER: ICD-10-CM

## 2021-01-28 DIAGNOSIS — Z98.890 S/P ABLATION OF ATRIAL FLUTTER: ICD-10-CM

## 2021-01-28 DIAGNOSIS — Z86.79 S/P ABLATION OF ATRIAL FIBRILLATION: ICD-10-CM

## 2021-01-28 DIAGNOSIS — Z98.890 S/P ABLATION OF ATRIAL FIBRILLATION: ICD-10-CM

## 2021-01-28 DIAGNOSIS — I48.3 TYPICAL ATRIAL FLUTTER (HCC): ICD-10-CM

## 2021-01-28 DIAGNOSIS — I48.0 PAROXYSMAL ATRIAL FIBRILLATION (HCC): Primary | ICD-10-CM

## 2021-01-28 PROCEDURE — 1123F ACP DISCUSS/DSCN MKR DOCD: CPT | Performed by: INTERNAL MEDICINE

## 2021-01-28 PROCEDURE — 3017F COLORECTAL CA SCREEN DOC REV: CPT | Performed by: INTERNAL MEDICINE

## 2021-01-28 PROCEDURE — 99214 OFFICE O/P EST MOD 30 MIN: CPT | Performed by: INTERNAL MEDICINE

## 2021-01-28 PROCEDURE — 93000 ELECTROCARDIOGRAM COMPLETE: CPT | Performed by: INTERNAL MEDICINE

## 2021-01-28 PROCEDURE — G8399 PT W/DXA RESULTS DOCUMENT: HCPCS | Performed by: INTERNAL MEDICINE

## 2021-01-28 PROCEDURE — 4040F PNEUMOC VAC/ADMIN/RCVD: CPT | Performed by: INTERNAL MEDICINE

## 2021-01-28 PROCEDURE — G9708 BILAT MAST/HX BI /UNILAT MAS: HCPCS | Performed by: INTERNAL MEDICINE

## 2021-01-28 PROCEDURE — G8482 FLU IMMUNIZE ORDER/ADMIN: HCPCS | Performed by: INTERNAL MEDICINE

## 2021-01-28 PROCEDURE — 1090F PRES/ABSN URINE INCON ASSESS: CPT | Performed by: INTERNAL MEDICINE

## 2021-01-28 PROCEDURE — G8417 CALC BMI ABV UP PARAM F/U: HCPCS | Performed by: INTERNAL MEDICINE

## 2021-01-28 PROCEDURE — 1036F TOBACCO NON-USER: CPT | Performed by: INTERNAL MEDICINE

## 2021-01-28 PROCEDURE — G8427 DOCREV CUR MEDS BY ELIG CLIN: HCPCS | Performed by: INTERNAL MEDICINE

## 2021-01-28 NOTE — PROGRESS NOTES
fee  Subjective:      Patient ID: Tray Le is a 65 y.o. female. HPI Castillo Adam is being seen for cardiac follow up for afib/arrhythmia. Ablation 11/20 , then ECV 12/20. Tolerating metoprolol. Off flecanide. Still with quite a few break through. Can last for hrs. No pnd or orthopnea. No palp/syncope. No sob. No chest pain.          Past Medical History:   Diagnosis Date    Acute diastolic congestive heart failure ([address] 11/26/2018    Allergic     SEASONAL    Atrial fibrillation ([facility] H/O    Cancer ([facility] 2009 AND 2011    BREAST right    Essential hypertension 3/13/2018    Hammertoe     Migraine     Mixed hyperlipidemia 10/19/2020     Past Surgical History:   Procedure Laterality Date    ANKLE FRACTURE SURGERY  2004,2008    APPENDECTOMY  03/1983    ATRIAL ABLATION SURGERY  X3    Cryoablation for atrial fib    AXILLARY SURGERY Right     lymph node    BREAST BIOPSY  01/07/2011    BREAST BIOPSY  7/12/2011    left breast bx - benign     BREAST CYST EXCISION  06/2003,    BREAST LUMPECTOMY  03/30/2009    CARDIAC CATHETERIZATION  5/2005,01/2006    COLONOSCOPY  1993,1995,1999,2003,2006    COLONOSCOPY  11/29/2011    Dr. Jess Wilder - benign polyps     COSMETIC SURGERY      FOOT SURGERY Right 1/15/16    CORRECTION OF HAMMERTOES 1-5 RIGHT FOOT, WEIL OSTEOTOMY 2,3    FOOT SURGERY Right 05/12/2016    EXTENSOR TENDON LENGTHENING 4TH AND 5TH TOES RIGHT FOOT    FOOT SURGERY Right 08/11/2016    CORRECTION HAMMERTOES 1ST, 4TH AND 5TH DIGITS RIGHT FOOT WITH    HERNIA REPAIR  [id number]    HYSTERECTOMY  12/1977    JOINT REPLACEMENT  June 2015    LEFT KNEE    KNEE SURGERY  6/09/2015    left knee replacement    LIPOMA RESECTION  09/2006    MASTECTOMY  3-15-11    bilateral    NEUROMA SURGERY  1987,1988,1989,1992 , 1995    Left foot    OTHER SURGICAL HISTORY  1983, 1984 X 2, 1994 X 2, 2006    Bowel obstructions    RHINOPLASTY  11/1976    RHINOPLASTY  1995    THORACOTOMY  1998    TONSILLECTOMY [facility] GASTROINTESTINAL ENDOSCOPY  [id number]    VARICOSE VEIN SURGERY  2001     Social History     Socioeconomic History    Marital status:      Spouse name: Not on file    Number of children: 2    Years of education: 15    Highest education level: Not on file   Occupational History    Occupation: [employer] finish    Social Needs    Financial resource strain: Not hard at all   Jonatan-Alisia insecurity     Worry: Never true     Inability: Never true    Transportation needs     Medical: No     Non-medical: No   Tobacco Use    Smoking status: Former Smoker     Packs/day: 0.25     Years: 8.00     Pack years: 2.00     Types: Cigarettes     Start date: 34     Quit date: 1988     Years since quittin.0    Smokeless tobacco: Never Used    Tobacco comment: QUIT    Substance and Sexual Activity    Alcohol use: No    Drug use: No    Sexual activity: Never   Lifestyle    Physical activity     Days per week: Not on file     Minutes per session: Not on file    Stress: Not on file   Relationships    Social connections     Talks on phone: Not on file     Gets together: Not on file     Attends Buddhist service: Not on file     Active member of club or organization: Not on file     Attends meetings of clubs or organizations: Not on file     Relationship status: Not on file    Intimate partner violence     Fear of current or ex partner: Not on file     Emotionally abused: Not on file     Physically abused: Not on file     Forced sexual activity: Not on file   Other Topics Concern    Not on file   Social History Narrative    Not on file       Family hx reviewed, denies FH cardiac issues    Vitals:    21 0956   BP: 122/70   Pulse: 60   Temp: 97.1 °F (36.2 °C)       Wt 203    Review of Systems   Constitutional: Negative for activity change, appetite change and fatigue. Respiratory: Negative for cough, choking, chest tightness and shortness of breath. Cardiovascular: Negative for chest pain, palpitations and leg swelling. Denies PND or orthopnea. No tachycardia or syncope. Neurological: Negative for dizziness, syncope and light-headedness. Psychiatric/Behavioral: Negative for behavioral problems, confusion and agitation. All other systems reviewed negative as done    Objective:   Physical Exam     Constitutional: She is oriented to person, place, and time. She appears well-developed and well-nourished. No distress. HENT:   Head: Normocephalic and atraumatic. Eyes: Conjunctivae and EOM are normal. Right eye exhibits no discharge. Left eye exhibits no discharge. Neck: Normal range of motion. No JVD present. Cardiovascular: Normal rate, regular rhythm, S1 normal, S2 normal and normal heart sounds. Exam reveals no gallop. 1/6 syst murmur heard. Pulses:       Radial pulses are 2+ on the right side, and 2+ on the left side. Pulmonary/Chest: Effort normal and breath sounds normal. She has no rales. Abdominal: Soft. Bowel sounds are normal. There is no tenderness. Musculoskeletal: Normal range of motion. She exhibits no edema. Neurological: She is alert and oriented to person, place, and time. Skin: Skin is warm and dry. Psychiatric: She has a normal mood and affect. Her behavior is normal.       Assessment:       Diagnosis Orders   1. Paroxysmal atrial fibrillation (HCC)  EKG 12 Lead   2. Typical atrial flutter ([address]     3. S/P ablation of atrial flutter     4. S/P ablation of atrial fibrillation             Plan:      Continues with P afib. Daily episodes. Tired/light headed with episodes. BP good. No exertional sx. On AC, no bleeding issues. EP following. EKG today shows SB, NSSTTW changes. Will see if EP can see or comment earlier. No changes. Reviewed previous records and testing including myoview 3/19 and 30 day monitor. Follow up 3 months.

## 2021-02-25 NOTE — PROGRESS NOTES
[facility]   Cardiac Electrophysiology Consultation   Date: 3/4/2021  Reason for Consultation:   Consult Requesting Physician: No att. providers found     Chief Complaint:   Chief Complaint   Patient presents with    3 Month Follow-Up     Post ablation         HPI: Kathy Bermudez is a 65 y.o.  woman with a h/o breast CA, HLD,  chronic dCHF, atypical AFL, pAF, S/p RFA (2005, Dr. Arie Cheek, 2006, Dr. Jacobo Mcginnis, 2013, Dr. Arthur Rowe), s/p RFA/PVI of AF/typical AFL/atypical AFL (11/23/2020). In follow-up  she was noted to be in atrial fibrillation and review of her log showed that she had been in atrial fibrillation most of the time since her procedure. She was placed on digoxin 0.25 mg for heart rate control and the flecainide was discontinued for a QRS of 260 ms. S/P CV to NSR on 12/9/2020. She developed recurrent AF on 12/10/2020, was taken off amlodipine and started on verapamil 80 mg every 8 hours (Pt has PRN Verapamil 40 mg for sustained AF with rates greater that 100 lasting more that 1 hour) and her Toprol was increased to 50 mg daily. Interval History: Today, she is here for a 3 month follow up after her ablation. The patient is in [place] DrKEVIN today with a rate of 63 bpm. The patient states she has only had 3-4 days in normal rhythm since her ablation. She does not feel well most days as she feels out of rhythm a lot.        Past Medical History:   Diagnosis Date    Acute diastolic congestive heart failure ([address] 11/26/2018    Allergic     SEASONAL    Atrial fibrillation ([address] H/O    Cancer ([address] 2009 AND 2011    BREAST right    Essential hypertension 3/13/2018    Hammertoe     Migraine     Mixed hyperlipidemia 10/19/2020        Past Surgical History:   Procedure Laterality Date    ANKLE FRACTURE SURGERY  2004,2008    APPENDECTOMY  03/1983    ATRIAL ABLATION SURGERY  X3    Cryoablation for atrial fib    AXILLARY SURGERY Right     lymph node    BREAST BIOPSY  01/07/2011    BREAST BIOPSY 7/12/2011    left breast bx - benign     BREAST CYST EXCISION  06/2003,    BREAST LUMPECTOMY  03/30/2009    CARDIAC CATHETERIZATION  5/2005,01/2006    COLONOSCOPY  1993,1995,1999,2003,2006    COLONOSCOPY  11/29/2011    Dr. Joyce Marcos - benign polyps     COSMETIC SURGERY      FOOT SURGERY Right 1/15/16    CORRECTION OF HAMMERTOES 1-5 RIGHT FOOT, WEIL OSTEOTOMY 2,3    FOOT SURGERY Right 05/12/2016    EXTENSOR TENDON LENGTHENING 4TH AND 5TH TOES RIGHT FOOT    FOOT SURGERY Right 08/11/2016    CORRECTION HAMMERTOES 1ST, 4TH AND 5TH DIGITS RIGHT FOOT WITH    HERNIA REPAIR  [id number]    HYSTERECTOMY  12/1977    JOINT REPLACEMENT  June 2015    LEFT KNEE    KNEE SURGERY  6/09/2015    left knee replacement    LIPOMA RESECTION  09/2006    MASTECTOMY  3-15-11    bilateral    NEUROMA SURGERY  1987,1988,1989,1992 , 1995    Left foot    OTHER SURGICAL HISTORY  1983, 1984 X 2, 1994 X 2, 2006    Bowel obstructions    RHINOPLASTY  11/1976    RHINOPLASTY  1995    THORACOTOMY  1998    TONSILLECTOMY  1956    UPPER GASTROINTESTINAL ENDOSCOPY  [id number]    VARICOSE VEIN SURGERY  11/2001       Allergies: Allergies   Allergen Reactions    Keflex [Cephalexin] Shortness Of Breath and Nausea Only    Novocain [Procaine] Shortness Of Breath and Swelling     LIDOCAINE OK    Amiodarone Other (See Comments)     dizziness    Cephalexin     Diltiazem Other (See Comments)     dizziness    Penicillins Swelling    Sulfa Antibiotics Nausea Only and Swelling       Medication:   Prior to Admission medications    Medication Sig Start Date End Date Taking?  Authorizing Provider   furosemide (LASIX) 20 MG tablet TAKE 1 TABLET DAILY 1/6/21  Yes Griselda Laity, MD   digoxin (LANOXIN) 250 MCG tablet TAKE 1 TABLET DAILY 1/6/21  Yes Griselda Laity, MD   verapamil (CALAN) 80 MG tablet Take 1 tablet by mouth 3 times daily 12/10/20  Yes Kiki Meléndez MD   metoprolol succinate (TOPROL XL) 50 MG extended release tablet Take 0.5 tablets by mouth daily  Patient taking differently: Take 50 mg by mouth daily  12/2/20  Yes MAILE Smith CNP   apixaban (ELIQUIS) 5 MG TABS tablet Take 1 tablet by mouth 2 times daily 11/23/20  Yes Aruna Turner MD   Vitamin D, Cholecalciferol, 25 MCG (1000 UT) CAPS Take 1,000 Units by mouth daily 10/19/20  Yes Christine Baxter MD   ezetimibe (ZETIA) 10 MG tablet TAKE 1 TABLET BY MOUTH ONE TIME A DAY 3/17/20  Yes MAILE Covarrubias CNP   acetaminophen (TYLENOL) 325 MG tablet Take 325 mg by mouth every 6 hours as needed. As need for headaches. Yes Historical Provider, MD       Social History:   reports that she quit smoking about 33 years ago. Her smoking use included cigarettes. She started smoking about 41 years ago. She has a 2.00 pack-year smoking history. She has never used smokeless tobacco. She reports that she does not drink alcohol or use drugs. Family History:  family history includes Cancer in her brother, father, and mother. Reviewed. Denies family history of sudden cardiac death, arrhythmia, premature CAD    Review of System:    · General ROS: negative for - chills, fever   · Psychological ROS: negative for - anxiety or depression  · Ophthalmic ROS: negative for - eye pain or loss of vision  · ENT ROS: negative for - epistaxis, headaches, nasal discharge, sore throat   · Allergy and Immunology ROS: negative for - hives, nasal congestion   · Hematological and Lymphatic ROS: negative for - bleeding problems, blood clots, bruising or jaundice  · Endocrine ROS: negative for - skin changes, temperature intolerance or unexpected weight changes  · Respiratory ROS: negative for - cough, hemoptysis, pleuritic pain, SOB, sputum changes or wheezing  · Cardiovascular ROS: Per HPI.    · Gastrointestinal ROS: negative for - abdominal pain, blood in stools, diarrhea, hematemesis, melena, nausea/vomiting or swallowing difficulty/pain  · Genito-Urinary ROS: negative for - dysuria or incontinence  · Musculoskeletal ROS: negative for - joint swelling or muscle pain  · Neurological ROS: negative for - confusion, dizziness, gait disturbance, headaches, numbness/tingling, seizures, speech problems, tremors, visual changes or weakness  · Dermatological ROS: negative for - rash    Physical Examination:  Vitals:    03/04/21 1515   BP: 121/80   Pulse: 63   Temp: 97.8 °F (36.6 °C)       · Constitutional: Oriented. No distress. · Head: Normocephalic and atraumatic. · Mouth/Throat: Oropharynx is clear and moist.   · Eyes: Conjunctivae normal. EOM are normal.   · Neck: Normal range of motion. Neck supple. No rigidity. No JVD present. · Cardiovascular: Normal rate, regular rhythm, S1&S2 and intact distal pulses. · Pulmonary/Chest: Bilateral respiratory sounds. No wheezes. No rhonchi. · Abdominal: Soft. Bowel sounds present. No distension, No tenderness. · Musculoskeletal: No tenderness. No edema    · Lymphadenopathy: Has no cervical adenopathy. · Neurological: Alert and oriented. Cranial nerve appears intact, No Gross deficit   · Skin: Skin is warm and dry. No rash noted. · Psychiatric: Has a normal mood, affect and behavior     Labs:  Reviewed. ECG: reviewed, 63 Sinus  Rhythm, with QRS duration 102 ms. No pathologic Q waves, ventricular pre-excitation, or QT prolongation. Studies:   1. Event monitor:     Echo 11/30/18   Summary   Left ventricular cavity size is normal. There is mild concentric left   ventricular hypertrophy. Overall left ventricular systolic function appears   normal with an ejection fraction of 55-60%.  No regional wall motion   abnormalities are noted.   Indeterminate diastolic function.   Trivial mitral regurgitation is present.   The aortic valve leaflets appear slightly thickened but opens well.   Mild tricuspid regurgitation.   Estimated pulmonary artery systolic pressure is normal at 30 mmHg assuming a   right atrial pressure of 8 mmHg.     Stress Test: 2/19/2019  Summary    There is normal isotope uptake at stress and rest. There is no evidence of    myocardial ischemia or scar.    Normal LV size and systolic function.    Left ventricular ejection fraction of 75 %.    There are no regional wall motion abnormalities.    Normal myocardial perfusion study.      Cardiac Angiography: n/a    The MCOT, echocardiogram, stress test, and coronary angiography/PCI were reviewed by myself and used for my plan of care. Procedures:  - S/p RFA/PVI 11/23/2020  - S/p DCCV to NSR (12/9/2020)    Assessment/Plan:   Longstanding persistent atrial fibrillation, for more than 30 years  Status post total of 4 ablations including right side and left side twice  Patient continues to have symptomatic recurrence of atrial fibrillation  Highly symptomatic with daily symptoms of palpitations and shortness of breath    I had a long discussion about the pathophysiology of longstanding persistent atrial fibrillation. I had a long discussion about further treatment options. In the past, she was on flecainide and continued to have frequent breakthroughs in spite of being on high dose of flecainide. Eventually, she was not able to continue flecainide secondary to significant QRS prolongation. She is not interested in amiodarone secondary to concerns of side effects. I talked about the options including inpatient initiation of Tikosyn. Secondary to highly symptomatic episodes, she is interested in proceeding with Tikosyn loading. If Tikosyn is also not helpful, then other options including 1 more redo atrial fibrillation ablation versus AV node ablation and CRT-P. Kelly Lopez RN, am scribing for and in the presence of Chuy Schaefer MD 03/04/21   3:47 PM  IGLESIA Low MD, personally performed the services prescribed in this documentation as scribed by Ms. Jovany Zelaya RN in my presence and it is both accurate and complete. Thank you for allowing me to participate in the care of Pedro Melendez. All questions and concerns were addressed to the patient/family. Alternatives to my treatment were discussed.      Benny Mendoza MD  Cardiac Electrophysiology  [facility], [place]

## 2021-03-03 ENCOUNTER — TELEPHONE (OUTPATIENT)
Dept: PRIMARY CARE CLINIC | Age: 71
End: 2021-03-03

## 2021-03-03 NOTE — TELEPHONE ENCOUNTER
[facility] Ta[facility]res is sending over outstanding orders that need to be signed. They are just wanting the office to be on the lookout for them.

## 2021-03-04 ENCOUNTER — TELEPHONE (OUTPATIENT)
Dept: CARDIOLOGY CLINIC | Age: 71
End: 2021-03-04

## 2021-03-04 ENCOUNTER — OFFICE VISIT (OUTPATIENT)
Dept: CARDIOLOGY CLINIC | Age: 71
End: 2021-03-04
Payer: MEDICARE

## 2021-03-04 ENCOUNTER — TELEPHONE (OUTPATIENT)
Dept: PRIMARY CARE CLINIC | Age: 71
End: 2021-03-04

## 2021-03-04 ENCOUNTER — TELEPHONE (OUTPATIENT)
Dept: FAMILY MEDICINE CLINIC | Age: 71
End: 2021-03-04

## 2021-03-04 VITALS
TEMPERATURE: 97.8 F | WEIGHT: 209.6 LBS | BODY MASS INDEX: 30.95 KG/M2 | DIASTOLIC BLOOD PRESSURE: 80 MMHG | SYSTOLIC BLOOD PRESSURE: 121 MMHG | HEART RATE: 63 BPM

## 2021-03-04 DIAGNOSIS — I48.0 PAROXYSMAL ATRIAL FIBRILLATION (HCC): ICD-10-CM

## 2021-03-04 DIAGNOSIS — R07.89 OTHER CHEST PAIN: Primary | ICD-10-CM

## 2021-03-04 DIAGNOSIS — I50.31 ACUTE DIASTOLIC CONGESTIVE HEART FAILURE (HCC): ICD-10-CM

## 2021-03-04 PROCEDURE — G8399 PT W/DXA RESULTS DOCUMENT: HCPCS | Performed by: INTERNAL MEDICINE

## 2021-03-04 PROCEDURE — 99214 OFFICE O/P EST MOD 30 MIN: CPT | Performed by: INTERNAL MEDICINE

## 2021-03-04 PROCEDURE — 4040F PNEUMOC VAC/ADMIN/RCVD: CPT | Performed by: INTERNAL MEDICINE

## 2021-03-04 PROCEDURE — 3017F COLORECTAL CA SCREEN DOC REV: CPT | Performed by: INTERNAL MEDICINE

## 2021-03-04 PROCEDURE — 1090F PRES/ABSN URINE INCON ASSESS: CPT | Performed by: INTERNAL MEDICINE

## 2021-03-04 PROCEDURE — G8482 FLU IMMUNIZE ORDER/ADMIN: HCPCS | Performed by: INTERNAL MEDICINE

## 2021-03-04 PROCEDURE — G8417 CALC BMI ABV UP PARAM F/U: HCPCS | Performed by: INTERNAL MEDICINE

## 2021-03-04 PROCEDURE — 1036F TOBACCO NON-USER: CPT | Performed by: INTERNAL MEDICINE

## 2021-03-04 PROCEDURE — G9708 BILAT MAST/HX BI /UNILAT MAS: HCPCS | Performed by: INTERNAL MEDICINE

## 2021-03-04 PROCEDURE — 93000 ELECTROCARDIOGRAM COMPLETE: CPT | Performed by: INTERNAL MEDICINE

## 2021-03-04 PROCEDURE — G8427 DOCREV CUR MEDS BY ELIG CLIN: HCPCS | Performed by: INTERNAL MEDICINE

## 2021-03-04 PROCEDURE — 1123F ACP DISCUSS/DSCN MKR DOCD: CPT | Performed by: INTERNAL MEDICINE

## 2021-03-04 RX ORDER — METOPROLOL SUCCINATE 50 MG/1
25 TABLET, EXTENDED RELEASE ORAL DAILY
Qty: 90 TABLET | Refills: 3 | Status: SHIPPED | OUTPATIENT
Start: 2021-03-04 | End: 2021-03-25 | Stop reason: SDUPTHER

## 2021-03-04 RX ORDER — VERAPAMIL HYDROCHLORIDE 80 MG/1
80 TABLET ORAL 3 TIMES DAILY
Qty: 90 TABLET | Refills: 3 | Status: ON HOLD | OUTPATIENT
Start: 2021-03-04 | End: 2021-03-11 | Stop reason: HOSPADM

## 2021-03-04 NOTE — TELEPHONE ENCOUNTER
Spoke with [facility] told them Dr. Fred Wood was not the PCP during those dates and that she is unable to sign the orders. Her PCP at that time was Dr. Isaura Pena and to contact his office to get him to sign off on the services.

## 2021-03-04 NOTE — TELEPHONE ENCOUNTER
Sole Patel from [facility] called to see about getting home care orders signed for patient and who to address these to. She is faxing orders over to [facility]. Explained she will be out tomorrow but will be back on Monday.

## 2021-03-04 NOTE — TELEPHONE ENCOUNTER
Kaya Officer from [address] [place] [address] calling to see if you will sign a Plan of care and therapy evaluation from 10/2020 . States you signed Physician orders 8/2020 and that's why they are asking. Patient has been seen since 06/22/2018 Please advise.

## 2021-03-04 NOTE — TELEPHONE ENCOUNTER
Call [address] [place] [address] and speak with the person in charge of outstanding orders. Inform them that I cannot sign the paperwork for outstanding orders on this patient because she did not establish with me until 10/19/2020 as a new patient and I did not know the patient for the dates of service 8/20/20-10/18/20 that they are requesting me to sign for and I will not sign since she was not my patient at time. I have already spoke with Elena regarding this previously and I am still receiving faxes. I would like my name taken off these orders and for the faxes to stop for these dates of service. Inform them I also sent this message back in writing.

## 2021-03-05 DIAGNOSIS — I48.0 PAROXYSMAL ATRIAL FIBRILLATION (HCC): ICD-10-CM

## 2021-03-05 DIAGNOSIS — I48.0 PAROXYSMAL ATRIAL FIBRILLATION (HCC): Primary | ICD-10-CM

## 2021-03-05 LAB
A/G RATIO: 1.6 (ref 1.1–2.2)
ALBUMIN SERPL-MCNC: 4.6 G/DL (ref 3.4–5)
ALP BLD-CCNC: 79 U/L (ref 40–129)
ALT SERPL-CCNC: 21 U/L (ref 10–40)
ANION GAP SERPL CALCULATED.3IONS-SCNC: 12 MMOL/L (ref 3–16)
AST SERPL-CCNC: 18 U/L (ref 15–37)
BASOPHILS ABSOLUTE: 0 K/UL (ref 0–0.2)
BASOPHILS RELATIVE PERCENT: 0.5 %
BILIRUB SERPL-MCNC: 0.3 MG/DL (ref 0–1)
BUN BLDV-MCNC: 12 MG/DL (ref 7–20)
CALCIUM SERPL-MCNC: 9.6 MG/DL (ref 8.3–10.6)
CHLORIDE BLD-SCNC: 100 MMOL/L (ref 99–110)
CO2: 25 MMOL/L (ref 21–32)
CREAT SERPL-MCNC: 0.6 MG/DL (ref 0.6–1.2)
EOSINOPHILS ABSOLUTE: 0.1 K/UL (ref 0–0.6)
EOSINOPHILS RELATIVE PERCENT: 2 %
GFR AFRICAN AMERICAN: >60
GFR NON-AFRICAN AMERICAN: >60
GLOBULIN: 2.8 G/DL
GLUCOSE BLD-MCNC: 110 MG/DL (ref 70–99)
HCT VFR BLD CALC: 39.6 % (ref 36–48)
HEMOGLOBIN: 13.6 G/DL (ref 12–16)
INR BLD: 1.16 (ref 0.86–1.14)
LYMPHOCYTES ABSOLUTE: 2 K/UL (ref 1–5.1)
LYMPHOCYTES RELATIVE PERCENT: 31.1 %
MAGNESIUM: 2.2 MG/DL (ref 1.8–2.4)
MCH RBC QN AUTO: 30.3 PG (ref 26–34)
MCHC RBC AUTO-ENTMCNC: 34.3 G/DL (ref 31–36)
MCV RBC AUTO: 88.5 FL (ref 80–100)
MONOCYTES ABSOLUTE: 0.4 K/UL (ref 0–1.3)
MONOCYTES RELATIVE PERCENT: 6.2 %
NEUTROPHILS ABSOLUTE: 3.9 K/UL (ref 1.7–7.7)
NEUTROPHILS RELATIVE PERCENT: 60.2 %
PDW BLD-RTO: 13.5 % (ref 12.4–15.4)
PLATELET # BLD: 319 K/UL (ref 135–450)
PMV BLD AUTO: 7.7 FL (ref 5–10.5)
POTASSIUM SERPL-SCNC: 4.5 MMOL/L (ref 3.5–5.1)
PROTHROMBIN TIME: 13.5 SEC (ref 10–13.2)
RBC # BLD: 4.48 M/UL (ref 4–5.2)
SODIUM BLD-SCNC: 137 MMOL/L (ref 136–145)
TOTAL PROTEIN: 7.4 G/DL (ref 6.4–8.2)
WBC # BLD: 6.5 K/UL (ref 4–11)

## 2021-03-05 NOTE — PROGRESS NOTES
Pt being admitted to [facility] on 3/8/21 for dofetilide loading. Pt will arrive at [facility] with pt regarding procedure. Pre-op instructions, time and location of arrival discussed. Pt verbalized understanding and all questions answered at this time.

## 2021-03-08 ENCOUNTER — HOSPITAL ENCOUNTER (INPATIENT)
Age: 71
LOS: 3 days | Discharge: HOME | DRG: 309 | End: 2021-03-11
Attending: INTERNAL MEDICINE | Admitting: INTERNAL MEDICINE
Payer: MEDICARE

## 2021-03-08 PROBLEM — I49.9 ARRHYTHMIA: Status: ACTIVE | Noted: 2021-03-08

## 2021-03-08 PROBLEM — I48.0 PAROXYSMAL ATRIAL FIBRILLATION (HCC): Status: ACTIVE | Noted: 2021-03-08

## 2021-03-08 LAB
ANION GAP SERPL CALCULATED.3IONS-SCNC: 10 MMOL/L (ref 3–16)
BUN BLDV-MCNC: 15 MG/DL (ref 7–20)
CALCIUM SERPL-MCNC: 9.2 MG/DL (ref 8.3–10.6)
CHLORIDE BLD-SCNC: 102 MMOL/L (ref 99–110)
CO2: 24 MMOL/L (ref 21–32)
CREAT SERPL-MCNC: 0.6 MG/DL (ref 0.6–1.2)
EKG ATRIAL RATE: 300 BPM
EKG ATRIAL RATE: 394 BPM
EKG ATRIAL RATE: 62 BPM
EKG DIAGNOSIS: NORMAL
EKG P AXIS: 53 DEGREES
EKG P-R INTERVAL: 190 MS
EKG Q-T INTERVAL: 314 MS
EKG Q-T INTERVAL: 336 MS
EKG Q-T INTERVAL: 412 MS
EKG QRS DURATION: 88 MS
EKG QRS DURATION: 94 MS
EKG QRS DURATION: 98 MS
EKG QTC CALCULATION (BAZETT): 418 MS
EKG QTC CALCULATION (BAZETT): 424 MS
EKG QTC CALCULATION (BAZETT): 428 MS
EKG R AXIS: 12 DEGREES
EKG R AXIS: 23 DEGREES
EKG R AXIS: 3 DEGREES
EKG T AXIS: 189 DEGREES
EKG T AXIS: 205 DEGREES
EKG T AXIS: 94 DEGREES
EKG VENTRICULAR RATE: 110 BPM
EKG VENTRICULAR RATE: 62 BPM
EKG VENTRICULAR RATE: 98 BPM
GFR AFRICAN AMERICAN: >60
GFR NON-AFRICAN AMERICAN: >60
GLUCOSE BLD-MCNC: 122 MG/DL (ref 70–99)
MAGNESIUM: 2.2 MG/DL (ref 1.8–2.4)
POTASSIUM SERPL-SCNC: 4.4 MMOL/L (ref 3.5–5.1)
SODIUM BLD-SCNC: 136 MMOL/L (ref 136–145)

## 2021-03-08 PROCEDURE — 36415 COLL VENOUS BLD VENIPUNCTURE: CPT

## 2021-03-08 PROCEDURE — 83735 ASSAY OF MAGNESIUM: CPT

## 2021-03-08 PROCEDURE — 6370000000 HC RX 637 (ALT 250 FOR IP): Performed by: NURSE PRACTITIONER

## 2021-03-08 PROCEDURE — 93005 ELECTROCARDIOGRAM TRACING: CPT | Performed by: NURSE PRACTITIONER

## 2021-03-08 PROCEDURE — 99223 1ST HOSP IP/OBS HIGH 75: CPT | Performed by: NURSE PRACTITIONER

## 2021-03-08 PROCEDURE — 93005 ELECTROCARDIOGRAM TRACING: CPT | Performed by: INTERNAL MEDICINE

## 2021-03-08 PROCEDURE — 2060000000 HC ICU INTERMEDIATE R&B

## 2021-03-08 PROCEDURE — 80048 BASIC METABOLIC PNL TOTAL CA: CPT

## 2021-03-08 PROCEDURE — 93010 ELECTROCARDIOGRAM REPORT: CPT | Performed by: INTERNAL MEDICINE

## 2021-03-08 RX ORDER — ACETAMINOPHEN 325 MG/1
325 TABLET ORAL EVERY 6 HOURS PRN
Status: DISCONTINUED | OUTPATIENT
Start: 2021-03-08 | End: 2021-03-11 | Stop reason: HOSPADM

## 2021-03-08 RX ORDER — EZETIMIBE 10 MG/1
10 TABLET ORAL NIGHTLY
Status: DISCONTINUED | OUTPATIENT
Start: 2021-03-08 | End: 2021-03-11 | Stop reason: HOSPADM

## 2021-03-08 RX ORDER — SODIUM CHLORIDE 0.9 % (FLUSH) 0.9 %
10 SYRINGE (ML) INJECTION EVERY 12 HOURS SCHEDULED
Status: CANCELLED | OUTPATIENT
Start: 2021-03-08

## 2021-03-08 RX ORDER — SODIUM CHLORIDE 9 MG/ML
INJECTION, SOLUTION INTRAVENOUS CONTINUOUS
Status: CANCELLED | OUTPATIENT
Start: 2021-03-09

## 2021-03-08 RX ORDER — VERAPAMIL HYDROCHLORIDE 80 MG/1
80 TABLET ORAL 3 TIMES DAILY
Status: DISCONTINUED | OUTPATIENT
Start: 2021-03-08 | End: 2021-03-08

## 2021-03-08 RX ORDER — METOPROLOL SUCCINATE 25 MG/1
25 TABLET, EXTENDED RELEASE ORAL DAILY
Status: DISCONTINUED | OUTPATIENT
Start: 2021-03-08 | End: 2021-03-08

## 2021-03-08 RX ORDER — DOFETILIDE 0.5 MG/1
500 CAPSULE ORAL EVERY 12 HOURS SCHEDULED
Status: DISCONTINUED | OUTPATIENT
Start: 2021-03-08 | End: 2021-03-11 | Stop reason: HOSPADM

## 2021-03-08 RX ORDER — METOPROLOL SUCCINATE 50 MG/1
50 TABLET, EXTENDED RELEASE ORAL DAILY
Status: DISCONTINUED | OUTPATIENT
Start: 2021-03-08 | End: 2021-03-11 | Stop reason: HOSPADM

## 2021-03-08 RX ORDER — SODIUM CHLORIDE 0.9 % (FLUSH) 0.9 %
10 SYRINGE (ML) INJECTION PRN
Status: CANCELLED | OUTPATIENT
Start: 2021-03-08

## 2021-03-08 RX ORDER — FUROSEMIDE 20 MG/1
20 TABLET ORAL DAILY
Status: DISCONTINUED | OUTPATIENT
Start: 2021-03-08 | End: 2021-03-11 | Stop reason: HOSPADM

## 2021-03-08 RX ORDER — VERAPAMIL HYDROCHLORIDE 80 MG/1
80 TABLET ORAL EVERY 8 HOURS SCHEDULED
Status: DISCONTINUED | OUTPATIENT
Start: 2021-03-08 | End: 2021-03-11

## 2021-03-08 RX ORDER — VITAMIN B COMPLEX
1000 TABLET ORAL DAILY
Status: DISCONTINUED | OUTPATIENT
Start: 2021-03-08 | End: 2021-03-11 | Stop reason: HOSPADM

## 2021-03-08 RX ADMIN — VERAPAMIL HYDROCHLORIDE 80 MG: 80 TABLET, FILM COATED ORAL at 23:51

## 2021-03-08 RX ADMIN — METOPROLOL SUCCINATE 50 MG: 50 TABLET, EXTENDED RELEASE ORAL at 18:31

## 2021-03-08 RX ADMIN — DOFETILIDE 500 MCG: 0.5 CAPSULE ORAL at 10:45

## 2021-03-08 RX ADMIN — DOFETILIDE 500 MCG: 0.5 CAPSULE ORAL at 21:55

## 2021-03-08 RX ADMIN — EZETIMIBE 10 MG: 10 TABLET ORAL at 20:56

## 2021-03-08 RX ADMIN — FUROSEMIDE 20 MG: 20 TABLET ORAL at 09:46

## 2021-03-08 RX ADMIN — VERAPAMIL HYDROCHLORIDE 80 MG: 80 TABLET, FILM COATED ORAL at 15:21

## 2021-03-08 RX ADMIN — APIXABAN 5 MG: 5 TABLET, FILM COATED ORAL at 09:46

## 2021-03-08 RX ADMIN — Medication 1000 UNITS: at 09:46

## 2021-03-08 RX ADMIN — APIXABAN 5 MG: 5 TABLET, FILM COATED ORAL at 20:56

## 2021-03-08 ASSESSMENT — PAIN SCALES - GENERAL
PAINLEVEL_OUTOF10: 0

## 2021-03-08 NOTE — PROGRESS NOTES
Patient had a second run of 6 beats of V Tach and another 8 beats. Radha Smith CNP, notified. Will monitor.

## 2021-03-08 NOTE — PROGRESS NOTES
Clinical Pharmacy Progress Note  Medication History     Admit Date: 3/8/2021    List of of current medications patient is taking is complete. Home Medication list in EPIC updated to reflect changes noted below. Source of information: interview with patient     Patient's home pharmacy: [facility] - [phone]     Changes made to medication list:   Medication doses adjusted:    Metoprolol XL 50 mg daily   Vitamin D 3000 units daily       Please call with any questions.   Miguelina WebsterD, BCPS  Wireless # [phone]  3/8/2021 8:52 AM

## 2021-03-08 NOTE — PROGRESS NOTES
Pt direct admitted to room 4304 at [phone] today. No complaints of pain or sob noted. Pt placed on tele and afib with a rate of 90's-100's. VSS, PIV placed with no complaints. Pt oriented to room.     Electronically signed by Milton Reno RN on 3/8/21 at 7:57 AM EST

## 2021-03-08 NOTE — CONSULTS
days or for 12 hours after conversion to normal sinus rhythm (whichever is longer)   The following medications are contraindicated in combination with dofetilide:  o Verapamil, hydrochlorothiazide, cimetidine, trimethoprim (including Bactrim), ketoconazole (as these drugs can cause significant increase in dofetilide plasma levels)  o Other medications which should not be used include prochlorperazine and megestrol   Serum potassium levels should be > 4 meQ/L prior to administration    Initiating Dofetilide (Tikosyn):    Obtain baseline EKG  QTc interval should be calculated    If baseline QTc is > 440 msec (500 msec in patients with ventricular conduction abnormalities), Tikosyn is contraindicated  o Note time, date and telemetry lead on strip  o All measurements of QTc interval should be from this lead   Measure the QT interval (QTc) 2-3 hours after each dose of Tikosyn until the patient is discharged     Dosing   Calculated creatinine clearance should be calculated using actual body weight  o If calculated CrCl > 60 mL/min, the appropriate dose of Tikosyn is 500 mcg PO BID  o 2-3 hours after initial dose, if QTc increases to > 15% from baseline, then Tikosyn should be decreased to 250 mcg BID   The second dose of Tikosyn should be given after the QT has been determined.  Only 1 down titration of Tikosyn for QTc is suggested.  If QTc is still excessively prolonged, Tikosyn should be discontinued   During therapy initiation in the hospital, at 2-3 hours after each dose of Tikosyn, determine the QTc to see if dose adjustment is necessary   Tikosyn should be given q12h at the same time every day    Prior to Patient Discharge --    Patient should receive a Tikosyn discharge bottle with 14 capsules (7 day supply)   Medication Guide should be reviewed with patient

## 2021-03-08 NOTE — H&P
[facility]   Electrophysiology   H&P    Date: 3/8/2021      History Obtained From: Patient and medical record. Chief Complaint: recurrent persistent atrial fibrillation    Cardiac Hx: Kelley Gudino is a 65 y.o. woman with a h/o breast CA, HLD, chronic dCHF, atypical AFL, pAF, S/p RFA (2005, Dr. Signe Gottron, 2006, Dr. Jose Munoz, 2013, Dr. Shantelle Gonzales), s/p RFA/PVI of AF/typical AFL/atypical AFL (11/23/2020, Dr. Rose Ayala). Today: Patient here for dofetilide loading. Has symptomatic recurrent atrial fibrillation on Eliquis. Has failed flecainide, amio and diltiazem. Currently in AF with HR controlled. Home medications:   No current facility-administered medications on file prior to encounter. Current Outpatient Medications on File Prior to Encounter   Medication Sig Dispense Refill    metoprolol succinate (TOPROL XL) 50 MG extended release tablet Take 0.5 tablets by mouth daily 90 tablet 3    verapamil (CALAN) 80 MG tablet Take 1 tablet by mouth 3 times daily 90 tablet 3    apixaban (ELIQUIS) 5 MG TABS tablet Take 1 tablet by mouth 2 times daily 180 tablet 3    furosemide (LASIX) 20 MG tablet TAKE 1 TABLET DAILY 90 tablet 3    digoxin (LANOXIN) 250 MCG tablet TAKE 1 TABLET DAILY 90 tablet 3    Vitamin D, Cholecalciferol, 25 MCG (1000 UT) CAPS Take 1,000 Units by mouth daily      ezetimibe (ZETIA) 10 MG tablet TAKE 1 TABLET BY MOUTH ONE TIME A DAY 90 tablet 3    acetaminophen (TYLENOL) 325 MG tablet Take 325 mg by mouth every 6 hours as needed. As need for headaches.          Scheduled Meds:  Continuous Infusions:  PRN Meds:       Past Medical History:   Diagnosis Date    Acute diastolic congestive heart failure ([address] 11/26/2018    Allergic     SEASONAL    Atrial fibrillation ([facility] H/O    Cancer ([facility] 2009 AND 2011    BREAST right    Essential hypertension 3/13/2018    Hammertoe     Migraine     Mixed hyperlipidemia 10/19/2020        Past Surgical History:   Procedure Laterality Date    ANKLE FRACTURE SURGERY  2004,2008    APPENDECTOMY  03/1983    ATRIAL ABLATION SURGERY  X3    Cryoablation for atrial fib    AXILLARY SURGERY Right     lymph node    BREAST BIOPSY  01/07/2011    BREAST BIOPSY  7/12/2011    left breast bx - benign     BREAST CYST EXCISION  06/2003,    BREAST LUMPECTOMY  03/30/2009    CARDIAC CATHETERIZATION  5/2005,01/2006    COLONOSCOPY  1993,1995,1999,2003,2006    COLONOSCOPY  11/29/2011    Dr. Memo Foley - benign polyps     COSMETIC SURGERY      FOOT SURGERY Right 1/15/16    CORRECTION OF HAMMERTOES 1-5 RIGHT FOOT, WEIL OSTEOTOMY 2,3    FOOT SURGERY Right 05/12/2016    EXTENSOR TENDON LENGTHENING 4TH AND 5TH TOES RIGHT FOOT    FOOT SURGERY Right 08/11/2016    CORRECTION HAMMERTOES 1ST, 4TH AND 5TH DIGITS RIGHT FOOT WITH    HERNIA REPAIR  [id number]    HYSTERECTOMY  12/1977    JOINT REPLACEMENT  June 2015    LEFT KNEE    KNEE SURGERY  6/09/2015    left knee replacement    LIPOMA RESECTION  09/2006    MASTECTOMY  3-15-11    bilateral    NEUROMA SURGERY  1987,1988,1989,1992 , 1995    Left foot    OTHER SURGICAL HISTORY  1983, 1984 X 2, 1994 X 2, 2006    Bowel obstructions    RHINOPLASTY  11/1976    RHINOPLASTY  1995    THORACOTOMY  1998    TONSILLECTOMY  1956    UPPER GASTROINTESTINAL ENDOSCOPY  [id number]    VARICOSE VEIN SURGERY  11/2001       Allergies   Allergen Reactions    Keflex [Cephalexin] Shortness Of Breath and Nausea Only    Novocain [Procaine] Shortness Of Breath and Swelling     LIDOCAINE OK    Amiodarone Other (See Comments)     dizziness    Cephalexin     Diltiazem Other (See Comments)     dizziness    Penicillins Swelling    Sulfa Antibiotics Nausea Only and Swelling       Social History:  Reviewed. reports that she quit smoking about 33 years ago. Her smoking use included cigarettes. She started smoking about 41 years ago. She has a 2.00 pack-year smoking history.  She has never used smokeless tobacco. She reports that she does not drink alcohol or use drugs. Family History:  Reviewed. family history includes Cancer in her brother, father, and mother. Review of System:    · Constitutional: No fevers, chills. · Eyes: No visual changes or diplopia. No scleral icterus. · ENT: No Headaches. No mouth sores or sore throat. · Cardiovascular: No for chest pain, No for dyspnea on exertion, Yes for palpitations or No for loss of consciousness. No cough, hemoptysis, No for pleuritic pain, or phlebitis. · Respiratory: No for cough or wheezing. No hematemesis. · Gastrointestinal: No abdominal pain, blood in stools. · Genitourinary: No dysuria, or hematuria. · Musculoskeletal: No gait disturbance,    · Integumentary: No rash or pruritis. · Neurological: No headache, change in muscle strength, numbness or tingling. · Psychiatric: No anxiety, or depression. · Endocrine: No temperature intolerance. No excessive thirst, fluid intake, or urination. · Hem/Lymph: No abnormal bruising or bleeding, blood clots or swollen lymph nodes. · Allergic/Immunologic: No nasal congestion or hives. Physical Examination:  Vitals:    21 0704   BP: 136/79   Pulse: 97   Resp: 18   Temp: 97.2 °F (36.2 °C)   SpO2: 96%      No intake/output data recorded. Wt Readings from Last 3 Encounters:   21 207 lb 14.3 oz (94.3 kg)   21 209 lb 9.6 oz (95.1 kg)   21 206 lb (93.4 kg)     Temp  Av.2 °F (36.2 °C)  Min: 97.2 °F (36.2 °C)  Max: 97.2 °F (36.2 °C)  Pulse  Av  Min: 97  Max: 97  BP  Min: 136/79  Max: 136/79  SpO2  Av %  Min: 96 %  Max: 96 %  No intake or output data in the 24 hours ending 21 0811    · Constitutional: Oriented. No distress. · Head: Normocephalic and atraumatic. · Mouth/Throat: Oropharynx is clear and moist.   · Eyes: Conjunctivae clear without jaunduice. PERRL. · Neck: Neck supple. No rigidity. No JVD present. · Cardiovascular: Normal rate, regular rhythm, S1&S2. and rest. There is no evidence of    myocardial ischemia or scar.    Normal LV size and systolic function.    Left ventricular ejection fraction of 75 %.    There are no regional wall motion abnormalities.    Normal myocardial perfusion study. 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 12/20/2010     Priority: High    Abdominal adhesions 02/26/2014     Priority: Medium    Osteopenia 02/26/2014     Priority: Medium    Lumbar facet arthropathy 02/26/2014     Priority: Medium    Tremor 06/15/2012     Priority: Medium    S/P ablation of atrial flutter 06/15/2012     Priority: Medium    A-fib ([address] 03/08/2011     Priority: Medium    Ventral hernia 08/28/2013     Priority: Low    Seborrheic keratosis 10/06/2010     Priority: Low    Arrhythmia 03/08/2021    Atypical atrial flutter ([address]     Vitamin D deficiency 10/19/2020    Mixed hyperlipidemia 10/19/2020    Constipation 10/19/2020    Prediabetes 10/19/2020    Hypervolemia 11/26/2018    Other chest pain 11/26/2018    Acute diastolic congestive heart failure ([address] 11/26/2018    S/P bilateral mastectomy 03/13/2018    Essential hypertension 03/13/2018    Facet arthropathy, thoracic 05/17/2017    Hammer toe of right foot 08/01/2016    Concussion 03/31/2015        Assessment:     1. Paroxysmal atrial fibrillation (HCC)    2. Atypical atrial flutter (HCC)    3. Tachycardia    4. Palpitations          Cardiac HX: Briana Cunningham is a 65 y.o. woman with a h/o breast CA, HLD,  chronic dCHF, atypical AFL, pAF, S/p RFA (2005, Dr. Toya Beard, 2006, Dr. Angella Whipple, 2013, Dr. Mariza Henson), s/p RFA/PVI of AF/typical AFL/atypical AFL (11/23/2020, Dr. Lev Garcia), recurrent AF, s/p DCCV to NSR (12/9/20) here for dofetilide loading. [id number] 2. TSH 1.17 (9/8/2020).       AF  - In AF - HR controlled  - On Eliquis - no s/s bleeding - continue, has not missed any doses  - Intolerant to flecainide, amio and dilt  - On dig 0.25, verapamil 80 mg 3 times daily, Toprol XL 50 mg daily, will hold dig and verapamil  - Reviewed with Dr. Morgan Babin - will start 500 mcg dofetilide with an EKG 2 hours after each dose  - Plan for DCCV tomorrow  - NPO after midnight  - ECG ordered and results personally reviewed      Chronic dCHF  - EF 55-60%  - Indeterminate diastolic dysfunction  - Furosemide 20 mg daily    EF of 86-58%  No systolic HF  No known CAD  Anticoagulation for AF   No Tobacco use.       All questions and concerns were addressed to the patient/family. Alternatives to my treatment were discussed. The note was completed using EMR. Every effort was made to ensure accuracy; however, inadvertent computerized transcription errors may be present.      Army Deal FLOR Smiley    Patient interviewed, examined and pertinent medical data and imaging studies reviewed. Refer to the NP's note for details of clinical findings, assessment and plan with which I agree. Corrections and additions as noted:     Admitted for Tikosyn loading  Started Tikosyn 500 mcg p.o. every 12 hours  This afternoon, she got converted to normal sinus rhythm  Please continue Tikosyn 500 mcg p.o. every 12 hours  QTC is acceptable  If her sinus rate is faster, then she will need Cardizem. Otherwise she does not need any other rate controlling medications.     Expected discharge on Wednesday by noon time    Reyes Sewell MD   Cardiac Electrophysiology  [facility] [phone]

## 2021-03-08 NOTE — PLAN OF CARE
Problem: Cardiac:  Goal: Cardiovascular alteration will improve  Outcome: Ongoing  Patient was admitted to room 4304 at 0700 today. Was atrial fibrillation on monitor. Denied chest pain or shortness of breath at the time. Patient just had 6 beats of V Tach. Asymptomatic. Will continue to monitor cardiac status. Problem: Falls - Risk of:  Goal: Will remain free from falls  Outcome: Ongoing  Patient is alert and oriented. Able to follow commands and verbalize needs. Ambulates in room independently. Gait steady. Plans to keep room free of clutter, well lit and to monitor for safety. Problem: Pain:  Description: Pain management should include both nonpharmacologic and pharmacologic interventions. Goal: Pain level will decrease  Outcome: Ongoing  Patient has denied pain or discomfort. Will continue to monitor comfort level and treat as needed.

## 2021-03-08 NOTE — CARE COORDINATION
Case Management Assessment           Initial Evaluation                Date / Time of Evaluation: 3/8/2021 2:46 PM                 Assessment Completed by: Amanda Suárez    Patient Name: Ac Curtis     YOB: 1950  Diagnosis: Arrhythmia [I49.9]  Paroxysmal atrial fibrillation [facility]) [I48.0]     Date / Time: 3/8/2021  7:04 AM    Patient Admission Status: Inpatient    If patient is discharged prior to next notation, then this note serves as note for discharge by case management.      Current PCP: Jose Alberto Martínez MD  Clinic Patient: Yes    Chart Reviewed: Yes  Patient/ Family Interviewed: Yes    Initial assessment completed at bedside with: patient    Hospitalization in the last 30 days: No    Emergency Contacts:  Extended Emergency Contact Information  Primary Emergency Contact: Sina Lake [address] Phone: [phone]  Mobile Phone: [phone]  Relation: Child  Secondary Emergency Contact: [address] Phone: [phone]  Work Phone: [phone]  Relation: Child    Advance Directives:   Code Status: [address]: Yes  Agent: Nick Perkins and Romero Huerta (children)       Financial  Payor: MEDICARE / Plan: MEDICARE PART A AND B / Product Type: *No Product type* /     Pre-cert required for SNF: No    Pharmacy    [facility] [address] [facility], [address] [place] [address] [address] [phone] - F [phone]  [address] [place] [address] [address]  Phone: [phone] Fax: [phone]    [facility] [place] [phone] - f [phone]  [address] [place] [address]  Phone: [phone] Fax: [phone]      Potential assistance Purchasing Medications: Potential Assistance Purchasing Medications: No  Does Patient want to participate in local refill/ meds to beds program?: No    Meds To [facility] Rules: 1. Can ONLY be done Monday- Friday between 8:30am-5pm  2. Prescription(s) must be in pharmacy by 3pm to be filled same day  3. Copy of patient's insurance/ prescription drug card and patient face sheet must be sent along with the prescription(s)  4. Cost of Rx cannot be added to hospital bill. If financial assistance is needed, please contact unit  or ;  or  CANNOT provide pharmacy voucher for patients co-pays  5. Patients can then  the prescription on their way out of the hospital at discharge, or pharmacy can deliver to the bedside if staff is available. (payment due at time of pick-up or delivery - cash, check, or card accepted)     Able to afford home medications/ co-pay costs: Yes    ADLS  Support Systems: Family Members, Friends/Neighbors    PT AM-PAC:   /24  OT AM-PAC:   /24    [place]: from home alone  Steps: 1    Plans to RETURN to current housing: Yes  Barriers to RETURNING to current housing: none    [address]  Currently ACTIVE with [address]: No  Home Care Agency: Not Applicable          Durable Medical Equipment  DME Provider: n/a  Equipment: n/a    Home Oxygen and [address] [place] prior to admission: No  [address]: Not Applicable      DISCHARGE PLAN:  Disposition: Home- No Services Needed    Transportation PLAN for discharge: family     Factors facilitating achievement of predicted outcomes: Family support    Barriers to discharge: Medical complications    Additional Case Management Notes:   Patient is from home alone, independent pta. No CM needs at this time. Family to transport home at discharge.     The Plan for Transition of Care is related to the following treatment goals of Arrhythmia [I49.9]  Paroxysmal atrial fibrillation ([facility] [I48.0]    The Patient and/or patient representative Michael Bhat and her family were provided with a choice of provider and agrees with the discharge plan Yes    Freedom of choice list was provided with basic dialogue that supports the patient's individualized plan of care/goals and shares the quality data associated with the providers.  Yes    Care Transition patient: Yes    Arjun Luna RN  [facility]  Case Management Department  Ph: [phone]   Fax: [phone]

## 2021-03-08 NOTE — PROGRESS NOTES
4 Eyes Admission Assessment     Patient denied 4 eyed skin assessment. Skin intact and without areas of breakdown. Will keep skin clean and dry and monitor integrity.         Nurse 1 eSignature: Electronically signed by Ramón Chavez RN on 3/8/21 at 6:53 PM EST

## 2021-03-09 LAB
ANION GAP SERPL CALCULATED.3IONS-SCNC: 11 MMOL/L (ref 3–16)
BUN BLDV-MCNC: 19 MG/DL (ref 7–20)
CALCIUM SERPL-MCNC: 9 MG/DL (ref 8.3–10.6)
CHLORIDE BLD-SCNC: 99 MMOL/L (ref 99–110)
CO2: 26 MMOL/L (ref 21–32)
CREAT SERPL-MCNC: 0.6 MG/DL (ref 0.6–1.2)
EKG ATRIAL RATE: 113 BPM
EKG ATRIAL RATE: 288 BPM
EKG ATRIAL RATE: 54 BPM
EKG ATRIAL RATE: 54 BPM
EKG ATRIAL RATE: 58 BPM
EKG DIAGNOSIS: NORMAL
EKG P AXIS: 53 DEGREES
EKG P AXIS: 76 DEGREES
EKG P AXIS: 79 DEGREES
EKG P-R INTERVAL: 202 MS
EKG P-R INTERVAL: 204 MS
EKG P-R INTERVAL: 208 MS
EKG Q-T INTERVAL: 342 MS
EKG Q-T INTERVAL: 358 MS
EKG Q-T INTERVAL: 366 MS
EKG Q-T INTERVAL: 470 MS
EKG Q-T INTERVAL: 472 MS
EKG QRS DURATION: 90 MS
EKG QRS DURATION: 92 MS
EKG QRS DURATION: 94 MS
EKG QTC CALCULATION (BAZETT): 347 MS
EKG QTC CALCULATION (BAZETT): 440 MS
EKG QTC CALCULATION (BAZETT): 445 MS
EKG QTC CALCULATION (BAZETT): 463 MS
EKG QTC CALCULATION (BAZETT): 471 MS
EKG R AXIS: -18 DEGREES
EKG R AXIS: -20 DEGREES
EKG R AXIS: 1 DEGREES
EKG R AXIS: 11 DEGREES
EKG R AXIS: 19 DEGREES
EKG T AXIS: 158 DEGREES
EKG T AXIS: 223 DEGREES
EKG T AXIS: 44 DEGREES
EKG T AXIS: 57 DEGREES
EKG T AXIS: 71 DEGREES
EKG VENTRICULAR RATE: 114 BPM
EKG VENTRICULAR RATE: 54 BPM
EKG VENTRICULAR RATE: 54 BPM
EKG VENTRICULAR RATE: 58 BPM
EKG VENTRICULAR RATE: 91 BPM
GFR AFRICAN AMERICAN: >60
GFR NON-AFRICAN AMERICAN: >60
GLUCOSE BLD-MCNC: 113 MG/DL (ref 70–99)
MAGNESIUM: 2.2 MG/DL (ref 1.8–2.4)
POTASSIUM SERPL-SCNC: 3.9 MMOL/L (ref 3.5–5.1)
SODIUM BLD-SCNC: 136 MMOL/L (ref 136–145)

## 2021-03-09 PROCEDURE — 99232 SBSQ HOSP IP/OBS MODERATE 35: CPT | Performed by: NURSE PRACTITIONER

## 2021-03-09 PROCEDURE — 93010 ELECTROCARDIOGRAM REPORT: CPT | Performed by: INTERNAL MEDICINE

## 2021-03-09 PROCEDURE — 6370000000 HC RX 637 (ALT 250 FOR IP): Performed by: NURSE PRACTITIONER

## 2021-03-09 PROCEDURE — 83735 ASSAY OF MAGNESIUM: CPT

## 2021-03-09 PROCEDURE — 80048 BASIC METABOLIC PNL TOTAL CA: CPT

## 2021-03-09 PROCEDURE — 36415 COLL VENOUS BLD VENIPUNCTURE: CPT

## 2021-03-09 PROCEDURE — 93005 ELECTROCARDIOGRAM TRACING: CPT | Performed by: INTERNAL MEDICINE

## 2021-03-09 PROCEDURE — 2060000000 HC ICU INTERMEDIATE R&B

## 2021-03-09 RX ORDER — DOFETILIDE 0.5 MG/1
500 CAPSULE ORAL 2 TIMES DAILY
Qty: 60 CAPSULE | Refills: 5 | Status: SHIPPED | OUTPATIENT
Start: 2021-03-09 | End: 2021-09-08 | Stop reason: SDUPTHER

## 2021-03-09 RX ORDER — POTASSIUM CHLORIDE 20 MEQ/1
40 TABLET, EXTENDED RELEASE ORAL ONCE
Status: COMPLETED | OUTPATIENT
Start: 2021-03-09 | End: 2021-03-09

## 2021-03-09 RX ORDER — DOFETILIDE 0.5 MG/1
500 CAPSULE ORAL EVERY 12 HOURS SCHEDULED
Qty: 14 CAPSULE | Refills: 0 | Status: SHIPPED | OUTPATIENT
Start: 2021-03-09 | End: 2021-04-26

## 2021-03-09 RX ADMIN — METOPROLOL SUCCINATE 50 MG: 50 TABLET, EXTENDED RELEASE ORAL at 18:07

## 2021-03-09 RX ADMIN — ACETAMINOPHEN 325 MG: 325 TABLET ORAL at 00:34

## 2021-03-09 RX ADMIN — APIXABAN 5 MG: 5 TABLET, FILM COATED ORAL at 20:53

## 2021-03-09 RX ADMIN — VERAPAMIL HYDROCHLORIDE 80 MG: 80 TABLET, FILM COATED ORAL at 14:11

## 2021-03-09 RX ADMIN — VERAPAMIL HYDROCHLORIDE 80 MG: 80 TABLET, FILM COATED ORAL at 06:34

## 2021-03-09 RX ADMIN — EZETIMIBE 10 MG: 10 TABLET ORAL at 20:53

## 2021-03-09 RX ADMIN — FUROSEMIDE 20 MG: 20 TABLET ORAL at 09:36

## 2021-03-09 RX ADMIN — DOFETILIDE 500 MCG: 0.5 CAPSULE ORAL at 09:36

## 2021-03-09 RX ADMIN — DOFETILIDE 500 MCG: 0.5 CAPSULE ORAL at 20:52

## 2021-03-09 RX ADMIN — POTASSIUM CHLORIDE 40 MEQ: 1500 TABLET, EXTENDED RELEASE ORAL at 09:41

## 2021-03-09 RX ADMIN — Medication 1000 UNITS: at 09:36

## 2021-03-09 RX ADMIN — APIXABAN 5 MG: 5 TABLET, FILM COATED ORAL at 09:36

## 2021-03-09 ASSESSMENT — PAIN SCALES - GENERAL
PAINLEVEL_OUTOF10: 0
PAINLEVEL_OUTOF10: 3
PAINLEVEL_OUTOF10: 0

## 2021-03-09 NOTE — PROGRESS NOTES
Electrophysiology - PROGRESS NOTE    Admit Date: 3/8/2021     Chief Complaint: AF     Interval History:   Patient seen and examined and notes reviewed. Patient is being followed for AF. Admitted for dofetilide loading. Placed on 500 mcg of dofetilide with conversion to NSR. Nonsustained paroxysmal atrial fibrillation overnight. Patient is symptomatic. She has not had a sleep study in the past.    In: 960 [P.O.:960]  Out: 1    Wt Readings from Last 2 Encounters:   03/08/21 207 lb 14.3 oz (94.3 kg)   03/04/21 209 lb 9.6 oz (95.1 kg)       Data:   Scheduled Meds:   Scheduled Meds:   potassium chloride  40 mEq Oral Once    apixaban  5 mg Oral BID    ezetimibe  10 mg Oral Nightly    furosemide  20 mg Oral Daily    Vitamin D  1,000 Units Oral Daily    dofetilide  500 mcg Oral 2 times per day    metoprolol succinate  50 mg Oral Daily    verapamil  80 mg Oral 3 times per day     Continuous Infusions:  PRN Meds:.acetaminophen  Continuous Infusions:    Intake/Output Summary (Last 24 hours) at 3/9/2021 0759  Last data filed at 3/9/2021 0034  Gross per 24 hour   Intake 1440 ml   Output 3 ml   Net 1437 ml       CBC:   Lab Results   Component Value Date    WBC 6.5 03/05/2021    HGB 13.6 03/05/2021     03/05/2021     BMP:  Lab Results   Component Value Date     03/09/2021    K 3.9 03/09/2021    CL 99 03/09/2021    CO2 26 03/09/2021    BUN 19 03/09/2021    CREATININE 0.6 03/09/2021    GLUCOSE 113 03/09/2021    GLUCOSE 128 05/23/2017     INR:   Lab Results   Component Value Date    INR 1.16 03/05/2021    INR 1.41 12/09/2020    INR 1.0 11/30/2020    INR 2.48 11/23/2020        CARDIAC LABS  ENZYMES:No results for input(s): CKMB, CKMBINDEX, TROPONINI in the last 72 hours.     Invalid input(s): CKTOTAL;3  FASTING LIPID PANEL:  Lab Results   Component Value Date    HDL 38 09/08/2020    LDLCALC 99 09/08/2020    TRIG 292 09/08/2020    TSH 1.05 11/26/2018 LIVER PROFILE:  Lab Results   Component Value Date    AST 18 03/05/2021    AST 21 09/08/2020    ALT 21 03/05/2021    ALT 17 09/08/2020       -----------------------------------------------------------------  Telemetry: Personally reviewed  NSR,     Objective:   Vitals: BP (!) 114/53   Pulse 64   Temp 97.9 °F (36.6 °C) (Oral)   Resp 20   Ht 5' 9\" (1.753 m)   Wt 207 lb 14.3 oz (94.3 kg)   SpO2 96%   BMI 30.70 kg/m²   General appearance: alert, appears stated age and cooperative, No acute distress   Eyes: Conjunctiva and pupils normal and reactive  Skin: Skin color, texture, turgor normal. No rashes or ecchymosis. 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 pAF  2. AT  3. Chronic dCHF      Cardiac HX: Rosalva Sr is a 65 y. o. woman with a h/o breast CA, HLD,  chronic dCHF, atypical AFL, pAF, S/p RFA (2005, Dr. Dakotah Ernandez, 2006, Dr. Preston Escalona, 2013, Dr. Blayne Parkinson RFA/PVI of AF/typical AFL/atypical AFL (11/23/2020, Dr. Duong Ruiz), recurrent AF, s/p DCCV to NSR (12/9/20) here for dofetilide loading. [id number] 2. TSH 1.17 (9/8/2020).    AF  - In AF - HR controlled  - On Eliquis - no s/s bleeding - continue, has not missed any doses  - Intolerant to flecainide, amio and dilt  - On dig 0.25, verapamil 80 mg 3 times daily, Toprol XL 50 mg daily, dig on hold  - Reviewed EKG with Dr. Kristian Austin - will continue 500 mcg dofetilide with an EKG 2 hours after each dose -   - No need for DCCV  - Sleep study outpatient  - Keep K+ > 4.0 and Mg > 2.0 - replacement ordered  - ECG ordered and results personally reviewed      Chronic dCHF  - EF 55-60%  - Indeterminate diastolic dysfunction  - Furosemide 20 mg daily     EF of 19-81%  No systolic HF  No known CAD  Anticoagulation for AF   No Tobacco use.       All questions and concerns were addressed to the patient/family. Alternatives to my treatment were discussed. The note was completed using EMR. Every effort was made to ensure accuracy; however, inadvertent computerized transcription errors may be present.        Walter Palma [address]

## 2021-03-09 NOTE — PLAN OF CARE
Problem: Cardiac:  Goal: Ability to maintain vital signs within normal range will improve  Description: Ability to maintain vital signs within normal range will improve  3/9/2021 0753 by Riddhi Zeng RN  Outcome: Met This Shift  Goal: Cardiovascular alteration will improve  Description: Cardiovascular alteration will improve  3/9/2021 0753 by Riddhi Zeng RN  Outcome: Met This Shift  Note: Tikosyn/verapamil administered as scheduled; Afib RVR with freq PVCs; converted to SB 0048; went back into Afib at 0250  converted back into NSR/SB at 0318    Problem: Pain:  Goal: Pain level will decrease  Description: Pain level will decrease  3/9/2021 0753 by Riddhi Zeng RN  Outcome: Met This Shift- pt c/o mild headache; relieved with tylenol.      Problem: Falls - Risk of:  Goal: Will remain free from falls  Description: Will remain free from falls  3/9/2021 0753 by Riddhi Zeng RN  Outcome: Met This Shift

## 2021-03-09 NOTE — PROGRESS NOTES
Pt in Afib with rate ranging from 90s-140s; frequent PVCs, frequent runs of V-tach (up to 6 beats); and periodically experiencing pauses followed by a few sinus beats. On call cardio paged about pt status. Converted to SB with rate in the 50s at 0048. EKG performed and confirmed pt in sinus bradycardia.

## 2021-03-09 NOTE — PROGRESS NOTES
Physician Progress Note      Ary Mcgowan  CSN #:                  [id number]  :                       1950  ADMIT DATE:       3/8/2021 7:04 AM  DISCH DATE:  RESPONDING  PROVIDER #:        Chrissie Farr MD          QUERY TEXT:    Pt admitted with AFIB. Pt noted to have history of diastolic CHF. If possible,   please document in progress notes and discharge summary if you are evaluating   and/or treating any of the following: The medical record reflects the following:  Risk Factors: 66 yo w/ history of diastolic CHF  Clinical Indicators: Per H&P:  65 y.o. woman with a h/o breast CA, HLD,    chronic dCHF. ECHO 2020: 55-60%. No regional wall motion abnormalities are   noted. Indeterminate diastolic function. Trivial mitral regurgitation is   present. Treatment: Home dose of Metoprolol XL 50 mg daily, Lasix 20mg PO daily  Options provided:  -- Chronic Diastolic CHF/HFpEF  -- Other - I will add my own diagnosis  -- Disagree - Not applicable / Not valid  -- Disagree - Clinically unable to determine / Unknown  -- Refer to Clinical Documentation Reviewer    PROVIDER RESPONSE TEXT:    This patient has chronic diastolic CHF/HFpEF.     Query created by: Rajani Banegas on 3/8/2021 1:00 PM      Electronically signed by:  Chrissie Farr MD 3/8/2021 10:04 PM

## 2021-03-09 NOTE — PLAN OF CARE
Problem: Cardiac:  Goal: Ability to maintain vital signs within normal range will improve  3/9/2021 1814 by Ganesh Colmenares RN  Outcome: Ongoing  Patient has remained in NSR most of the shift. She occasionally brandies into the 30's to 40's. Remains asymptomatic. Laverne Malloy CNP, notified. Parameters put on inderal. Patient has denied chest pain or palpitations. BP has remained WNL. Will monitor. Problem: Pain:  Description: Pain management should include both nonpharmacologic and pharmacologic interventions. Goal: Pain level will decrease  3/9/2021 1814 by Ganesh Colmenares RN  Outcome: Ongoing   Has continued to deny pain this shift. Will continue to monitor comfort level and treat as needed. Problem: Falls - Risk of:  Goal: Will remain free from falls  3/9/2021 1814 by Ganesh Colmenares RN  Outcome: Ongoing  Continues to ambulate in room independently. Gait steady. Up to chair at present. Will continue to keep room free of clutter and well lit. Will monitor for safety.

## 2021-03-09 NOTE — CARE COORDINATION
Case Management Assessment           Daily Note                 Date/ Time of Note: 3/9/2021 11:58 AM         Note completed by: Nagi Armas    Patient Name: Slime Archibald  YOB: 1950    Diagnosis:Arrhythmia [I49.9]  Paroxysmal atrial fibrillation [facility] [I48.0]  Patient Admission Status: Inpatient    Date of Admission:3/8/2021  7:04 AM Length of Stay: 1 GLOS:      Current Plan of Care: Tikosyn loading  ________________________________________________________________________________________  PT AM-PAC:   / 24 per last evaluation on:     OT AM-PAC:   / 24 per last evaluation on:     DME Needs for discharge: n/a  ________________________________________________________________________________________  Discharge Plan: Home    Tentative discharge date: 1-2 days    Current barriers to discharge: medical clearance      ________________________________________________________________________________________  Case Management Notes:  Patient is from home alone, independent pta. No CM needs at this time. Gemini Trejo and her family were provided with choice of provider; she and her family are in agreement with the discharge plan.     Care Transition Patient: Yes    Nagi Armas RN  [facility]  Case Management Department  Ph: [phone]  Fax: [phone]

## 2021-03-09 NOTE — PLAN OF CARE
Problem: Cardiac:  Goal: Cardiovascular alteration will improve  3/8/2021 1905 by Susi Briceño RN  Outcome: Ongoing patient converted back Atrial fibrillation with rate in the 120's -140's. Also having runs of V Tach. Denies chest pain or shortness of breath. Stat EKG ordered. Cardiology paged.

## 2021-03-09 NOTE — PROGRESS NOTES
Clinical Pharmacy Progress Note    ADMIT DATE: 3/8/2021     Interval update: AFib overnight with frequent runs of Vtach, then converted to sinus bradycardia with HR in 50s. 64 yo F with PMH of breast cancer, HFpEF, atypical AFL, pAF admitted 3/8 for dofetilide loading. Pharmacy has been consulted to assist with Dofetilide loading and electrolyte management (goal K >4, Mg > 2). LABORATORY:  Recent Labs     03/08/21  0844 03/09/21  0423    136   K 4.4 3.9    99   CO2 24 26   BUN 15 19   CREATININE 0.6 0.6   GLUCOSE 122* 113*       Estimated Creatinine Clearance: 104 mL/min (based on SCr of 0.6 mg/dL). Magnesium 2.2  3/9    ASSESSMENT/PLAN:  1)  Dofetilide Loading --   · Electrolytes--  · Pharmacy asked to assist with electrolyte replacement. Goal K > 4, Mg > 2.  · K 3.9 -- will give KCl 40 mEq PO x1.  · Mg at goal - no supplement needed. · Pharmacy will continue to monitor and replace electrolytes as appropriate. · Dofetilide--  · Pharmacy asked to assist with dosing for Dofetilide. · See below for dosing guidelines. · Baseline QTc = 424  Msec 3/8. · Last EKG showed QTc of 347 (3/9 AM). · Patient is on verapamil at home which is contraindicated with use of Dofetilide. Verapamil can increase serum levels of Dofetilide. Would consider d/c of verapamil due to drug interaction. · Dofetilide 500 mcg PO BID ordered by cardiology - agree with dosing. · EKG to be obtained 2 hours after each dose of Dofetilide. Subsequent doses will be determined based on QTc interval.   · Pharmacy will continue to monitor and provide dosing recommendations as appropriate. Please call with any questions. Sindy Thrasher PharmD, BCPS  Wireless: [phone]  or [phone]  3/9/2021 7:56 AM      Dofetilide Dosing Guidelines:  Before Initiating Dofetilide (Tikosyn):   Previous anti-arrhythmic therapy should be discontinued a minimum of 3 half-lives.  Tikosyn should not be initiated following amiodarone therapy until amiodarone levels are < 0.3 mcg/mL or amiodarone withdrawn at least 3 months.  Patient must be admitted to telemetry unit and with a telemetry lead with a visible QT interval   Telemetry should be continued for minimum of 3 days or for 12 hours after conversion to normal sinus rhythm (whichever is longer)   The following medications are contraindicated in combination with dofetilide:  o Verapamil, hydrochlorothiazide, cimetidine, trimethoprim (including Bactrim), ketoconazole (as these drugs can cause significant increase in dofetilide plasma levels)  o Other medications which should not be used include prochlorperazine and megestrol   Serum potassium levels should be > 4 meQ/L prior to administration    Initiating Dofetilide (Tikosyn):    Obtain baseline EKG  QTc interval should be calculated    If baseline QTc is > 440 msec (500 msec in patients with ventricular conduction abnormalities), Tikosyn is contraindicated  o Note time, date and telemetry lead on strip  o All measurements of QTc interval should be from this lead   Measure the QT interval (QTc) 2-3 hours after each dose of Tikosyn until the patient is discharged     Dosing   Calculated creatinine clearance should be calculated using actual body weight  o If calculated CrCl > 60 mL/min, the appropriate dose of Tikosyn is 500 mcg PO BID  o 2-3 hours after initial dose, if QTc increases to > 15% from baseline, then Tikosyn should be decreased to 250 mcg BID   The second dose of Tikosyn should be given after the QT has been determined.  Only 1 down titration of Tikosyn for QTc is suggested.  If QTc is still excessively prolonged, Tikosyn should be discontinued   During therapy initiation in the hospital, at 2-3 hours after each dose of Tikosyn, determine the QTc to see if dose adjustment is necessary   Tikosyn should be given q12h at the same time every day    Prior to Patient Discharge --    Patient should receive a Tikosyn discharge bottle with 14 capsules (7 day supply)   Medication Guide should be reviewed with patient

## 2021-03-10 LAB
ANION GAP SERPL CALCULATED.3IONS-SCNC: 11 MMOL/L (ref 3–16)
BUN BLDV-MCNC: 19 MG/DL (ref 7–20)
CALCIUM SERPL-MCNC: 9.1 MG/DL (ref 8.3–10.6)
CHLORIDE BLD-SCNC: 102 MMOL/L (ref 99–110)
CO2: 26 MMOL/L (ref 21–32)
CREAT SERPL-MCNC: 0.6 MG/DL (ref 0.6–1.2)
EKG ATRIAL RATE: 202 BPM
EKG DIAGNOSIS: NORMAL
EKG Q-T INTERVAL: 362 MS
EKG QRS DURATION: 92 MS
EKG QTC CALCULATION (BAZETT): 480 MS
EKG R AXIS: 3 DEGREES
EKG T AXIS: 219 DEGREES
EKG VENTRICULAR RATE: 106 BPM
GFR AFRICAN AMERICAN: >60
GFR NON-AFRICAN AMERICAN: >60
GLUCOSE BLD-MCNC: 106 MG/DL (ref 70–99)
MAGNESIUM: 2.3 MG/DL (ref 1.8–2.4)
POTASSIUM SERPL-SCNC: 4.2 MMOL/L (ref 3.5–5.1)
SODIUM BLD-SCNC: 139 MMOL/L (ref 136–145)

## 2021-03-10 PROCEDURE — 6370000000 HC RX 637 (ALT 250 FOR IP): Performed by: NURSE PRACTITIONER

## 2021-03-10 PROCEDURE — 36415 COLL VENOUS BLD VENIPUNCTURE: CPT

## 2021-03-10 PROCEDURE — 2060000000 HC ICU INTERMEDIATE R&B

## 2021-03-10 PROCEDURE — 93010 ELECTROCARDIOGRAM REPORT: CPT | Performed by: INTERNAL MEDICINE

## 2021-03-10 PROCEDURE — 80048 BASIC METABOLIC PNL TOTAL CA: CPT

## 2021-03-10 PROCEDURE — 93005 ELECTROCARDIOGRAM TRACING: CPT | Performed by: INTERNAL MEDICINE

## 2021-03-10 PROCEDURE — 99233 SBSQ HOSP IP/OBS HIGH 50: CPT | Performed by: NURSE PRACTITIONER

## 2021-03-10 PROCEDURE — 83735 ASSAY OF MAGNESIUM: CPT

## 2021-03-10 RX ADMIN — APIXABAN 5 MG: 5 TABLET, FILM COATED ORAL at 20:23

## 2021-03-10 RX ADMIN — VERAPAMIL HYDROCHLORIDE 80 MG: 80 TABLET, FILM COATED ORAL at 12:37

## 2021-03-10 RX ADMIN — Medication 1000 UNITS: at 09:04

## 2021-03-10 RX ADMIN — DOFETILIDE 500 MCG: 0.5 CAPSULE ORAL at 20:23

## 2021-03-10 RX ADMIN — DOFETILIDE 500 MCG: 0.5 CAPSULE ORAL at 09:04

## 2021-03-10 RX ADMIN — FUROSEMIDE 20 MG: 20 TABLET ORAL at 09:04

## 2021-03-10 RX ADMIN — EZETIMIBE 10 MG: 10 TABLET ORAL at 20:23

## 2021-03-10 RX ADMIN — APIXABAN 5 MG: 5 TABLET, FILM COATED ORAL at 09:04

## 2021-03-10 RX ADMIN — METOPROLOL SUCCINATE 50 MG: 50 TABLET, EXTENDED RELEASE ORAL at 17:05

## 2021-03-10 RX ADMIN — VERAPAMIL HYDROCHLORIDE 80 MG: 80 TABLET, FILM COATED ORAL at 22:25

## 2021-03-10 ASSESSMENT — PAIN SCALES - GENERAL: PAINLEVEL_OUTOF10: 0

## 2021-03-10 NOTE — DISCHARGE SUMMARY
[address] Discharge Note      Patient ID: Bret Castro 65 y.o. 1950    Admission Date: 3/8/2021     Discharge Date:  3/10/2021    Reason for Admission:  Principal Diagnosis: pAF  Secondary Diagnosis: Dofetilide loading     Procedures:  None    Brief Summary of Patient's Course: Fawn Rosenberg a 65 y. o. woman with a h/o breast CA, HLD,  chronic dCHF, atypical AFL, pAF, S/p RFA (2005, Dr. Kylie Ivory, 2006, Dr. Kendall Chavez, 2013, Dr. Thania Kaba RFA/PVI of AF/typical AFL/atypical AFL (11/23/2020, Dr. Wesley Bush), recurrent AF, s/p DCCV to NSR (12/9/20) here for dofetilide loading. She tolerated the medication. QTC remained stable. Continued to have pAF with RVR at times and sinus bradycardia. She was monitored for an extra day and medications adjusted. She was discharged home. Discharge Condition:  Good     Discharge Instructions: Activity Limitations:  None     Diet:  low fat and low sodium    Follow Up Instructions: To office in: Follow up with Johny Timmons NP in 1 week   Instructed Re: Discharge medications, activity and dietary restrictions and follow up appointments were discussed. Discharge Medications:     Mariann Parra   Home Medication Instructions RXT:[id number]    Printed on:03/11/21 1322   Medication Information                      acetaminophen (TYLENOL) 325 MG tablet  Take 325 mg by mouth every 6 hours as needed. As need for headaches.              apixaban (ELIQUIS) 5 MG TABS tablet  Take 1 tablet by mouth 2 times daily             dofetilide (TIKOSYN) 500 MCG capsule  Take 1 capsule by mouth every 12 hours                        ezetimibe (ZETIA) 10 MG tablet  TAKE 1 TABLET BY MOUTH ONE TIME A DAY             furosemide (LASIX) 20 MG tablet  TAKE 1 TABLET DAILY             metoprolol succinate (TOPROL XL) 50 MG extended release tablet  Take 0.5 tablets by mouth daily             verapamil (CALAN) 80 MG tablet  Take 1 tablet by mouth 3 times daily             Vitamin D,

## 2021-03-10 NOTE — PROGRESS NOTES
Pt noted to be symptomatic with c/o heart palpitations and SOB in response to frequent runs of ectopy noted on tele monitor. MD aware, will administer PO Verapamil at this time and MD to bedside to speak with pt regarding plan of care.  Electronically signed by Radha Paulson RN on 3/10/2021 at 12:29 PM

## 2021-03-10 NOTE — PLAN OF CARE
Problem: Cardiac:  Goal: Ability to maintain vital signs within normal range will improve  Description: Ability to maintain vital signs within normal range will improve  3/10/2021 0943 by Katerine Albarado RN  Outcome: Ongoing     Problem: Cardiac:  Goal: Cardiovascular alteration will improve  Description: Cardiovascular alteration will improve  Outcome: Not Met This Shift

## 2021-03-10 NOTE — PLAN OF CARE
Problem: Pain:  Goal: Pain level will decrease  Description: Pain level will decrease  3/10/2021 0249 by Cora Bui RN  Outcome: Met This Shift  Note: Patient denies complaints of pain or discomfort. Currently resting in bed quietly. Will continue to monitor. Problem: Cardiac:  Goal: Ability to maintain vital signs within normal range will improve  Description: Ability to maintain vital signs within normal range will improve  3/10/2021 0249 by Cora Bui RN  Outcome: Ongoing  Note: Patient continues with Tikosyn loading, received 4th dose this evening. Patient continues to flip between a-fib/a-flutter/SB. Had one run of 11 beats of v-tach. Patient endorses palpitations and is able to tell when flipping into afib, however denies chest pain, dizziness or lightheadedness. Currently resting in bed quietly. Will continue to monitor.

## 2021-03-10 NOTE — PROGRESS NOTES
Clinical Pharmacy Progress Note    ADMIT DATE: 3/8/2021     64 yo F with PMH of breast cancer, HFpEF, atypical AFL, pAF admitted 3/8 for dofetilide loading. Pharmacy has been consulted to assist with Dofetilide loading and electrolyte management (goal K >4, Mg > 2). LABORATORY:  Recent Labs     03/09/21  0423 03/10/21  0423    139   K 3.9 4.2   CL 99 102   CO2 26 26   BUN 19 19   CREATININE 0.6 0.6   GLUCOSE 113* 106*       Estimated Creatinine Clearance: 104 mL/min (based on SCr of 0.6 mg/dL). 3/10/2021 04:23   Magnesium 2.30     ASSESSMENT/PLAN:  1)  Dofetilide Loading --   · Electrolytes--  · Pharmacy asked to assist with electrolyte replacement. Goal K > 4, Mg > 2.  · Electrolytes at goal today - no supplement needed. · Pharmacy will continue to monitor and replace electrolytes as appropriate. · Dofetilide--  · Pharmacy asked to assist with dosing for Dofetilide. · See below for dosing guidelines. · Last EKG showed QTc of 480 (3/9 PM). · Patient is on verapamil at home which is contraindicated with use of Dofetilide. Verapamil can increase serum levels of Dofetilide. Would consider d/c of verapamil due to drug interaction. · Dofetilide 500 mcg PO BID ordered by cardiology. · EKG to be obtained 2 hours after each dose of Dofetilide. Subsequent doses will be determined based on QTc interval.   · Pharmacy will continue to monitor and provide dosing recommendations as appropriate. Please call with any questions. Annabel Palomino, PharmD, BCPS  Wireless: [phone]  or [phone]  3/10/2021 7:33 AM      Dofetilide Dosing Guidelines:  Before Initiating Dofetilide (Tikosyn):   Previous anti-arrhythmic therapy should be discontinued a minimum of 3 half-lives. Tikosyn should not be initiated following amiodarone therapy until amiodarone levels are < 0.3 mcg/mL or amiodarone withdrawn at least 3 months.    Patient must be admitted to telemetry unit and with a telemetry lead with a visible QT

## 2021-03-10 NOTE — PROGRESS NOTES
CC: pAF and dofetilide loading     HPI: Patient seen and examined and notes reviewed. Patient is being followed for AF. Admitted for dofetilide loading. Placed on 500 mcg of dofetilide with conversion to NSR. Nonsustained paroxysmal atrial fibrillation overnight. Patient is symptomatic. She has not had a sleep study in the past.    S: C/o palpitations and SOB. No chest pain or LH/dizziness    Tele: Sinus brenden or A fib RVR     O:  Physical Exam:  /81   Pulse 100   Temp 98 °F (36.7 °C) (Oral)   Resp 18   Ht 5' 9\" (1.753 m)   Wt 207 lb 14.3 oz (94.3 kg)   SpO2 97%   BMI 30.70 kg/m²    General (appearance):  No acute distress  Eyes: anicteric   Neck: soft, No JVD  Ears/Nose/Mouth/Thorat: No cyanosis  CV: Irreg, irreg tachy   Respiratory:  Clear, normal effort  GI: soft, non-tender, non-distended  Skin: Warm, dry. No rashes  Neuro/Psych: Alert and oriented x 3. Appropriate behavior  Ext:  No c/c. No edema  Pulses:  2+ radial     I.O's=     Weight  Admission: Weight: 207 lb 14.3 oz (94.3 kg)   Today: Weight: 207 lb 14.3 oz (94.3 kg)    CBC: No results for input(s): WBC, HGB, HCT, MCV, PLT in the last 72 hours. BMP:   Recent Labs     21  0844 21  0423 03/10/21  0423    136 139   K 4.4 3.9 4.2    99 102   CO2 24 26 26   BUN 15 19 19   CREATININE 0.6 0.6 0.6     Mag:   Lab Results   Component Value Date    MG 2.30 03/10/2021     Pro-BNP:   Lab Results   Component Value Date    PROBNP 66 2018         Imagin Nuc stress:      There is normal isotope uptake at stress and rest. There is no evidence of    myocardial ischemia or scar.    Normal LV size and systolic function.    Left ventricular ejection fraction of 75 %.    There are no regional wall motion abnormalities.    Normal myocardial perfusion study. 2018 Echo:   Left ventricular cavity size is normal. There is mild concentric left   ventricular hypertrophy.  Overall left ventricular systolic function appears normal with an ejection fraction of 55-60%. No regional wall motion   abnormalities are noted. Indeterminate diastolic function. Trivial mitral regurgitation is present. The aortic valve leaflets appear slightly thickened but opens well. Mild tricuspid regurgitation. Estimated pulmonary artery systolic pressure is normal at 30 mmHg assuming a right atrial pressure of 8 mmHg. Assessment:    Jazmin Paul a 65 y. o. woman with a h/o breast CA, HLD,  chronic dCHF, atypical AFL, pAF, S/p RFA (2005, Dr. Shanique Salazar, 2006, Dr. Nancy De, 2013, Dr. Coralee Jeans RFA/PVI of AF/typical AFL/atypical AFL (11/23/2020, Dr. Lila Hernandez), recurrent AF, s/p DCCV to NSR (12/9/20) here for dofetilide loading. [id number] 2. TSH 1.17 (9/8/2020).      Issues:  -pAF;   -Tachycardia   -Bradycardia  -Chronic dCHF  -HLD    Plan:  -Keep K>4, Mg>2.  -Eliquis 5 mg po bid  -QTC stable continue Dofetilide 500 mcg po bid.   -Toprol  -Verapamil  -Lasix po q day     Will discuss plan of care with Dr Lila Hernandez

## 2021-03-10 NOTE — CARE COORDINATION
Patient is from home alone, independent pta. Patient is Tikosyn loading, expect no d/c needs. Will continue to monitor.     Alexus Blanca RN, BSN,   4th Floor Progressive Care Unit  [phone]

## 2021-03-11 VITALS
BODY MASS INDEX: 30.79 KG/M2 | OXYGEN SATURATION: 95 % | HEART RATE: 61 BPM | DIASTOLIC BLOOD PRESSURE: 70 MMHG | SYSTOLIC BLOOD PRESSURE: 123 MMHG | RESPIRATION RATE: 18 BRPM | TEMPERATURE: 97.6 F | HEIGHT: 69 IN | WEIGHT: 207.89 LBS

## 2021-03-11 LAB
ANION GAP SERPL CALCULATED.3IONS-SCNC: 9 MMOL/L (ref 3–16)
BUN BLDV-MCNC: 15 MG/DL (ref 7–20)
CALCIUM SERPL-MCNC: 9 MG/DL (ref 8.3–10.6)
CHLORIDE BLD-SCNC: 100 MMOL/L (ref 99–110)
CO2: 28 MMOL/L (ref 21–32)
CREAT SERPL-MCNC: 0.6 MG/DL (ref 0.6–1.2)
EKG ATRIAL RATE: 234 BPM
EKG ATRIAL RATE: 58 BPM
EKG ATRIAL RATE: 61 BPM
EKG ATRIAL RATE: 69 BPM
EKG DIAGNOSIS: NORMAL
EKG P AXIS: 45 DEGREES
EKG P AXIS: 48 DEGREES
EKG P AXIS: 54 DEGREES
EKG P-R INTERVAL: 182 MS
EKG P-R INTERVAL: 186 MS
EKG P-R INTERVAL: 192 MS
EKG Q-T INTERVAL: 332 MS
EKG Q-T INTERVAL: 430 MS
EKG Q-T INTERVAL: 454 MS
EKG Q-T INTERVAL: 456 MS
EKG QRS DURATION: 90 MS
EKG QRS DURATION: 94 MS
EKG QRS DURATION: 94 MS
EKG QRS DURATION: 98 MS
EKG QTC CALCULATION (BAZETT): 426 MS
EKG QTC CALCULATION (BAZETT): 447 MS
EKG QTC CALCULATION (BAZETT): 457 MS
EKG QTC CALCULATION (BAZETT): 460 MS
EKG R AXIS: -1 DEGREES
EKG R AXIS: -2 DEGREES
EKG R AXIS: -3 DEGREES
EKG R AXIS: 2 DEGREES
EKG T AXIS: 143 DEGREES
EKG T AXIS: 45 DEGREES
EKG T AXIS: 58 DEGREES
EKG T AXIS: 58 DEGREES
EKG VENTRICULAR RATE: 58 BPM
EKG VENTRICULAR RATE: 61 BPM
EKG VENTRICULAR RATE: 69 BPM
EKG VENTRICULAR RATE: 99 BPM
GFR AFRICAN AMERICAN: >60
GFR NON-AFRICAN AMERICAN: >60
GLUCOSE BLD-MCNC: 105 MG/DL (ref 70–99)
MAGNESIUM: 2.3 MG/DL (ref 1.8–2.4)
POTASSIUM SERPL-SCNC: 4.4 MMOL/L (ref 3.5–5.1)
SODIUM BLD-SCNC: 137 MMOL/L (ref 136–145)

## 2021-03-11 PROCEDURE — 6370000000 HC RX 637 (ALT 250 FOR IP): Performed by: NURSE PRACTITIONER

## 2021-03-11 PROCEDURE — 93010 ELECTROCARDIOGRAM REPORT: CPT | Performed by: INTERNAL MEDICINE

## 2021-03-11 PROCEDURE — 36415 COLL VENOUS BLD VENIPUNCTURE: CPT

## 2021-03-11 PROCEDURE — 83735 ASSAY OF MAGNESIUM: CPT

## 2021-03-11 PROCEDURE — 93005 ELECTROCARDIOGRAM TRACING: CPT | Performed by: INTERNAL MEDICINE

## 2021-03-11 PROCEDURE — 99239 HOSP IP/OBS DSCHRG MGMT >30: CPT | Performed by: NURSE PRACTITIONER

## 2021-03-11 PROCEDURE — 80048 BASIC METABOLIC PNL TOTAL CA: CPT

## 2021-03-11 RX ORDER — VERAPAMIL HYDROCHLORIDE 80 MG/1
80 TABLET ORAL 3 TIMES DAILY
Qty: 90 TABLET | Refills: 3
Start: 2021-03-11 | End: 2021-03-25 | Stop reason: SDUPTHER

## 2021-03-11 RX ORDER — VERAPAMIL HYDROCHLORIDE 80 MG/1
120 TABLET ORAL EVERY 8 HOURS SCHEDULED
Status: DISCONTINUED | OUTPATIENT
Start: 2021-03-11 | End: 2021-03-11

## 2021-03-11 RX ORDER — VERAPAMIL HYDROCHLORIDE 80 MG/1
80 TABLET ORAL EVERY 8 HOURS SCHEDULED
Status: DISCONTINUED | OUTPATIENT
Start: 2021-03-11 | End: 2021-03-11 | Stop reason: HOSPADM

## 2021-03-11 RX ADMIN — FUROSEMIDE 20 MG: 20 TABLET ORAL at 08:55

## 2021-03-11 RX ADMIN — DOFETILIDE 500 MCG: 0.5 CAPSULE ORAL at 08:55

## 2021-03-11 RX ADMIN — APIXABAN 5 MG: 5 TABLET, FILM COATED ORAL at 08:55

## 2021-03-11 RX ADMIN — Medication 1000 UNITS: at 08:55

## 2021-03-11 RX ADMIN — VERAPAMIL HYDROCHLORIDE 80 MG: 80 TABLET, FILM COATED ORAL at 14:08

## 2021-03-11 ASSESSMENT — PAIN SCALES - GENERAL
PAINLEVEL_OUTOF10: 0
PAINLEVEL_OUTOF10: 0

## 2021-03-11 NOTE — PROGRESS NOTES
Clinical Pharmacy Progress Note    ADMIT DATE: 3/8/2021     Interval update: in AFib/flutter and sinus brenden overnight with episode of pause. 66 yo F with PMH of breast cancer, HFpEF, atypical AFL, pAF admitted 3/8 for dofetilide loading. Pharmacy has been consulted to assist with Dofetilide loading and electrolyte management (goal K >4, Mg > 2). LABORATORY:  Recent Labs     03/10/21  0423 03/11/21  0435    137   K 4.2 4.4    100   CO2 26 28   BUN 19 15   CREATININE 0.6 0.6   GLUCOSE 106* 105*       Estimated Creatinine Clearance: 104 mL/min (based on SCr of 0.6 mg/dL). 3/11/2021 04:35   Magnesium 2.30       ASSESSMENT/PLAN:  1)  Dofetilide Loading --   · Electrolytes--  · Pharmacy asked to assist with electrolyte replacement. Goal K > 4, Mg > 2.  · Electrolytes at goal today - no supplement needed. · Pharmacy will continue to monitor and replace electrolytes as appropriate. · Dofetilide--  · Pharmacy asked to assist with dosing for Dofetilide. · See below for dosing guidelines. · Last EKG showed QTc of 426 (3/10 PM). · Patient is on verapamil at home which is contraindicated with use of Dofetilide. Verapamil can increase serum levels of Dofetilide. Would consider d/c of verapamil due to drug interaction. · Dofetilide 500 mcg PO BID ordered by cardiology. · EKG to be obtained 2 hours after each dose of Dofetilide. Subsequent doses will be determined based on QTc interval.   · Pharmacy will continue to monitor and provide dosing recommendations as appropriate. Please call with any questions. Miguelina FerreraD, BCPS  Wireless: [phone]  or [phone]  3/11/2021 8:28 AM      Dofetilide Dosing Guidelines:  Before Initiating Dofetilide (Tikosyn):   Previous anti-arrhythmic therapy should be discontinued a minimum of 3 half-lives.  Tikosyn should not be initiated following amiodarone therapy until amiodarone levels are < 0.3 mcg/mL or amiodarone withdrawn at least 3 months.  Patient must be admitted to telemetry unit and with a telemetry lead with a visible QT interval   Telemetry should be continued for minimum of 3 days or for 12 hours after conversion to normal sinus rhythm (whichever is longer)   The following medications are contraindicated in combination with dofetilide:  o Verapamil, hydrochlorothiazide, cimetidine, trimethoprim (including Bactrim), ketoconazole (as these drugs can cause significant increase in dofetilide plasma levels)  o Other medications which should not be used include prochlorperazine and megestrol   Serum potassium levels should be > 4 meQ/L prior to administration    Initiating Dofetilide (Tikosyn):    Obtain baseline EKG  QTc interval should be calculated    If baseline QTc is > 440 msec (500 msec in patients with ventricular conduction abnormalities), Tikosyn is contraindicated  o Note time, date and telemetry lead on strip  o All measurements of QTc interval should be from this lead   Measure the QT interval (QTc) 2-3 hours after each dose of Tikosyn until the patient is discharged     Dosing   Calculated creatinine clearance should be calculated using actual body weight  o If calculated CrCl > 60 mL/min, the appropriate dose of Tikosyn is 500 mcg PO BID  o 2-3 hours after initial dose, if QTc increases to > 15% from baseline, then Tikosyn should be decreased to 250 mcg BID   The second dose of Tikosyn should be given after the QT has been determined.  Only 1 down titration of Tikosyn for QTc is suggested.  If QTc is still excessively prolonged, Tikosyn should be discontinued   During therapy initiation in the hospital, at 2-3 hours after each dose of Tikosyn, determine the QTc to see if dose adjustment is necessary   Tikosyn should be given q12h at the same time every day    Prior to Patient Discharge --    Patient should receive a Tikosyn discharge bottle with 14 capsules (7 day supply)   Medication Guide should be reviewed with patient

## 2021-03-11 NOTE — PROGRESS NOTES
IV and tele removed, discharge instructions given. Prescriptions for Tikosyn filled here with meds to beds.

## 2021-03-11 NOTE — PROGRESS NOTES
Patient had 4.79 second pause on telemetry. Patient alert and oriented x4. VSS. Denies complaints of dizziness or lightheadedness. Currently SB on telemetry. Will continue to monitor.

## 2021-03-11 NOTE — PROGRESS NOTES
CC: pAF and dofetilide loading     HPI: Patient seen and examined and notes reviewed. Patient is being followed for AF. Admitted for dofetilide loading. Placed on 500 mcg of dofetilide with conversion to NSR. Nonsustained paroxysmal atrial fibrillation overnight. Patient is symptomatic. She has not had a sleep study in the past.    S: Feeling slightly better than yesterday. Continues to have SOB and palpitations. Tele: Sinus brenden or A fib RVR , PVC    O:  Physical Exam:  /73   Pulse 61   Temp 96.8 °F (36 °C) (Axillary)   Resp 18   Ht 5' 9\" (1.753 m)   Wt 207 lb 14.3 oz (94.3 kg)   SpO2 97%   BMI 30.70 kg/m²    General (appearance):  No acute distress  Eyes: anicteric   Neck: soft, No JVD  Ears/Nose/Mouth/Thorat: No cyanosis  CV: Irreg, irreg tachy   Respiratory:  Clear, normal effort  GI: soft, non-tender, non-distended  Skin: Warm, dry. No rashes  Neuro/Psych: Alert and oriented x 3. Appropriate behavior  Ext:  No c/c. No edema  Pulses:  2+ radial     I.O's=     Weight  Admission: Weight: 207 lb 14.3 oz (94.3 kg)   Today: Weight: 207 lb 14.3 oz (94.3 kg)    CBC: No results for input(s): WBC, HGB, HCT, MCV, PLT in the last 72 hours. BMP:   Recent Labs     21  0423 03/10/21  0423 21  0435    139 137   K 3.9 4.2 4.4   CL 99 102 100   CO2 26 26 28   BUN 19 19 15   CREATININE 0.6 0.6 0.6     Mag:   Lab Results   Component Value Date    MG 2.30 2021     Pro-BNP:   Lab Results   Component Value Date    PROBNP 66 2018         Imagin Nuc stress:      There is normal isotope uptake at stress and rest. There is no evidence of    myocardial ischemia or scar.    Normal LV size and systolic function.    Left ventricular ejection fraction of 75 %.    There are no regional wall motion abnormalities.    Normal myocardial perfusion study. 2018 Echo:   Left ventricular cavity size is normal. There is mild concentric left   ventricular hypertrophy.  Overall left ventricular systolic function appears   normal with an ejection fraction of 55-60%. No regional wall motion   abnormalities are noted. Indeterminate diastolic function. Trivial mitral regurgitation is present. The aortic valve leaflets appear slightly thickened but opens well. Mild tricuspid regurgitation. Estimated pulmonary artery systolic pressure is normal at 30 mmHg assuming a right atrial pressure of 8 mmHg. Assessment:    Lynn Davis a 65 y. o. woman with a h/o breast CA, HLD,  chronic dCHF, atypical AFL, pAF, S/p RFA (2005, Dr. Signe Gottron, 2006, Dr. Jose Munoz, 2013, Dr. Meme Simpson RFA/PVI of AF/typical AFL/atypical AFL (11/23/2020, Dr. Rose Ayala), recurrent AF, s/p DCCV to NSR (12/9/20) here for dofetilide loading. [id number] 2. TSH 1.17 (9/8/2020). Issues:  -pAF;   -Tachycardia   -Bradycardia  -Chronic dCHF  -HLD    Plan:  -Keep K>4, Mg>2.  -Eliquis 5 mg po bid  -QTC stable continue Dofetilide 500 mcg po bid.   -Toprol  -Verapamil  -Lasix po q day     Will discuss plan of care with Dr Rose Ayala   Will clarify medications with Dr Rose Ayala. Will hopefully d/c today     Addendum: 1200 pm  -Reviewed tele strips with Dr Rose Ayala. Pt having post-conversion pauses and I do not believe pt is having VT but it is actually A fib/flutter with aberrancy.     Will continue tikosyn, verapamil and toprol     Will d/c home    Arnel Cano NP

## 2021-03-11 NOTE — PLAN OF CARE
Problem: Cardiac:  Goal: Ability to maintain vital signs within normal range will improve  Description: Ability to maintain vital signs within normal range will improve  3/11/2021 1509 by Aileen Denver, RN  Outcome: Completed   Patient still having arrhythmia at times, see notes from today. Being discharged with Tikosyn and no digoxin. Will follow up with cardiology in a 1-2 weeks. Problem: Pain:  Goal: Pain level will decrease  Description: Pain level will decrease  3/11/2021 1509 by Aileen Denver, RN  Outcome: Completed   No complaints of pain noted this shift. Problem: Falls - Risk of:  Goal: Will remain free from falls  Description: Will remain free from falls  Outcome: Completed   Patient up at [facility], being discharge today.

## 2021-03-11 NOTE — CARE COORDINATION
Case Management Assessment            Discharge Note                    Date / Time of Note: 3/11/2021 1:18 PM                  Discharge Note Completed by: Ag Daugherty    Patient Name: Julianna Phillips   YOB: 1950  Diagnosis: Arrhythmia [I49.9]  Paroxysmal atrial fibrillation [facility]) [I48.0]   Date / Time: 3/8/2021  7:04 AM    Current PCP: Yumiko Celis MD  Clinic patient: No    Hospitalization in the last 30 days: No    Advance Directives:  Code Status: Full Code  [place] DNR form completed and on chart: Not Indicated    Financial:  Payor: Wesley Abdi / Plan: MEDICARE PART A AND B / Product Type: *No Product type* /      Pharmacy:    [facility] [address] [facility], [address] [facility] [phone] - F [phone]  [address] [place] [address]  Phone: [phone] Fax: [phone]    [facility] [address] [phone] - f [phone]  [address] [place] [address]  Phone: [phone] Fax: [phone]      Assistance purchasing medications?: Potential Assistance Purchasing Medications: No  Assistance provided by Case Management: None at this time    Does patient want to participate in local refill/ meds to beds program?: No    Meds To Beds General Rules:  1. Can ONLY be done Monday- Friday between 8:30am-5pm  2. Prescription(s) must be in pharmacy by 3pm to be filled same day  3. Copy of patient's insurance/ prescription drug card and patient face sheet must be sent along with the prescription(s)  4. Cost of Rx cannot be added to hospital bill. If financial assistance is needed, please contact unit  or ;  or  CANNOT provide pharmacy voucher for patients co-pays  5.  Patients can then  the prescription on their way out of the hospital at discharge, or pharmacy can deliver to the bedside if staff is available. (payment due at time of pick-up or delivery - cash, check, or card accepted)     Able to afford home medications/ co-pay costs: Yes    ADLS:  Current PT AM-PAC Score:   /24  Current OT AM-PAC Score:   /24      DISCHARGE Disposition: Home- No Services Needed    LOC at discharge: Not Applicable  MCKAYLA Completed: Not Indicated    Notification completed in HENS/PAS?:  Not Applicable    IMM Completed:   Yes, Case management has presented and reviewed IMM letter #2 to the patient and/or family/ POA. Patient and/or family/POA verbalized understanding of their medicare rights and appeal process if needed. Patient and/or family/POA has signed, initialed and placed today's date (3.11.21) and time (1430) on IMM letter #2 on the the appropriate lines. Patient and/or family/POA, copy of letter offered and they are aware that this original copy of [facility] letter #2 is available prior to discharge from the paper chart on the unit. Electronic documentation has been entered into epic for IMM letter #2 and original paper copy has been added to the paper chart at the nurses station. Transportation:  Transportation PLAN for discharge: family   Mode of Transport: [address]  ordered at discharge: Not [address]: Not Applicable  Orders faxed: No    Durable Medical Equipment:  DME Provider: n/a  Equipment obtained during hospitalization: [address] and Respiratory Equipment:  Oxygen needed at discharge?: Not [address]: Not Applicable  Portable tank available for discharge?: Not Indicated    Dialysis:  Dialysis patient: No    Dialysis Center:  Not Applicable    Hospice Services:  Location: Not Applicable  Agency: Not Applicable    Consents signed: Not Indicated    Referrals made at [facility] for outpatient continued care:  Not Applicable    Additional CM Notes:   Patient is from home alone, independent pta. No CM needs at this time. Family to transport home.     The Plan for Transition of Care is related

## 2021-03-11 NOTE — PLAN OF CARE
Problem: Cardiac:  Goal: Ability to maintain vital signs within normal range will improve  Description: Ability to maintain vital signs within normal range will improve  Outcome: Ongoing  Note: Patient's heart rhythm fluctuating between a-fib/a-flutter/SB at beginning of shift. Patient had 4.79 second pause at approximately 0100 and has remained SB. Vitals otherwise stable. Will continue to monitor. Problem: Pain:  Goal: Pain level will decrease  Description: Pain level will decrease  Outcome: Ongoing  Note: Patient denies complaints of pain or discomfort. Currently resting in bed quietly. Will continue to monitor.

## 2021-03-11 NOTE — PROGRESS NOTES
patient had another run of V-tach of 16 beats at 0944, she has continued to have runs periodically up to 24-26 times since 0944 (not as long of runs) , still remains Sinus brenden. When having runs of V-tach she has A-fib/flutter. This has been an ongoing thing since she is here. Paged cardiology. Josee HULL held verapamil for now till speaking to Dr. Wesley Bush. Patient is asymptomatic when this happens. Did get Tikosyn this morning. EKG will be done around 11 am, 2 hours after medication.

## 2021-03-12 ENCOUNTER — CARE COORDINATION (OUTPATIENT)
Dept: CASE MANAGEMENT | Age: 71
End: 2021-03-12

## 2021-03-12 ENCOUNTER — TELEPHONE (OUTPATIENT)
Dept: CARDIOLOGY CLINIC | Age: 71
End: 2021-03-12

## 2021-03-12 NOTE — CARE COORDINATION
Patient contacted regarding Chandrika Chahal. Care Transition Nurse contacted the patient by telephone to perform post discharge assessment. Call within 2 business days of discharge: Yes. Verified name and  with patient as identifiers. Provided introduction to self, and explanation of the CTN/ACM role, and reason for call due to risk factors for infection and/or exposure to COVID-19. Symptoms reviewed with patient who verbalized the following symptoms: no new symptoms and no worsening symptoms. Due to no new or worsening symptoms encounter was not routed to provider for escalation. Discussed follow-up appointments. If no appointment was previously scheduled, appointment scheduling offered: [facility] follow up appointment(s):   Future Appointments   Date Time Provider Augustine Latham   3/25/2021 10:00 AM MAILE Urbina - CNP [facility]   2021  9:45 AM MD Ortiz Owen [facility]       Non-face-to-face services provided:  Obtained and reviewed discharge summary and/or continuity of care documents  Education of patient/family/caregiver/guardian to support self-management-. Assessment and support for treatment adherence and medication management-. Advance Care Planning:   Does patient have an Advance Directive:  reviewed and current. Patient has following risk factors of: heart failure, immunocompromised and A-fib. CTN reviewed discharge instructions, medical action plan and red flags such as increased shortness of breath, increasing fever and signs of decompensation with patient who verbalized understanding. Discussed exposure protocols and quarantine with CDC Guidelines What to do if you are sick with coronavirus disease 2019.  Patient was given an opportunity for questions and concerns.  The patient agrees to contact the [facility] exposure line [phone], [facility] department [place] Department of Health: ([phone]) and PCP office for questions related to their healthcare. CTN provided contact information for future needs. Reviewed and educated patient on any new and changed medications related to discharge diagnosis     Was patient discharged with a pulse oximeter? No Discussed and confirmed pulse oximeter discharge instructions and when to notify provider or seek emergency care. Summary  CTN spoke with patient this am for initial COVID -19 call. Patient states she is still in A-fib. Reports no congestion, pain, difficulty emptying bladder, LE edema, feeling lightheaded, dizziness, and heart palpitations. States she has own EKG machine in home, is able to manage and monitor herself if A-fib becomes worse, or symptoms present. Patient has all medications filled and in home, and follow up with EP is scheduled for 03/25/2021. Plan follow up call in 7-14 days based on severity of symptoms and risk factors.     Thank Fany Harding RN  Care Transition Coordinator  Contact IQ[place]:[phone]

## 2021-03-12 NOTE — TELEPHONE ENCOUNTER
Spoke with. Pt.  Please confirm Toprol dose. Pt was on 50 daily previously but D/C paperwork states take 0.5 tab. Please verify dose.  Maricarmen Cervantes

## 2021-03-15 NOTE — TELEPHONE ENCOUNTER
Suzanna Gant. .. Spoke with pt. She will continue to take 50 mg until she see's Belinda Garcia on the 24th.

## 2021-03-25 ENCOUNTER — OFFICE VISIT (OUTPATIENT)
Dept: CARDIOLOGY CLINIC | Age: 71
End: 2021-03-25
Payer: MEDICARE

## 2021-03-25 ENCOUNTER — TELEPHONE (OUTPATIENT)
Dept: CARDIOLOGY CLINIC | Age: 71
End: 2021-03-25

## 2021-03-25 VITALS
TEMPERATURE: 97.3 F | SYSTOLIC BLOOD PRESSURE: 112 MMHG | HEIGHT: 69 IN | HEART RATE: 76 BPM | DIASTOLIC BLOOD PRESSURE: 70 MMHG | BODY MASS INDEX: 31.4 KG/M2 | WEIGHT: 212 LBS

## 2021-03-25 DIAGNOSIS — I48.91 ATRIAL FIBRILLATION, UNSPECIFIED TYPE (HCC): Primary | ICD-10-CM

## 2021-03-25 PROCEDURE — 93000 ELECTROCARDIOGRAM COMPLETE: CPT | Performed by: NURSE PRACTITIONER

## 2021-03-25 PROCEDURE — 93246 EXT ECG>7D<15D RECORDING: CPT | Performed by: INTERNAL MEDICINE

## 2021-03-25 PROCEDURE — 1036F TOBACCO NON-USER: CPT | Performed by: NURSE PRACTITIONER

## 2021-03-25 PROCEDURE — 3017F COLORECTAL CA SCREEN DOC REV: CPT | Performed by: NURSE PRACTITIONER

## 2021-03-25 PROCEDURE — 1111F DSCHRG MED/CURRENT MED MERGE: CPT | Performed by: NURSE PRACTITIONER

## 2021-03-25 PROCEDURE — G9708 BILAT MAST/HX BI /UNILAT MAS: HCPCS | Performed by: NURSE PRACTITIONER

## 2021-03-25 PROCEDURE — G8427 DOCREV CUR MEDS BY ELIG CLIN: HCPCS | Performed by: NURSE PRACTITIONER

## 2021-03-25 PROCEDURE — 1090F PRES/ABSN URINE INCON ASSESS: CPT | Performed by: NURSE PRACTITIONER

## 2021-03-25 PROCEDURE — 4040F PNEUMOC VAC/ADMIN/RCVD: CPT | Performed by: NURSE PRACTITIONER

## 2021-03-25 PROCEDURE — G8482 FLU IMMUNIZE ORDER/ADMIN: HCPCS | Performed by: NURSE PRACTITIONER

## 2021-03-25 PROCEDURE — 99214 OFFICE O/P EST MOD 30 MIN: CPT | Performed by: NURSE PRACTITIONER

## 2021-03-25 PROCEDURE — G8399 PT W/DXA RESULTS DOCUMENT: HCPCS | Performed by: NURSE PRACTITIONER

## 2021-03-25 PROCEDURE — 1123F ACP DISCUSS/DSCN MKR DOCD: CPT | Performed by: NURSE PRACTITIONER

## 2021-03-25 PROCEDURE — G8417 CALC BMI ABV UP PARAM F/U: HCPCS | Performed by: NURSE PRACTITIONER

## 2021-03-25 RX ORDER — METOPROLOL SUCCINATE 50 MG/1
50 TABLET, EXTENDED RELEASE ORAL DAILY
Qty: 90 TABLET | Refills: 3 | Status: SHIPPED | OUTPATIENT
Start: 2021-03-25 | End: 2021-06-10 | Stop reason: SDUPTHER

## 2021-03-25 RX ORDER — VERAPAMIL HYDROCHLORIDE 80 MG/1
80 TABLET ORAL 3 TIMES DAILY
Qty: 270 TABLET | Refills: 3 | Status: SHIPPED | OUTPATIENT
Start: 2021-03-25 | End: 2022-04-04

## 2021-03-25 NOTE — PROGRESS NOTES
[address]     Outpatient Follow Up Note  CHIEF COMPLAINT / HPI:  Follow Up   Pt of Dr. Irwin Winters is 65 y.o. female who presents today with a history of atypical AFL,  pAF, S/p RFA (2005, Dr. Toya Beard, 2006, Dr. Angella Whipple, 2013, Dr. Mariza Henson), s/p RFA/PVI of AF/typical AFL/atypical AFL (11/23/2020). Per EP note:  pAF, S/p RFA (2005, Dr. Toya Beard, 2006, Dr. Angella Whipple, 2013, Dr. Mariza Henson), s/p RFA/PVI of AF/typical AFL/atypical AFL (11/23/2020). Interval history:  c/o symptomatic afib   Ms Austin Park is here after being in hosp for dofetilide loading. She tolerated the medication. QTC remained stable. Continued to have pAF with RVR at times and sinus bradycardia. She was monitored for an extra day and medications adjusted. She was discharged home. on BB / Chantal Cassette / Wander Dumas /  Natasha Miranda / she denies any bleeding falls   She has no c/o wt gain / cp/ usual SOB / denies PND or HEVER     states had COVID last year and now has had vaccines    She states she is in afib and she has an Ipad  with her declaring afib all the time   when I did EKG she is in NSR with few PACs   she did have some aifb on her ipad EKGs but in NSR today  msec     2018  TTE Left ventricular cavity size is normal. There is mild concentric left   ventricular hypertrophy. Overall left ventricular systolic function appears   normal with an ejection fraction of 55-60%. No regional wall motion   abnormalities are noted. Indeterminate diastolic function. Trivial mitral regurgitation is present. The aortic valve leaflets appear slightly thickened but opens well. Mild tricuspid regurgitation. Estimated pulmonary artery systolic pressure is normal at 30 mmHg assuming a   right atrial pressure of 8 mmHg.     Stress test 2019    There is normal isotope uptake at stress and rest. There is no evidence of    myocardial ischemia or scar.    Normal LV size and systolic function.    Left ventricular ejection fraction of 75 %.    There are no regional wall motion abnormalities.    Normal myocardial perfusion study. Past Medical History:   Diagnosis Date    Acute diastolic congestive heart failure ([facility] 2018    Allergic     SEASONAL    Atrial fibrillation ([facility] H/O    Cancer ([facility]  AND     BREAST right    Essential hypertension 3/13/2018    Hammertoe     Migraine     Mixed hyperlipidemia 10/19/2020     Social History:    Social History     Tobacco Use   Smoking Status Former Smoker    Packs/day: 0.25    Years: 8.00    Pack years: 2.00    Types: Cigarettes    Start date: 34    Quit date: 1988    Years since quittin.2   Smokeless Tobacco Never Used   Tobacco Comment    QUIT 1982     Current Medications:  Current Outpatient Medications   Medication Sig Dispense Refill    metoprolol succinate (TOPROL XL) 50 MG extended release tablet Take 1 tablet by mouth daily 90 tablet 3    verapamil (CALAN) 80 MG tablet Take 1 tablet by mouth 3 times daily 270 tablet 3    dofetilide (TIKOSYN) 500 MCG capsule Take 1 capsule by mouth every 12 hours 14 capsule 0    dofetilide (TIKOSYN) 500 MCG capsule Take 1 capsule by mouth 2 times daily 60 capsule 5    apixaban (ELIQUIS) 5 MG TABS tablet Take 1 tablet by mouth 2 times daily 180 tablet 3    furosemide (LASIX) 20 MG tablet TAKE 1 TABLET DAILY 90 tablet 3    Vitamin D, Cholecalciferol, 25 MCG (1000 UT) CAPS Take 1,000 Units by mouth daily (Patient taking differently: Take 3 capsules by mouth daily )      ezetimibe (ZETIA) 10 MG tablet TAKE 1 TABLET BY MOUTH ONE TIME A DAY 90 tablet 3    acetaminophen (TYLENOL) 325 MG tablet Take 325 mg by mouth every 6 hours as needed. As need for headaches. No current facility-administered medications for this visit. REVIEW OF SYSTEMS:    CONSTITUTIONAL: No major weight gain or loss, night sweats, fever, fatigue, or weakness. HEENT: No new vision difficulties or ringing in the ears.   RESPIRATORY: No new Lab Results   Component Value Date    TSH 1.05 11/26/2018    F5GETPM 1.43 11/26/2018    W5ZVLON 9.2 11/26/2018     Lab Results   Component Value Date    WBC 6.5 03/05/2021    HGB 13.6 03/05/2021    HCT 39.6 03/05/2021    MCV 88.5 03/05/2021     03/05/2021     Lab Results   Component Value Date    TRIG 292 (H) 09/08/2020    TRIG 202 (H) 11/20/2017    TRIG 180 (H) 07/25/2016     Lab Results   Component Value Date    HDL 38 (L) 09/08/2020    HDL 61 (H) 11/20/2017    HDL 45 (L) 07/25/2016     Lab Results   Component Value Date    LDLCALC 99 09/08/2020    LDLCALC 121 (H) 11/20/2017    LDLCALC 93 07/25/2016     Lab Results   Component Value Date    LABVLDL 58 09/08/2020    LABVLDL 40 11/20/2017      No results found for: BNP  Radiology Review:  Pertinent images / reports were reviewed as a part of this visit and reveals the following:    Last EKG Echo:Last Stress Test:Last Angiogram:reviewed     Assessment:     atypical AFL,  pAF, S/p RFA (2005, Dr. Saint Base, 2006, Dr. Byron Marsh, 2013, Dr. Amador Meza), s/p RFA/PVI of AF/typical AFL/atypical AFL (11/23/2020). Per EP note:  pAF, S/p RFA (2005, Dr. Saint Base, 2006, Dr. Byron Marsh, 2013, Dr. Amador Meza), s/p RFA/PVI of AF/typical AFL/atypical AFL (11/23/2020). [id number] Score for Atrial Fibrillation Stroke Risk   Risk   Factors  Component Value   C CHF Yes 1   H HTN Yes 1   A2 Age >= 75 No,  (71 y.o.) 0   D DM No 0   S2 Prior Stroke/TIA No 0   V Vascular Disease No 0   A Age 74-69 Yes,  (71 y.o.) 1   Sc Sex female 1    [id number]  Score  4       complaint with meds   Discussed nutrition / sodium intake / fluid intake   Recommend activity as tolerated     2018  TTE Left ventricular cavity size is normal. There is mild concentric left   ventricular hypertrophy. Overall left ventricular systolic function appears   normal with an ejection fraction of 55-60%. No regional wall motion   abnormalities are noted. Indeterminate diastolic function.    Trivial mitral regurgitation is present. The aortic valve leaflets appear slightly thickened but opens well. Mild tricuspid regurgitation. Estimated pulmonary artery systolic pressure is normal at 30 mmHg assuming a   right atrial pressure of 8 mmHg. Plan  c/o symptomatic afib   Ms Kelly oMntalvo is here after being in hosp for dofetilide loading. She tolerated the medication. QTC remained stable. Continued to have pAF with RVR at times and sinus bradycardia. She was monitored for an extra day and medications adjusted. She was discharged home / on BB / Dominguez Knox / Miranda Ratliff /  Liset Scripture / she denies any bleeding falls   She has no c/o wt gain / cp/ usual SOB with afib / denies PND or HEVER     states had COVID last year and now has had vaccines    She states she is in afib and she has an Ipad  with her declaring afib all the time   she did have some aifb on her ipad EKGs but in NSR today   msec   when I did EKG she is in NSR with few PACs  zio for two weeks and fu with Jyothi Young in approx 4 weeks    Overall the patient is stable from CV standpoint    I have addressed the patient's cardiac risk factors and adjusted pharmacologic treatment as needed. In addition, I have reinforced the need for patient directed risk factor modification. Further evaluation will be based upon the patient's clinical course and testing results. All questions and concerns were addressed to the patient. Alternatives to my treatment were discussed. The patient verbalizes understanding not to stop medications without discussing with us. Patient is on a beta-blocker  Patient is on a zetia   On [address]   Discussed exercise: 30min of sustained cardiovascular exercise most days of the week   Discussed Low saturated fat / Cholesterol diet. Thank you for allowing us to participate in the care of Elias Michel.     Jesse MCCRARYP Bear Betancourt     Documentation of today's visit sent to PCP

## 2021-03-26 ENCOUNTER — CARE COORDINATION (OUTPATIENT)
Dept: CASE MANAGEMENT | Age: 71
End: 2021-03-26

## 2021-03-26 NOTE — CARE COORDINATION
[address] [facility] Transitions Follow Up Call    3/26/2021    Patient: Elza Antonio  Patient : [id number]   MRN: [id number]   Reason for Admission:  covid 19   Discharge Date: 3/11/21 RARS: Readmission Risk Score: 9         Spoke with: 95Diallo Deluca Transitions Subsequent and Final Call    Subsequent and Final Calls  Do you have any ongoing symptoms?: No  Have your medications changed?: No  Do you have any questions related to your medications?: No  Do you have any needs or concerns that I can assist you with?: No  Identified Barriers: None  Care Transitions Interventions  Other Interventions:         Summary  CTN spoke to the Pt who denies s/s r/t Covid 19. LBM was today. Pt confirms she has all meds and taking as prescribed. Pt reports she saw NP with cardiology yesterday and there were no changes to her meds. [facility]: Are you at higher risk for severe illness?  Wash your hands often.  Avoid close contact (6 feet, which is about two arm lengths) with people who are sick.  Put distance between yourself and other people if COVID-19 is spreading in your community.  Clean and disinfect frequently touched surfaces.  Avoid all cruise travel and non-essential air travel.  Call your healthcare professional if you have concerns about COVID-19 and your underlying condition or if you are sick. Pt will take all meds as prescribed and schedule / keep doctors appt. [facility] provided education on s/s that require medical attention and when to seek medical attention. [facility] advised Pt of use urgent care or physicians 24 hr access line if assistance is needed after hours or on the weekend. Pt denies any needs or concerns and CT calls have concluded.      Follow Up  Future Appointments   Date Time Provider Augustine Latham   2021  3:45 PM MAILE Fung - CNP [facility]   2021  1:15 PM MD RAMAN Guerrero [facility] Blount RN

## 2021-03-29 RX ORDER — EZETIMIBE 10 MG/1
TABLET ORAL
Qty: 90 TABLET | Refills: 3 | Status: SHIPPED | OUTPATIENT
Start: 2021-03-29 | End: 2022-02-25

## 2021-03-29 NOTE — TELEPHONE ENCOUNTER
Requested Prescriptions     Pending Prescriptions Disp Refills    ezetimibe (Herber Stanton) 10 MG tablet [Pharmacy Med Name: Ezetimibe Oral Tablet 10 MG] 90 tablet 3     Sig: TAKE 1 TABLET BY MOUTH ONE TIME A DAY                Last Office Visit: 3/25/2021     Next Office Visit: 4/26/2021

## 2021-04-05 NOTE — TELEPHONE ENCOUNTER
His is an old message. I have previously spoken to [facility] to inform them that the patient established care with me as a new patient on 10/19/20 and I can't sign paperwork prior to that date.

## 2021-04-21 DIAGNOSIS — R00.0 TACHYCARDIA: ICD-10-CM

## 2021-04-21 DIAGNOSIS — I48.0 PAROXYSMAL ATRIAL FIBRILLATION (HCC): ICD-10-CM

## 2021-04-21 PROCEDURE — 93248 EXT ECG>7D<15D REV&INTERPJ: CPT | Performed by: INTERNAL MEDICINE

## 2021-04-21 PROCEDURE — 93246 EXT ECG>7D<15D RECORDING: CPT | Performed by: INTERNAL MEDICINE

## 2021-04-21 NOTE — PROGRESS NOTES
[address]   Electrophysiology  Office Visit  Date: 4/26/2021    Chief Complaint   Patient presents with    Atrial Fibrillation    Shortness of Breath    Chest Pain    Congestive Heart Failure       Cardiac HX: Jhon Rodriguez is a 65 y.o. woman with a h/o breast CA, HLD,  chronic dCHF, atypical AFL, pAF, S/p RFA (2005, Dr. Terence Garcia, 2006, Dr. Barak Bailey, 2013, Dr. Lashaun Thrasher), s/p RFA/PVI of AF/typical AFL/atypical AFL (11/23/2020, Dr. Silvio Cloud). Interval History/HPI: Patient is here for follow-up for AF/AFL. Patient undergone catheter ablation and pulmonary vein isolation for AF and atypical/typical AFL 11/23/2020. She had been placed on metoprolol 50 mg after the procedure, had complaints of dizziness, lightheadedness and bradycardia and the dose was decreased to 25 mg daily. She then went into AF with elevated heart rates increase the dose back to 50 mg daily. In follow-up she was in A. fib with a heart rate of 91. She then underwent a DCCV (12/9/2020, Dr. Silvio Cloud). She was in NSR in follow-up. She was then admitted 3/8/2021 for dofetilide loading, converted to NSR however had nonsustained paroxysmal AF post loading. She then wore a 2-week Zio patch (3/25/20214/8/2021) that showed an average HR 91 (33218), 2945 ventricular tachycardic episodes with the longest lasting 28.1 seconds, 69% AF burden,  128 pauses with the longest lasting 4.2 seconds in length. Patient complains of chest pain, shortness of breath and no energy. She describes her pain as a chest tightness, like there is a band around her chest.  She cannot take a deep breath and has shortness of breath at the same time. It does not radiate or wake her at night. It is worse with exertion. She has no energy along with fatigue and cannot walk across the room without having to sit down. She does occasionally get some lower extremity edema. She is on Lasix 20 mg daily.       She is here for follow-up for paroxysmal atrial fibrillation and atypical atrial flutter. She underwent a catheter ablation and pulmonary vein isolation of her AF and typical AFL, atypical AFL on November 23, 2020. In follow-up she was noted to be in atrial fibrillation and review of her log showed that she had been in atrial fibrillation most of the time since her procedure. She was placed on digoxin 0.25 mg for heart rate control and the flecainide was discontinued for a QRS of 260 ms. She underwent DCCV to [facility] on 12/9/2020. She developed recurrent AF on 12/10/2020, was taken off amlodipine and started on verapamil 80 mg every 8 hours and her Toprol was increased to 50 mg daily. Today presents in NSR. Forgot to bring her iPad to show me the amount of episodes that she has had. She states that she has had 4-5 tachycardic episodes last week with the longest lasting about an hour and her heart rates were in the 138-144 range. She had an episode this morning that lasted about 45 minutes and her heart rate was up in the 120s. It makes her exhausted and fatigued with a heart rate elevated. She feels the episodes may be getting less.     Home medications:   Current Outpatient Medications on File Prior to Visit   Medication Sig Dispense Refill    ezetimibe (ZETIA) 10 MG tablet TAKE 1 TABLET BY MOUTH ONE TIME A DAY  90 tablet 3    metoprolol succinate (TOPROL XL) 50 MG extended release tablet Take 1 tablet by mouth daily 90 tablet 3    verapamil (CALAN) 80 MG tablet Take 1 tablet by mouth 3 times daily 270 tablet 3    dofetilide (TIKOSYN) 500 MCG capsule Take 1 capsule by mouth 2 times daily 60 capsule 5    apixaban (ELIQUIS) 5 MG TABS tablet Take 1 tablet by mouth 2 times daily 180 tablet 3    furosemide (LASIX) 20 MG tablet TAKE 1 TABLET DAILY 90 tablet 3    Vitamin D, Cholecalciferol, 25 MCG (1000 UT) CAPS Take 1,000 Units by mouth daily (Patient taking differently: Take 3 capsules by mouth daily )      acetaminophen (TYLENOL) 325 MG tablet Take 325 mg by mouth every 6 hours as needed. As need for headaches. No current facility-administered medications on file prior to visit.         Past Medical History:   Diagnosis Date    Acute diastolic congestive heart failure ([facility] [address] 11/26/2018    Allergic     SEASONAL    Atrial fibrillation ([facility] [address] H/O    Cancer ([facility] [address] 2009 AND 2011    BREAST right    Essential hypertension 3/13/2018    Hammertoe     Migraine     Mixed hyperlipidemia 10/19/2020        Past Surgical History:   Procedure Laterality Date    ANKLE FRACTURE SURGERY  2004,2008    APPENDECTOMY  03/1983    ATRIAL ABLATION SURGERY  X3    Cryoablation for atrial fib    AXILLARY SURGERY Right     lymph node    BREAST BIOPSY  01/07/2011    BREAST BIOPSY  7/12/2011    left breast bx - benign     BREAST CYST EXCISION  06/2003,    BREAST LUMPECTOMY  03/30/2009    CARDIAC CATHETERIZATION  5/2005,01/2006    COLONOSCOPY  1993,1995,1999,2003,2006    COLONOSCOPY  11/29/2011    Dr. Malaika Stewart - benign polyps     COSMETIC SURGERY      FOOT SURGERY Right 1/15/16    CORRECTION OF HAMMERTOES 1-5 RIGHT FOOT, WEIL OSTEOTOMY 2,3    FOOT SURGERY Right 05/12/2016    EXTENSOR TENDON LENGTHENING 4TH AND 5TH TOES RIGHT FOOT    FOOT SURGERY Right 08/11/2016    CORRECTION HAMMERTOES 1ST, 4TH AND 5TH DIGITS RIGHT FOOT WITH    HERNIA REPAIR  [id number]    HYSTERECTOMY  12/1977    JOINT REPLACEMENT  June 2015    LEFT KNEE    KNEE SURGERY  6/09/2015    left knee replacement    LIPOMA RESECTION  09/2006    MASTECTOMY  3-15-11    bilateral    NEUROMA SURGERY  1987,1988,1989,1992 , 1995    Left foot    OTHER SURGICAL HISTORY  1983, 1984 X 2, 1994 X 2, 2006    Bowel obstructions    RHINOPLASTY  11/1976    RHINOPLASTY  1995    THORACOTOMY  1998    TONSILLECTOMY  1956    UPPER GASTROINTESTINAL ENDOSCOPY  [id number]    VARICOSE VEIN SURGERY  11/2001       Allergies   Allergen Reactions    Keflex [Cephalexin] Shortness Of Breath and Nausea Only    Novocain [Procaine] Shortness Of Breath and Swelling     LIDOCAINE OK    Amiodarone Other (See Comments)     dizziness    Cephalexin     Diltiazem Other (See Comments)     dizziness    Penicillins Swelling    Sulfa Antibiotics Nausea Only and Swelling       Social History:  Reviewed. reports that she quit smoking about 33 years ago. Her smoking use included cigarettes. She started smoking about 41 years ago. She has a 2.00 pack-year smoking history. She has never used smokeless tobacco. She reports that she does not drink alcohol or use drugs. Family History:  Reviewed. family history includes Cancer in her brother, father, and mother. Review of System:    · Constitutional: No fevers, chills. · Eyes: No visual changes or diplopia. No scleral icterus. · ENT: No Headaches. No mouth sores or sore throat. · Cardiovascular: No for chest pain, Yes for dyspnea on exertion, Yes for palpitations or No for loss of consciousness. No cough, hemoptysis, No for pleuritic pain, or phlebitis. · Respiratory: No for cough or wheezing. No hematemesis. · Gastrointestinal: No abdominal pain, blood in stools. · Genitourinary: No dysuria, or hematuria. · Musculoskeletal: No gait disturbance,    · Integumentary: No rash or pruritis. · Neurological: No headache, change in muscle strength, numbness or tingling. · Psychiatric: No anxiety, or depression. · Endocrine: No temperature intolerance. No excessive thirst, fluid intake, or urination. · Hem/Lymph: No abnormal bruising or bleeding, blood clots or swollen lymph nodes. · Allergic/Immunologic: No nasal congestion or hives. Physical Examination:  Vitals:    04/26/21 1550   BP: 122/76   Pulse: 115         Wt Readings from Last 3 Encounters:   04/26/21 216 lb 3.2 oz (98.1 kg)   03/25/21 212 lb (96.2 kg)   03/08/21 207 lb 14.3 oz (94.3 kg)       · Constitutional: Oriented. No distress. · Head: Normocephalic and atraumatic.    · Mouth/Throat: Oropharynx is clear and moist.   · Eyes: Conjunctivae clear without jaunduice. PERRL. · Neck: Neck supple. No rigidity. No JVD present. · Cardiovascular: Normal rate, regular rhythm, S1&S2. · Pulmonary/Chest: Bilateral respiratory sounds. No wheezes, No rhonchi. · Abdominal: Soft. Bowel sounds present. No distension, No tenderness. · Musculoskeletal: No tenderness. No edema    · Lymphadenopathy: Has no cervical adenopathy. · Neurological: Alert and oriented. Cranial nerve appears intact, No Gross deficit   · Skin: Skin is warm and dry. No rash noted. · Psychiatric: Has a normal mood, affect and behavior     Labs:  Reviewed. No results for input(s): NA, K, CL, CO2, PHOS, BUN, CREATININE in the last 72 hours. Invalid input(s): CA,  TSH  No results for input(s): WBC, HGB, HCT, MCV, PLT in the last 72 hours. No results found for: CKTOTAL, CKMB, CKMBINDEX, TROPONINI  No results found for: BNP  Lab Results   Component Value Date    PROTIME 13.5 03/05/2021    PROTIME 16.4 12/09/2020    PROTIME 29.0 11/23/2020    PROTIME 31.5 03/08/2012    PROTIME 21.4 02/09/2012    PROTIME 18.8 12/16/2011    PROTIME 23.5 10/06/2010    INR 1.16 03/05/2021    INR 1.41 12/09/2020    INR 1.0 11/30/2020    INR 2.48 11/23/2020     Lab Results   Component Value Date    CHOL 195 09/08/2020    HDL 38 09/08/2020    TRIG 292 09/08/2020       ECG: Personally reviewed: NSR, HR 60, , QRS 98, QTc 406    ECHO: 8.18.2020 ([facility])  Study Conclusions  - Left ventricle: Systolic function was normal. The estimated ejection fraction was in the range of    55% to 60%. Wall motion was normal; there were no regional wall motion abnormalities. - Right ventricle: Systolic function was normal by visual assessment. 11/30/2018 (Complete)   Summary   Left ventricular cavity size is normal. There is mild concentric left   ventricular hypertrophy. Overall left ventricular systolic function appears   normal with an ejection fraction of 55-60%. arthropathy, thoracic 05/17/2017    Hammer toe of right foot 08/01/2016    Concussion 03/31/2015        Assessment:   1. Paroxysmal atrial fibrillation (HCC)    2. Nonsustained ventricular tachycardia ([address]    3. Atrial fibrillation with slow ventricular response (HCC)    4. Chest pain, unspecified type    5. Shortness of breath    6. Tachycardia-bradycardia syndrome [facility])         Cardiac HX: Rodney Fong is a 65 y.o. woman with a h/o breast CA, HLD,  chronic dCHF, atypical AFL, pAF, S/p RFA (2005, Dr. Rashel Silver, 2006, Dr. Buffy Eastman, 2013, Dr. Pedro Luis Angeles), s/p RFA/PVI of AF/typical AFL/atypical AFL (11/23/2020, Dr. Kori Farias). [id number] 2. TSH 1.17 (9/8/2020). AF/TBS  - In NSR today  - S/p RFA/PVI 11/23/2020  - S/p DCCV to NSR (12/9/2020) then back in AF the next day   - On Eliquis - no s/s bleeding - continue  - Intolerant to flecainide -  - intolerant to amio and dilt  - On Toprol XL 50 mg daily, verapamil 80 mg 3 times daily  - Reviewed recent 2-week Zio patch with patient (3/25/20214/8/2021), average HR 91 (33218), 2945 ventricular tachycardia runs with the longest lasting 28.1 seconds in length, 69% AF burden, longest episode lasting 1 hour and 27 minutes, 128 pauses noted with the longest lasting 4.2 seconds in length, IVR, junctional rhythms also noted. - Long discussion today regarding patient not a candidate for a repeat catheter ablation for atrial fibrillation. She currently is undergone for ablations with recurrent AF. We discussed AV node ablation and CRT-P placement.   Patient wants to discuss this with her daughter.  - Patient to follow-up in 4 weeks  - ECG ordered and results personally reviewed     NSVT  - 2945 episodes on the monitor with the longest lasting 28.1 seconds  - Last MPI 2019  - Will have patient see Dr. Wen Casillas on Monday for a possible cath    SOB/edema  - Patient on Lasix 20 mg daily  - She was instructed to take 40 for the next couple of days to see if it helps with her symptoms of shortness of breath and edema    EF of 98-70%  No systolic HF  No known CAD  Anticoagulation for AF   No Tobacco use. All questions and concerns were addressed to the patient/family. Alternatives to my treatment were discussed. The note was completed using EMR. Every effort was made to ensure accuracy; however, inadvertent computerized transcription errors may be present. Patient received education regarding their diagnosis, treatment and medications while in the office today. Estela Ayoub CNP  [address]       I  have spent > 40 minutes of face to face time directly with the patient/family with more than 50% spent counseling and coordinating care for this patient. , including treatment options and the side effects of medications

## 2021-04-22 PROCEDURE — 93248 EXT ECG>7D<15D REV&INTERPJ: CPT | Performed by: INTERNAL MEDICINE

## 2021-04-26 ENCOUNTER — OFFICE VISIT (OUTPATIENT)
Dept: CARDIOLOGY CLINIC | Age: 71
End: 2021-04-26
Payer: MEDICARE

## 2021-04-26 VITALS
WEIGHT: 216.2 LBS | DIASTOLIC BLOOD PRESSURE: 76 MMHG | SYSTOLIC BLOOD PRESSURE: 122 MMHG | HEART RATE: 115 BPM | BODY MASS INDEX: 32.02 KG/M2 | HEIGHT: 69 IN

## 2021-04-26 DIAGNOSIS — R07.9 CHEST PAIN, UNSPECIFIED TYPE: ICD-10-CM

## 2021-04-26 DIAGNOSIS — I49.5 TACHYCARDIA-BRADYCARDIA SYNDROME (HCC): ICD-10-CM

## 2021-04-26 DIAGNOSIS — I47.29 NONSUSTAINED VENTRICULAR TACHYCARDIA: ICD-10-CM

## 2021-04-26 DIAGNOSIS — I48.91 ATRIAL FIBRILLATION WITH SLOW VENTRICULAR RESPONSE (HCC): ICD-10-CM

## 2021-04-26 DIAGNOSIS — R06.02 SHORTNESS OF BREATH: ICD-10-CM

## 2021-04-26 DIAGNOSIS — I48.0 PAROXYSMAL ATRIAL FIBRILLATION (HCC): Primary | ICD-10-CM

## 2021-04-26 PROCEDURE — 4040F PNEUMOC VAC/ADMIN/RCVD: CPT | Performed by: NURSE PRACTITIONER

## 2021-04-26 PROCEDURE — 1123F ACP DISCUSS/DSCN MKR DOCD: CPT | Performed by: NURSE PRACTITIONER

## 2021-04-26 PROCEDURE — 1036F TOBACCO NON-USER: CPT | Performed by: NURSE PRACTITIONER

## 2021-04-26 PROCEDURE — G9708 BILAT MAST/HX BI /UNILAT MAS: HCPCS | Performed by: NURSE PRACTITIONER

## 2021-04-26 PROCEDURE — G8399 PT W/DXA RESULTS DOCUMENT: HCPCS | Performed by: NURSE PRACTITIONER

## 2021-04-26 PROCEDURE — G8417 CALC BMI ABV UP PARAM F/U: HCPCS | Performed by: NURSE PRACTITIONER

## 2021-04-26 PROCEDURE — G8427 DOCREV CUR MEDS BY ELIG CLIN: HCPCS | Performed by: NURSE PRACTITIONER

## 2021-04-26 PROCEDURE — 3017F COLORECTAL CA SCREEN DOC REV: CPT | Performed by: NURSE PRACTITIONER

## 2021-04-26 PROCEDURE — 93000 ELECTROCARDIOGRAM COMPLETE: CPT | Performed by: NURSE PRACTITIONER

## 2021-04-26 PROCEDURE — 99215 OFFICE O/P EST HI 40 MIN: CPT | Performed by: NURSE PRACTITIONER

## 2021-04-26 PROCEDURE — 1090F PRES/ABSN URINE INCON ASSESS: CPT | Performed by: NURSE PRACTITIONER

## 2021-04-26 RX ORDER — NITROGLYCERIN 0.4 MG/1
0.4 TABLET SUBLINGUAL EVERY 5 MIN PRN
Qty: 25 TABLET | Refills: 3 | Status: SHIPPED | OUTPATIENT
Start: 2021-04-26

## 2021-04-26 NOTE — PATIENT INSTRUCTIONS
working right. Pacemaker batteries usually last 5 to 15 years before they need to be replaced. Follow-up care is a key part of your treatment and safety. Be sure to make and go to all appointments, and call your doctor if you are having problems. It's also a good idea to know your test results and keep a list of the medicines you take. Where can you learn more? Go to https://[email or web address].[email or web address] org and sign in to your [employer] account. Enter Y169 in the [facility] box to learn more about \"Learning About a Biventricular Pacemaker. \"     If you do not have an account, please click on the \"Sign Up Now\" link. Current as of: August 31, 2020               Content Version: 12.8  © 2006-2021 [employer]. Care instructions adapted under license by [facility] ([facility]). If you have questions about a medical condition or this instruction, always ask your healthcare professional. [place] [address] any warranty or liability for your use of this information. Patient Education        Biventricular Pacemaker Placement: Before Your Procedure  What is biventricular pacemaker placement? A biventricular pacemaker (say \"by-tuan-TRICK-yuh-ler\") is a device used to treat heart failure. Treatment that uses this type of pacemaker is called cardiac resynchronization therapy (CRT). When you have heart failure, the lower chambers of your heart may not pump at the same time. The pacemaker sends painless electrical signals to your heart. These signals make the chambers pump at the same time. This can help your heart pump blood better and help you feel better. Your pacemaker may be combined with an ICD, or implantable cardioverter-defibrillator. It can control abnormal heart rhythms. This can prevent sudden death. You will get medicine to help you relax and prevent pain. The doctor will make a cut in the skin just below your collarbone.  The cut may be on either side of your chest. The increase the risk of problems during your procedure. Your doctor will tell you if you should stop taking any of them before the procedure and how soon to do it.     · Make sure your doctor and the hospital have a copy of your advance directive. If you don't have one, you may want to prepare one. It lets others know your health care wishes. It's a good thing to have before any type of surgery or procedure. What happens on the day of the procedure? · Follow the instructions exactly about when to stop eating and drinking. If you don't, your procedure may be canceled. If your doctor told you to take your medicines on the day of the procedure, take them with only a sip of water.     · Take a bath or shower before you come in for your procedure. Do not apply lotions, perfumes, deodorants, or nail polish.     · Take off all jewelry and piercings. And take out contact lenses, if you wear them. At the hospital or surgery center   · Bring a picture ID.     · You will be kept comfortable and safe by your anesthesia provider. You may get medicine that relaxes you or puts you in a light sleep. The area being worked on will be numb.     · The procedure will take 2 to 3 hours. When should you call your doctor? · You have questions or concerns.     · You don't understand how to prepare for your procedure.     · You become ill before the procedure (such as fever, flu, or a cold).     · You need to reschedule or have changed your mind about having the procedure. Where can you learn more? Go to https://tokia.lt.Altierre. org and sign in to your [facility] account. Enter C440 in the [facility] box to learn more about \"Biventricular Pacemaker Placement: Before Your Procedure. \"     If you do not have an account, please click on the \"Sign Up Now\" link. Current as of: August 31, 2020               Content Version: 12.8  © 7273-7903 Healthwise, Incorporated.    Care instructions adapted under license by [facility] ([facility]). If you have questions about a medical condition or this instruction, always ask your healthcare professional. [place] [address] any warranty or liability for your use of this information. Patient Education        Biventricular Pacemaker Placement: What to Expect at [address] pacemaker placement is surgery to put a biventricular pacemaker in your chest. Your doctor made a cut (incision) just below your collarbone. The doctor put the pacemaker leads through the cut, into a large blood vessel, then into the heart. The doctor put the pacemaker under the skin of your chest and attached the leads to it. Your chest may be sore where the doctor made the cut. You also may have a bruise and mild swelling. These symptoms usually get better in 1 to 2 weeks. You may feel a hard ridge along the incision. This usually gets softer in the months after surgery. You may be able to see or feel the outline of the pacemaker under your skin. You will probably be able to go back to work or your usual routine 1 to 2 weeks after surgery. Pacemaker batteries usually last 5 to 15 years. Your doctor will talk to you about how often you will need to have your pacemaker checked. You'll need to take steps to safely use electric devices. Some of these devices can stop your pacemaker from working right for a short time. Check with your doctor about what to avoid and what to keep a short distance away from your pacemaker. For example, you will need to stay away from things with strong magnetic and electrical fields. An example is an MRI machine (unless your pacemaker is safe for an MRI). You can use a cellphone and other wireless devices, but keep them at least 6 inches away from your pacemaker. Many household and office electronics don't affect a pacemaker. These include kitchen appliances and computers.   This care sheet gives you a general idea about how long it will take for you to recover. But each person recovers at a different pace. Follow the steps below to get better as quickly as possible. How can you care for yourself at home? Activity    · Rest when you feel tired.     · Be active. Walking is a good choice.     · For 4 to 6 weeks:  ? Avoid activities that strain your chest or upper arm muscles. This includes pushing a  or vacuum, mopping floors, swimming, or swinging a golf club or tennis racquet. ? Do not raise your arm (the one on the side of your body where the pacemaker is located) above your shoulder. ? Allow your body to heal. Don't move quickly or lift anything heavy until you are feeling better.     · Many people are able to return to work within 1 to 2 weeks after surgery.     · Ask your doctor when it is okay for you to have sex. Diet    · You can eat your normal diet. If your stomach is upset, try bland, low-fat foods like plain rice, broiled chicken, toast, and yogurt. Medicines    · Your doctor will tell you if and when you can restart your medicines. You will also get instructions about taking any new medicines.     · If you take aspirin or some other blood thinner, ask your doctor if and when to start taking it again. Make sure that you understand exactly what your doctor wants you to do.     · Be safe with medicines. Read and follow all instructions on the label. ? If the doctor gave you a prescription medicine for pain, take it as prescribed. ? If you are not taking a prescription pain medicine, ask your doctor if you can take an over-the-counter medicine. ? Do not take aspirin, ibuprofen (Advil, Motrin), naproxen (Aleve), or other nonsteroidal anti-inflammatory drugs (NSAIDs) unless your doctor says it is okay.     · If your doctor prescribed antibiotics, take them as directed. Do not stop taking them just because you feel better. You need to take the full course of antibiotics.    Incision care    · If you have strips of tape on the incision, leave the tape on for a week or until it falls off.     · Keep the incision dry while it heals. Your doctor may recommend sponge baths for about 7 days, but do not get the incision wet. Your doctor will let you know when you may take showers. After a shower, pat the incision dry.     · Don't use hydrogen peroxide or alcohol on the incision, which can slow healing. You may cover the area with a gauze bandage if it oozes fluid or rubs against clothing. Change the bandage every day.     · Do not take a bath or get into a hot tub for the first 2 weeks, or until your doctor tells you it is okay. Other instructions    · Keep a medical ID card with you at all times that says you have a pacemaker. The card should include the  and model information.     · Wear medical alert jewelry stating that you have a pacemaker. You can buy this at most [employer].     · Check your pulse as directed by your doctor.     · Have your pacemaker checked as often as your doctor recommends. In some cases, this may be done over the phone or online. Your doctor will give you instructions about how to do this. Follow-up care is a key part of your treatment and safety. Be sure to make and go to all appointments, and call your doctor if you are having problems. It's also a good idea to know your test results and keep a list of the medicines you take. When should you call for help? Call 911 anytime you think you may need emergency care. For example, call if:    · You passed out (lost consciousness).     · You have trouble breathing.    Call your doctor now or seek immediate medical care if:    · You are dizzy or light-headed, or you feel like you may faint.     · You have pain that does not get better after you take pain medicine.     · You hear an alarm or feel a vibration from your pacemaker.     · You have loose stitches, or your incision comes open.     · Bright red blood has soaked through the bandage over your incision.     · helps prevent pain. The doctor makes a cut in the skin just below your collarbone. The cut may be on either side of your chest. The doctor will put the pacemaker leads through the cut. The leads go into a large blood vessel in the upper chest. Then the doctor will guide the leads through the blood vessel into the heart. The leads are placed in one or two of the chambers in the heart. The doctor will place the pacemaker under the skin of your chest. The doctor will attach the leads to the pacemaker. Then the cut will be closed with stitches. What can you expect when you have a pacemaker? A pacemaker can help you return to a more normal, more active life. You'll need to use certain electric devices with caution. Some devices have a strong electromagnetic field. This field can keep your pacemaker from working right for a short time. These devices include things in your home, garage, or workplace. Check with your doctor about what you need to avoid and what you need to keep a short distance away from your pacemaker. Many household and office electronics do not affect your pacemaker. Your doctor will check your pacemaker regularly to make sure it is working right. Pacemaker batteries usually last 5 to 15 years before they need to be replaced. Follow-up care is a key part of your treatment and safety. Be sure to make and go to all appointments, and call your doctor if you are having problems. It's also a good idea to know your test results and keep a list of the medicines you take. Where can you learn more? Go to https://FannectpecristinaAkamedia.Franchise Fund. org and sign in to your [employer] account. Enter V454 in the [employer] box to learn more about \"Learning About a Pacemaker. \"     If you do not have an account, please click on the \"Sign Up Now\" link. Current as of: August 31, 2020               Content Version: 12.8  © 6327-5068 Healthwise, Incorporated.    Care instructions adapted under license by [facility] [place] ([facility]). If you have questions about a medical condition or this instruction, always ask your healthcare professional. [place] [address] any warranty or liability for your use of this information.

## 2021-04-27 DIAGNOSIS — R00.2 PALPITATION: ICD-10-CM

## 2021-05-03 ENCOUNTER — OFFICE VISIT (OUTPATIENT)
Dept: CARDIOLOGY CLINIC | Age: 71
End: 2021-05-03
Payer: MEDICARE

## 2021-05-03 VITALS
SYSTOLIC BLOOD PRESSURE: 134 MMHG | HEART RATE: 76 BPM | DIASTOLIC BLOOD PRESSURE: 90 MMHG | BODY MASS INDEX: 31.6 KG/M2 | WEIGHT: 214 LBS

## 2021-05-03 DIAGNOSIS — I48.3 TYPICAL ATRIAL FLUTTER (HCC): ICD-10-CM

## 2021-05-03 DIAGNOSIS — Z98.890 S/P ABLATION OF ATRIAL FIBRILLATION: ICD-10-CM

## 2021-05-03 DIAGNOSIS — I47.20 VT (VENTRICULAR TACHYCARDIA): Primary | ICD-10-CM

## 2021-05-03 DIAGNOSIS — Z86.79 S/P ABLATION OF ATRIAL FIBRILLATION: ICD-10-CM

## 2021-05-03 DIAGNOSIS — Z86.79 S/P ABLATION OF ATRIAL FLUTTER: ICD-10-CM

## 2021-05-03 DIAGNOSIS — I48.0 PAROXYSMAL ATRIAL FIBRILLATION (HCC): ICD-10-CM

## 2021-05-03 DIAGNOSIS — R07.9 CHEST PAIN, UNSPECIFIED TYPE: ICD-10-CM

## 2021-05-03 DIAGNOSIS — Z98.890 S/P ABLATION OF ATRIAL FLUTTER: ICD-10-CM

## 2021-05-03 PROCEDURE — G8427 DOCREV CUR MEDS BY ELIG CLIN: HCPCS | Performed by: INTERNAL MEDICINE

## 2021-05-03 PROCEDURE — G8417 CALC BMI ABV UP PARAM F/U: HCPCS | Performed by: INTERNAL MEDICINE

## 2021-05-03 PROCEDURE — 4040F PNEUMOC VAC/ADMIN/RCVD: CPT | Performed by: INTERNAL MEDICINE

## 2021-05-03 PROCEDURE — 1036F TOBACCO NON-USER: CPT | Performed by: INTERNAL MEDICINE

## 2021-05-03 PROCEDURE — 99214 OFFICE O/P EST MOD 30 MIN: CPT | Performed by: INTERNAL MEDICINE

## 2021-05-03 PROCEDURE — 1123F ACP DISCUSS/DSCN MKR DOCD: CPT | Performed by: INTERNAL MEDICINE

## 2021-05-03 PROCEDURE — G8399 PT W/DXA RESULTS DOCUMENT: HCPCS | Performed by: INTERNAL MEDICINE

## 2021-05-03 PROCEDURE — 3017F COLORECTAL CA SCREEN DOC REV: CPT | Performed by: INTERNAL MEDICINE

## 2021-05-03 PROCEDURE — G9708 BILAT MAST/HX BI /UNILAT MAS: HCPCS | Performed by: INTERNAL MEDICINE

## 2021-05-03 PROCEDURE — 1090F PRES/ABSN URINE INCON ASSESS: CPT | Performed by: INTERNAL MEDICINE

## 2021-05-03 NOTE — PROGRESS NOTES
fee  Subjective:      Patient ID: Iliana Zelaya is a 65 y.o. female. \[facility] [place] [facility]\"" Corbin Navarrete is being seen for cardiac follow up for afib/arrhythmia and now VT. Ablation 11/20, then ECV 12/20. Not feeling good since ablation. Now on dofetilide. Tolerating metoprolol. No pnd or orthopnea. No palp/syncope. No sob. Vague chest pain on occasion . Non exertion related. Mild. Brief. Now with VT on monitor per EP.        Past Medical History:   Diagnosis Date    Acute diastolic congestive heart failure ([address] 11/26/2018    Allergic     SEASONAL    Atrial fibrillation ([address] H/O    Cancer ([facility] [address] 2009 AND 2011    BREAST right    Essential hypertension 3/13/2018    Hammertoe     Migraine     Mixed hyperlipidemia 10/19/2020     Past Surgical History:   Procedure Laterality Date    ANKLE FRACTURE SURGERY  2004,2008    APPENDECTOMY  03/1983    ATRIAL ABLATION SURGERY  X3    Cryoablation for atrial fib    AXILLARY SURGERY Right     lymph node    BREAST BIOPSY  01/07/2011    BREAST BIOPSY  7/12/2011    left breast bx - benign     BREAST CYST EXCISION  06/2003,    BREAST LUMPECTOMY  03/30/2009    CARDIAC CATHETERIZATION  5/2005,01/2006    COLONOSCOPY  1993,1995,1999,2003,2006    COLONOSCOPY  11/29/2011    Dr. Marija Mensah - benign polyps     COSMETIC SURGERY      FOOT SURGERY Right 1/15/16    CORRECTION OF HAMMERTOES 1-5 RIGHT FOOT, WEIL OSTEOTOMY 2,3    FOOT SURGERY Right 05/12/2016    EXTENSOR TENDON LENGTHENING 4TH AND 5TH TOES RIGHT FOOT    FOOT SURGERY Right 08/11/2016    CORRECTION HAMMERTOES 1ST, 4TH AND 5TH DIGITS RIGHT FOOT WITH    HERNIA REPAIR  [id number]    HYSTERECTOMY  12/1977    JOINT REPLACEMENT  June 2015    LEFT KNEE    KNEE SURGERY  6/09/2015    left knee replacement    LIPOMA RESECTION  09/2006    MASTECTOMY  3-15-11    bilateral    NEUROMA SURGERY  1987,1988,1989,1992 , 1995    Left foot    OTHER SURGICAL HISTORY  1983, 1984 X 2, 1994 X 2, 2006    Bowel obstructions    RHINOPLASTY 1976    [facility]    TONSILLECTOMY      UPPER GASTROINTESTINAL ENDOSCOPY  [id number]    VARICOSE VEIN SURGERY  2001     Social History     Socioeconomic History    Marital status:      Spouse name: Not on file    Number of children: 2    Years of education: 15    Highest education level: Not on file   Occupational History    Occupation: [employer] finish    Social Needs    Financial resource strain: Not hard at all   [place] insecurity     Worry: Never true     Inability: Never true    Transportation needs     Medical: No     Non-medical: No   Tobacco Use    Smoking status: Former Smoker     Packs/day: 0.25     Years: 8.00     Pack years: 2.00     Types: Cigarettes     Start date: 34     Quit date: 1988     Years since quittin.3    Smokeless tobacco: Never Used    Tobacco comment: QUIT    Substance and Sexual Activity    Alcohol use: No    Drug use: No    Sexual activity: Never   Lifestyle    Physical activity     Days per week: Not on file     Minutes per session: Not on file    Stress: Not on file   Relationships    Social connections     Talks on phone: Not on file     Gets together: Not on file     Attends Ep[employer]alian service: Not on file     Active member of club or organization: Not on file     Attends meetings of clubs or organizations: Not on file     Relationship status: Not on file    Intimate partner violence     Fear of current or ex partner: Not on file     Emotionally abused: Not on file     Physically abused: Not on file     Forced sexual activity: Not on file   Other Topics Concern    Not on file   Social History Narrative    Not on file       Family hx reviewed, denies FH cardiac issues      Vitals:    21 1405   BP: (!) 134/90   Pulse: 76       Wt 203    Review of Systems   Constitutional: Negative for activity change, appetite change and fatigue.    Respiratory: Negative for cough, choking, chest tightness and shortness of breath. Cardiovascular: Negative for chest pain, palpitations and leg swelling. Denies PND or orthopnea. No tachycardia or syncope. Neurological: Negative for dizziness, syncope and light-headedness. Psychiatric/Behavioral: Negative for behavioral problems, confusion and agitation. All other systems reviewed negative as done    Objective:   Physical Exam     Constitutional: She is oriented to person, place, and time. She appears well-developed and well-nourished. No distress. HENT:   Head: Normocephalic and atraumatic. Eyes: Conjunctivae and EOM are normal. Right eye exhibits no discharge. Left eye exhibits no discharge. Neck: Normal range of motion. No JVD present. Cardiovascular: Normal rate, regular rhythm, S1 normal, S2 normal and normal heart sounds. Exam reveals no gallop. 1/6 syst murmur heard. Pulses:       Radial pulses are 2+ on the right side, and 2+ on the left side. Pulmonary/Chest: Effort normal and breath sounds normal. She has no rales. Abdominal: Soft. Bowel sounds are normal. There is no tenderness. Musculoskeletal: Normal range of motion. She exhibits no edema. Neurological: She is alert and oriented to person, place, and time. Skin: Skin is warm and dry. Psychiatric: She has a normal mood and affect. Her behavior is normal.       Assessment:       Diagnosis Orders   1. VT (ventricular tachycardia) (HCC)     2. Chest pain, unspecified type     3. Paroxysmal atrial fibrillation (HCC)     4. Typical atrial flutter ([address]     5. S/P ablation of atrial fibrillation     6. S/P ablation of atrial flutter           Plan:      Continues with P afib. Daily episodes. BP good. No exertional sx. On AC, no bleeding issues. EP following and monitor showed multiple episodes of VT. Have recommended cath. Risk and bene explained. Bridge with lovenox. No changes.  Reviewed previous records and testing including myoview 3/19 and 30 day monitor.

## 2021-05-04 ENCOUNTER — TELEPHONE (OUTPATIENT)
Dept: CARDIOLOGY CLINIC | Age: 71
End: 2021-05-04

## 2021-05-04 NOTE — TELEPHONE ENCOUNTER
Left Heart Catheterization    A left heart catheterization is a procedure that provides your cardiologist with detailed information regarding how your heart functions. A small catheter (long, fine tube) is inserted into an artery (a vessel that carries blood and oxygen) that leads to your heart. While watching with x-ray equipment, small amounts of dye are injected which enables visualization of the heart arteries and chambers. The pictures that your cardiologist receives from the cardiac catheterization enable him or her to decide on the best treatment for you. Date of the procedure:   05/18/2021    Time of arrival:  7:30a.m    Cardiologist performing the procedure:  Gwen Li TEST: 05/11/2021 at 10:00 a.m     Instructions for your left heart catheterization:    1. Bring a list of your medications to the hospital.    2.  Please notify us before the procedure if you are allergic to anything; especially x-ray contrast dye, iodine, nickel, or any type of jewelry. This is very important! 3. Do not eat or drink anything at all after midnight (or 8 hours) prior to the procedure. 4.  Take all morning medications EXCEPT any diuretics (water pills) the day of the procedure with a small sip of water. 5.  If you are on Coumadin, Warfarin, or Kathlen Lock, please notify us so that we can make adjustments to your medication. 6.  If you are taking Xarelto, Eliquis, or Pradaxa, please stop staking these medications two days prior to the procedure (including the day of the procedure). 7.  If you are diabetic, check your blood sugar in the morning. If your blood sugar is 120 or less, do not take insulin. If your blood sugar is more than 120, take half the dose of your normal insulin. Do not take Metformin the night before your procedure or morning of the procedure. 8.  You MUST have someone to drive you home--no driving for 24 hours after your procedure.   If an intervention is performed, you might stay overnight in the hospital.    9.  Discharge instructions will be given to you at the time of your procedure. 10. For any questions or if you cannot keep this appointment for any reason, please call [phone]. She will also need to be bridged with lovenox. Please see instructions below. ...    05/12/2021 Last dose of Eliquis  05/13/2021 NO Eliquis  05/14/2021 NO Eliquis  05/15/2021 Inject Lovenox in AM and PM ( 12 hours apart)  05/16/2021 Inject Lovenox in AM and PM ( 12 hours apart)  05/17/2021 Inject Lovenox in AM only   05/18/2021 Day of Surgery.  No Lovenox

## 2021-05-05 ENCOUNTER — PREP FOR PROCEDURE (OUTPATIENT)
Dept: CARDIOLOGY CLINIC | Age: 71
End: 2021-05-05

## 2021-05-05 RX ORDER — SODIUM CHLORIDE 9 MG/ML
25 INJECTION, SOLUTION INTRAVENOUS PRN
Status: CANCELLED | OUTPATIENT
Start: 2021-05-05

## 2021-05-05 RX ORDER — ASPIRIN 325 MG
325 TABLET ORAL ONCE
Status: CANCELLED | OUTPATIENT
Start: 2021-05-18

## 2021-05-05 RX ORDER — SODIUM CHLORIDE 9 MG/ML
INJECTION, SOLUTION INTRAVENOUS CONTINUOUS
Status: CANCELLED | OUTPATIENT
Start: 2021-05-05

## 2021-05-05 RX ORDER — SODIUM CHLORIDE 0.9 % (FLUSH) 0.9 %
5-40 SYRINGE (ML) INJECTION PRN
Status: CANCELLED | OUTPATIENT
Start: 2021-05-05

## 2021-05-05 RX ORDER — SODIUM CHLORIDE 0.9 % (FLUSH) 0.9 %
5-40 SYRINGE (ML) INJECTION EVERY 12 HOURS SCHEDULED
Status: CANCELLED | OUTPATIENT
Start: 2021-05-05

## 2021-05-07 ENCOUNTER — TELEPHONE (OUTPATIENT)
Dept: CARDIOLOGY CLINIC | Age: 71
End: 2021-05-07

## 2021-05-07 NOTE — TELEPHONE ENCOUNTER
Patient called asking questions about baby aspirin and when to start taking it. Please return call [phone].

## 2021-05-11 ENCOUNTER — TELEPHONE (OUTPATIENT)
Dept: CARDIOLOGY CLINIC | Age: 71
End: 2021-05-11

## 2021-05-11 ENCOUNTER — OFFICE VISIT (OUTPATIENT)
Dept: PRIMARY CARE CLINIC | Age: 71
End: 2021-05-11
Payer: MEDICARE

## 2021-05-11 DIAGNOSIS — Z01.818 PRE-OP EXAMINATION: Primary | ICD-10-CM

## 2021-05-11 LAB — SARS-COV-2: NOT DETECTED

## 2021-05-11 PROCEDURE — 99211 OFF/OP EST MAY X REQ PHY/QHP: CPT | Performed by: NURSE PRACTITIONER

## 2021-05-11 NOTE — TELEPHONE ENCOUNTER
Julia Marie with [facility] would like to know if  would sign order for [employer] Julia Marie will refax order.

## 2021-05-18 ENCOUNTER — HOSPITAL ENCOUNTER (OUTPATIENT)
Dept: CARDIAC CATH/INVASIVE PROCEDURES | Age: 71
Discharge: HOME | End: 2021-05-20
Payer: MEDICARE

## 2021-05-18 VITALS — BODY MASS INDEX: 31.84 KG/M2 | HEIGHT: 69 IN | TEMPERATURE: 98.2 F | WEIGHT: 215 LBS

## 2021-05-18 DIAGNOSIS — I47.20 VT (VENTRICULAR TACHYCARDIA): ICD-10-CM

## 2021-05-18 LAB
A/G RATIO: 1.7 (ref 1.1–2.2)
ALBUMIN SERPL-MCNC: 4.5 G/DL (ref 3.4–5)
ALP BLD-CCNC: 74 U/L (ref 40–129)
ALT SERPL-CCNC: 29 U/L (ref 10–40)
ANION GAP SERPL CALCULATED.3IONS-SCNC: 12 MMOL/L (ref 3–16)
AST SERPL-CCNC: 34 U/L (ref 15–37)
BILIRUB SERPL-MCNC: 0.5 MG/DL (ref 0–1)
BUN BLDV-MCNC: 15 MG/DL (ref 7–20)
CALCIUM SERPL-MCNC: 9.3 MG/DL (ref 8.3–10.6)
CHLORIDE BLD-SCNC: 99 MMOL/L (ref 99–110)
CO2: 26 MMOL/L (ref 21–32)
CREAT SERPL-MCNC: 0.5 MG/DL (ref 0.6–1.2)
EKG ATRIAL RATE: 300 BPM
EKG ATRIAL RATE: 75 BPM
EKG DIAGNOSIS: NORMAL
EKG DIAGNOSIS: NORMAL
EKG P AXIS: 70 DEGREES
EKG P-R INTERVAL: 186 MS
EKG Q-T INTERVAL: 290 MS
EKG Q-T INTERVAL: 350 MS
EKG QRS DURATION: 86 MS
EKG QRS DURATION: 96 MS
EKG QTC CALCULATION (BAZETT): 390 MS
EKG QTC CALCULATION (BAZETT): 436 MS
EKG R AXIS: 3 DEGREES
EKG R AXIS: 4 DEGREES
EKG T AXIS: 162 DEGREES
EKG T AXIS: 67 DEGREES
EKG VENTRICULAR RATE: 136 BPM
EKG VENTRICULAR RATE: 75 BPM
GFR AFRICAN AMERICAN: >60
GFR NON-AFRICAN AMERICAN: >60
GLOBULIN: 2.7 G/DL
GLUCOSE BLD-MCNC: 127 MG/DL (ref 70–99)
HCT VFR BLD CALC: 39.8 % (ref 36–48)
HEMOGLOBIN: 13.4 G/DL (ref 12–16)
INR BLD: 0.94 (ref 0.86–1.14)
LEFT VENTRICULAR EJECTION FRACTION MODE: NORMAL
LV EF: 55 %
MCH RBC QN AUTO: 30.4 PG (ref 26–34)
MCHC RBC AUTO-ENTMCNC: 33.7 G/DL (ref 31–36)
MCV RBC AUTO: 90.2 FL (ref 80–100)
PDW BLD-RTO: 13.5 % (ref 12.4–15.4)
PLATELET # BLD: 292 K/UL (ref 135–450)
PMV BLD AUTO: 7.6 FL (ref 5–10.5)
POTASSIUM SERPL-SCNC: 4.5 MMOL/L (ref 3.5–5.1)
PROTHROMBIN TIME: 10.9 SEC (ref 10–13.2)
RBC # BLD: 4.42 M/UL (ref 4–5.2)
SODIUM BLD-SCNC: 137 MMOL/L (ref 136–145)
TOTAL PROTEIN: 7.2 G/DL (ref 6.4–8.2)
WBC # BLD: 6.4 K/UL (ref 4–11)

## 2021-05-18 PROCEDURE — 85610 PROTHROMBIN TIME: CPT

## 2021-05-18 PROCEDURE — 93458 L HRT ARTERY/VENTRICLE ANGIO: CPT | Performed by: INTERNAL MEDICINE

## 2021-05-18 PROCEDURE — 6360000004 HC RX CONTRAST MEDICATION: Performed by: INTERNAL MEDICINE

## 2021-05-18 PROCEDURE — 6360000002 HC RX W HCPCS

## 2021-05-18 PROCEDURE — C1760 CLOSURE DEV, VASC: HCPCS

## 2021-05-18 PROCEDURE — 99152 MOD SED SAME PHYS/QHP 5/>YRS: CPT

## 2021-05-18 PROCEDURE — 93010 ELECTROCARDIOGRAM REPORT: CPT | Performed by: INTERNAL MEDICINE

## 2021-05-18 PROCEDURE — 93005 ELECTROCARDIOGRAM TRACING: CPT | Performed by: INTERNAL MEDICINE

## 2021-05-18 PROCEDURE — 80053 COMPREHEN METABOLIC PANEL: CPT

## 2021-05-18 PROCEDURE — 93458 L HRT ARTERY/VENTRICLE ANGIO: CPT

## 2021-05-18 PROCEDURE — C1769 GUIDE WIRE: HCPCS

## 2021-05-18 PROCEDURE — 2709999900 HC NON-CHARGEABLE SUPPLY

## 2021-05-18 PROCEDURE — C1894 INTRO/SHEATH, NON-LASER: HCPCS

## 2021-05-18 PROCEDURE — 85027 COMPLETE CBC AUTOMATED: CPT

## 2021-05-18 PROCEDURE — 2500000003 HC RX 250 WO HCPCS

## 2021-05-18 RX ORDER — SODIUM CHLORIDE 9 MG/ML
INJECTION, SOLUTION INTRAVENOUS CONTINUOUS
Status: ACTIVE | OUTPATIENT
Start: 2021-05-18 | End: 2021-05-18

## 2021-05-18 RX ORDER — SODIUM CHLORIDE 0.9 % (FLUSH) 0.9 %
5-40 SYRINGE (ML) INJECTION EVERY 12 HOURS SCHEDULED
Status: DISCONTINUED | OUTPATIENT
Start: 2021-05-18 | End: 2021-05-21 | Stop reason: HOSPADM

## 2021-05-18 RX ORDER — SODIUM CHLORIDE 0.9 % (FLUSH) 0.9 %
5-40 SYRINGE (ML) INJECTION PRN
Status: DISCONTINUED | OUTPATIENT
Start: 2021-05-18 | End: 2021-05-21 | Stop reason: HOSPADM

## 2021-05-18 RX ORDER — SODIUM CHLORIDE 9 MG/ML
25 INJECTION, SOLUTION INTRAVENOUS PRN
Status: DISCONTINUED | OUTPATIENT
Start: 2021-05-18 | End: 2021-05-21 | Stop reason: HOSPADM

## 2021-05-18 RX ORDER — ASPIRIN 325 MG
325 TABLET ORAL ONCE
Status: DISCONTINUED | OUTPATIENT
Start: 2021-05-18 | End: 2021-05-21 | Stop reason: HOSPADM

## 2021-05-18 RX ORDER — SODIUM CHLORIDE 9 MG/ML
INJECTION, SOLUTION INTRAVENOUS CONTINUOUS
Status: DISCONTINUED | OUTPATIENT
Start: 2021-05-18 | End: 2021-05-18 | Stop reason: SDUPTHER

## 2021-05-18 RX ORDER — ACETAMINOPHEN 325 MG/1
650 TABLET ORAL EVERY 4 HOURS PRN
Status: DISCONTINUED | OUTPATIENT
Start: 2021-05-18 | End: 2021-05-21 | Stop reason: HOSPADM

## 2021-05-18 RX ADMIN — IOHEXOL 100 ML: 350 INJECTION, SOLUTION INTRAVENOUS at 09:27

## 2021-05-18 NOTE — CONSULTS
ATRIAL ABLATION SURGERY  X3    Cryoablation for atrial fib    AXILLARY SURGERY Right     lymph node    BREAST BIOPSY  01/07/2011    BREAST BIOPSY  7/12/2011    left breast bx - benign     BREAST CYST EXCISION  06/2003,    BREAST LUMPECTOMY  03/30/2009    CARDIAC CATHETERIZATION  5/2005,01/2006    COLONOSCOPY  1993,1995,1999,2003,2006    COLONOSCOPY  11/29/2011    Dr. Aundrea Milner - benign polyps     COSMETIC SURGERY      FOOT SURGERY Right 1/15/16    CORRECTION OF HAMMERTOES 1-5 RIGHT FOOT, WEIL OSTEOTOMY 2,3    FOOT SURGERY Right 05/12/2016    EXTENSOR TENDON LENGTHENING 4TH AND 5TH TOES RIGHT FOOT    FOOT SURGERY Right 08/11/2016    CORRECTION HAMMERTOES 1ST, 4TH AND 5TH DIGITS RIGHT FOOT WITH    HERNIA REPAIR  [id number]    HYSTERECTOMY  12/1977    JOINT REPLACEMENT  June 2015    LEFT KNEE    KNEE SURGERY  6/09/2015    left knee replacement    LIPOMA RESECTION  09/2006    MASTECTOMY  3-15-11    bilateral    NEUROMA SURGERY  1987,1988,1989,1992 , 1995    Left foot    OTHER SURGICAL HISTORY  1983, 1984 X 2, 1994 X 2, 2006    Bowel obstructions    RHINOPLASTY  11/1976    RHINOPLASTY  1995    THORACOTOMY  1998    TONSILLECTOMY  1956    UPPER GASTROINTESTINAL ENDOSCOPY  [id number]    VARICOSE VEIN SURGERY  11/2001         Medications:  Current Outpatient Medications   Medication Sig Dispense Refill    enoxaparin (LOVENOX) 100 MG/ML injection Take one syringe twice a day 12 hours apart except on 05/17/2021 use in the a.m only.  5 Syringe 0    ezetimibe (ZETIA) 10 MG tablet TAKE 1 TABLET BY MOUTH ONE TIME A DAY  90 tablet 3    metoprolol succinate (TOPROL XL) 50 MG extended release tablet Take 1 tablet by mouth daily 90 tablet 3    verapamil (CALAN) 80 MG tablet Take 1 tablet by mouth 3 times daily 270 tablet 3    dofetilide (TIKOSYN) 500 MCG capsule Take 1 capsule by mouth 2 times daily 60 capsule 5    apixaban (ELIQUIS) 5 MG TABS tablet Take 1 tablet by mouth 2 times daily 180 tablet 3    furosemide (LASIX) 20 MG tablet TAKE 1 TABLET DAILY 90 tablet 3    Vitamin D, Cholecalciferol, 25 MCG (1000 UT) CAPS Take 1,000 Units by mouth daily (Patient taking differently: Take 3 capsules by mouth daily )      nitroGLYCERIN (NITROSTAT) 0.4 MG SL tablet Place 1 tablet under the tongue every 5 minutes as needed for Chest pain 25 tablet 3    acetaminophen (TYLENOL) 325 MG tablet Take 325 mg by mouth every 6 hours as needed. As need for headaches. Current Facility-Administered Medications   Medication Dose Route Frequency Provider Last Rate Last Admin    0.9 % sodium chloride infusion   Intravenous Continuous Constanza Smart MD        0.9 % sodium chloride infusion  25 mL Intravenous PRN Constanza Smart MD        aspirin tablet 325 mg  325 mg Oral Once Constanza Smart MD        sodium chloride flush 0.9 % injection 5-40 mL  5-40 mL Intravenous 2 times per day Constanza Smart MD        sodium chloride flush 0.9 % injection 5-40 mL  5-40 mL Intravenous PRN Constanza Smart MD               Pre-Sedation:    Pre-Sedation Documentation and Exam:  I have personally completed a history, physical exam & review of systems for this patient (see notes). Prior History of Anesthesia Complications:   none    Modified Mallampati:  II (soft palate, uvula, fauces visible)    ASA Classification:  Class 3 - A patient with severe systemic disease that limits activity but is not incapacitating      Rambo Scale: Activity:  2 - Able to move 4 extremities voluntarily on command  Respiration:  2 - Able to breathe deeply and cough freely  Circulation:  2 - BP+/- 20mmHg of normal  Consciousness:  2 - Fully awake  Oxygen Saturation (color):  2 - Able to maintain oxygen saturation >92% on room air    Sedation/Anesthesia Plan:  Guard the patient's safety and welfare. Minimize physical discomfort and pain.   Minimize negative psychological responses to treatment by providing sedation and analgesia and maximize the potential amnesia. Patient to meet pre-procedure discharge plan.     Medication Planned:  midazolam intravenously and fentanyl intravenously    Patient is an appropriate candidate for plan of sedation: yes      Electronically signed by Jackelyn Hogue MD on 5/18/2021 at 9:08 AM

## 2021-05-18 NOTE — CONSULTS
Signed        Expand AllCollapse All      Show:Clear all  [x]Manual[x]Template[x]Copied    Added by:  [x]Logan Diaz MD    []Juliana for details  fee  Subjective:      Patient ID: Chiki Emery is a 65 y.o. female.      HPI Rodolph Ek is being seen for cardiac follow up for afib/arrhythmia and now VT. Ablation 11/20, then ECV 12/20. Not feeling good since ablation. Now on dofetilide. Tolerating metoprolol. No pnd or orthopnea. No palp/syncope. No sob. Vague chest pain on occasion . Non exertion related. Mild. Brief.   Now with VT on monitor per EP.        Past Medical History        Past Medical History:   Diagnosis Date    Acute diastolic congestive heart failure ([address] 11/26/2018    Allergic       SEASONAL    Atrial fibrillation ([facility] H/O    Cancer ([facility] [facility] 2009 AND 2011     BREAST right    Essential hypertension 3/13/2018    Hammertoe      Migraine      Mixed hyperlipidemia 10/19/2020         Past Surgical History         Past Surgical History:   Procedure Laterality Date    ANKLE FRACTURE SURGERY   2004,2008    APPENDECTOMY   03/1983    ATRIAL ABLATION SURGERY   X3     Cryoablation for atrial fib    AXILLARY SURGERY Right       lymph node    BREAST BIOPSY   01/07/2011    BREAST BIOPSY   7/12/2011     left breast bx - benign     BREAST CYST EXCISION   06/2003,    BREAST LUMPECTOMY   03/30/2009    CARDIAC CATHETERIZATION   5/2005,01/2006    COLONOSCOPY   1993,1995,1999,2003,2006    COLONOSCOPY   11/29/2011     Dr. Neptali Dhillon - benign polyps     COSMETIC SURGERY        FOOT SURGERY Right 1/15/16     CORRECTION OF HAMMERTOES 1-5 RIGHT FOOT, WEIL OSTEOTOMY 2,3    FOOT SURGERY Right 05/12/2016     EXTENSOR TENDON LENGTHENING 4TH AND 5TH TOES RIGHT FOOT    FOOT SURGERY Right 08/11/2016     CORRECTION HAMMERTOES 1ST, 4TH AND 5TH DIGITS RIGHT FOOT WITH    HERNIA REPAIR   [id number]    HYSTERECTOMY   12/1977    JOINT REPLACEMENT   June 2015     LEFT KNEE    KNEE SURGERY   2015     left knee replacement    LIPOMA RESECTION   2006    MASTECTOMY   3-15-11     bilateral    NEUROMA SURGERY   ,,, ,      Left foot    OTHER SURGICAL HISTORY   1983, 1984 X 2, 1994 X 2, 2006     Bowel obstructions    RHINOPLASTY   1976    RHINOPLASTY       THORACOTOMY   1998    TONSILLECTOMY   195    UPPER GASTROINTESTINAL ENDOSCOPY   [id number]    VARICOSE VEIN SURGERY   2001         Social History               Socioeconomic History    Marital status:        Spouse name: Not on file    Number of children: 2    Years of education: 12    Highest education level: Not on file   Occupational History    Occupation: [employer] finish    Social Needs    Financial resource strain: Not hard at all   [employer] insecurity       Worry: Never true       Inability: Never true    Transportation needs       Medical: No       Non-medical: No   Tobacco Use    Smoking status: Former Smoker       Packs/day: 0.25       Years: 8.00       Pack years: 2.00       Types: Cigarettes       Start date: 34       Quit date: 1988       Years since quittin.3    Smokeless tobacco: Never Used    Tobacco comment: QUIT    Substance and Sexual Activity    Alcohol use: No    Drug use: No    Sexual activity: Never   Lifestyle    Physical activity       Days per week: Not on file       Minutes per session: Not on file    Stress: Not on file   Relationships    Social connections       Talks on phone: Not on file       Gets together: Not on file       Attends Denominational service: Not on file       Active member of club or organization: Not on file       Attends meetings of clubs or organizations: Not on file       Relationship status: Not on file    Intimate partner violence       Fear of current or ex partner: Not on file       Emotionally abused: Not on file       Physically abused: Not on file       Forced sexual activity: Not fibrillation      6. S/P ablation of atrial flutter                       Plan:   Continues with P afib. Daily episodes. BP good. No exertional sx. On AC, no bleeding issues. EP following and monitor showed multiple episodes of VT. Have recommended cath. Risk and bene explained. Bridge with lovenox. No changes.  Reviewed previous records and testing including myoview 3/19 and 30 day monitor.

## 2021-05-18 NOTE — CONSULTS
Procedure: LHC, angioseal RFA  Complication: none  EBL<5 cc  Preliminary:  LV uniform. EF 55%. Normal cors    Successful angioseal RFA.

## 2021-05-18 NOTE — PROCEDURES
[facility] [address], [address]                            CARDIAC CATHETERIZATION    PATIENT NAME: Joss Mathews                      :        1950  MED REC NO:   [id number]                          ROOM:  ACCOUNT NO:   [de-identified]                           ADMIT DATE: 2021  PROVIDER:     Clayton Peterson MD    DATE OF PROCEDURE:  2021    PROCEDURES PERFORMED:  Left heart catheterization, coronary  cineangiography, Angio-Seal of the right femoral arteriotomy site. HISTORY:  The patient is a 68-year-old female with a history of atrial  fibrillation and multiple ablations. She was noted to have multiple  episodes of ventricular tachycardia on monitor. She was seen by EP and  she was referred back to us. Because of her multiple episodes of  ventricular tachycardia, it was felt she should undergo catheterization. TECHNICAL PROCEDURE:  The patient was brought to the cardiac  catheterization lab on 2021 where the right femoral area was  prepped and draped in the usual sterile fashion. After anesthetizing  the area with 2% lidocaine, a 5-Syrian sheath was placed in the right  femoral artery using Seldinger technique. Subsequently, a left heart  catheterization and left ventriculography and selective coronary  cineangiography, both left and right coronaries were performed using  multiple projections. This was performed using 5-Syrian pigtail, JL-4,  JR-4 diagnostic catheters. The patient tolerated the procedure well. No complications were encountered. A brief right femoral arteriogram  was obtained to ascertain positioning appropriate for Angio-Seal.  It  was well positioned. She was successfully sealed with standard 6-Syrian  Angio-Seal device. No complications were encountered. RESULTS:    HEMODYNAMICS:  Left ventricular end-diastolic pressure equals 12.     There was no significant gradient across the aortic valve by pullback  post cineangiography. LEFT VENTRICULOGRAM:  Left ventriculogram demonstrates uniform wall  motion. Estimated ejection fraction is 55%. LEFT MAIN:  Left main is a short vessel that is normal.    LEFT ANTERIOR DESCENDING:  The LAD courses to and wraps around the apex. It gave off a large first diagonal branch followed by two small diagonal  branches. The LAD is normal.    LEFT CIRCUMFLEX:  Circumflex consists of two marginal branches before a  small posterolateral branch. Circumflex is normal.    RIGHT CORONARY ARTERY:  Right coronary artery is a dominant vessel. It  gives off moderate sized high-rising PDA and two posterolateral  branches. The right coronary artery is normal.    IMPRESSION:  1. Normal left ventricular systolic function. 2.  Essentially normal coronary arteries. 3.  Successful Angio-Seal of right femoral arteriotomy site.         Lalita Kc MD    D: 05/18/2021 9:32:31       T: 05/18/2021 10:22:09     [email or web address]  Job#: [id number]     Doc#: [id number]    CC:

## 2021-06-04 NOTE — PROGRESS NOTES
We discussed AV node ablation and CRT-P placement and patient wanted to discuss with her daughter. Today patient presents in AT/AFL with a heart rate of 122. She states at home that her heart rate runs between 68 to 135 bpm.  Sitting in the office today at rest she remains tachycardic. She is short of breath with minimal exertion. Patient states she has gained 5-6# since Monday. Patient states she is eating the same amount and denies early satiety. She states she feels bloated. Denies lower extremity edema. Home medications:   Current Outpatient Medications on File Prior to Visit   Medication Sig Dispense Refill    enoxaparin (LOVENOX) 100 MG/ML injection Take one syringe twice a day 12 hours apart except on 05/17/2021 use in the a.m only. 5 Syringe 0    nitroGLYCERIN (NITROSTAT) 0.4 MG SL tablet Place 1 tablet under the tongue every 5 minutes as needed for Chest pain 25 tablet 3    ezetimibe (ZETIA) 10 MG tablet TAKE 1 TABLET BY MOUTH ONE TIME A DAY  90 tablet 3    verapamil (CALAN) 80 MG tablet Take 1 tablet by mouth 3 times daily 270 tablet 3    dofetilide (TIKOSYN) 500 MCG capsule Take 1 capsule by mouth 2 times daily 60 capsule 5    apixaban (ELIQUIS) 5 MG TABS tablet Take 1 tablet by mouth 2 times daily 180 tablet 3    furosemide (LASIX) 20 MG tablet TAKE 1 TABLET DAILY 90 tablet 3    Vitamin D, Cholecalciferol, 25 MCG (1000 UT) CAPS Take 1,000 Units by mouth daily (Patient taking differently: Take 3 capsules by mouth daily )      acetaminophen (TYLENOL) 325 MG tablet Take 325 mg by mouth every 6 hours as needed. As need for headaches. No current facility-administered medications on file prior to visit.        Past Medical History:   Diagnosis Date    Acute diastolic congestive heart failure ([address] 11/26/2018    Allergic     SEASONAL    Atrial fibrillation ([address] H/O    Cancer ([address] 2009 AND 2011    BREAST right    Essential hypertension 3/13/2018    Hammertoe     Migraine     Mixed hyperlipidemia 10/19/2020        Past Surgical History:   Procedure Laterality Date    ANKLE FRACTURE SURGERY  2004,2008    APPENDECTOMY  03/1983    ATRIAL ABLATION SURGERY  X3    Cryoablation for atrial fib    AXILLARY SURGERY Right     lymph node    BREAST BIOPSY  01/07/2011    BREAST BIOPSY  7/12/2011    left breast bx - benign     BREAST CYST EXCISION  06/2003,    BREAST LUMPECTOMY  03/30/2009    CARDIAC CATHETERIZATION  5/2005,01/2006    COLONOSCOPY  1993,1995,1999,2003,2006    COLONOSCOPY  11/29/2011    Dr. Opal Mills - benign polyps     COSMETIC SURGERY      FOOT SURGERY Right 1/15/16    CORRECTION OF HAMMERTOES 1-5 RIGHT FOOT, WEIL OSTEOTOMY 2,3    FOOT SURGERY Right 05/12/2016    EXTENSOR TENDON LENGTHENING 4TH AND 5TH TOES RIGHT FOOT    FOOT SURGERY Right 08/11/2016    CORRECTION HAMMERTOES 1ST, 4TH AND 5TH DIGITS RIGHT FOOT WITH    HERNIA REPAIR  [id number]    HYSTERECTOMY  12/1977    JOINT REPLACEMENT  June 2015    LEFT KNEE    KNEE SURGERY  6/09/2015    left knee replacement    LIPOMA RESECTION  09/2006    MASTECTOMY  3-15-11    bilateral    NEUROMA SURGERY  1987,1988,1989,1992 , 1995    Left foot    OTHER SURGICAL HISTORY  1983, 1984 X 2, 1994 X 2, 2006    Bowel obstructions    RHINOPLASTY  11/1976    RHINOPLASTY  1995    THORACOTOMY  1998    TONSILLECTOMY  1956    UPPER GASTROINTESTINAL ENDOSCOPY  [id number]    VARICOSE VEIN SURGERY  11/2001       Allergies   Allergen Reactions    Keflex [Cephalexin] Shortness Of Breath and Nausea Only    Novocain [Procaine] Shortness Of Breath and Swelling     LIDOCAINE OK    Amiodarone Other (See Comments)     dizziness    Cephalexin     Diltiazem Other (See Comments)     dizziness    Penicillins Swelling    Sulfa Antibiotics Nausea Only and Swelling       Social History:  Reviewed. reports that she quit smoking about 33 years ago. Her smoking use included cigarettes.  She started smoking about 41 years ago. She has a 2.00 pack-year smoking history. She has never used smokeless tobacco. She reports that she does not drink alcohol and does not use drugs. Family History:  Reviewed. family history includes Cancer in her brother, father, and mother. Review of System:    · Constitutional: No fevers, chills. · Eyes: No visual changes or diplopia. No scleral icterus. · ENT: No Headaches. No mouth sores or sore throat. · Cardiovascular: No for chest pain, Yes for dyspnea on exertion, Yes for palpitations or No for loss of consciousness. No cough, hemoptysis, No for pleuritic pain, or phlebitis. · Respiratory: No for cough or wheezing. No hematemesis. · Gastrointestinal: No abdominal pain, blood in stools. · Genitourinary: No dysuria, or hematuria. · Musculoskeletal: No gait disturbance,    · Integumentary: No rash or pruritis. · Neurological: No headache, change in muscle strength, numbness or tingling. · Psychiatric: No anxiety, or depression. · Endocrine: No temperature intolerance. No excessive thirst, fluid intake, or urination. · Hem/Lymph: No abnormal bruising or bleeding, blood clots or swollen lymph nodes. · Allergic/Immunologic: No nasal congestion or hives. Physical Examination:  Vitals:    06/10/21 1353   BP: 132/82   Pulse: 122         Wt Readings from Last 3 Encounters:   06/10/21 223 lb (101.2 kg)   06/07/21 216 lb 12.8 oz (98.3 kg)   05/18/21 215 lb (97.5 kg)       · Constitutional: Oriented. No distress. · Head: Normocephalic and atraumatic. · Mouth/Throat: Oropharynx is clear and moist.   · Eyes: Conjunctivae clear without jaunduice. PERRL. · Neck: Neck supple. No rigidity. No JVD present. · Cardiovascular: Normal rate, regular rhythm, S1&S2. · Pulmonary/Chest: Bilateral respiratory sounds. No wheezes, No rhonchi. · Abdominal: Soft. Bowel sounds present. No distension, No tenderness. · Musculoskeletal: No tenderness.  No edema    · Lymphadenopathy: Has no cervical adenopathy. · Neurological: Alert and oriented. Cranial nerve appears intact, No Gross deficit   · Skin: Skin is warm and dry. No rash noted. · Psychiatric: Has a normal mood, affect and behavior     Labs:  Reviewed. No results for input(s): NA, K, CL, CO2, PHOS, BUN, CREATININE in the last 72 hours. Invalid input(s): CA,  TSH  No results for input(s): WBC, HGB, HCT, MCV, PLT in the last 72 hours. No results found for: CKTOTAL, CKMB, CKMBINDEX, TROPONINI  No results found for: BNP  Lab Results   Component Value Date    PROTIME 10.9 05/18/2021    PROTIME 13.5 03/05/2021    PROTIME 16.4 12/09/2020    PROTIME 31.5 03/08/2012    PROTIME 21.4 02/09/2012    PROTIME 18.8 12/16/2011    PROTIME 23.5 10/06/2010    INR 0.94 05/18/2021    INR 1.16 03/05/2021    INR 1.41 12/09/2020     Lab Results   Component Value Date    CHOL 195 09/08/2020    HDL 38 09/08/2020    TRIG 292 09/08/2020       ECG: Personally reviewed: AT/AF/AFL , QRS 96, QTc 398    ECHO: 8.18.2020 ([facility]  Study Conclusions  - Left ventricle: Systolic function was normal. The estimated ejection fraction was in the range of    55% to 60%. Wall motion was normal; there were no regional wall motion abnormalities. - Right ventricle: Systolic function was normal by visual assessment. 11/30/2018 (Complete)   Summary   Left ventricular cavity size is normal. There is mild concentric left   ventricular hypertrophy. Overall left ventricular systolic function appears   normal with an ejection fraction of 55-60%. No regional wall motion   abnormalities are noted. Indeterminate diastolic function. Trivial mitral regurgitation is present. The aortic valve leaflets appear slightly thickened but opens well. Mild tricuspid regurgitation. Estimated pulmonary artery systolic pressure is normal at 30 mmHg assuming a   right atrial pressure of 8 mmHg.     Stress Test: 2/19/2019  Summary    Pollie Dy is normal isotope uptake at stress [place]), NSVT noted on monitor, s/p LHC (5/18/2021, Dr. Connie Donnelly). [id number] 2. TSH 1.17 (9/8/2020). AT/AF/AFL  - In an AT/AF today -HR uncontrolled in 122  - S/p RFA/PVI 11/23/2020  - S/p DCCV to NSR (12/9/2020) then back in AF the next day   - On Eliquis - no s/s bleeding - continue  - Intolerant to flecainide -  - intolerant to amio and dilt  - On Toprol XL 50 mg daily, verapamil 80 mg 3 times daily -will increase Toprol-XL to 100 mg daily for rate control  - Reviewed recent 2-week Zio patch with patient (3/25/20214/8/2021), average HR 91 (33218), 2945 ventricular tachycardia runs with the longest lasting 28.1 seconds in length, 69% AF burden, longest episode lasting 1 hour and 27 minutes, 128 pauses noted with the longest lasting 4.2 seconds in length, IVR, junctional rhythms also noted. - Long discussion again today regarding patient not a candidate for a repeat catheter ablation for atrial fibrillation. She currently has undergone four ablations with recurrent AF. We discussed AV node ablation and CRT-P placement. Information given to patient. - On dofetilide 500 mcg - monitor showed 31% NSR - will leave for now until sees Dr. Brianda Wallace  - QTc 398  - We will have patient follow-up in 4 to 6 weeks with Dr. Brianda Wallace to discuss AV node ablation and pacing. I have asked the patient to bring her daughter with her to help with understanding. - ECG ordered and results personally reviewed     NSVT  - 2945 episodes on the monitor with the longest lasting 28.1 seconds  - LHC showed no CAD    SOB/edema  - Patient on Lasix 40 mg daily  - BMP, Mg  - Will check an echo    EF of 67-08%  No systolic HF  No known CAD  Anticoagulation for AF   No Tobacco use. All questions and concerns were addressed to the patient/family. Alternatives to my treatment were discussed. The note was completed using EMR. Every effort was made to ensure accuracy; however, inadvertent computerized transcription errors may be present. Patient received education regarding their diagnosis, treatment and medications while in the office today. Twila Phillpis CNP  [address]       I  have spent > 40 minutes of face to face time directly with the patient/family with more than 50% spent counseling and coordinating care for this patient. , including treatment options and the side effects of medications

## 2021-06-07 ENCOUNTER — OFFICE VISIT (OUTPATIENT)
Dept: CARDIOLOGY CLINIC | Age: 71
End: 2021-06-07
Payer: MEDICARE

## 2021-06-07 VITALS
SYSTOLIC BLOOD PRESSURE: 112 MMHG | DIASTOLIC BLOOD PRESSURE: 72 MMHG | HEART RATE: 67 BPM | WEIGHT: 216.8 LBS | HEIGHT: 69 IN | BODY MASS INDEX: 32.11 KG/M2

## 2021-06-07 DIAGNOSIS — I48.3 TYPICAL ATRIAL FLUTTER (HCC): ICD-10-CM

## 2021-06-07 DIAGNOSIS — I48.0 PAROXYSMAL ATRIAL FIBRILLATION (HCC): Primary | ICD-10-CM

## 2021-06-07 DIAGNOSIS — Z98.890 S/P ABLATION OF ATRIAL FIBRILLATION: ICD-10-CM

## 2021-06-07 DIAGNOSIS — Z86.79 S/P ABLATION OF ATRIAL FLUTTER: ICD-10-CM

## 2021-06-07 DIAGNOSIS — Z86.79 S/P ABLATION OF ATRIAL FIBRILLATION: ICD-10-CM

## 2021-06-07 DIAGNOSIS — Z98.890 S/P ABLATION OF ATRIAL FLUTTER: ICD-10-CM

## 2021-06-07 DIAGNOSIS — I47.20 VT (VENTRICULAR TACHYCARDIA): ICD-10-CM

## 2021-06-07 PROCEDURE — G8427 DOCREV CUR MEDS BY ELIG CLIN: HCPCS | Performed by: INTERNAL MEDICINE

## 2021-06-07 PROCEDURE — 1123F ACP DISCUSS/DSCN MKR DOCD: CPT | Performed by: INTERNAL MEDICINE

## 2021-06-07 PROCEDURE — 4040F PNEUMOC VAC/ADMIN/RCVD: CPT | Performed by: INTERNAL MEDICINE

## 2021-06-07 PROCEDURE — G8399 PT W/DXA RESULTS DOCUMENT: HCPCS | Performed by: INTERNAL MEDICINE

## 2021-06-07 PROCEDURE — G9708 BILAT MAST/HX BI /UNILAT MAS: HCPCS | Performed by: INTERNAL MEDICINE

## 2021-06-07 PROCEDURE — 1036F TOBACCO NON-USER: CPT | Performed by: INTERNAL MEDICINE

## 2021-06-07 PROCEDURE — 1090F PRES/ABSN URINE INCON ASSESS: CPT | Performed by: INTERNAL MEDICINE

## 2021-06-07 PROCEDURE — G8417 CALC BMI ABV UP PARAM F/U: HCPCS | Performed by: INTERNAL MEDICINE

## 2021-06-07 PROCEDURE — 99213 OFFICE O/P EST LOW 20 MIN: CPT | Performed by: INTERNAL MEDICINE

## 2021-06-07 PROCEDURE — 3017F COLORECTAL CA SCREEN DOC REV: CPT | Performed by: INTERNAL MEDICINE

## 2021-06-07 NOTE — PROGRESS NOTES
fee  Subjective:      Patient ID: Silvio Douglass is a 65 y.o. female. HPI Vick Taylor is being seen for cardiac follow up for afib/arrhythmia and now VT. Ablation 11/20, then ECV 12/20. Not feeling good since ablation. Now on dofetilide. Tolerating metoprolol. No pnd or orthopnea. No palp/syncope. No sob. Vague chest pain on occasion . Non exertion related. Mild. Brief.      Past Medical History:   Diagnosis Date    Acute diastolic congestive heart failure ([address] 11/26/2018    Allergic     SEASONAL    Atrial fibrillation ([address] H/O    Cancer ([facility] 2009 AND 2011    BREAST right    Essential hypertension 3/13/2018    Hammertoe     Migraine     Mixed hyperlipidemia 10/19/2020     Past Surgical History:   Procedure Laterality Date    ANKLE FRACTURE SURGERY  2004,2008    APPENDECTOMY  03/1983    ATRIAL ABLATION SURGERY  X3    Cryoablation for atrial fib    AXILLARY SURGERY Right     lymph node    BREAST BIOPSY  01/07/2011    BREAST BIOPSY  7/12/2011    left breast bx - benign     BREAST CYST EXCISION  06/2003,    BREAST LUMPECTOMY  03/30/2009    CARDIAC CATHETERIZATION  5/2005,01/2006    COLONOSCOPY  1993,1995,1999,2003,2006    COLONOSCOPY  11/29/2011    Dr. Maurisio Sorensen - benign polyps     COSMETIC SURGERY      FOOT SURGERY Right 1/15/16    CORRECTION OF HAMMERTOES 1-5 RIGHT FOOT, WEIL OSTEOTOMY 2,3    FOOT SURGERY Right 05/12/2016    EXTENSOR TENDON LENGTHENING 4TH AND 5TH TOES RIGHT FOOT    FOOT SURGERY Right 08/11/2016    CORRECTION HAMMERTOES 1ST, 4TH AND 5TH DIGITS RIGHT FOOT WITH    HERNIA REPAIR  [id number]    HYSTERECTOMY  12/1977    JOINT REPLACEMENT  June 2015    LEFT KNEE    KNEE SURGERY  6/09/2015    left knee replacement    LIPOMA RESECTION  09/2006    MASTECTOMY  3-15-11    bilateral    NEUROMA SURGERY  1987,1988,1989,1992 , 1995    Left foot    OTHER SURGICAL HISTORY  1983, 1984 X 2, 1994 X 2, 2006    Bowel obstructions    RHINOPLASTY  11/1976    RHINOPLASTY  1995    [address]    UPPER GASTROINTESTINAL ENDOSCOPY  [id number]    VARICOSE VEIN SURGERY  2001     Social History     Socioeconomic History    Marital status:      Spouse name: Not on file    Number of children: 2    Years of education: 15    Highest education level: Not on file   Occupational History    Occupation: [employer]    Tobacco Use    Smoking status: Former Smoker     Packs/day: 0.25     Years: 8.00     Pack years: 2.00     Types: Cigarettes     Start date: 34     Quit date: 1988     Years since quittin.4    Smokeless tobacco: Never Used    Tobacco comment: QUIT    Substance and Sexual Activity    Alcohol use: No    Drug use: No    Sexual activity: Never   Other Topics Concern    Not on file   Social History Narrative    Not on file     Social Determinants of Health     Financial Resource Strain: Low Risk     Difficulty of Paying Living Expenses: Not hard at all   Food Insecurity: No Food Insecurity    Worried About Running Out of Food in the Last Year: Never true    Jef of Food in the Last Year: Never true   Transportation Needs: No Transportation Needs    Lack of Transportation (Medical): No    Lack of Transportation (Non-Medical):  No   Physical Activity:     Days of Exercise per Week:     Minutes of Exercise per Session:    Stress:     Feeling of Stress :    Social Connections:     Frequency of Communication with Friends and Family:     Frequency of Social Gatherings with Friends and Family:     Attends [employer] Services:     Active Member of Clubs or Organizations:     Attends Club or Organization Meetings:     Marital Status:    Intimate Partner Violence:     Fear of Current or Ex-Partner:     Emotionally Abused:     Physically Abused:     Sexually Abused:        Family hx reviewed, denies FH cardiac issues      Vitals:    21 1501   BP: 112/72   Pulse: 67       Wt 216 Review of Systems   Constitutional: Negative for activity change, appetite change and fatigue. Respiratory: Negative for cough, choking, chest tightness and shortness of breath. Cardiovascular: Negative for chest pain, palpitations and leg swelling. Denies PND or orthopnea. No tachycardia or syncope. Neurological: Negative for dizziness, syncope and light-headedness. Psychiatric/Behavioral: Negative for behavioral problems, confusion and agitation. All other systems reviewed negative as done    Objective:   Physical Exam     Constitutional: She is oriented to person, place, and time. She appears well-developed and well-nourished. No distress. HENT:   Head: Normocephalic and atraumatic. Eyes: Conjunctivae and EOM are normal. Right eye exhibits no discharge. Left eye exhibits no discharge. Neck: Normal range of motion. No JVD present. Cardiovascular: Normal rate, regular rhythm, S1 normal, S2 normal and normal heart sounds. Exam reveals no gallop. 1/6 syst murmur heard. Pulses:       Radial pulses are 2+ on the right side, and 2+ on the left side. Pulmonary/Chest: Effort normal and breath sounds normal. She has no rales. Abdominal: Soft. Bowel sounds are normal. There is no tenderness. Musculoskeletal: Normal range of motion. She exhibits no edema. Neurological: She is alert and oriented to person, place, and time. Skin: Skin is warm and dry. Psychiatric: She has a normal mood and affect. Her behavior is normal.       Assessment:        Diagnosis Orders   1. Paroxysmal atrial fibrillation (HCC)     2. Typical atrial flutter ([address]     3. S/P ablation of atrial fibrillation     4. S/P ablation of atrial flutter     5. VT (ventricular tachycardia) ([address]             Plan:      Continues with P afib. Was even having it on and off here in office. BP good. No exertional sx. On AC, no bleeding issues. EP following and to see this week. Feeling worse since on dofetilide.    No changes. Reviewed previous records and testing including myoview 3/19 and 30 day monitor and cath 5/21.  Follow up 3 months since being followed by EP

## 2021-06-10 ENCOUNTER — OFFICE VISIT (OUTPATIENT)
Dept: CARDIOLOGY CLINIC | Age: 71
End: 2021-06-10
Payer: MEDICARE

## 2021-06-10 VITALS
HEIGHT: 69 IN | WEIGHT: 223 LBS | DIASTOLIC BLOOD PRESSURE: 82 MMHG | SYSTOLIC BLOOD PRESSURE: 132 MMHG | BODY MASS INDEX: 33.03 KG/M2 | HEART RATE: 122 BPM

## 2021-06-10 DIAGNOSIS — I48.0 PAROXYSMAL ATRIAL FIBRILLATION (HCC): Primary | ICD-10-CM

## 2021-06-10 DIAGNOSIS — I50.32 CHRONIC DIASTOLIC CHF (CONGESTIVE HEART FAILURE) (HCC): ICD-10-CM

## 2021-06-10 DIAGNOSIS — R06.02 SOB (SHORTNESS OF BREATH): ICD-10-CM

## 2021-06-10 DIAGNOSIS — I49.5 TACHY-BRADY SYNDROME (HCC): ICD-10-CM

## 2021-06-10 DIAGNOSIS — I47.29 NONSUSTAINED VENTRICULAR TACHYCARDIA: ICD-10-CM

## 2021-06-10 PROCEDURE — 93000 ELECTROCARDIOGRAM COMPLETE: CPT | Performed by: NURSE PRACTITIONER

## 2021-06-10 PROCEDURE — G8417 CALC BMI ABV UP PARAM F/U: HCPCS | Performed by: NURSE PRACTITIONER

## 2021-06-10 PROCEDURE — 1123F ACP DISCUSS/DSCN MKR DOCD: CPT | Performed by: NURSE PRACTITIONER

## 2021-06-10 PROCEDURE — 3017F COLORECTAL CA SCREEN DOC REV: CPT | Performed by: NURSE PRACTITIONER

## 2021-06-10 PROCEDURE — G8427 DOCREV CUR MEDS BY ELIG CLIN: HCPCS | Performed by: NURSE PRACTITIONER

## 2021-06-10 PROCEDURE — 1036F TOBACCO NON-USER: CPT | Performed by: NURSE PRACTITIONER

## 2021-06-10 PROCEDURE — G8399 PT W/DXA RESULTS DOCUMENT: HCPCS | Performed by: NURSE PRACTITIONER

## 2021-06-10 PROCEDURE — 4040F PNEUMOC VAC/ADMIN/RCVD: CPT | Performed by: NURSE PRACTITIONER

## 2021-06-10 PROCEDURE — 99215 OFFICE O/P EST HI 40 MIN: CPT | Performed by: NURSE PRACTITIONER

## 2021-06-10 PROCEDURE — 1090F PRES/ABSN URINE INCON ASSESS: CPT | Performed by: NURSE PRACTITIONER

## 2021-06-10 PROCEDURE — G9708 BILAT MAST/HX BI /UNILAT MAS: HCPCS | Performed by: NURSE PRACTITIONER

## 2021-06-10 RX ORDER — METOPROLOL SUCCINATE 100 MG/1
100 TABLET, EXTENDED RELEASE ORAL DAILY
Qty: 90 TABLET | Refills: 3 | Status: SHIPPED | OUTPATIENT
Start: 2021-06-10 | End: 2022-05-27 | Stop reason: SDUPTHER

## 2021-06-11 ENCOUNTER — TELEPHONE (OUTPATIENT)
Dept: CARDIOLOGY CLINIC | Age: 71
End: 2021-06-11

## 2021-06-11 NOTE — TELEPHONE ENCOUNTER
----- Message from MAILE Read CNP sent at 6/10/2021  5:38 PM EDT -----  Evelio Renee, '  I forgot to order a BMP when the patient was in the office today. Can you call her and have her get some lab work done. She has been taken that Lasix twice a day and I want a make sure that her potassium is okay. I have also ordered a magnesium to check as well. She did have an echo ordered and I am sure that is going to be scheduled.   Laci.,   HERBIE

## 2021-06-14 ENCOUNTER — PROCEDURE VISIT (OUTPATIENT)
Dept: CARDIOLOGY CLINIC | Age: 71
End: 2021-06-14
Payer: MEDICARE

## 2021-06-14 DIAGNOSIS — I50.32 CHRONIC DIASTOLIC CHF (CONGESTIVE HEART FAILURE) (HCC): ICD-10-CM

## 2021-06-14 DIAGNOSIS — I48.0 PAROXYSMAL ATRIAL FIBRILLATION (HCC): ICD-10-CM

## 2021-06-14 LAB
ANION GAP SERPL CALCULATED.3IONS-SCNC: 14 MMOL/L (ref 3–16)
BUN BLDV-MCNC: 11 MG/DL (ref 7–20)
CALCIUM SERPL-MCNC: 8.8 MG/DL (ref 8.3–10.6)
CHLORIDE BLD-SCNC: 100 MMOL/L (ref 99–110)
CO2: 23 MMOL/L (ref 21–32)
CREAT SERPL-MCNC: 0.5 MG/DL (ref 0.6–1.2)
GFR AFRICAN AMERICAN: >60
GFR NON-AFRICAN AMERICAN: >60
GLUCOSE BLD-MCNC: 113 MG/DL (ref 70–99)
LV EF: 58 %
LVEF MODALITY: NORMAL
MAGNESIUM: 2 MG/DL (ref 1.8–2.4)
POTASSIUM SERPL-SCNC: 4.4 MMOL/L (ref 3.5–5.1)
SODIUM BLD-SCNC: 137 MMOL/L (ref 136–145)

## 2021-06-14 PROCEDURE — 93306 TTE W/DOPPLER COMPLETE: CPT | Performed by: INTERNAL MEDICINE

## 2021-07-20 NOTE — PROGRESS NOTES
[address]   Cardiac Electrophysiology Consultation   Date: 7/22/2021  Reason for Consultation:  AF/AFL  Consult Requesting Physician: No att. providers found     Chief Complaint:   Chief Complaint   Patient presents with    Follow-up     Discuss AVN aBLATION       HPI: Lenard Garcia is a 65 y.o.  woman with a h/o breast CA, HLD,  chronic dCHF, atypical AFL, pAF, S/p RFA (2005, Dr. Kecia Hamilton, 2006, Dr. Megan Arauz, 2013, Dr. Molly Alcala), s/p RFA/PVI of AF/typical AFL/atypical AFL (11/23/20) with recurrence of highly symptomatic AF. Toprol was increased to 50 mg, but she did not tolerate it due to dizziness and bradycardia, dose reduced to 25 mg daily. Failed flecainide due to widening QRS. S/p DCCV to NSR on 12/9/20 with recurrence the next day. S/p dofetilide loading with chemical conversion to SR; however, developed nonsustained PAF post loading. Outpatient monitor (3/2021) showed AF burden of 69%, 2945 runs of VT (longest lasting 28 sec), and 128 pauses with longest lasting 4.2 sec. S/p LHC (5/2021) showed normal coronaries. Echo (6/2021) shows preserved LVEF with LAE. Interval History: Today, she is here with her daughter for rhythm management, presenting in AF, rate 119. She continues to complain of palpitations, fatigue, and SOB. He daughter is concerned that her mother is very deconditioned with very inactive lifestyle due to the AF. Her daughter is requesting cardiac rehab.     Past Medical History:   Diagnosis Date    Acute diastolic congestive heart failure ([address] 11/26/2018    Allergic     SEASONAL    Atrial fibrillation ([address] H/O    Cancer ([address] 2009 AND 2011    BREAST right    Essential hypertension 3/13/2018    Hammertoe     Migraine     Mixed hyperlipidemia 10/19/2020        Past Surgical History:   Procedure Laterality Date    ANKLE FRACTURE SURGERY  2004,2008    APPENDECTOMY  03/1983    ATRIAL ABLATION SURGERY  X3    Cryoablation for atrial fib    AXILLARY SURGERY Right lymph node    BREAST BIOPSY  01/07/2011    BREAST BIOPSY  7/12/2011    left breast bx - benign     BREAST CYST EXCISION  06/2003,    BREAST LUMPECTOMY  03/30/2009    CARDIAC CATHETERIZATION  5/2005,01/2006    COLONOSCOPY  1993,1995,1999,2003,2006    COLONOSCOPY  11/29/2011    Dr. Yolanda Hernandez - benign polyps     COSMETIC SURGERY      FOOT SURGERY Right 1/15/16    CORRECTION OF HAMMERTOES 1-5 RIGHT FOOT, WEIL OSTEOTOMY 2,3    FOOT SURGERY Right 05/12/2016    EXTENSOR TENDON LENGTHENING 4TH AND 5TH TOES RIGHT FOOT    FOOT SURGERY Right 08/11/2016    CORRECTION HAMMERTOES 1ST, 4TH AND 5TH DIGITS RIGHT FOOT WITH    HERNIA REPAIR  [id number]    HYSTERECTOMY  12/1977    JOINT REPLACEMENT  June 2015    LEFT KNEE    KNEE SURGERY  6/09/2015    left knee replacement    LIPOMA RESECTION  09/2006    MASTECTOMY  3-15-11    bilateral    NEUROMA SURGERY  1987,1988,1989,1992 , 1995    Left foot    OTHER SURGICAL HISTORY  1983, 1984 X 2, 1994 X 2, 2006    Bowel obstructions    RHINOPLASTY  11/1976    RHINOPLASTY  1995    THORACOTOMY  1998    TONSILLECTOMY  1956    UPPER GASTROINTESTINAL ENDOSCOPY  [id number]    VARICOSE VEIN SURGERY  11/2001       Allergies: Allergies   Allergen Reactions    Keflex [Cephalexin] Shortness Of Breath and Nausea Only    Novocain [Procaine] Shortness Of Breath and Swelling     LIDOCAINE OK    Amiodarone Other (See Comments)     dizziness    Cephalexin     Diltiazem Other (See Comments)     dizziness    Penicillins Swelling    Sulfa Antibiotics Nausea Only and Swelling       Medication:   Prior to Admission medications    Medication Sig Start Date End Date Taking?  Authorizing Provider   metoprolol succinate (TOPROL XL) 100 MG extended release tablet Take 1 tablet by mouth daily 6/10/21  Yes MAILE Galeana - CNP   nitroGLYCERIN (NITROSTAT) 0.4 MG SL tablet Place 1 tablet under the tongue every 5 minutes as needed for Chest pain 4/26/21  Yes Dwain Graves Kam Alda, APRN - CNP   ezetimibe (ZETIA) 10 MG tablet TAKE 1 TABLET BY MOUTH ONE TIME A DAY  3/29/21  Yes [place] Rash, APRN - CNP   verapamil (CALAN) 80 MG tablet Take 1 tablet by mouth 3 times daily 3/25/21  Yes [place] Rash, APRN - CNP   dofetilide (TIKOSYN) 500 MCG capsule Take 1 capsule by mouth 2 times daily 3/9/21  Yes Yokasta Mercury, APRN - CNP   apixaban (ELIQUIS) 5 MG TABS tablet Take 1 tablet by mouth 2 times daily 3/4/21  Yes Roetta Crigler, MD   furosemide (LASIX) 20 MG tablet TAKE 1 TABLET DAILY 1/6/21  Yes Christopher Drake MD   Vitamin D, Cholecalciferol, 25 MCG (1000 UT) CAPS Take 1,000 Units by mouth daily  Patient taking differently: Take 3 capsules by mouth daily  10/19/20  Yes Roro Celaya MD   acetaminophen (TYLENOL) 325 MG tablet Take 325 mg by mouth every 6 hours as needed. As need for headaches. Yes Historical Provider, MD       Social History:   reports that she quit smoking about 33 years ago. Her smoking use included cigarettes. She started smoking about 41 years ago. She has a 2.00 pack-year smoking history. She has never used smokeless tobacco. She reports that she does not drink alcohol and does not use drugs. Family History:  family history includes Cancer in her brother, father, and mother. Reviewed.  Denies family history of sudden cardiac death, arrhythmia, premature CAD    Review of System:    · General ROS: negative for - chills, fever   · Psychological ROS: negative for - anxiety or depression  · Ophthalmic ROS: negative for - eye pain or loss of vision  · ENT ROS: negative for - epistaxis, headaches, nasal discharge, sore throat   · Allergy and Immunology ROS: negative for - hives, nasal congestion   · Hematological and Lymphatic ROS: negative for - bleeding problems, blood clots, bruising or jaundice  · Endocrine ROS: negative for - skin changes, temperature intolerance or unexpected weight changes  · Respiratory ROS: negative for - cough, hemoptysis, pleuritic pain, SOB, sputum changes or wheezing  · Cardiovascular ROS: Per HPI. · Gastrointestinal ROS: negative for - abdominal pain, blood in stools, diarrhea, hematemesis, melena, nausea/vomiting or swallowing difficulty/pain  · Genito-Urinary ROS: negative for - dysuria or incontinence  · Musculoskeletal ROS: negative for - joint swelling or muscle pain  · Neurological ROS: negative for - confusion, dizziness, gait disturbance, headaches, numbness/tingling, seizures, speech problems, tremors, visual changes or weakness  · Dermatological ROS: negative for - rash    Physical Examination:  Vitals:    07/22/21 1306   BP: (!) 160/82   Pulse: 119       · Constitutional: Oriented. No distress. · Head: Normocephalic and atraumatic. · Mouth/Throat: Oropharynx is clear and moist.   · Eyes: Conjunctivae normal. EOM are normal.   · Neck: Normal range of motion. Neck supple. No rigidity. No JVD present. · Cardiovascular: Normal rate, regular rhythm, S1&S2 and intact distal pulses. · Pulmonary/Chest: Bilateral respiratory sounds. No wheezes. No rhonchi. · Abdominal: Soft. Bowel sounds present. No distension, No tenderness. · Musculoskeletal: No tenderness. No edema    · Lymphadenopathy: Has no cervical adenopathy. · Neurological: Alert and oriented. Cranial nerve appears intact, No Gross deficit   · Skin: Skin is warm and dry. No rash noted. · Psychiatric: Has a normal mood, affect and behavior     Labs:  Reviewed. ECG: reviewed, 63 Sinus Rhythm, with QRS duration 102 ms. No pathologic Q waves, ventricular pre-excitation, or QT prolongation. Studies:   1.  2-week Zio (3/254/8/21):  SR with average HR 91 (33218), 2945 VT episodes with the longest lasting 28.1 seconds, 69% AF burden,  128 pauses with the longest lasting 4.2 seconds in length    Echo 6/14/21    Summary   Left ventricular cavity size is normal. There is mild concentric left   ventricular hypertrophy.  Left ventricular function is normal with ejection   fraction estimated at 55-60%. No regional wall motion abnormalities are   noted. Diastolic function cannot be assessed due to an arrhythmia (AFRVR). Normal LVEDP. Mild mitral regurgitation is present. Stress Test: 2/19/2019  Summary    There is normal isotope uptake at stress and rest. There is no evidence of    myocardial ischemia or scar.    Normal LV size and systolic function.    Left ventricular ejection fraction of 75 %.    There are no regional wall motion abnormalities.    Normal myocardial perfusion study.      Cath 5/18/21  IMPRESSION:  1. Normal left ventricular systolic function. 2.  Essentially normal coronary arteries. 3.  Successful Angio-Seal of right femoral arteriotomy site. The MCOT, echocardiogram, stress test, and coronary angiography/PCI were reviewed by myself and used for my plan of care. Procedures:  1. None    Assessment/Plan:   1. Longstanding persistent AF, for more than 30 years  2. S/p total of 4 ablations including right side and left side twice  3. Tachy-brenden syndrome  4. NSVT,   5. Diastolic CHF    Patient continues to have symptomatic recurrence of AF with daily palpitations and SOB. S/p AF ablations x 2 (one in 2013 and the second one in 2020). Unable to take flecainide due to significant QRS prolongation. She is not interested in amiodarone secondary to concerns of side effects. Recurrence of AF despite dofetilide. Discussed the 3 options including    1. Redo RFA/PVI of AF,   2. Hybrid epicardial ablation procedure  3. AV node ablation with CRT-P implantation. Discussed the risks and benefits of each option in detail and she wishes to think about these options. If she decides on the hybrid epicardial ablation, we will refer her to the [facility]. She will call us with her decision. Will refer to cardiac rehab, per her request.     Follow up based on her decision.     Thank you for allowing me to participate in the care of Swathi Pearl. All questions and concerns were addressed to the patient/family. Alternatives to my treatment were discussed. Tori Jade RN, am scribing for and in the presence of Dr. Steff Phan. 07/22/21 1:15 PM  Piper Hill RN    I, Baltazar Kelly MD, personally performed the services prescribed in this documentation as scribed by Ms. Piper Hill RN in my presence and it is both accurate and complete.      Baltazar Kelly MD  Cardiac Electrophysiology  [address]

## 2021-07-22 ENCOUNTER — OFFICE VISIT (OUTPATIENT)
Dept: CARDIOLOGY CLINIC | Age: 71
End: 2021-07-22
Payer: MEDICARE

## 2021-07-22 VITALS
SYSTOLIC BLOOD PRESSURE: 160 MMHG | HEART RATE: 119 BPM | BODY MASS INDEX: 33.32 KG/M2 | DIASTOLIC BLOOD PRESSURE: 82 MMHG | WEIGHT: 225.6 LBS

## 2021-07-22 DIAGNOSIS — Z86.79 S/P ABLATION OF ATRIAL FIBRILLATION: ICD-10-CM

## 2021-07-22 DIAGNOSIS — Z86.79 S/P ABLATION OF ATRIAL FLUTTER: ICD-10-CM

## 2021-07-22 DIAGNOSIS — I50.32 CHRONIC DIASTOLIC CHF (CONGESTIVE HEART FAILURE) (HCC): ICD-10-CM

## 2021-07-22 DIAGNOSIS — I47.29 NONSUSTAINED VENTRICULAR TACHYCARDIA: ICD-10-CM

## 2021-07-22 DIAGNOSIS — Z98.890 S/P ABLATION OF ATRIAL FLUTTER: ICD-10-CM

## 2021-07-22 DIAGNOSIS — I48.11 LONGSTANDING PERSISTENT ATRIAL FIBRILLATION (HCC): Primary | ICD-10-CM

## 2021-07-22 DIAGNOSIS — I49.5 TACHY-BRADY SYNDROME (HCC): ICD-10-CM

## 2021-07-22 DIAGNOSIS — Z98.890 S/P ABLATION OF ATRIAL FIBRILLATION: ICD-10-CM

## 2021-07-22 PROCEDURE — 4040F PNEUMOC VAC/ADMIN/RCVD: CPT | Performed by: INTERNAL MEDICINE

## 2021-07-22 PROCEDURE — 1123F ACP DISCUSS/DSCN MKR DOCD: CPT | Performed by: INTERNAL MEDICINE

## 2021-07-22 PROCEDURE — G8427 DOCREV CUR MEDS BY ELIG CLIN: HCPCS | Performed by: INTERNAL MEDICINE

## 2021-07-22 PROCEDURE — 99214 OFFICE O/P EST MOD 30 MIN: CPT | Performed by: INTERNAL MEDICINE

## 2021-07-22 PROCEDURE — G8417 CALC BMI ABV UP PARAM F/U: HCPCS | Performed by: INTERNAL MEDICINE

## 2021-07-22 PROCEDURE — 1036F TOBACCO NON-USER: CPT | Performed by: INTERNAL MEDICINE

## 2021-07-22 PROCEDURE — G8399 PT W/DXA RESULTS DOCUMENT: HCPCS | Performed by: INTERNAL MEDICINE

## 2021-07-22 PROCEDURE — 3017F COLORECTAL CA SCREEN DOC REV: CPT | Performed by: INTERNAL MEDICINE

## 2021-07-22 PROCEDURE — 93000 ELECTROCARDIOGRAM COMPLETE: CPT | Performed by: INTERNAL MEDICINE

## 2021-07-22 PROCEDURE — 1090F PRES/ABSN URINE INCON ASSESS: CPT | Performed by: INTERNAL MEDICINE

## 2021-07-22 PROCEDURE — G9708 BILAT MAST/HX BI /UNILAT MAS: HCPCS | Performed by: INTERNAL MEDICINE

## 2021-07-23 ENCOUNTER — TELEPHONE (OUTPATIENT)
Dept: CARDIOLOGY CLINIC | Age: 71
End: 2021-07-23

## 2021-07-23 DIAGNOSIS — I48.11 LONGSTANDING PERSISTENT ATRIAL FIBRILLATION (HCC): Primary | ICD-10-CM

## 2021-07-23 NOTE — LETTER
[address]  EP Procedure Sheet  7/26/21    Nicolas Maier  1950    Physician:  Dr. Madhav Hackett    EP Procedures   Pacemaker implant x EP Study    ICD implant  Atrial flutter ablation     Biv implant x Atrial fibrillation ablation    Generator Change  SVT ablation    Lead revision  VT ablation    Lead extraction +/- upgrade  AVN ablation    Loop implant  Cardioversion     Other:   ELSIE     Equipment   [employer]tr[employer]   PAVEL Mapping System    [place] x [address]  CryoAblation    Biotronik  Laser Lead Extraction    Special Equipment       EP Procedures Scheduling Request  Time Requested  0730   Specific Day 8/4/21   Anesthesia xxx   CT surgery backup    Location [facility]     Pre-Procedure Labs / Imaging  x PT/INR  Type & cross   x CBC  Units PRBC   x BMP/Mg  Units FFP    Venogram  CXR    Echo x Pulmonary CTA for Pulmonary vein mapping     Patient Instructions

## 2021-07-23 NOTE — TELEPHONE ENCOUNTER
Pt aware that Nupur Hilton will call to schedule. Pt would like the 9/1/21 date if it is still available.

## 2021-07-27 ENCOUNTER — PREP FOR PROCEDURE (OUTPATIENT)
Dept: CARDIOLOGY CLINIC | Age: 71
End: 2021-07-27

## 2021-07-27 ENCOUNTER — HOSPITAL ENCOUNTER (OUTPATIENT)
Dept: GENERAL RADIOLOGY | Age: 71
Discharge: HOME | End: 2021-07-27
Payer: MEDICARE

## 2021-07-27 ENCOUNTER — OFFICE VISIT (OUTPATIENT)
Dept: PRIMARY CARE CLINIC | Age: 71
End: 2021-07-27
Payer: MEDICARE

## 2021-07-27 VITALS
BODY MASS INDEX: 32.96 KG/M2 | WEIGHT: 223.2 LBS | OXYGEN SATURATION: 97 % | DIASTOLIC BLOOD PRESSURE: 68 MMHG | SYSTOLIC BLOOD PRESSURE: 130 MMHG | HEART RATE: 53 BPM | RESPIRATION RATE: 16 BRPM | TEMPERATURE: 97.9 F

## 2021-07-27 DIAGNOSIS — M54.50 ACUTE LOW BACK PAIN WITHOUT SCIATICA, UNSPECIFIED BACK PAIN LATERALITY: Primary | ICD-10-CM

## 2021-07-27 DIAGNOSIS — M79.602 LEFT ARM PAIN: ICD-10-CM

## 2021-07-27 DIAGNOSIS — M54.50 ACUTE LOW BACK PAIN WITHOUT SCIATICA, UNSPECIFIED BACK PAIN LATERALITY: ICD-10-CM

## 2021-07-27 PROCEDURE — G8399 PT W/DXA RESULTS DOCUMENT: HCPCS | Performed by: INTERNAL MEDICINE

## 2021-07-27 PROCEDURE — 72100 X-RAY EXAM L-S SPINE 2/3 VWS: CPT

## 2021-07-27 PROCEDURE — 99214 OFFICE O/P EST MOD 30 MIN: CPT | Performed by: INTERNAL MEDICINE

## 2021-07-27 PROCEDURE — 1090F PRES/ABSN URINE INCON ASSESS: CPT | Performed by: INTERNAL MEDICINE

## 2021-07-27 PROCEDURE — 1036F TOBACCO NON-USER: CPT | Performed by: INTERNAL MEDICINE

## 2021-07-27 PROCEDURE — G8427 DOCREV CUR MEDS BY ELIG CLIN: HCPCS | Performed by: INTERNAL MEDICINE

## 2021-07-27 PROCEDURE — 3017F COLORECTAL CA SCREEN DOC REV: CPT | Performed by: INTERNAL MEDICINE

## 2021-07-27 PROCEDURE — 4040F PNEUMOC VAC/ADMIN/RCVD: CPT | Performed by: INTERNAL MEDICINE

## 2021-07-27 PROCEDURE — 1123F ACP DISCUSS/DSCN MKR DOCD: CPT | Performed by: INTERNAL MEDICINE

## 2021-07-27 PROCEDURE — G9708 BILAT MAST/HX BI /UNILAT MAS: HCPCS | Performed by: INTERNAL MEDICINE

## 2021-07-27 PROCEDURE — G8417 CALC BMI ABV UP PARAM F/U: HCPCS | Performed by: INTERNAL MEDICINE

## 2021-07-27 PROCEDURE — 73060 X-RAY EXAM OF HUMERUS: CPT

## 2021-07-27 RX ORDER — SODIUM CHLORIDE 0.9 % (FLUSH) 0.9 %
5-40 SYRINGE (ML) INJECTION PRN
Status: CANCELLED | OUTPATIENT
Start: 2021-07-27

## 2021-07-27 RX ORDER — SODIUM CHLORIDE 9 MG/ML
25 INJECTION, SOLUTION INTRAVENOUS PRN
Status: CANCELLED | OUTPATIENT
Start: 2021-07-27

## 2021-07-27 RX ORDER — TRAMADOL HYDROCHLORIDE 50 MG/1
50 TABLET ORAL EVERY 8 HOURS
Qty: 12 TABLET | Refills: 0 | Status: SHIPPED | OUTPATIENT
Start: 2021-07-27 | End: 2021-07-31

## 2021-07-27 RX ORDER — SODIUM CHLORIDE 9 MG/ML
INJECTION, SOLUTION INTRAVENOUS CONTINUOUS
Status: CANCELLED | OUTPATIENT
Start: 2021-07-27

## 2021-07-27 RX ORDER — TIZANIDINE 2 MG/1
2 TABLET ORAL NIGHTLY PRN
Qty: 10 TABLET | Refills: 0 | Status: SHIPPED | OUTPATIENT
Start: 2021-07-27 | End: 2021-11-16 | Stop reason: ALTCHOICE

## 2021-07-27 RX ORDER — SODIUM CHLORIDE 0.9 % (FLUSH) 0.9 %
5-40 SYRINGE (ML) INJECTION EVERY 12 HOURS SCHEDULED
Status: CANCELLED | OUTPATIENT
Start: 2021-07-27

## 2021-07-27 ASSESSMENT — PATIENT HEALTH QUESTIONNAIRE - PHQ9
2. FEELING DOWN, DEPRESSED OR HOPELESS: 0
SUM OF ALL RESPONSES TO PHQ9 QUESTIONS 1 & 2: 0
1. LITTLE INTEREST OR PLEASURE IN DOING THINGS: 0
SUM OF ALL RESPONSES TO PHQ QUESTIONS 1-9: 0

## 2021-07-27 NOTE — PATIENT INSTRUCTIONS
1. Acute low back pain without sciatica, unspecified back pain laterality  - XR LUMBAR SPINE (2-3 VIEWS); Future  - traMADol (ULTRAM) 50 MG tablet; Take 1 tablet by mouth every 8 hours for 4 days. Dispense: 12 tablet; Refill: 0  - tiZANidine (ZANAFLEX) 2 MG tablet; Take 1 tablet by mouth nightly as needed (pain)  Dispense: 10 tablet; Refill: 0  - Tylenol 650mg 3 times daily as needed    2. Left arm pain  - XR HUMERUS LEFT (MIN 2 VIEWS); Future  - traMADol (ULTRAM) 50 MG tablet; Take 1 tablet by mouth every 8 hours for 4 days. Dispense: 12 tablet;  Refill: 0  - Tylenol 650mg 3 times daily as needed

## 2021-07-27 NOTE — PROGRESS NOTES
Wesley Shoemaker   Date ofBirth:  1950    Date of Visit:  7/27/2021    Chief Complaint   Patient presents with    Back Pain     Yesterday drying feet off in shower bent over and felt a pop in the lower back    Arm Pain     Lalit Negrete last may, pain has been off and on but stopped. 7.22.21 arm pain started again because she hit it on the door frame. HPI  Patient states yesterday morning she was getting out of the shower and bent over and heard a popping noise. Patient states she has had back pain every since in her L4-L5 area. Patient states pain is 11 out of 10 severity. Low back pain is sharp and dull. Patient states right now back pain is 9 out of 10 and dull. Back pain constant. Patient states she is walking slow. Patient can't take Ibuprofen due to Eliquis. Patient states she takes Tylenol 325mg 2 every 8 hours. Patient complains of left arm pain. Patient states she fell last May and hit her left arm. Patient states she has had arm pain off and on last year. Patient states this past Thursday 7/22/21 she was walking and hit her left arm on a door frame and has had pain since. Patient has arm swelling. Patient denies redness. Arm pain is sharp. Patient states it hurts to reach sometimes and she has to hold her arm. Review of Systems   Constitutional: Negative for activity change, chills, fatigue and fever. HENT: Negative for congestion, postnasal drip, rhinorrhea, sinus pressure and sore throat. Eyes: Negative for visual disturbance. Respiratory: Negative for cough, chest tightness, shortness of breath and wheezing. Cardiovascular: Negative for chest pain, palpitations and leg swelling. Gastrointestinal: Negative for abdominal pain, blood in stool, constipation, diarrhea, nausea and vomiting. Genitourinary: Negative for dysuria, flank pain, frequency and hematuria. Musculoskeletal: Positive for back pain, gait problem, joint swelling and myalgias. Negative for arthralgias. Skin: Negative for rash. Neurological: Negative for dizziness, tremors, syncope, weakness, light-headedness, numbness and headaches. Psychiatric/Behavioral: Negative for dysphoric mood and sleep disturbance. The patient is not nervous/anxious. Allergies   Allergen Reactions    Keflex [Cephalexin] Shortness Of Breath and Nausea Only    Novocain [Procaine] Shortness Of Breath and Swelling     LIDOCAINE OK    Amiodarone Other (See Comments)     dizziness    Cephalexin     Diltiazem Other (See Comments)     dizziness    Penicillins Swelling    Sulfa Antibiotics Nausea Only and Swelling     Outpatient Medications Marked as Taking for the 7/27/21 encounter (Office Visit) with Clarissa Clarke MD   Medication Sig Dispense Refill    metoprolol succinate (TOPROL XL) 100 MG extended release tablet Take 1 tablet by mouth daily 90 tablet 3    nitroGLYCERIN (NITROSTAT) 0.4 MG SL tablet Place 1 tablet under the tongue every 5 minutes as needed for Chest pain 25 tablet 3    ezetimibe (ZETIA) 10 MG tablet TAKE 1 TABLET BY MOUTH ONE TIME A DAY  90 tablet 3    verapamil (CALAN) 80 MG tablet Take 1 tablet by mouth 3 times daily 270 tablet 3    dofetilide (TIKOSYN) 500 MCG capsule Take 1 capsule by mouth 2 times daily 60 capsule 5    apixaban (ELIQUIS) 5 MG TABS tablet Take 1 tablet by mouth 2 times daily 180 tablet 3    furosemide (LASIX) 20 MG tablet TAKE 1 TABLET DAILY 90 tablet 3    Vitamin D, Cholecalciferol, 25 MCG (1000 UT) CAPS Take 1,000 Units by mouth daily (Patient taking differently: Take 3 capsules by mouth daily )      acetaminophen (TYLENOL) 325 MG tablet Take 325 mg by mouth every 6 hours as needed. As need for headaches. Vitals:    07/27/21 1425   BP: 130/68   Pulse: 53   Resp: 16   Temp: 97.9 °F (36.6 °C)   SpO2: 97%   Weight: 223 lb 3.2 oz (101.2 kg)     Body mass index is 32.96 kg/m². Physical Exam  Nursing note reviewed.    Constitutional:       General: She is not in acute distress. Appearance: Normal appearance. She is well-developed. Eyes:      General: Lids are normal.      Extraocular Movements: Extraocular movements intact. Conjunctiva/sclera: Conjunctivae normal.      Pupils: Pupils are equal, round, and reactive to light. Neck:      Thyroid: No thyromegaly. Vascular: No carotid bruit. Cardiovascular:      Rate and Rhythm: Normal rate and regular rhythm. Heart sounds: Normal heart sounds, S1 normal and S2 normal. No murmur heard. No friction rub. No gallop. Pulmonary:      Effort: Pulmonary effort is normal.      Breath sounds: No wheezing, rhonchi or rales. Abdominal:      General: Bowel sounds are normal. There is no distension. Palpations: Abdomen is soft. Tenderness: There is no abdominal tenderness. Musculoskeletal:      Left upper arm: Swelling and tenderness present. Cervical back: Neck supple. Lumbar back: Tenderness present. No spasms. Decreased range of motion. Negative right straight leg raise test and negative left straight leg raise test.      Right lower leg: No edema. Left lower leg: No edema. Lymphadenopathy:      Head:      Right side of head: No submandibular adenopathy. Left side of head: No submandibular adenopathy. Neurological:      Mental Status: She is alert and oriented to person, place, and time. Gait: Gait normal.      Deep Tendon Reflexes: Reflexes are normal and symmetric. Psychiatric:         Mood and Affect: Mood normal.           No results found for this visit on 07/27/21. Lab Review         Assessment/Plan     1. Acute low back pain without sciatica, unspecified back pain laterality  - XR LUMBAR SPINE (2-3 VIEWS); Future  - traMADol (ULTRAM) 50 MG tablet; Take 1 tablet by mouth every 8 hours for 4 days. Dispense: 12 tablet; Refill: 0  - tiZANidine (ZANAFLEX) 2 MG tablet; Take 1 tablet by mouth nightly as needed (pain)  Dispense: 10 tablet;  Refill: 0  - Tylenol 650mg 3 times daily as needed    2. Left arm pain  - XR HUMERUS LEFT (MIN 2 VIEWS); Future  - traMADol (ULTRAM) 50 MG tablet; Take 1 tablet by mouth every 8 hours for 4 days. Dispense: 12 tablet; Refill: 0  - Tylenol 650mg 3 times daily as needed      Discussed medications with patient, who voiced understanding of their use and indications. All questions answered. Controlled Substance Monitoring:    Acute and Chronic Pain Monitoring:   RX Monitoring 7/27/2021   Periodic Controlled Substance Monitoring No signs of potential drug abuse or diversion identified. Return in about 1 week (around 8/3/2021) for low back pain and arm pain.

## 2021-07-30 PROBLEM — M79.602 LEFT ARM PAIN: Status: ACTIVE | Noted: 2021-07-30

## 2021-07-30 PROBLEM — M54.50 ACUTE LOW BACK PAIN WITHOUT SCIATICA: Status: ACTIVE | Noted: 2021-07-30

## 2021-07-30 ASSESSMENT — ENCOUNTER SYMPTOMS
CHEST TIGHTNESS: 0
SINUS PRESSURE: 0
WHEEZING: 0
CONSTIPATION: 0
ABDOMINAL PAIN: 0
BACK PAIN: 1
SHORTNESS OF BREATH: 0
BLOOD IN STOOL: 0
DIARRHEA: 0
RHINORRHEA: 0
SORE THROAT: 0
COUGH: 0
NAUSEA: 0
VOMITING: 0

## 2021-08-03 ENCOUNTER — OFFICE VISIT (OUTPATIENT)
Dept: PRIMARY CARE CLINIC | Age: 71
End: 2021-08-03
Payer: MEDICARE

## 2021-08-03 VITALS
HEART RATE: 75 BPM | HEIGHT: 69 IN | BODY MASS INDEX: 33.24 KG/M2 | SYSTOLIC BLOOD PRESSURE: 140 MMHG | WEIGHT: 224.4 LBS | TEMPERATURE: 98.3 F | OXYGEN SATURATION: 95 % | DIASTOLIC BLOOD PRESSURE: 80 MMHG

## 2021-08-03 DIAGNOSIS — M54.50 ACUTE LOW BACK PAIN WITHOUT SCIATICA, UNSPECIFIED BACK PAIN LATERALITY: Primary | ICD-10-CM

## 2021-08-03 DIAGNOSIS — M79.602 LEFT ARM PAIN: ICD-10-CM

## 2021-08-03 PROCEDURE — 1090F PRES/ABSN URINE INCON ASSESS: CPT | Performed by: INTERNAL MEDICINE

## 2021-08-03 PROCEDURE — G9708 BILAT MAST/HX BI /UNILAT MAS: HCPCS | Performed by: INTERNAL MEDICINE

## 2021-08-03 PROCEDURE — G8427 DOCREV CUR MEDS BY ELIG CLIN: HCPCS | Performed by: INTERNAL MEDICINE

## 2021-08-03 PROCEDURE — G8417 CALC BMI ABV UP PARAM F/U: HCPCS | Performed by: INTERNAL MEDICINE

## 2021-08-03 PROCEDURE — 1123F ACP DISCUSS/DSCN MKR DOCD: CPT | Performed by: INTERNAL MEDICINE

## 2021-08-03 PROCEDURE — G8399 PT W/DXA RESULTS DOCUMENT: HCPCS | Performed by: INTERNAL MEDICINE

## 2021-08-03 PROCEDURE — 99213 OFFICE O/P EST LOW 20 MIN: CPT | Performed by: INTERNAL MEDICINE

## 2021-08-03 PROCEDURE — 4040F PNEUMOC VAC/ADMIN/RCVD: CPT | Performed by: INTERNAL MEDICINE

## 2021-08-03 PROCEDURE — 3017F COLORECTAL CA SCREEN DOC REV: CPT | Performed by: INTERNAL MEDICINE

## 2021-08-03 PROCEDURE — 1036F TOBACCO NON-USER: CPT | Performed by: INTERNAL MEDICINE

## 2021-08-03 RX ORDER — ACETAMINOPHEN 500 MG
500 TABLET ORAL EVERY 8 HOURS PRN
COMMUNITY
End: 2022-08-22 | Stop reason: SDUPTHER

## 2021-08-03 NOTE — PROGRESS NOTES
Cosme Coleman   Date ofBirth:  1950    Date of Visit:  8/3/2021    Chief Complaint   Patient presents with    Arm Pain    Lower Back Pain       HPI  Patient still has low back pain. Low back pain is sharp and dull. Low back pain is 8 out of 10 severity and comes and goes. Patient had the lumbar spine xray done. Patient states she has Tylenol ES at home and takes 2 tablets 2 times per day bedtime and noon. Patient states she took a muscle relaxer one night and went to sleep. Patient states she didn't take any of the tramadol yet. Patient states her left arm pain is better. Patient had hit her arm on a door frame on 7/22/2021. Patient states when she gets out of bed and lies on her left side she has to have her arm all the way out. Patient states her arm pain comes and goes. Review of Systems   Constitutional: Negative for activity change, chills and fever. Respiratory: Negative for shortness of breath. Cardiovascular: Negative for chest pain. Gastrointestinal: Negative for abdominal pain, nausea and vomiting. Genitourinary: Negative for dysuria, flank pain, frequency and hematuria. Musculoskeletal: Positive for back pain, gait problem, joint swelling and myalgias. Negative for arthralgias. Skin: Negative for rash. Neurological: Negative for numbness and headaches. Psychiatric/Behavioral: Negative for sleep disturbance.        Allergies   Allergen Reactions    Keflex [Cephalexin] Shortness Of Breath and Nausea Only    Novocain [Procaine] Shortness Of Breath and Swelling     LIDOCAINE OK    Amiodarone Other (See Comments)     dizziness    Cephalexin     Diltiazem Other (See Comments)     dizziness    Penicillins Swelling    Sulfa Antibiotics Nausea Only and Swelling     Outpatient Medications Marked as Taking for the 8/3/21 encounter (Office Visit) with Biju Cody MD   Medication Sig Dispense Refill    acetaminophen (TYLENOL) 500 MG tablet Take 500 mg by mouth every 8 hours as needed for Pain      tiZANidine (ZANAFLEX) 2 MG tablet Take 1 tablet by mouth nightly as needed (pain) 10 tablet 0    metoprolol succinate (TOPROL XL) 100 MG extended release tablet Take 1 tablet by mouth daily 90 tablet 3    nitroGLYCERIN (NITROSTAT) 0.4 MG SL tablet Place 1 tablet under the tongue every 5 minutes as needed for Chest pain 25 tablet 3    ezetimibe (ZETIA) 10 MG tablet TAKE 1 TABLET BY MOUTH ONE TIME A DAY  90 tablet 3    verapamil (CALAN) 80 MG tablet Take 1 tablet by mouth 3 times daily 270 tablet 3    dofetilide (TIKOSYN) 500 MCG capsule Take 1 capsule by mouth 2 times daily 60 capsule 5    apixaban (ELIQUIS) 5 MG TABS tablet Take 1 tablet by mouth 2 times daily 180 tablet 3    furosemide (LASIX) 20 MG tablet TAKE 1 TABLET DAILY 90 tablet 3    Vitamin D, Cholecalciferol, 25 MCG (1000 UT) CAPS Take 1,000 Units by mouth daily (Patient taking differently: Take 3 capsules by mouth daily )           Vitals:    08/03/21 1131 08/03/21 1139   BP: (!) 140/80 (!) 140/80   Site: Right Upper Arm Right Upper Arm   Position: Sitting Sitting   Cuff Size: Large Adult Large Adult   Pulse: 75    Temp: 98.3 °F (36.8 °C)    TempSrc: Oral    SpO2: 95%    Weight: 224 lb 6.4 oz (101.8 kg)    Height: 5' 9\" (1.753 m)      Body mass index is 33.14 kg/m². Physical Exam  Nursing note reviewed. Constitutional:       General: She is not in acute distress. Appearance: Normal appearance. She is well-developed. Eyes:      General: Lids are normal.      Extraocular Movements: Extraocular movements intact. Conjunctiva/sclera: Conjunctivae normal.      Pupils: Pupils are equal, round, and reactive to light. Cardiovascular:      Rate and Rhythm: Normal rate and regular rhythm. Heart sounds: Normal heart sounds, S1 normal and S2 normal. No murmur heard. Pulmonary:      Effort: Pulmonary effort is normal.      Breath sounds: No wheezing, rhonchi or rales.    Musculoskeletal: Left upper arm: Swelling and tenderness present. Lumbar back: Tenderness present. No spasms. Decreased range of motion. Right lower leg: No edema. Left lower leg: No edema. Neurological:      Mental Status: She is alert and oriented to person, place, and time. Gait: Gait normal.   Psychiatric:         Mood and Affect: Mood normal.           No results found for this visit on 08/03/21. Lab Review     Left humerus, lumbar spine  7/27/21       HISTORY: Injury. Arm pain. Back pain.           Impression       Left humerus: Frontal and lateral views demonstrate no fracture or dislocation.       Lumbar spine: Frontal and lateral views demonstrate no fracture. There is grade 1 anterolisthesis of L5 in relation to S1. Facet hypertrophy from L3-4 through L5-S1. Assessment/Plan     1. Acute low back pain without sciatica, unspecified back pain laterality  -Low back pain is the same  -Lumbar spine x-ray shows grade 1 anterior listhesis of L5 in relation to S1 and facet hypertrophy from L3-4 through L5-S1  -Continue same medication  -Patient has a prescription for tramadol to take if needed  -Referral to [facility] MD ESTELITA, Orthopedic Surgery, [facility] - [place] [facility]  -Referral to [facility] Physical Therapy    2. Left arm pain  -improving  -Referral to [facility] MD ESTELITA, Orthopedic Surgery, [facility] - [facility]  -Referral to [facility] Physical Therapy      Discussed medications with patient, who voiced understanding of their use and indications. All questions answered. Return in about 4 months (around 12/13/2021) for Medicare AWV.

## 2021-08-03 NOTE — PATIENT INSTRUCTIONS
Diagnosis Orders   1. Acute low back pain without sciatica, unspecified back pain laterality  Janice Vallecillo MD, Orthopedic Surgery, [address] [place] [address] Physical Therapy   2.  Left arm pain  Janice Vallecillo MD, Orthopedic Surgery, [address] [place] [address] Physical [facility]

## 2021-08-04 ENCOUNTER — OFFICE VISIT (OUTPATIENT)
Dept: ORTHOPEDIC SURGERY | Age: 71
End: 2021-08-04
Payer: MEDICARE

## 2021-08-04 VITALS — HEIGHT: 69 IN | BODY MASS INDEX: 33.24 KG/M2 | WEIGHT: 224.43 LBS

## 2021-08-04 DIAGNOSIS — Z79.01 CHRONIC ANTICOAGULATION: ICD-10-CM

## 2021-08-04 DIAGNOSIS — S33.9XXA SPRAIN AND STRAIN OF LUMBOSACRAL JOINT/LIGAMENT, INITIAL ENCOUNTER: Primary | ICD-10-CM

## 2021-08-04 DIAGNOSIS — M43.10 DEGENERATIVE SPONDYLOLISTHESIS: ICD-10-CM

## 2021-08-04 DIAGNOSIS — M47.816 LUMBAR SPONDYLOSIS: ICD-10-CM

## 2021-08-04 DIAGNOSIS — M51.36 DDD (DEGENERATIVE DISC DISEASE), LUMBAR: ICD-10-CM

## 2021-08-04 PROCEDURE — 1123F ACP DISCUSS/DSCN MKR DOCD: CPT | Performed by: INTERNAL MEDICINE

## 2021-08-04 PROCEDURE — 99213 OFFICE O/P EST LOW 20 MIN: CPT | Performed by: INTERNAL MEDICINE

## 2021-08-04 PROCEDURE — 3017F COLORECTAL CA SCREEN DOC REV: CPT | Performed by: INTERNAL MEDICINE

## 2021-08-04 PROCEDURE — G8427 DOCREV CUR MEDS BY ELIG CLIN: HCPCS | Performed by: INTERNAL MEDICINE

## 2021-08-04 PROCEDURE — G8399 PT W/DXA RESULTS DOCUMENT: HCPCS | Performed by: INTERNAL MEDICINE

## 2021-08-04 PROCEDURE — G9708 BILAT MAST/HX BI /UNILAT MAS: HCPCS | Performed by: INTERNAL MEDICINE

## 2021-08-04 PROCEDURE — 1036F TOBACCO NON-USER: CPT | Performed by: INTERNAL MEDICINE

## 2021-08-04 PROCEDURE — 1090F PRES/ABSN URINE INCON ASSESS: CPT | Performed by: INTERNAL MEDICINE

## 2021-08-04 PROCEDURE — 4040F PNEUMOC VAC/ADMIN/RCVD: CPT | Performed by: INTERNAL MEDICINE

## 2021-08-04 PROCEDURE — G8417 CALC BMI ABV UP PARAM F/U: HCPCS | Performed by: INTERNAL MEDICINE

## 2021-08-04 RX ORDER — METHYLPREDNISOLONE 4 MG/1
TABLET ORAL
Qty: 1 KIT | Refills: 0 | Status: SHIPPED | OUTPATIENT
Start: 2021-08-04 | End: 2021-09-01 | Stop reason: HOSPADM

## 2021-08-04 NOTE — PROGRESS NOTES
Chief Complaint:   Chief Complaint   Patient presents with    Lower Back Pain     fell 2 wks ago getting out of shower, injured back and shld, back pain is primary concern          History of Present Illness:       Patient is a 65 y.o. female presents with the above complaint. The symptoms began 2 weeksago and started with an injury. The patient describes a sharp, aching pain that does not radiate. The symptoms are constant  and are show no change since the onset. The symptoms of back pain  do not show a neurogenic claudication pattern and is worsened by sitting and standing and improved with Shifting position. There is not new onset weakness or progressive weakness of the lower extremities that has developed. The patient denies new onset bowel or bladder dysfunction. There is no history of previous spinal trauma. Pain localizes to the lumbar region    Pain levels: 5     There is no  lower limb pain. The patient has no history or autoimmune disease, inflammatory arthropathy or crystal arthropathy. Pain localizes to the lumbar region    Pain levels: 5    There is no lower limb pain . The patient has no history or autoimmune disease, inflammatory arthropathy or crystal arthropathy.         Past Medical History:        Past Medical History:   Diagnosis Date    Acute diastolic congestive heart failure ([address] 11/26/2018    Allergic     SEASONAL    Atrial fibrillation ([address] H/O    Cancer ([address] 2009 AND 2011    BREAST right    Essential hypertension 3/13/2018    Hammertoe     Migraine     Mixed hyperlipidemia 10/19/2020         Past Surgical History:   Procedure Laterality Date    ANKLE FRACTURE SURGERY  2004,2008    APPENDECTOMY  03/1983    ATRIAL ABLATION SURGERY  X3    Cryoablation for atrial fib    AXILLARY SURGERY Right     lymph node    BREAST BIOPSY  01/07/2011    BREAST BIOPSY  7/12/2011    left breast bx - benign     BREAST CYST EXCISION  06/2003,    BREAST LUMPECTOMY 03/30/2009    CARDIAC CATHETERIZATION  5/2005,01/2006    COLONOSCOPY  1993,1995,1999,2003,2006    COLONOSCOPY  11/29/2011    Dr. Miller Screen - benign polyps     COSMETIC SURGERY      FOOT SURGERY Right 1/15/16    CORRECTION OF HAMMERTOES 1-5 RIGHT FOOT, WEIL OSTEOTOMY 2,3    FOOT SURGERY Right 05/12/2016    EXTENSOR TENDON LENGTHENING 4TH AND 5TH TOES RIGHT FOOT    FOOT SURGERY Right 08/11/2016    CORRECTION HAMMERTOES 1ST, 4TH AND 5TH DIGITS RIGHT FOOT WITH    HERNIA REPAIR  [id number]    HYSTERECTOMY  12/1977    JOINT REPLACEMENT  June 2015    LEFT KNEE    KNEE SURGERY  6/09/2015    left knee replacement    LIPOMA RESECTION  09/2006    MASTECTOMY  3-15-11    bilateral    NEUROMA SURGERY  1987,1988,1989,1992 , 1995    Left foot    OTHER SURGICAL HISTORY  1983, 1984 X 2, 1994 X 2, 2006    Bowel obstructions    RHINOPLASTY  11/1976    RHINOPLASTY  1995    THORACOTOMY  1998    TONSILLECTOMY  1956    UPPER GASTROINTESTINAL ENDOSCOPY  [id number]    VARICOSE VEIN SURGERY  11/2001         Present Medications:         Current Outpatient Medications   Medication Sig Dispense Refill    methylPREDNISolone (MEDROL, MILADY,) 4 MG tablet By mouth.  1 kit 0    acetaminophen (TYLENOL) 500 MG tablet Take 500 mg by mouth every 8 hours as needed for Pain      tiZANidine (ZANAFLEX) 2 MG tablet Take 1 tablet by mouth nightly as needed (pain) 10 tablet 0    metoprolol succinate (TOPROL XL) 100 MG extended release tablet Take 1 tablet by mouth daily 90 tablet 3    nitroGLYCERIN (NITROSTAT) 0.4 MG SL tablet Place 1 tablet under the tongue every 5 minutes as needed for Chest pain 25 tablet 3    ezetimibe (ZETIA) 10 MG tablet TAKE 1 TABLET BY MOUTH ONE TIME A DAY  90 tablet 3    verapamil (CALAN) 80 MG tablet Take 1 tablet by mouth 3 times daily 270 tablet 3    dofetilide (TIKOSYN) 500 MCG capsule Take 1 capsule by mouth 2 times daily 60 capsule 5    apixaban (ELIQUIS) 5 MG TABS tablet Take 1 tablet by mouth 2 times daily 180 tablet 3    furosemide (LASIX) 20 MG tablet TAKE 1 TABLET DAILY 90 tablet 3    Vitamin D, Cholecalciferol, 25 MCG (1000 UT) CAPS Take 1,000 Units by mouth daily (Patient taking differently: Take 3 capsules by mouth daily )      acetaminophen (TYLENOL) 325 MG tablet Take 325 mg by mouth every 6 hours as needed As need for headaches. (Patient not taking: Reported on 8/3/2021)       No current facility-administered medications for this visit. Allergies:         Allergies   Allergen Reactions    Keflex [Cephalexin] Shortness Of Breath and Nausea Only    Novocain [Procaine] Shortness Of Breath and Swelling     LIDOCAINE OK    Amiodarone Other (See Comments)     dizziness    Cephalexin     Diltiazem Other (See Comments)     dizziness    Penicillins Swelling    Sulfa Antibiotics Nausea Only and Swelling        Social History:         Social History     Socioeconomic History    Marital status:      Spouse name: Not on file    Number of children: 2    Years of education: 12    Highest education level: Not on file   Occupational History    Occupation: [employer] finish    Tobacco Use    Smoking status: Former Smoker     Packs/day: 0.25     Years: 8.00     Pack years: 2.00     Types: Cigarettes     Start date: 34     Quit date: 1988     Years since quittin.6    Smokeless tobacco: Never Used    Tobacco comment: QUIT    Substance and Sexual Activity    Alcohol use: No    Drug use: No    Sexual activity: Never   Other Topics Concern    Not on file   Social History Narrative    Not on file     Social Determinants of Health     Financial Resource Strain: Low Risk     Difficulty of Paying Living Expenses: Not hard at all   Food Insecurity: No Food Insecurity    Worried About Running Out of Food in the Last Year: Never true    Jef of Food in the Last Year: Never true   Transportation Needs: No Transportation Needs    Lack of Transportation (Medical): No    Lack of Transportation (Non-Medical): No   Physical Activity:     Days of Exercise per Week:     Minutes of Exercise per Session:    Stress:     Feeling of Stress :    Social Connections:     Frequency of Communication with Friends and Family:     Frequency of Social Gatherings with Friends and Family:     Attends [employer] Services:     Active Member of Clubs or Organizations:     Attends Club or Organization Meetings:     Marital Status:    Intimate Partner Violence:     Fear of Current or Ex-Partner:     Emotionally Abused:     Physically Abused:     Sexually Abused:         Review of Symptoms:    Pertinent items are noted in HPI    Review of systems reviewed from Patient History Form dated on today's date and   available in the patient's chart under the Media tab. Vital Signs: There were no vitals filed for this visit. General Exam:     Constitutional: Patient is adequately groomed with no evidence of malnutrition  Mental Status: The patient is oriented to time, place and person. The patient's mood and affect are appropriate. Vascular: Examination reveals no swelling or calf tenderness. Peripheral pulses are palpable and 2+. Lymphatics: no lymphadenopathy of the inguinal region or lower extremity      Physical Exam: lower back      Primary Exam:    Inspection: No deformity atrophy appreciable curvature      Palpation: No focal trigger point tenderness      Range of Motion: 70/5 pain greater in flexion and extension      Strength: Normal lower extremity      Special Tests: Negative SLR      Skin: There are no rashes, ulcerations or lesions. Gait: Nonantalgic      Reflex intact lower     Additional Comments:        Additional Examinations:           Right Lower Extremity: Examination of the right lower extremity does not show any tenderness, deformity or injury. Range of motion is unremarkable. There is no gross instability.   There are no rashes, ulcerations or lesions. Strength and tone are normal.  Left Lower Extremity: Examination of the left lower extremity does not show any tenderness, deformity or injury. Range of motion is unremarkable. There is no gross instability. There are no rashes, ulcerations or lesions. Strength and tone are normal.         Office Imaging Results/Procedures PerformedToday:            Office Procedures:   No orders of the defined types were placed in this encounter. Other Outside Imaging and Testing Personally Reviewed:       7/27/2021  4:00 PM Kel, [facility] Incoming Radiology Results From [facility] Leo Willson MD Results   Radiation Dose Estimates    No radiation information found for this patient   Narrative   Left humerus, lumbar spine       HISTORY: Injury. Arm pain. Back pain.           Impression       Left humerus: Frontal and lateral views demonstrate no fracture or dislocation.       Lumbar spine: Frontal and lateral views demonstrate no fracture. There is grade 1 anterolisthesis of L5 in relation to S1. Facet hypertrophy from L3-4 through L5-S1             Assessment   Impression: . Encounter Diagnoses   Name Primary?  Sprain and strain of lumbosacral joint/ligament, initial encounter Yes    Degenerative spondylolisthesis     DDD (degenerative disc disease), lumbar     Lumbar spondylosis     Chronic anticoagulation               Plan:     Medrol Dosepak for hopeful therapeutic benefit  Activity modification lumbar sprain protocol  Home lumbar stabilization/trunk rotation exercises  If symptoms show no appreciable change consider MRI evaluation lumbar spine evaluate for acute disc herniation      The nature of the finding, probable diagnosis and likely treatment was thoroughly discussed with the patient. The options, risks, complications, alternative treatment as well as some of the differential diagnosis was discussed.  The patient was thoroughly informed and all questions were answered. the patient indicated understanding and satisfaction with the discussion. Orders:      No orders of the defined types were placed in this encounter. Disclaimer: \"This note was dictated with voice recognition software. Though review and correction are routine, we apologize for any errors. \"

## 2021-08-04 NOTE — LETTER
[address]  [address] [facility] [address]  Phone: [phone]  Fax: [phone]    Gary aPul MD    August 8, 2021     Anirudh Wood MD  [address] [facility] [address] [address]    Patient: Karin Yates   MR Number: [id number]   YOB: 1950   Date of Visit: 8/4/2021       Dear Anirudh Wood: Thank you for referring Salina Hernadez to me for evaluation/treatment. Below are the relevant portions of my assessment and plan of care. Impression: . Encounter Diagnoses   Name Primary?  Sprain and strain of lumbosacral joint/ligament, initial encounter Yes    Degenerative spondylolisthesis     DDD (degenerative disc disease), lumbar     Lumbar spondylosis     Chronic anticoagulation               Plan:     Medrol Dosepak for hopeful therapeutic benefit  Activity modification lumbar sprain protocol  Home lumbar stabilization/trunk rotation exercises  If symptoms show no appreciable change consider MRI evaluation lumbar spine evaluate for acute disc herniation      The nature of the finding, probable diagnosis and likely treatment was thoroughly discussed with the patient. The options, risks, complications, alternative treatment as well as some of the differential diagnosis was discussed. The patient was thoroughly informed and all questions were answered. the patient indicated understanding and satisfaction with the discussion. Orders:      No orders of the defined types were placed in this encounter. Disclaimer: \"This note was dictated with voice recognition software. Though review and correction are routine, we apologize for any errors. \"           If you have questions, please do not hesitate to call me. I look forward to following Jin Bergman along with you.     Sincerely,    MD Gary Morales MD

## 2021-08-11 ASSESSMENT — ENCOUNTER SYMPTOMS
ABDOMINAL PAIN: 0
NAUSEA: 0
SHORTNESS OF BREATH: 0
VOMITING: 0
BACK PAIN: 1

## 2021-08-27 ENCOUNTER — HOSPITAL ENCOUNTER (OUTPATIENT)
Dept: CT IMAGING | Age: 71
Discharge: HOME | End: 2021-08-27
Payer: MEDICARE

## 2021-08-27 DIAGNOSIS — I48.11 LONGSTANDING PERSISTENT ATRIAL FIBRILLATION (HCC): ICD-10-CM

## 2021-08-27 LAB
GFR AFRICAN AMERICAN: >60
GFR NON-AFRICAN AMERICAN: >60
PERFORMED ON: NORMAL
POC CREATININE: 0.6 MG/DL (ref 0.6–1.2)
POC SAMPLE TYPE: NORMAL

## 2021-08-27 PROCEDURE — 6360000004 HC RX CONTRAST MEDICATION: Performed by: INTERNAL MEDICINE

## 2021-08-27 PROCEDURE — 82565 ASSAY OF CREATININE: CPT

## 2021-08-27 PROCEDURE — 71275 CT ANGIOGRAPHY CHEST: CPT

## 2021-08-27 RX ADMIN — IOPAMIDOL 85 ML: 755 INJECTION, SOLUTION INTRAVENOUS at 09:14

## 2021-08-30 ENCOUNTER — TELEPHONE (OUTPATIENT)
Dept: CARDIOLOGY CLINIC | Age: 71
End: 2021-08-30

## 2021-08-30 NOTE — TELEPHONE ENCOUNTER
Spoke with pt and confirmed AF ablation on 9/1, pt arriving at [facility] at 0600. Reviewed all preop instructions previously given to pt. CTA and labs completed on 8/27. Pt denies missing any doses of Eliquis and advised to hold [address] the morning of the procedure. Reminded pt to hold dofetilide on 8/31 pm and 9/1 am.  Pt verbalized understanding.

## 2021-09-01 ENCOUNTER — ANESTHESIA (OUTPATIENT)
Dept: CARDIAC CATH/INVASIVE PROCEDURES | Age: 71
End: 2021-09-01

## 2021-09-01 ENCOUNTER — HOSPITAL ENCOUNTER (OUTPATIENT)
Dept: CARDIAC CATH/INVASIVE PROCEDURES | Age: 71
Discharge: HOME | End: 2021-09-03
Payer: MEDICARE

## 2021-09-01 ENCOUNTER — ANESTHESIA EVENT (OUTPATIENT)
Dept: CARDIAC CATH/INVASIVE PROCEDURES | Age: 71
End: 2021-09-01

## 2021-09-01 VITALS
TEMPERATURE: 90.7 F | SYSTOLIC BLOOD PRESSURE: 151 MMHG | OXYGEN SATURATION: 100 % | DIASTOLIC BLOOD PRESSURE: 69 MMHG | RESPIRATION RATE: 12 BRPM

## 2021-09-01 VITALS — WEIGHT: 227 LBS | TEMPERATURE: 98.7 F | BODY MASS INDEX: 33.62 KG/M2 | HEIGHT: 69 IN

## 2021-09-01 LAB
EKG ATRIAL RATE: 326 BPM
EKG DIAGNOSIS: NORMAL
EKG Q-T INTERVAL: 310 MS
EKG QRS DURATION: 88 MS
EKG QTC CALCULATION (BAZETT): 461 MS
EKG R AXIS: 4 DEGREES
EKG T AXIS: 135 DEGREES
EKG VENTRICULAR RATE: 133 BPM
POC ACT LR: 243 SEC
POC ACT LR: 254 SEC
POC ACT LR: 295 SEC
POC ACT LR: 300 SEC
POC ACT LR: 312 SEC
POC ACT LR: 377 SEC

## 2021-09-01 PROCEDURE — 2500000003 HC RX 250 WO HCPCS

## 2021-09-01 PROCEDURE — 93656 COMPRE EP EVAL ABLTJ ATR FIB: CPT

## 2021-09-01 PROCEDURE — 93662 INTRACARDIAC ECG (ICE): CPT

## 2021-09-01 PROCEDURE — 93613 INTRACARDIAC EPHYS 3D MAPG: CPT

## 2021-09-01 PROCEDURE — C1760 CLOSURE DEV, VASC: HCPCS

## 2021-09-01 PROCEDURE — 93005 ELECTROCARDIOGRAM TRACING: CPT

## 2021-09-01 PROCEDURE — C1732 CATH, EP, DIAG/ABL, 3D/VECT: HCPCS

## 2021-09-01 PROCEDURE — 6360000002 HC RX W HCPCS

## 2021-09-01 PROCEDURE — 85347 COAGULATION TIME ACTIVATED: CPT

## 2021-09-01 PROCEDURE — 93622 COMP EP EVAL L VENTR PAC&REC: CPT

## 2021-09-01 PROCEDURE — 93613 INTRACARDIAC EPHYS 3D MAPG: CPT | Performed by: INTERNAL MEDICINE

## 2021-09-01 PROCEDURE — 3700000000 HC ANESTHESIA ATTENDED CARE

## 2021-09-01 PROCEDURE — 2580000003 HC RX 258: Performed by: NURSE ANESTHETIST, CERTIFIED REGISTERED

## 2021-09-01 PROCEDURE — C1730 CATH, EP, 19 OR FEW ELECT: HCPCS

## 2021-09-01 PROCEDURE — G0269 OCCLUSIVE DEVICE IN VEIN ART: HCPCS

## 2021-09-01 PROCEDURE — 93657 TX L/R ATRIAL FIB ADDL: CPT | Performed by: INTERNAL MEDICINE

## 2021-09-01 PROCEDURE — 2720000010 HC SURG SUPPLY STERILE

## 2021-09-01 PROCEDURE — C1759 CATH, INTRA ECHOCARDIOGRAPHY: HCPCS

## 2021-09-01 PROCEDURE — 93662 INTRACARDIAC ECG (ICE): CPT | Performed by: INTERNAL MEDICINE

## 2021-09-01 PROCEDURE — 2709999900 HC NON-CHARGEABLE SUPPLY

## 2021-09-01 PROCEDURE — 93656 COMPRE EP EVAL ABLTJ ATR FIB: CPT | Performed by: INTERNAL MEDICINE

## 2021-09-01 PROCEDURE — 6360000002 HC RX W HCPCS: Performed by: NURSE ANESTHETIST, CERTIFIED REGISTERED

## 2021-09-01 PROCEDURE — 93657 TX L/R ATRIAL FIB ADDL: CPT

## 2021-09-01 PROCEDURE — 6370000000 HC RX 637 (ALT 250 FOR IP): Performed by: INTERNAL MEDICINE

## 2021-09-01 PROCEDURE — C1894 INTRO/SHEATH, NON-LASER: HCPCS

## 2021-09-01 PROCEDURE — 93623 PRGRMD STIMJ&PACG IV RX NFS: CPT | Performed by: INTERNAL MEDICINE

## 2021-09-01 PROCEDURE — 3700000001 HC ADD 15 MINUTES (ANESTHESIA)

## 2021-09-01 PROCEDURE — 93655 ICAR CATH ABLTJ DSCRT ARRHYT: CPT

## 2021-09-01 PROCEDURE — 93622 COMP EP EVAL L VENTR PAC&REC: CPT | Performed by: INTERNAL MEDICINE

## 2021-09-01 PROCEDURE — 2500000003 HC RX 250 WO HCPCS: Performed by: NURSE ANESTHETIST, CERTIFIED REGISTERED

## 2021-09-01 PROCEDURE — 6360000002 HC RX W HCPCS: Performed by: INTERNAL MEDICINE

## 2021-09-01 PROCEDURE — 93655 ICAR CATH ABLTJ DSCRT ARRHYT: CPT | Performed by: INTERNAL MEDICINE

## 2021-09-01 PROCEDURE — C1766 INTRO/SHEATH,STRBLE,NON-PEEL: HCPCS

## 2021-09-01 RX ORDER — PROTAMINE SULFATE 10 MG/ML
INJECTION, SOLUTION INTRAVENOUS PRN
Status: DISCONTINUED | OUTPATIENT
Start: 2021-09-01 | End: 2021-09-01 | Stop reason: SDUPTHER

## 2021-09-01 RX ORDER — SODIUM CHLORIDE 0.9 % (FLUSH) 0.9 %
5-40 SYRINGE (ML) INJECTION EVERY 12 HOURS SCHEDULED
Status: DISCONTINUED | OUTPATIENT
Start: 2021-09-01 | End: 2021-09-04 | Stop reason: HOSPADM

## 2021-09-01 RX ORDER — ENALAPRILAT 2.5 MG/2ML
1.25 INJECTION INTRAVENOUS
Status: ACTIVE | OUTPATIENT
Start: 2021-09-01 | End: 2021-09-01

## 2021-09-01 RX ORDER — FENTANYL CITRATE 50 UG/ML
50 INJECTION, SOLUTION INTRAMUSCULAR; INTRAVENOUS EVERY 5 MIN PRN
Status: DISCONTINUED | OUTPATIENT
Start: 2021-09-01 | End: 2021-09-04 | Stop reason: HOSPADM

## 2021-09-01 RX ORDER — LABETALOL HYDROCHLORIDE 5 MG/ML
5 INJECTION, SOLUTION INTRAVENOUS EVERY 10 MIN PRN
Status: DISCONTINUED | OUTPATIENT
Start: 2021-09-01 | End: 2021-09-04 | Stop reason: HOSPADM

## 2021-09-01 RX ORDER — ONDANSETRON 2 MG/ML
INJECTION INTRAMUSCULAR; INTRAVENOUS PRN
Status: DISCONTINUED | OUTPATIENT
Start: 2021-09-01 | End: 2021-09-01 | Stop reason: SDUPTHER

## 2021-09-01 RX ORDER — MIDAZOLAM HYDROCHLORIDE 1 MG/ML
INJECTION INTRAMUSCULAR; INTRAVENOUS PRN
Status: DISCONTINUED | OUTPATIENT
Start: 2021-09-01 | End: 2021-09-01 | Stop reason: SDUPTHER

## 2021-09-01 RX ORDER — PROPOFOL 10 MG/ML
INJECTION, EMULSION INTRAVENOUS PRN
Status: DISCONTINUED | OUTPATIENT
Start: 2021-09-01 | End: 2021-09-01 | Stop reason: SDUPTHER

## 2021-09-01 RX ORDER — ROCURONIUM BROMIDE 10 MG/ML
INJECTION, SOLUTION INTRAVENOUS PRN
Status: DISCONTINUED | OUTPATIENT
Start: 2021-09-01 | End: 2021-09-01 | Stop reason: SDUPTHER

## 2021-09-01 RX ORDER — FUROSEMIDE 10 MG/ML
INJECTION INTRAMUSCULAR; INTRAVENOUS PRN
Status: DISCONTINUED | OUTPATIENT
Start: 2021-09-01 | End: 2021-09-01 | Stop reason: SDUPTHER

## 2021-09-01 RX ORDER — EPHEDRINE SULFATE 50 MG/ML
INJECTION INTRAVENOUS PRN
Status: DISCONTINUED | OUTPATIENT
Start: 2021-09-01 | End: 2021-09-01 | Stop reason: SDUPTHER

## 2021-09-01 RX ORDER — SODIUM CHLORIDE 9 MG/ML
25 INJECTION, SOLUTION INTRAVENOUS PRN
Status: DISCONTINUED | OUTPATIENT
Start: 2021-09-01 | End: 2021-09-04 | Stop reason: HOSPADM

## 2021-09-01 RX ORDER — ONDANSETRON 2 MG/ML
4 INJECTION INTRAMUSCULAR; INTRAVENOUS
Status: ACTIVE | OUTPATIENT
Start: 2021-09-01 | End: 2021-09-01

## 2021-09-01 RX ORDER — KETOROLAC TROMETHAMINE 30 MG/ML
15 INJECTION, SOLUTION INTRAMUSCULAR; INTRAVENOUS EVERY 6 HOURS PRN
Status: DISCONTINUED | OUTPATIENT
Start: 2021-09-01 | End: 2021-09-04 | Stop reason: HOSPADM

## 2021-09-01 RX ORDER — FENTANYL CITRATE 50 UG/ML
25 INJECTION, SOLUTION INTRAMUSCULAR; INTRAVENOUS EVERY 5 MIN PRN
Status: DISCONTINUED | OUTPATIENT
Start: 2021-09-01 | End: 2021-09-04 | Stop reason: HOSPADM

## 2021-09-01 RX ORDER — ACETAMINOPHEN 325 MG/1
650 TABLET ORAL EVERY 4 HOURS PRN
Status: DISCONTINUED | OUTPATIENT
Start: 2021-09-01 | End: 2021-09-04 | Stop reason: HOSPADM

## 2021-09-01 RX ORDER — SODIUM CHLORIDE 0.9 % (FLUSH) 0.9 %
5-40 SYRINGE (ML) INJECTION PRN
Status: DISCONTINUED | OUTPATIENT
Start: 2021-09-01 | End: 2021-09-04 | Stop reason: HOSPADM

## 2021-09-01 RX ORDER — VASOPRESSIN 20 U/ML
INJECTION PARENTERAL PRN
Status: DISCONTINUED | OUTPATIENT
Start: 2021-09-01 | End: 2021-09-01 | Stop reason: SDUPTHER

## 2021-09-01 RX ORDER — SODIUM CHLORIDE, SODIUM LACTATE, POTASSIUM CHLORIDE, CALCIUM CHLORIDE 600; 310; 30; 20 MG/100ML; MG/100ML; MG/100ML; MG/100ML
INJECTION, SOLUTION INTRAVENOUS CONTINUOUS PRN
Status: DISCONTINUED | OUTPATIENT
Start: 2021-09-01 | End: 2021-09-01 | Stop reason: SDUPTHER

## 2021-09-01 RX ORDER — FENTANYL CITRATE 50 UG/ML
INJECTION, SOLUTION INTRAMUSCULAR; INTRAVENOUS PRN
Status: DISCONTINUED | OUTPATIENT
Start: 2021-09-01 | End: 2021-09-01 | Stop reason: SDUPTHER

## 2021-09-01 RX ORDER — HEPARIN SODIUM 10000 [USP'U]/100ML
INJECTION, SOLUTION INTRAVENOUS CONTINUOUS PRN
Status: DISCONTINUED | OUTPATIENT
Start: 2021-09-01 | End: 2021-09-01 | Stop reason: SDUPTHER

## 2021-09-01 RX ORDER — HEPARIN SODIUM 1000 [USP'U]/ML
INJECTION, SOLUTION INTRAVENOUS; SUBCUTANEOUS PRN
Status: DISCONTINUED | OUTPATIENT
Start: 2021-09-01 | End: 2021-09-01 | Stop reason: SDUPTHER

## 2021-09-01 RX ORDER — SODIUM CHLORIDE 9 MG/ML
INJECTION, SOLUTION INTRAVENOUS CONTINUOUS
Status: DISCONTINUED | OUTPATIENT
Start: 2021-09-01 | End: 2021-09-04 | Stop reason: HOSPADM

## 2021-09-01 RX ORDER — HYDRALAZINE HYDROCHLORIDE 20 MG/ML
5 INJECTION INTRAMUSCULAR; INTRAVENOUS EVERY 5 MIN PRN
Status: DISCONTINUED | OUTPATIENT
Start: 2021-09-01 | End: 2021-09-04 | Stop reason: HOSPADM

## 2021-09-01 RX ORDER — PANTOPRAZOLE SODIUM 40 MG/1
40 TABLET, DELAYED RELEASE ORAL DAILY
Qty: 30 TABLET | Refills: 0 | Status: SHIPPED | OUTPATIENT
Start: 2021-09-01 | End: 2021-12-14

## 2021-09-01 RX ORDER — SODIUM CHLORIDE 9 MG/ML
INJECTION, SOLUTION INTRAVENOUS CONTINUOUS PRN
Status: DISCONTINUED | OUTPATIENT
Start: 2021-09-01 | End: 2021-09-01 | Stop reason: SDUPTHER

## 2021-09-01 RX ADMIN — ACETAMINOPHEN 650 MG: 325 TABLET ORAL at 11:34

## 2021-09-01 RX ADMIN — APIXABAN 5 MG: 5 TABLET, FILM COATED ORAL at 16:34

## 2021-09-01 RX ADMIN — HEPARIN SODIUM 10000 UNITS: 1000 INJECTION INTRAVENOUS; SUBCUTANEOUS at 08:47

## 2021-09-01 RX ADMIN — ROCURONIUM BROMIDE 25 MG: 10 INJECTION INTRAVENOUS at 08:47

## 2021-09-01 RX ADMIN — SODIUM CHLORIDE, SODIUM LACTATE, POTASSIUM CHLORIDE, AND CALCIUM CHLORIDE: .6; .31; .03; .02 INJECTION, SOLUTION INTRAVENOUS at 07:32

## 2021-09-01 RX ADMIN — HEPARIN SODIUM 3000 UNITS: 1000 INJECTION INTRAVENOUS; SUBCUTANEOUS at 09:09

## 2021-09-01 RX ADMIN — FUROSEMIDE 20 MG: 10 INJECTION, SOLUTION INTRAMUSCULAR; INTRAVENOUS at 10:42

## 2021-09-01 RX ADMIN — EPHEDRINE SULFATE 10 MG: 50 INJECTION INTRAVENOUS at 08:24

## 2021-09-01 RX ADMIN — PHENYLEPHRINE HYDROCHLORIDE 20 MCG/MIN: 10 INJECTION INTRAVENOUS at 08:23

## 2021-09-01 RX ADMIN — EPHEDRINE SULFATE 5 MG: 50 INJECTION INTRAVENOUS at 08:39

## 2021-09-01 RX ADMIN — HEPARIN SODIUM 2000 UNITS/HR: 10000 INJECTION, SOLUTION INTRAVENOUS at 09:09

## 2021-09-01 RX ADMIN — HEPARIN SODIUM 3000 UNITS: 1000 INJECTION INTRAVENOUS; SUBCUTANEOUS at 10:07

## 2021-09-01 RX ADMIN — VASOPRESSIN 1 UNITS: 20 INJECTION INTRAVENOUS at 08:26

## 2021-09-01 RX ADMIN — ISOPROTERENOL HYDROCHLORIDE 10 MCG/MIN: 0.2 INJECTION, SOLUTION INTRAMUSCULAR; INTRAVENOUS at 10:32

## 2021-09-01 RX ADMIN — FENTANYL CITRATE 50 MCG: 50 INJECTION, SOLUTION INTRAMUSCULAR; INTRAVENOUS at 08:17

## 2021-09-01 RX ADMIN — PROTAMINE SULFATE 40 MG: 10 INJECTION, SOLUTION INTRAVENOUS at 10:42

## 2021-09-01 RX ADMIN — MIDAZOLAM HYDROCHLORIDE 1 MG: 2 INJECTION, SOLUTION INTRAMUSCULAR; INTRAVENOUS at 07:58

## 2021-09-01 RX ADMIN — HEPARIN SODIUM 3000 UNITS: 1000 INJECTION INTRAVENOUS; SUBCUTANEOUS at 09:49

## 2021-09-01 RX ADMIN — PROPOFOL 120 MG: 10 INJECTION, EMULSION INTRAVENOUS at 08:18

## 2021-09-01 RX ADMIN — FENTANYL CITRATE 50 MCG: 50 INJECTION, SOLUTION INTRAMUSCULAR; INTRAVENOUS at 07:56

## 2021-09-01 RX ADMIN — ONDANSETRON 4 MG: 2 INJECTION INTRAMUSCULAR; INTRAVENOUS at 10:42

## 2021-09-01 RX ADMIN — SODIUM CHLORIDE: 9 INJECTION, SOLUTION INTRAVENOUS at 09:56

## 2021-09-01 RX ADMIN — ROCURONIUM BROMIDE 50 MG: 10 INJECTION INTRAVENOUS at 08:18

## 2021-09-01 RX ADMIN — KETOROLAC TROMETHAMINE 30 MG: 30 INJECTION, SOLUTION INTRAMUSCULAR at 11:20

## 2021-09-01 RX ADMIN — PHENYLEPHRINE HYDROCHLORIDE 80 MCG: 10 INJECTION, SOLUTION INTRAMUSCULAR; INTRAVENOUS; SUBCUTANEOUS at 10:05

## 2021-09-01 RX ADMIN — ROCURONIUM BROMIDE 25 MG: 10 INJECTION INTRAVENOUS at 09:30

## 2021-09-01 RX ADMIN — HEPARIN SODIUM 5000 UNITS: 1000 INJECTION INTRAVENOUS; SUBCUTANEOUS at 09:31

## 2021-09-01 RX ADMIN — PHENYLEPHRINE HYDROCHLORIDE 80 MCG: 10 INJECTION, SOLUTION INTRAMUSCULAR; INTRAVENOUS; SUBCUTANEOUS at 09:55

## 2021-09-01 ASSESSMENT — PULMONARY FUNCTION TESTS
PIF_VALUE: 20
PIF_VALUE: 0
PIF_VALUE: 20
PIF_VALUE: 21
PIF_VALUE: 20
PIF_VALUE: 19
PIF_VALUE: 20
PIF_VALUE: 20
PIF_VALUE: 34
PIF_VALUE: 19
PIF_VALUE: 19
PIF_VALUE: 20
PIF_VALUE: 21
PIF_VALUE: 7
PIF_VALUE: 19
PIF_VALUE: 1
PIF_VALUE: 19
PIF_VALUE: 20
PIF_VALUE: 20
PIF_VALUE: 19
PIF_VALUE: 20
PIF_VALUE: 20
PIF_VALUE: 21
PIF_VALUE: 20
PIF_VALUE: 20
PIF_VALUE: 0
PIF_VALUE: 20
PIF_VALUE: 19
PIF_VALUE: 20
PIF_VALUE: 1
PIF_VALUE: 20
PIF_VALUE: 20
PIF_VALUE: 1
PIF_VALUE: 20
PIF_VALUE: 22
PIF_VALUE: 20
PIF_VALUE: 1
PIF_VALUE: 20
PIF_VALUE: 1
PIF_VALUE: 20
PIF_VALUE: 19
PIF_VALUE: 19
PIF_VALUE: 20
PIF_VALUE: 21
PIF_VALUE: 20
PIF_VALUE: 19
PIF_VALUE: 20
PIF_VALUE: 21
PIF_VALUE: 20
PIF_VALUE: 21
PIF_VALUE: 20
PIF_VALUE: 20
PIF_VALUE: 0
PIF_VALUE: 20
PIF_VALUE: 21
PIF_VALUE: 20
PIF_VALUE: 0
PIF_VALUE: 20
PIF_VALUE: 19
PIF_VALUE: 30
PIF_VALUE: 20
PIF_VALUE: 19
PIF_VALUE: 22
PIF_VALUE: 20
PIF_VALUE: 1
PIF_VALUE: 20
PIF_VALUE: 21
PIF_VALUE: 20
PIF_VALUE: 20
PIF_VALUE: 0
PIF_VALUE: 20
PIF_VALUE: 21
PIF_VALUE: 19
PIF_VALUE: 1
PIF_VALUE: 20
PIF_VALUE: 0
PIF_VALUE: 20
PIF_VALUE: 20
PIF_VALUE: 0
PIF_VALUE: 20
PIF_VALUE: 20
PIF_VALUE: 21
PIF_VALUE: 0
PIF_VALUE: 19
PIF_VALUE: 20
PIF_VALUE: 19
PIF_VALUE: 2
PIF_VALUE: 20
PIF_VALUE: 19
PIF_VALUE: 20
PIF_VALUE: 0
PIF_VALUE: 0
PIF_VALUE: 20
PIF_VALUE: 19
PIF_VALUE: 20
PIF_VALUE: 0
PIF_VALUE: 19
PIF_VALUE: 20
PIF_VALUE: 22
PIF_VALUE: 20
PIF_VALUE: 21
PIF_VALUE: 20
PIF_VALUE: 19
PIF_VALUE: 21
PIF_VALUE: 20

## 2021-09-01 ASSESSMENT — PAIN SCALES - GENERAL: PAINLEVEL_OUTOF10: 9

## 2021-09-01 NOTE — ANESTHESIA POSTPROCEDURE EVALUATION
Department of Anesthesiology  Postprocedure Note    Patient: Kacey Garcia  MRN: [id number]  YOB: 1950  Date of evaluation: 9/1/2021  Time:  12:29 PM     Procedure Summary     Date: 09/01/21 Room / Location: [facility] Cath Lab    Anesthesia Start: 1660 Anesthesia Stop: 1110    Procedure: Claudio  A-FIB ABLATION W ANES Diagnosis:     Scheduled Providers: Teresa Richard MD Responsible Provider: Teresa Richard MD    Anesthesia Type: general ASA Status: 3          Anesthesia Type: general    Rambo Phase I:      Rambo Phase II:      Last vitals: Reviewed and per EMR flowsheets.        Anesthesia Post Evaluation    Patient location during evaluation: bedside  Patient participation: complete - patient participated  Level of consciousness: awake  Airway patency: patent  Nausea & Vomiting: no nausea and no vomiting  Complications: no  Cardiovascular status: hemodynamically stable  Respiratory status: acceptable  Hydration status: stable

## 2021-09-01 NOTE — H&P
[address]   Cardiac Electrophysiology H&P  Date: 9/1/2021  Admit Date:  9/1/2021  Reason for admission: Atrial fibrillation  Consult Requesting Physician: No att. providers found     No chief complaint on file. HPI: Peg Encarnacion is a 65 y.o.  woman with a h/o breast CA, HLD,  chronic dCHF, atypical AFL, pAF, S/p RFA (2005, Dr. Jazmin Barba, 2006, Dr. Min Mcdaniel, 2013, Dr. Christina Sheffield), s/p RFA/PVI of AF/typical AFL/atypical AFL (11/23/20) with recurrence of highly symptomatic AF. Toprol was increased to 50 mg, but she did not tolerate it due to dizziness and bradycardia, dose reduced to 25 mg daily. Failed flecainide due to widening QRS. S/p DCCV to NSR on 12/9/20 with recurrence the next day. S/p dofetilide loading with chemical conversion to SR; however, developed nonsustained PAF post loading. Outpatient monitor (3/2021) showed AF burden of 69%, 2945 runs of VT (longest lasting 28 sec), and 128 pauses with longest lasting 4.2 sec. S/p LHC (5/2021) showed normal coronaries. Echo (6/2021) shows preserved LVEF with LAE.       Today, she is here for previously scheduled atrial fibrillation ablation.        Past Medical History:   Diagnosis Date    Acute diastolic congestive heart failure ([address] 11/26/2018    Allergic     SEASONAL    Atrial fibrillation ([address] H/O    Cancer ([address] 2009 AND 2011    BREAST right    Essential hypertension 3/13/2018    Hammertoe     Migraine     Mixed hyperlipidemia 10/19/2020        Past Surgical History:   Procedure Laterality Date    ANKLE FRACTURE SURGERY  2004,2008    APPENDECTOMY  03/1983    ATRIAL ABLATION SURGERY  X3    Cryoablation for atrial fib    AXILLARY SURGERY Right     lymph node    BREAST BIOPSY  01/07/2011    BREAST BIOPSY  7/12/2011    left breast bx - benign     BREAST CYST EXCISION  06/2003,    BREAST LUMPECTOMY  03/30/2009    CARDIAC CATHETERIZATION  5/2005,01/2006    COLONOSCOPY  1993,1995,1999,2003,2006    COLONOSCOPY  11/29/2011     Kay - benign polyps     COSMETIC SURGERY      FOOT SURGERY Right 1/15/16    CORRECTION OF HAMMERTOES 1-5 RIGHT FOOT, WEIL OSTEOTOMY 2,3    FOOT SURGERY Right 05/12/2016    EXTENSOR TENDON LENGTHENING 4TH AND 5TH TOES RIGHT FOOT    FOOT SURGERY Right 08/11/2016    CORRECTION HAMMERTOES 1ST, 4TH AND 5TH DIGITS RIGHT FOOT WITH    HERNIA REPAIR  [id number]    HYSTERECTOMY  12/1977    JOINT REPLACEMENT  June 2015    LEFT KNEE    KNEE SURGERY  6/09/2015    left knee replacement    LIPOMA RESECTION  09/2006    MASTECTOMY  3-15-11    bilateral    NEUROMA SURGERY  3503,8642,6972,1999 , 1995    Left foot    OTHER SURGICAL HISTORY  1983, 1984 X 2, 1994 X 2, 2006    Bowel obstructions    RHINOPLASTY  11/1976    RHINOPLASTY  1995    THORACOTOMY  1998    TONSILLECTOMY  1956    UPPER GASTROINTESTINAL ENDOSCOPY  [id number],7914,6375,1966    VARICOSE VEIN SURGERY  11/2001       Allergies   Allergen Reactions    Keflex [Cephalexin] Shortness Of Breath and Nausea Only    Novocain [Procaine] Shortness Of Breath and Swelling     LIDOCAINE OK    Amiodarone Other (See Comments)     dizziness    Cephalexin     Diltiazem Other (See Comments)     dizziness    Penicillins Swelling    Sulfa Antibiotics Nausea Only and Swelling       Social History:  Reviewed. reports that she quit smoking about 33 years ago. Her smoking use included cigarettes. She started smoking about 41 years ago. She has a 2.00 pack-year smoking history. She has never used smokeless tobacco. She reports that she does not drink alcohol and does not use drugs. Family History:  Reviewed. family history includes Cancer in her brother, father, and mother. No premature CAD. Review of System:  All other systems reviewed except for that noted above.  Pertinent negatives and positives are:     Objective      · General: negative for fever, chills   · Ophthalmic ROS: negative for - eye pain or loss of vision  · ENT ROS: negative for - headaches, sore throat   · Respiratory: negative for - cough, sputum  · Cardiovascular: Reviewed in HPI  · Gastrointestinal: negative for - abdominal pain, diarrhea, N/V  · Hematology: negative for - bleeding, blood clots, bruising or jaundice  · Genito-Urinary:  negative for - Dysuria or incontinence  · Musculoskeletal: negative for - Joint swelling, muscle pain  · Neurological: negative for - confusion, dizziness, headaches   · Psychiatric: No anxiety, no depression. · Dermatological: negative for - rash    Physical Examination:  Vitals:    21 0726   Temp: 98.7 °F (37.1 °C)        Intake/Output Summary (Last 24 hours) at 2021 1105  Last data filed at 2021 0956  Gross per 24 hour   Intake 1000 ml   Output --   Net 1000 ml     In: 1000 [I.V.:1000]  Out: -    Wt Readings from Last 3 Encounters:   21 227 lb (103 kg)   21 224 lb 6.9 oz (101.8 kg)   21 224 lb 6.4 oz (101.8 kg)     Temp  Av.6 °F (36.4 °C)  Min: 90.7 °F (32.6 °C)  Max: 99.3 °F (37.4 °C)  BP  Min: 70/51  Max: 178/100  SpO2  Av.5 %  Min: 90 %  Max: 100 %  FiO2   Av.4 %  Min: 33 %  Max: 98 %    · Telemetry: A. fib  · Constitutional: Alert. Oriented to person, place, and time. No distress. · Head: Normocephalic and atraumatic. · Mouth/Throat: Lips appear moist. Oropharynx is clear and moist.  · Eyes: Conjunctivae normal. EOM are normal.   · Neck: Neck supple. No lymphadenopathy. No rigidity. No JVD present. · Cardiovascular:Tachy, irregular rhythm. Normal S1&S2. Carotid pulse 2+ bilaterally. · Pulmonary/Chest: Bilateral respiratory sounds present. No respiratory accessory muscle use. No wheezes, No rhonchi. · Abdominal: Soft. Normal bowel sounds present. No distension, No tenderness. No splenomegaly. No hernia. · Musculoskeletal: No tenderness. No edema    · Lymphadenopathy: Has no cervical adenopathy. · Neurological: Alert and oriented.  Cranial nerve II-XII grossly intact, No gross deficit to touch.  · Skin: Skin is warm and dry. No rash, lesions, ulcerations noted. · Psychiatric: No anxiety nor agitation. Tachy  Labs:  Reviewed. No results for input(s): NA, K, CL, CO2, PHOS, BUN, CREATININE in the last 72 hours. Invalid input(s): CA  No results for input(s): WBC, HGB, HCT, MCV, PLT in the last 72 hours. No results found for: CKTOTAL, CKMB, CKMBINDEX, TROPONINI  No results found for: BNP  Lab Results   Component Value Date    PROTIME 11.7 08/27/2021    PROTIME 10.9 05/18/2021    PROTIME 13.5 03/05/2021    PROTIME 31.5 03/08/2012    PROTIME 21.4 02/09/2012    PROTIME 18.8 12/16/2011    PROTIME 23.5 10/06/2010    INR 1.03 08/27/2021    INR 0.94 05/18/2021    INR 1.16 03/05/2021     Lab Results   Component Value Date    CHOL 195 09/08/2020    HDL 38 09/08/2020    TRIG 292 09/08/2020       Diagnostic and imaging results reviewed. I independently reviewed the ECG and telemetry.      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 12/20/2010    Abdominal adhesions 02/26/2014    Osteopenia 02/26/2014    Lumbar facet arthropathy 02/26/2014    Tremor 06/15/2012    S/P ablation of atrial flutter 06/15/2012    A-fib ([facility] 03/08/2011    Ventral hernia 08/28/2013    Seborrheic keratosis 10/06/2010    Acute low back pain without sciatica 07/30/2021    Left arm pain 07/30/2021    VT (ventricular tachycardia) ([address] 05/18/2021    Tachycardia     Bradycardia     Chronic diastolic CHF (congestive heart failure) ([facility]     Encounter for monitoring dofetilide therapy     Arrhythmia 03/08/2021    Paroxysmal atrial fibrillation ([address] 03/08/2021    Atypical atrial flutter ([facility]  Vitamin D deficiency 10/19/2020    Mixed hyperlipidemia 10/19/2020    Constipation 10/19/2020    Prediabetes 10/19/2020    Hypervolemia 11/26/2018    Other chest pain 11/26/2018    Acute diastolic congestive heart failure ([facility] 11/26/2018    S/P bilateral mastectomy 03/13/2018    Essential hypertension 03/13/2018    Facet arthropathy, thoracic 05/17/2017    Hammer toe of right foot 08/01/2016    Concussion 03/31/2015      There are no active hospital problems to display for this patient. Recommendation (s):    We educated the patient that atrial fibrillation and atrial flutter are both progressive diseases, with more frequent episodes that will ensue. Subsequent episodes usually become more sustained to the extent that many individuals would then develop persistent atrial fibrillation/atrial flutter. Once persistence is reached, permanent atrial dysrhythmia is inevitable. Treatment options including cardioversion, anti-arrhythmic medication therapy, rate control strategy, oral anticoagulation, and atrial fibrillation ablation were discussed with patient. Risks, benefits and alternative of each treatment options were explained. We discussed different management options for both atrial tachydysrhythmia including their risks and benefits. These options include use of cardioversion (mainly for persisting atrial fibrillation or atrial flutter) which provides an effective immediate therapy with success rates of 75% or higher, but it provides no short nor long term efficacy. Anti-arrhythmic medications provide a very effective short term therapy, but even with our most potent anti-arrhythmic medication there is limited long term efficacy (clinical studies have shown that 40% of patients remain atrial fibrillation-free after 4 years of follow-up after starting one of the more powerful anti-arrhythmic medication (amiodarone), and, if extrapolated, may have further diminishing success as time goes on). Against atrial flutter, any anti-arrhythmic medication would be nearly ineffective. Atrial fibrillation and atrial flutter ablation is a potentially curative therapy with very reasonable success rate after a first time procedure and with improving success rates with subsequent procedures. The risks, benefits and alternatives of the ablation procedure were discussed with the patient. The risks including, but not limited to, the risks of bleeding, infection, radiation exposure, injury to vascular, cardiac and surrounding structures (including pneumothorax), stroke, cardiac perforation, tamponade, need for emergent open heart surgery, need for pacemaker implantation, injury to the phrenic nerve, injury to the esophagus, myocardial infarction and death were discussed in detail. The patient wishes to proceed. Thank you for allowing me to participate in the care of Kristyn Chowdhury . If you have any questions/comments, please do not hesitate to contact us. Amandeep Le MD   Cardiac Electrophysiology  [address] [place] [address]    For any EP related issues after 5 PM, [facility] call [facility] through .

## 2021-09-01 NOTE — PROCEDURES
[employer]     Electrophysiology Procedure Note       Date of Procedure: 9/1/2021  Patient's Name: Wesley Shoemaker  YOB: 1950   Medical Record Number: [id number]  Referring Physician: Joslyn Woods MD  Procedure Performed by: Clarisa Guzman MD    Procedure performed:  · Electrophysiology study with radiofrequency ablation of atrial fibrillation and pulmonary vein isolation   · Additional focal ablation for Atrial fibrillation, outside the pulmonary vein -intra-atrial septum and posterior wall  · Atypical atrial flutter ablation -mitral isthmus line and left atrial roofline  · 3-D electroanatomical mapping of the left atrium    · Transseptal puncture through an intact septum under intracardiac ultrasound guidance   · Intracardiac echocardiography. · Left ventricular pacing and recording  · Drug infusion with an attempt to induce arrhythmia and pacing  · Anesthesia: General anesthesia provided by the Anesthesia service  · EBL < 30 cc      Indications for procedure: Symptomatic atrial fibrillation, failed antiarrhythmic therapy and cardioversion in the past     Wesley Shoemaker is a 65 y.o. female who has a history of longstanding persistent atrial fibrillation who is symptomatic with symptoms of dyspnea with minimal exertion and fatigue who has failed antiarrhythmic therapy in the past is now here for an ablation for longstanding persistent atrial fibrillation. In the past, she initially had atrial flutter ablation. In 2013, she had atrial fibrillation ablation. In 2020, she had another atrial fibrillation ablation by me. At the time, she also had roofline ablation. Since then, she had recurrence of highly symptomatic atrial fibrillation. She was not able to tolerate flecainide secondary to QRS prolongation. She is on Tikosyn. In spite of that, she have multiple recurrences of atrial fibrillation during which she is highly symptomatic.   Her monitor showed 69% atrial fibrillation burden. We discussed about multiple treatment options including repeat ablation versus referral for epicardial ablation versus AV node ablation and CRT-P. Patient decided to go with redo atrial fibrillation/atrial flutter ablation. Details of Procedure: The risks, benefits and alternatives of the ablation procedure were discussed with the patient. The risks including, but not limited to, the risks of bleeding, infection, radiation exposure, injury to vascular, cardiac and surrounding structures (including pneumothorax), stroke, cardiac perforation, tamponade, need for emergent open heart surgery, need for pacemaker implantation, esophageal injury and fistula, myocardial infarction and death were discussed in detail. The patient opted to proceed with the ablation. Written informed consent was signed and placed in the chart. Patient was brought to the EP lab in a fasting non-sedate state. Patient underwent general anesthesia by anesthesia team. The patient was monitored continuously with ECG, pulse oximetry, blood pressure monitoring, and direct observation. The patient was in atrial fibrillation with rapid ventricular rate upon arriving in the EP laboratory. After subcutaneous infiltration with Lidocaine, sheaths were placed in RFV without difficulty as follows:    8.5Fr Naper medium curve sheath to the RFV for 3.5 mm tip Thermocool ST/SF D/F curve catheter and Pentaray catheter  7Fr sheath for the decapolar CS catheter  9Fr sheath for the ICE catheter    An intracardiac echocardiography catheter was advanced to the right atrium. The ICE catheter was placed in the RV and lack of a pericardial effusion was documented. After confirming that, a heparin bolus was given and a heparin drip was started. The ACT was maintained with a goal of 350-400 seconds for the duration of the procedure. Additional boluses of heparin during the procedure to keep the ACT about 350 sec.  Intracardiac echocardiography was performed and a Cartosound map of the interatrial septum, coronary sinus, root of the aorta and left atrium was created. Using Pentaray catheter, a three-dimensional electro-anatomic mapping of the right atrium, right atrial appendage, SVC, IVC, coronary sinus and interatrial septum was performed. The decapolar catheter was advanced to the coronary sinus. Also an esophageal temperature probe (GPNX Temperature Probe 12Fr) that was tied with sutures to an accompanying [employer] quadripolar 6 Greek 5-5-5 electrode spacing catheter was advanced into the esophagus for real-time mapping of the esophagus and careful monitoring of the esophageal temperature during ablation. Ablation was stopped if the temperature was rising for more than 0.5 °C.    [place] medium curve sheath was carefully placed in SVC, over the Pentaray catheter with EAM and ICE guidance. The dilator and a trans-septal [place] C-1 NRG 98 cms needle was carefully advanced to the SVC. The entire assembly was carefully pulled down into the right atrium. Using 3D EAM and ICE guidance, a trans-septal catheterization was performed without difficulty. LA pressure was measured and was 34/12 mm Hg. A Penta-ray catheter was advanced to the left atrium via [place] sheath. Using PentaRay catheter 3D electro anatomic mapping of the left atrium was performed. Phrenic nerve was mapped using high output pacing. Esophagus was mapped using Carto sound map and a mapping catheter in the esophagus. Left atrial appendage was also mapped. Baseline signals were recorded. Voltage mapping was also created. Then Pentaray catheter was removed and a 3.5 mm tip Thermocool ST/SF D/F curve catheter was advanced to the left atrium via [place] sheath. During atrial fibrillation rhythm, we found that her left to superior pulmonary vein have reconnection's. All other 3 pulmonary veins had silent.  Then wide area circumferential ablation for the left sided pulmonary veins along with harinder ablation were performed which resulted in electrical isolation of these pulmonary veins and eradication of the PV fascicles. With the completion of left superior pulmonary vein isolation, she organized into atypical atrial flutter with a cycle length of 240 ms. Mapping was performed and this was consistent with roof-dependent flutter. Hence, a roofline was drawn between the left superior pulmonary vein and right superior pulmonary vein. By the completion of this line, she went into second atrial flutter with a cycle length of 264 ms. Activation mapping was performed and this was consistent with the mitral isthmus dependent flutter. Hence, a mitral isthmus line was drawn from the left inferior pulmonary vein towards the anterolateral part of the mitral isthmus. At the end of this line, she degenerated back into atrial fibrillation. Hence, further mapping was performed and CFAE ablation was performed along the posterior wall and anterior part of the intra-atrial septum, as a separate mechanism for atrial fibrillation. During ablation along the anterior part of the intra-atrial septum, that there are plenty of highly fragmented signals, she got converted to normal sinus rhythm. As the patient had complete isolation of the right-sided veins, right-sided veins were not isolated again. Using Carto-smart touch module, care was taken to maintain the contact force between 8gms and 20gms during all the ablative lesions. Entrance block developed during RF energy delivery. Entrance and exit block were demonstrated in all 4 pulmonary veins. Maximum output (20mA) pacing in the L antrum and R antrum were performed and showed dissociation from the rest of the left atrium. This was checked circumferentially around the antrums and verified many times. Ablation catheter was placed into the left ventricle and pacing from the left ventricular was performed to obtain VA block.  IV Isuprel up to a maximum of 10mcg/min was infused post ablation, for 10 minutes to check for any triggers for AF induction. No triggers noted. We then performed an EP study and programmed stimulation using our CS catheter and ablation cathter to assess the cardiac conduction system and to attempt to induce atrial tachydysrhythmia. His bundle potentials was recorded and pacing was performed from right atrium, coronary sinus and LV apex with the following results:     Sinus cycle length was 824 msec  OR interval was 168 msec  QRS duration was 97 msec  QT interval was 350 msec  AH interval was 112 msec  HV interval was 41 msec  Pacing from left atrium, 1:1 conduction over AV node with (AV wenckebach) was 380 msec  Pacing from LV apex, 1:1 retrograde conduction over AV node (VA wenckebach) was 550 msec    All catheters and sheaths were removed to the right atrium. The heparin was stopped. The ICE catheter was placed in the RV. There was no pericardial effusion. Protamine 40 mg was given. When the ACT was appropriate, catheters and sheaths were removed and hemostasis was obtained via Vascade MVP venous closure device. No complications noted. Conclusion:     - Pre- and post-procedure diagnoses were longstanding persistent atrial fibrillation and atypical atrial flutter  - Pulmonary vein isolations using wide area circumferential radiofrequency ablation   - Additional focal ablation for atrial fibrillation, outside the pulmonary veins in posterior wall and anterior part of the intra-atrial septum  -Atypical flutter ablation -mitral isthmus line and roofline  Isuprel infusion, with an attempt to induce arrhythmia    Plan:  [place] [facility] [place]  [place]I for 30 days  Continue Tikosyn, Lasix, beta-blockers and calcium channel blockers  Bedrest for 2 hours  Observation for 4 hours  D/C after 4 hours, if stable. Thank you for allowing us to participate in the care of your patient.  If you have any questions or concerns, please do not hesitate to contact me.     Mi Chanel MD   Cardiac Electrophysiology  [address] [facility] [phone]  [employer]

## 2021-09-01 NOTE — PRE SEDATION
Sedation Pre-Procedure Note    Patient Name: Rosalie Bridges   YOB: 1950  Room/Bed: Room/bed info not found  Medical Record Number: [id number]  Date: 9/1/2021   Time: 10:53 AM       Indication: Atrial fibrillation    Consent: I have discussed with the patient and/or the patient representative the indication, alternatives, and the possible risks and/or complications of the planned procedure and the anesthesia methods. The patient and/or patient representative appear to understand and agree to proceed. Vital Signs:   Vitals:    09/01/21 0726   Temp: 98.7 °F (37.1 °C)       Past Medical History:   has a past medical history of Acute diastolic congestive heart failure ([facility] [address] Allergic, Atrial fibrillation ([facility] [address] Cancer ([facility] [address] Essential hypertension, Hammertoe, Migraine, and Mixed hyperlipidemia. Past Surgical History:   has a past surgical history that includes Tonsillectomy (1956); rhinoplasty (11/1976); Hysterectomy (12/1977); Appendectomy (03/1983); other surgical history (1983, 1984 X 2, 1994 X 2, 2006); Neuroma surgery (2848,9674,6787,4002 , 1995); rhinoplasty (1995); hernia repair (1993,1998,2000,2009); thoracotomy (1998); Upper gastrointestinal endoscopy (0774,4950,0689,3268,9080,9518); Colonoscopy (1993,1995,1999,2003,2006); Varicose vein surgery (11/2001); Breast cyst excision (06/2003,); Ankle fracture surgery (9136,4103); Cardiac catheterization (5/2005,01/2006); lipoma resection (09/2006); Breast lumpectomy (03/30/2009); Breast biopsy (01/07/2011); Mastectomy (3-15-11); Cosmetic surgery; Breast biopsy (7/12/2011); Colonoscopy (11/29/2011); Atrial ablation surgery (X3); knee surgery (6/09/2015); joint replacement (June 2015); Axillary Surgery (Right); Foot surgery (Right, 1/15/16); Foot surgery (Right, 05/12/2016); and Foot surgery (Right, 08/11/2016).     Medications:   Scheduled Meds:    sodium chloride flush  5-40 mL IntraVENous 2 times per day     Continuous Infusions:    sodium chloride  sodium chloride       PRN Meds: sodium chloride, sodium chloride flush  Home Meds:   Prior to Admission medications    Medication Sig Start Date End Date Taking? Authorizing Provider   pantoprazole (PROTONIX) 40 MG tablet Take 1 tablet by mouth daily 9/1/21  Yes Alberto Martinez MD   metoprolol succinate (TOPROL XL) 100 MG extended release tablet Take 1 tablet by mouth daily 6/10/21  Yes MAILE Luna CNP   ezetimibe (ZETIA) 10 MG tablet TAKE 1 TABLET BY MOUTH ONE TIME A DAY  3/29/21  Yes MAILE Donahue CNP   verapamil (CALAN) 80 MG tablet Take 1 tablet by mouth 3 times daily 3/25/21  Yes MAILE Donahue CNP   dofetilide (TIKOSYN) 500 MCG capsule Take 1 capsule by mouth 2 times daily 3/9/21  Yes MAILE Luna CNP   apixaban (ELIQUIS) 5 MG TABS tablet Take 1 tablet by mouth 2 times daily 3/4/21  Yes Alberto Martinez MD   furosemide (LASIX) 20 MG tablet TAKE 1 TABLET DAILY 1/6/21  Yes Rachana Chiu MD   Vitamin D, Cholecalciferol, 25 MCG (1000 UT) CAPS Take 1,000 Units by mouth daily  Patient taking differently: Take 3 capsules by mouth daily  10/19/20  Yes Catherine Avila MD   acetaminophen (TYLENOL) 500 MG tablet Take 500 mg by mouth every 8 hours as needed for Pain    Historical Provider, MD   tiZANidine (ZANAFLEX) 2 MG tablet Take 1 tablet by mouth nightly as needed (pain) 7/27/21   Catherine Avila MD   nitroGLYCERIN (NITROSTAT) 0.4 MG SL tablet Place 1 tablet under the tongue every 5 minutes as needed for Chest pain 4/26/21   MAILE Luna CNP     Coumadin Use Last 7 Days:  no  Antiplatelet drug therapy use last 7 days: no  Other anticoagulant use last 7 days: yes -Eliquis  Additional Medication Information: Tikosyn      Pre-Sedation Documentation and Exam:   I have personally completed a history, physical exam & review of systems for this patient (see notes). Vital signs have been reviewed (see flow sheet for vitals).     Mallampati Airway Assessment:  Mallampati Class I - (soft palate, fauces, uvula & anterior/posterior tonsillar pillars are visible)    Prior History of Anesthesia Complications:   none    ASA Classification:  Class 2 - A normal healthy patient with mild systemic disease    Sedation/ Anesthesia Plan:   general    Medications Planned:   propofol intravenously    Patient is an appropriate candidate for plan of sedation: yes    Electronically signed by Armando Mak MD on 9/1/2021 at 10:53 AM

## 2021-09-01 NOTE — ANESTHESIA PRE PROCEDURE
Department of Anesthesiology  Preprocedure Note       Name:  Chioma Boswell   Age:  65 y.o.  :  1950                                          MRN:  [id number]         Date:  2021      Surgeon: * Surgery not found *    Procedure:     Medications prior to admission:   Prior to Admission medications    Medication Sig Start Date End Date Taking? Authorizing Provider   methylPREDNISolone (MEDROL, MILADY,) 4 MG tablet By mouth. 21   Juana Mendez MD   acetaminophen (TYLENOL) 500 MG tablet Take 500 mg by mouth every 8 hours as needed for Pain    Historical Provider, MD   tiZANidine (ZANAFLEX) 2 MG tablet Take 1 tablet by mouth nightly as needed (pain) 21   Kiara Chu MD   metoprolol succinate (TOPROL XL) 100 MG extended release tablet Take 1 tablet by mouth daily 6/10/21   MAILE Jennings CNP   nitroGLYCERIN (NITROSTAT) 0.4 MG SL tablet Place 1 tablet under the tongue every 5 minutes as needed for Chest pain 21   MAILE Jennings CNP   ezetimibe (ZETIA) 10 MG tablet TAKE 1 TABLET BY MOUTH ONE TIME A DAY  3/29/21   Ammy Brain, MAILE - CNP   verapamil (CALAN) 80 MG tablet Take 1 tablet by mouth 3 times daily 3/25/21   Ammy Brain, MAILE - CNP   dofetilide (TIKOSYN) 500 MCG capsule Take 1 capsule by mouth 2 times daily 3/9/21   MAILE Jennings - CNP   apixaban (ELIQUIS) 5 MG TABS tablet Take 1 tablet by mouth 2 times daily 3/4/21   Talat Morrison MD   furosemide (LASIX) 20 MG tablet TAKE 1 TABLET DAILY 21   Maciej Dodson MD   Vitamin D, Cholecalciferol, 25 MCG (1000 UT) CAPS Take 1,000 Units by mouth daily  Patient taking differently: Take 3 capsules by mouth daily  10/19/20   Kiara Chu MD   acetaminophen (TYLENOL) 325 MG tablet Take 325 mg by mouth every 6 hours as needed As need for headaches.   Patient not taking: Reported on 8/3/2021    Historical Provider, MD       Current medications:    Current Outpatient Medications   Medication Sig Dispense Refill    methylPREDNISolone (MEDROL, MILADY,) 4 MG tablet By mouth. 1 kit 0    acetaminophen (TYLENOL) 500 MG tablet Take 500 mg by mouth every 8 hours as needed for Pain      tiZANidine (ZANAFLEX) 2 MG tablet Take 1 tablet by mouth nightly as needed (pain) 10 tablet 0    metoprolol succinate (TOPROL XL) 100 MG extended release tablet Take 1 tablet by mouth daily 90 tablet 3    nitroGLYCERIN (NITROSTAT) 0.4 MG SL tablet Place 1 tablet under the tongue every 5 minutes as needed for Chest pain 25 tablet 3    ezetimibe (ZETIA) 10 MG tablet TAKE 1 TABLET BY MOUTH ONE TIME A DAY  90 tablet 3    verapamil (CALAN) 80 MG tablet Take 1 tablet by mouth 3 times daily 270 tablet 3    dofetilide (TIKOSYN) 500 MCG capsule Take 1 capsule by mouth 2 times daily 60 capsule 5    apixaban (ELIQUIS) 5 MG TABS tablet Take 1 tablet by mouth 2 times daily 180 tablet 3    furosemide (LASIX) 20 MG tablet TAKE 1 TABLET DAILY 90 tablet 3    Vitamin D, Cholecalciferol, 25 MCG (1000 UT) CAPS Take 1,000 Units by mouth daily (Patient taking differently: Take 3 capsules by mouth daily )      acetaminophen (TYLENOL) 325 MG tablet Take 325 mg by mouth every 6 hours as needed As need for headaches. (Patient not taking: Reported on 8/3/2021)       Current Facility-Administered Medications   Medication Dose Route Frequency Provider Last Rate Last Admin    0.9 % sodium chloride infusion   IntraVENous Continuous Marcell Henley MD        0.9 % sodium chloride infusion  25 mL IntraVENous PRN Marcell Henley MD        sodium chloride flush 0.9 % injection 5-40 mL  5-40 mL IntraVENous 2 times per day Marcell Henley MD        sodium chloride flush 0.9 % injection 5-40 mL  5-40 mL IntraVENous PRN Marcell Henley MD           Allergies:     Allergies   Allergen Reactions    Keflex [Cephalexin] Shortness Of Breath and Nausea Only    Novocain [Procaine] Shortness Of Breath and Swelling LIDOCAINE OK    Amiodarone Other (See Comments)     dizziness    Cephalexin     Diltiazem Other (See Comments)     dizziness    Penicillins Swelling    Sulfa Antibiotics Nausea Only and Swelling       Problem List:    Patient Active Problem List   Diagnosis Code    Seborrheic keratosis L82.1    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 0X9A    Right foot pain M79.671    Hammer toe of right foot M20.41    Facet arthropathy, thoracic M47.814    S/P bilateral mastectomy Z90.13    Essential hypertension I10    Hypervolemia E87.70    Other chest pain R07.89    Acute diastolic congestive heart failure (HCC) I50.31    Vitamin D deficiency E55.9    Mixed hyperlipidemia E78.2    Constipation K59.00    Prediabetes R73.03    Atypical atrial flutter (HCC) I48.4    Arrhythmia I49.9    Paroxysmal atrial fibrillation (HCC) I48.0    Tachycardia R00.0    Bradycardia R00.1    Chronic diastolic CHF [facility]congestive heart failure) ([facility] I50.32    Encounter for monitoring dofetilide therapy Z51.81, Z79.899    VT (ventricular tachycardia) ([facility]) I47.2    Acute low back pain without sciatica M54.5    Left arm pain M79.602       Past Medical History:        Diagnosis Date    Acute diastolic congestive heart failure ([address] 11/26/2018    Allergic     SEASONAL    Atrial fibrillation ([address] H/O    Cancer ([address] 2009 AND 2011    BREAST right    Essential hypertension 3/13/2018    Hammertoe     Migraine     Mixed hyperlipidemia 10/19/2020       Past Surgical History:        Procedure Laterality Date    ANKLE FRACTURE SURGERY  2004,2008    APPENDECTOMY  03/1983    ATRIAL ABLATION SURGERY  X3    Cryoablation for atrial fib    AXILLARY SURGERY Right     lymph node  BREAST BIOPSY  2011    BREAST BIOPSY  2011    left breast bx - benign     BREAST CYST EXCISION  2003,    BREAST LUMPECTOMY  2009    CARDIAC CATHETERIZATION  2005,2006    COLONOSCOPY  ,,,,    COLONOSCOPY  2011    Dr. Miller Screen - benign polyps     COSMETIC SURGERY      FOOT SURGERY Right 1/15/16    CORRECTION OF HAMMERTOES 1-5 RIGHT FOOT, WEIL OSTEOTOMY 2,3    FOOT SURGERY Right 2016    EXTENSOR TENDON LENGTHENING 4TH AND 5TH TOES RIGHT FOOT    FOOT SURGERY Right 2016    CORRECTION HAMMERTOES 1ST, 4TH AND 5TH DIGITS RIGHT FOOT WITH    HERNIA REPAIR  [id number]    HYSTERECTOMY  1977    JOINT REPLACEMENT  2015    LEFT KNEE    KNEE SURGERY  2015    left knee replacement    LIPOMA RESECTION  2006    MASTECTOMY  3-15-11    bilateral    NEUROMA SURGERY  ,,, ,     Left foot    OTHER SURGICAL HISTORY  , 1984 X 2, 1994 X 2, 2006    Bowel obstructions    RHINOPLASTY  1976    RHINOPLASTY      THORACOTOMY  1998    TONSILLECTOMY      UPPER GASTROINTESTINAL ENDOSCOPY  [id number]    VARICOSE VEIN SURGERY  2001       Social History:    Social History     Tobacco Use    Smoking status: Former Smoker     Packs/day: 0.25     Years: 8.00     Pack years: 2.00     Types: Cigarettes     Start date: 34     Quit date: 1988     Years since quittin.6    Smokeless tobacco: Never Used    Tobacco comment: QUIT    Substance Use Topics    Alcohol use: No                                Counseling given: Not Answered  Comment: QUIT       Vital Signs (Current): There were no vitals filed for this visit.                                            BP Readings from Last 3 Encounters:   21 (!) 140/80   21 130/68   21 (!) 160/82       NPO Status:                                                                                 BMI:   Wt Readings from Last 3 Encounters:   08/04/21 224 lb 6.9 oz (101.8 kg)   08/03/21 224 lb 6.4 oz (101.8 kg)   07/27/21 223 lb 3.2 oz (101.2 kg)     There is no height or weight on file to calculate BMI.    CBC:   Lab Results   Component Value Date    WBC 7.5 08/27/2021    RBC 4.01 08/27/2021    RBC 4.26 05/23/2017    HGB 12.0 08/27/2021    HCT 35.4 08/27/2021    MCV 88.2 08/27/2021    RDW 13.2 08/27/2021     08/27/2021       CMP:   Lab Results   Component Value Date     08/27/2021    K 4.7 08/27/2021     08/27/2021    CO2 22 08/27/2021    BUN 15 08/27/2021    CREATININE 0.5 08/27/2021    CREATININE 0.6 08/27/2021    GFRAA >60 08/27/2021    GFRAA >60 06/03/2011    AGRATIO 1.7 05/18/2021    LABGLOM >60 08/27/2021    LABGLOM 97 02/27/2014    GLUCOSE 103 08/27/2021    GLUCOSE 128 05/23/2017    PROT 7.2 05/18/2021    PROT 7.4 05/23/2017    CALCIUM 8.8 08/27/2021    BILITOT 0.5 05/18/2021    ALKPHOS 74 05/18/2021    AST 34 05/18/2021    ALT 29 05/18/2021       POC Tests: No results for input(s): POCGLU, POCNA, POCK, POCCL, POCBUN, POCHEMO, POCHCT in the last 72 hours.     Coags:   Lab Results   Component Value Date    PROTIME 11.7 08/27/2021    PROTIME 31.5 03/08/2012    PROTIME 23.5 10/06/2010    INR 1.03 08/27/2021       HCG (If Applicable): No results found for: PREGTESTUR, PREGSERUM, HCG, HCGQUANT     ABGs: No results found for: PHART, PO2ART, VEZ6KMT, ZBB4WVM, BEART, G4BEWUBQ     Type & Screen (If Applicable):  No results found for: LABABO, LABRH    Drug/Infectious Status (If Applicable):  No results found for: HIV, HEPCAB    COVID-19 Screening (If Applicable):   Lab Results   Component Value Date    COVID19 Not Detected 05/11/2021           Anesthesia Evaluation  Patient summary reviewed no history of anesthetic complications:   Airway: Mallampati: III  TM distance: >3 FB   Neck ROM: full  Mouth opening: > = 3 FB Dental: normal exam         Pulmonary:                             ROS comment: covid last August 2020 and short of breast with exertion since   Cardiovascular:    (+) hypertension:, dysrhythmias: atrial fibrillation, atrial flutter and ventricular tachycardia, CHF:,                   Neuro/Psych:   (+) headaches: migraine headaches,              ROS comment: Tremors and low back pain  GI/Hepatic/Renal:   (+) hiatal hernia,           Endo/Other:    (+) : arthritis:., .                  ROS comment: Breast ca 2009 and  2010 Abdominal:             Vascular: Other Findings:             Anesthesia Plan      general     ASA 3       Induction: intravenous. Anesthetic plan and risks discussed with patient.                       Ata Gore MD   9/1/2021

## 2021-09-02 ENCOUNTER — TELEPHONE (OUTPATIENT)
Dept: CARDIOLOGY | Age: 71
End: 2021-09-02

## 2021-09-02 ENCOUNTER — TELEPHONE (OUTPATIENT)
Dept: PRIMARY CARE CLINIC | Age: 71
End: 2021-09-02

## 2021-09-02 ENCOUNTER — NURSE ONLY (OUTPATIENT)
Dept: PRIMARY CARE CLINIC | Age: 71
End: 2021-09-02

## 2021-09-02 DIAGNOSIS — Z11.52 ENCOUNTER FOR SCREENING FOR COVID-19: Primary | ICD-10-CM

## 2021-09-02 NOTE — PROGRESS NOTES
Pt states she was exposed to positive Covid 19 by her grandson 4 days ago. Just had a cardiac ablation yesterday. Cardiologist recommended she get tested.

## 2021-09-02 NOTE — TELEPHONE ENCOUNTER
Pt came to get covid test today recommended by cardiologist due to cardiac ablation yesterday. Her grandson tested positive yesterday and she was with him 4 days ago. She does not have any symptoms at this time. It is okay if she takes vitamin C and Zinc regiment?   She is already on vitamin D.

## 2021-09-02 NOTE — TELEPHONE ENCOUNTER
F/u phone call made to patient after RFA ablation yesterday. She states she is feeling well, endorses that her throat is still slightly irritated, but has no pain or difficulty swallowing. She found out last night she was exposed to [place] earlier in the week, she was tested today and is awaiting results. Asked that we inform Dr. Armando Durham, will notify him. Updated pt on f/u appt. No further questions at this time.

## 2021-09-03 LAB — SARS-COV-2: NOT DETECTED

## 2021-09-03 NOTE — PROGRESS NOTES
[address]   Electrophysiology  Office Visit  Date: 9/8/2021    Chief Complaint   Patient presents with    Atrial Fibrillation    Congestive Heart Failure     Cardiac HX: Naveed Brink is a 65 y.o. woman w/ PMH breast Ca, HLD, chronic dCHF, atypical AFL, pAF, s/p RFA (2005, Dr. Shaista De La Rosa, 2006, Dr Pete Mercado, 2013, Dr. Silverio Puentes), s/p RFA/PVI of AF/typicalAFL/atypical AL (11/23/2020, Dr. Esperanza Silva), NSVT noted on monitor, s/p LHC (5/18/21, Dr. Kira Gann), s/p RFA/PVI of AF/atAFL (9/1/21, Dr. Esperanza Silva)    Interval History/HPI: Patient is here for f/u for AF/ AFL. She recently underwent RFA/PVI of AF/atAFL (9/1/21, Dr. Esperanza Silva). She is in AF today, rate 110. R groin soft, no hematoma. No throat pain. Had mild chest discomfort last week after ablation that was relieved by tylenol. Has not missed any doses of her Eliquis, no s/sx of bleeding. On dofetilide, no missed doses. States on Wednesday she believed she went into AF when she was taking her evening dose of dofetilide, she checked her Octavio Wilson which indicated she was out of rhythm. Endorses SOB, palpitations, occasional dizziness, lightheadedness w/ AF. No CP, PND, orthopnea, BLE edema. BP well controlled.     Home medications:   Current Outpatient Medications on File Prior to Visit   Medication Sig Dispense Refill    pantoprazole (PROTONIX) 40 MG tablet Take 1 tablet by mouth daily 30 tablet 0    acetaminophen (TYLENOL) 500 MG tablet Take 500 mg by mouth every 8 hours as needed for Pain      metoprolol succinate (TOPROL XL) 100 MG extended release tablet Take 1 tablet by mouth daily 90 tablet 3    nitroGLYCERIN (NITROSTAT) 0.4 MG SL tablet Place 1 tablet under the tongue every 5 minutes as needed for Chest pain 25 tablet 3    ezetimibe (ZETIA) 10 MG tablet TAKE 1 TABLET BY MOUTH ONE TIME A DAY  90 tablet 3    verapamil (CALAN) 80 MG tablet Take 1 tablet by mouth 3 times daily 270 tablet 3    apixaban (ELIQUIS) 5 MG TABS tablet Take 1 tablet by mouth 2 times daily 180 tablet 3    furosemide (LASIX) 20 MG tablet TAKE 1 TABLET DAILY 90 tablet 3    Vitamin D, Cholecalciferol, 25 MCG (1000 UT) CAPS Take 1,000 Units by mouth daily (Patient taking differently: Take 3 capsules by mouth daily )      tiZANidine (ZANAFLEX) 2 MG tablet Take 1 tablet by mouth nightly as needed (pain) (Patient not taking: Reported on 9/8/2021) 10 tablet 0     No current facility-administered medications on file prior to visit.        Past Medical History:   Diagnosis Date    Acute diastolic congestive heart failure [facility] 11/26/2018    Allergic     SEASONAL    Atrial fibrillation ([facility] H/O    Cancer ([facility] 2009 AND 2011    BREAST right    Essential hypertension 3/13/2018    Hammertoe     Migraine     Mixed hyperlipidemia 10/19/2020        Past Surgical History:   Procedure Laterality Date    ANKLE FRACTURE SURGERY  2004,2008    APPENDECTOMY  03/1983    ATRIAL ABLATION SURGERY  X3    Cryoablation for atrial fib    AXILLARY SURGERY Right     lymph node    BREAST BIOPSY  01/07/2011    BREAST BIOPSY  07/12/2011    left breast bx - benign     BREAST CYST EXCISION  06/2003    BREAST LUMPECTOMY  03/30/2009    CARDIAC CATHETERIZATION  5/2005,01/2006    COLONOSCOPY  1993,1995,1999,2003,2006    COLONOSCOPY  11/29/2011    Dr. Sandra Kimball - benign polyps     COSMETIC SURGERY      FOOT SURGERY Right 01/15/2016    CORRECTION OF HAMMERTOES 1-5 RIGHT FOOT, WEIL OSTEOTOMY 2,3    FOOT SURGERY Right 05/12/2016    EXTENSOR TENDON LENGTHENING 4TH AND 5TH TOES RIGHT FOOT    FOOT SURGERY Right 08/11/2016    CORRECTION HAMMERTOES 1ST, 4TH AND 5TH DIGITS RIGHT FOOT WITH    HERNIA REPAIR  [id number]    HYSTERECTOMY  12/1977    JOINT REPLACEMENT  06/2015    LEFT KNEE    KNEE SURGERY  06/09/2015    left knee replacement    LIPOMA RESECTION  09/2006    MASTECTOMY, BILATERAL Bilateral 03/15/2011    bilateral    NEUROMA SURGERY  1987,1988,1989,1992 , 1995    Left foot    OTHER SURGICAL HISTORY  1983, 1984 X 2, 1994 X 2, 2006    Bowel obstructions    RHINOPLASTY  11/1976    RHINOPLASTY  1995    THORACOTOMY  1998    TONSILLECTOMY  1956    UPPER GASTROINTESTINAL ENDOSCOPY  [id number]    VARICOSE VEIN SURGERY  11/2001       Family History:  Reviewed. family history includes Cancer in her brother, father, and mother. Review of System:    · Constitutional: No fevers, chills. · Eyes: No visual changes or diplopia. No scleral icterus. · ENT: No Headaches. No mouth sores or sore throat. · Cardiovascular: No for chest pain, No for dyspnea on exertion, Yes for palpitations or No for loss of consciousness. No cough, hemoptysis, No for pleuritic pain, or phlebitis. · Respiratory: No for cough or wheezing. No hematemesis. · Gastrointestinal: No abdominal pain, blood in stools. · Genitourinary: No dysuria, or hematuria. · Musculoskeletal: No gait disturbance,    · Integumentary: No rash or pruritis. · Neurological: No headache, change in muscle strength, numbness or tingling. · Psychiatric: No anxiety, or depression. · Endocrine: No temperature intolerance. No excessive thirst, fluid intake, or urination. · Hem/Lymph: No abnormal bruising or bleeding, blood clots or swollen lymph nodes. · Allergic/Immunologic: No nasal congestion or hives. Physical Examination:  Vitals:    09/08/21 1300   BP: 124/76   SpO2: 96%         Wt Readings from Last 3 Encounters:   09/08/21 227 lb (103 kg)   09/01/21 227 lb (103 kg)   08/04/21 224 lb 6.9 oz (101.8 kg)       · Constitutional: Oriented. No distress. · Head: Normocephalic and atraumatic. · Mouth/Throat: Oropharynx is clear and moist.   · Eyes: Conjunctivae clear without jaunduice. PERRL. · Neck: Neck supple. No rigidity. No JVD present. · Cardiovascular: Normal rate, regular rhythm, S1&S2. · Pulmonary/Chest: Bilateral respiratory sounds. No wheezes, No rhonchi. · Abdominal: Soft. Bowel sounds present. No distension, No tenderness. · Musculoskeletal: No tenderness. No edema    · Lymphadenopathy: Has no cervical adenopathy. · Neurological: Alert and oriented. Cranial nerve appears intact, No Gross deficit   · Skin: Skin is warm and dry. No rash noted. · Psychiatric: Has a normal mood, affect and behavior     Labs:  Reviewed. No results for input(s): NA, K, CL, CO2, PHOS, BUN, CREATININE in the last 72 hours. Invalid input(s): CA,  TSH  No results for input(s): WBC, HGB, HCT, MCV, PLT in the last 72 hours. No results found for: CKTOTAL, CKMB, CKMBINDEX, TROPONINI  No results found for: BNP  Lab Results   Component Value Date    PROTIME 11.7 08/27/2021    PROTIME 10.9 05/18/2021    PROTIME 13.5 03/05/2021    PROTIME 31.5 03/08/2012    PROTIME 21.4 02/09/2012    PROTIME 18.8 12/16/2011    PROTIME 23.5 10/06/2010    INR 1.03 08/27/2021    INR 0.94 05/18/2021    INR 1.16 03/05/2021     Lab Results   Component Value Date    CHOL 195 09/08/2020    HDL 38 09/08/2020    TRIG 292 09/08/2020       ECG: Personally reviewed: AF, , QRS 96, QTc 413    ECHO:  6/14/2021  Summary   Left ventricular cavity size is normal. There is mild concentric left   ventricular hypertrophy. Left ventricular function is normal with ejection   fraction estimated at 55-60%. No regional wall motion abnormalities are   noted. Diastolic function cannot be assessed due to an arrhythmia (AFRVR). Normal LVEDP. Mild mitral regurgitation is present.     Stress Test: 2019  Summary    There is normal isotope uptake at stress and rest. There is no evidence of    myocardial ischemia or scar.    Normal LV size and systolic function.    Left ventricular ejection fraction of 75 %.    There are no regional wall motion abnormalities.    Normal myocardial perfusion study       Cardiac Angiography: 5/18/2021, Dr. Bradly Leon:     HEMODYNAMICS:  Left ventricular end-diastolic pressure equals 12.     There was no significant gradient across the aortic valve by pullback  post cineangiography.     LEFT VENTRICULOGRAM:  Left ventriculogram demonstrates uniform wall  motion. Estimated ejection fraction is 55%.     LEFT MAIN:  Left main is a short vessel that is normal.     LEFT ANTERIOR DESCENDING:  The LAD courses to and wraps around the apex. It gave off a large first diagonal branch followed by two small diagonal  branches. The LAD is normal.     LEFT CIRCUMFLEX:  Circumflex consists of two marginal branches before a  small posterolateral branch. Circumflex is normal.     RIGHT CORONARY ARTERY:  Right coronary artery is a dominant vessel. It  gives off moderate sized high-rising PDA and two posterolateral  branches. The right coronary artery is normal.     IMPRESSION:  1. Normal left ventricular systolic function. 2.  Essentially normal coronary arteries.   3.  Successful Angio-Seal of right femoral arteriotomy site.     Problem List:   Patient Active Problem List    Diagnosis Date Noted    Right foot pain 01/13/2016    Intraductal carcinoma and lobular carcinoma in situ of right breast 12/20/2010    Abdominal adhesions 02/26/2014    Osteopenia 02/26/2014    Lumbar facet arthropathy 02/26/2014    Tremor 06/15/2012    S/P ablation of atrial flutter 06/15/2012    A-fib ([address] 03/08/2011    Ventral hernia 08/28/2013    Seborrheic keratosis 10/06/2010    Acute low back pain without sciatica 07/30/2021    Left arm pain 07/30/2021    VT (ventricular tachycardia) ([address] 05/18/2021    Tachycardia     Bradycardia     Chronic diastolic CHF (congestive heart failure) ([address]     Encounter for monitoring dofetilide therapy     Arrhythmia 03/08/2021    Paroxysmal atrial fibrillation ([address] 03/08/2021    Atypical atrial flutter ([address]     Vitamin D deficiency 10/19/2020    Mixed hyperlipidemia 10/19/2020    Constipation 10/19/2020    Prediabetes 10/19/2020    Hypervolemia 11/26/2018    Other chest pain 11/26/2018    Acute diastolic congestive heart failure ([address] 11/26/2018  S/P bilateral mastectomy 03/13/2018    Essential hypertension 03/13/2018    Facet arthropathy, thoracic 05/17/2017    Hammer toe of right foot 08/01/2016    Concussion 03/31/2015        Assessment:   1. Paroxysmal atrial fibrillation (HCC)    2. Chronic diastolic heart failure ([address]         Cardiac HX: Elkin Chavez is a 65 y.o. woman w/ PMH breast Ca, HLD, chronic dCHF, atypical AFL, pAF, s/p RFA (2005, Dr. Chandrika Akins, 2006, Dr Samara Orellana, 2013, Dr. Milagro Kuo), s/p RFA/PVI of AF/typicalAFL/atypical AL (11/23/2020, Dr. Tsering Johnson), NSVT noted on monitor, s/p LHC (5/18/21, Dr. Laurel Buchanan), s/p RFA/PVI of AF/atAFL (9/1/21, Dr. Tsering Johnson)  [id number] 2. TSH 1.17 (9/8/2020). AT/AF/AFL  - In AF today, , states she went out of rhythm the night of her ablation  - S/p RFA/PVI of AF (9/1/21)  - R groin soft, no hematoma noted  - On Pantoprazole x 30 days  - On Eliquis- no s/sx bleeding, continue, reinforced importance of not missing any doses 90 days after ablation  - Intolerant flecainide, amio, dilt  - On dofetilide, QTc 413, no missed doses  - We will plan for DCCV. Patient does not want to schedule today as her grandson is her mode of transportation and he has Covid right now. She will call the office when she finds out when his restrictions are lifted to schedule DCCV    Chronic dCHF  - Appears euvolemic  - On lasix 20 mg QD  - Weigh daily at home  - Adheres to low salt diet  - Has joined weight watchers    Follow-up in 3 months with Dr. Tsering Johnson    EF of 83-13%  No known systolic HF  No known CAD  Anticoagulation for AF     No tobacco use    All questions and concerns were addressed to the patient/family. Alternatives to my treatment were discussed. The note was completed using EMR. Every effort was made to ensure accuracy; however, inadvertent computerized transcription errors may be present. Patient received education regarding their diagnosis, treatment and medications while in the office today.       Tim Ling, [address] [facility]

## 2021-09-08 ENCOUNTER — OFFICE VISIT (OUTPATIENT)
Dept: CARDIOLOGY CLINIC | Age: 71
End: 2021-09-08
Payer: MEDICARE

## 2021-09-08 VITALS
OXYGEN SATURATION: 96 % | DIASTOLIC BLOOD PRESSURE: 76 MMHG | SYSTOLIC BLOOD PRESSURE: 124 MMHG | BODY MASS INDEX: 33.62 KG/M2 | WEIGHT: 227 LBS | HEIGHT: 69 IN

## 2021-09-08 DIAGNOSIS — I50.32 CHRONIC DIASTOLIC HEART FAILURE (HCC): ICD-10-CM

## 2021-09-08 DIAGNOSIS — I48.0 PAROXYSMAL ATRIAL FIBRILLATION (HCC): Primary | ICD-10-CM

## 2021-09-08 PROCEDURE — 4040F PNEUMOC VAC/ADMIN/RCVD: CPT | Performed by: NURSE PRACTITIONER

## 2021-09-08 PROCEDURE — G8417 CALC BMI ABV UP PARAM F/U: HCPCS | Performed by: NURSE PRACTITIONER

## 2021-09-08 PROCEDURE — G8399 PT W/DXA RESULTS DOCUMENT: HCPCS | Performed by: NURSE PRACTITIONER

## 2021-09-08 PROCEDURE — G9708 BILAT MAST/HX BI /UNILAT MAS: HCPCS | Performed by: NURSE PRACTITIONER

## 2021-09-08 PROCEDURE — 1090F PRES/ABSN URINE INCON ASSESS: CPT | Performed by: NURSE PRACTITIONER

## 2021-09-08 PROCEDURE — 1123F ACP DISCUSS/DSCN MKR DOCD: CPT | Performed by: NURSE PRACTITIONER

## 2021-09-08 PROCEDURE — 1036F TOBACCO NON-USER: CPT | Performed by: NURSE PRACTITIONER

## 2021-09-08 PROCEDURE — 99214 OFFICE O/P EST MOD 30 MIN: CPT | Performed by: NURSE PRACTITIONER

## 2021-09-08 PROCEDURE — 3017F COLORECTAL CA SCREEN DOC REV: CPT | Performed by: NURSE PRACTITIONER

## 2021-09-08 PROCEDURE — 93000 ELECTROCARDIOGRAM COMPLETE: CPT | Performed by: NURSE PRACTITIONER

## 2021-09-08 PROCEDURE — G8427 DOCREV CUR MEDS BY ELIG CLIN: HCPCS | Performed by: NURSE PRACTITIONER

## 2021-09-08 RX ORDER — DOFETILIDE 0.5 MG/1
500 CAPSULE ORAL 2 TIMES DAILY
Qty: 180 CAPSULE | Refills: 3 | Status: SHIPPED | OUTPATIENT
Start: 2021-09-08 | End: 2022-07-27 | Stop reason: ALTCHOICE

## 2021-09-28 ENCOUNTER — TELEPHONE (OUTPATIENT)
Dept: ORTHOPEDIC SURGERY | Age: 71
End: 2021-09-28

## 2021-09-28 ENCOUNTER — OFFICE VISIT (OUTPATIENT)
Dept: ORTHOPEDIC SURGERY | Age: 71
End: 2021-09-28
Payer: MEDICARE

## 2021-09-28 VITALS — HEIGHT: 69 IN | TEMPERATURE: 97.8 F | WEIGHT: 227.07 LBS | BODY MASS INDEX: 33.63 KG/M2

## 2021-09-28 DIAGNOSIS — M47.816 LUMBAR SPONDYLOSIS: ICD-10-CM

## 2021-09-28 DIAGNOSIS — M51.36 DDD (DEGENERATIVE DISC DISEASE), LUMBAR: ICD-10-CM

## 2021-09-28 DIAGNOSIS — M75.42 ROTATOR CUFF IMPINGEMENT SYNDROME OF LEFT SHOULDER: ICD-10-CM

## 2021-09-28 DIAGNOSIS — S46.012A STRAIN OF LEFT ROTATOR CUFF CAPSULE, INITIAL ENCOUNTER: Primary | ICD-10-CM

## 2021-09-28 DIAGNOSIS — Z79.01 CHRONIC ANTICOAGULATION: ICD-10-CM

## 2021-09-28 DIAGNOSIS — M75.52 BURSITIS OF LEFT SHOULDER: ICD-10-CM

## 2021-09-28 DIAGNOSIS — M43.10 DEGENERATIVE SPONDYLOLISTHESIS: ICD-10-CM

## 2021-09-28 PROCEDURE — 1090F PRES/ABSN URINE INCON ASSESS: CPT | Performed by: INTERNAL MEDICINE

## 2021-09-28 PROCEDURE — 20611 DRAIN/INJ JOINT/BURSA W/US: CPT | Performed by: INTERNAL MEDICINE

## 2021-09-28 PROCEDURE — 1036F TOBACCO NON-USER: CPT | Performed by: INTERNAL MEDICINE

## 2021-09-28 PROCEDURE — G8399 PT W/DXA RESULTS DOCUMENT: HCPCS | Performed by: INTERNAL MEDICINE

## 2021-09-28 PROCEDURE — G8427 DOCREV CUR MEDS BY ELIG CLIN: HCPCS | Performed by: INTERNAL MEDICINE

## 2021-09-28 PROCEDURE — G9708 BILAT MAST/HX BI /UNILAT MAS: HCPCS | Performed by: INTERNAL MEDICINE

## 2021-09-28 PROCEDURE — 1123F ACP DISCUSS/DSCN MKR DOCD: CPT | Performed by: INTERNAL MEDICINE

## 2021-09-28 PROCEDURE — 3017F COLORECTAL CA SCREEN DOC REV: CPT | Performed by: INTERNAL MEDICINE

## 2021-09-28 PROCEDURE — 4040F PNEUMOC VAC/ADMIN/RCVD: CPT | Performed by: INTERNAL MEDICINE

## 2021-09-28 PROCEDURE — G8417 CALC BMI ABV UP PARAM F/U: HCPCS | Performed by: INTERNAL MEDICINE

## 2021-09-28 PROCEDURE — 99214 OFFICE O/P EST MOD 30 MIN: CPT | Performed by: INTERNAL MEDICINE

## 2021-09-28 RX ORDER — BUPIVACAINE HYDROCHLORIDE 2.5 MG/ML
4 INJECTION, SOLUTION INFILTRATION; PERINEURAL ONCE
Status: COMPLETED | OUTPATIENT
Start: 2021-09-28 | End: 2021-09-28

## 2021-09-28 RX ORDER — BETAMETHASONE SODIUM PHOSPHATE AND BETAMETHASONE ACETATE 3; 3 MG/ML; MG/ML
6 INJECTION, SUSPENSION INTRA-ARTICULAR; INTRALESIONAL; INTRAMUSCULAR; SOFT TISSUE ONCE
Status: COMPLETED | OUTPATIENT
Start: 2021-09-28 | End: 2021-09-28

## 2021-09-28 RX ORDER — TRAMADOL HYDROCHLORIDE 50 MG/1
50 TABLET ORAL EVERY 6 HOURS PRN
Qty: 20 TABLET | Refills: 0 | Status: SHIPPED | OUTPATIENT
Start: 2021-09-28 | End: 2021-10-05

## 2021-09-28 RX ORDER — METHYLPREDNISOLONE 4 MG/1
TABLET ORAL
Qty: 1 KIT | Refills: 0 | Status: SHIPPED | OUTPATIENT
Start: 2021-09-28 | End: 2021-12-14

## 2021-09-28 RX ADMIN — BUPIVACAINE HYDROCHLORIDE 10 MG: 2.5 INJECTION, SOLUTION INFILTRATION; PERINEURAL at 15:05

## 2021-09-28 RX ADMIN — BETAMETHASONE SODIUM PHOSPHATE AND BETAMETHASONE ACETATE 6 MG: 3; 3 INJECTION, SUSPENSION INTRA-ARTICULAR; INTRALESIONAL; INTRAMUSCULAR; SOFT TISSUE at 15:04

## 2021-09-28 NOTE — TELEPHONE ENCOUNTER
General Question     Subject: MEDICATION REFILL  Patient and /or Facility Request: Maya Gu  Contact Number: [phone]    PATIENT FOLLOWING ON THE 2 REFILLS THAT WAS GOING TO [address] [place] [address] Drive. PATIENT STATED THAT ONE OF THE MEDICATIONS IS A STEROID    PLEASE CALL BACK AT THE ABOVE NUMBER.

## 2021-09-28 NOTE — PROGRESS NOTES
Chief Complaint:   Chief Complaint   Patient presents with    Arm Pain     Left arm pain, fall    Lower Back Pain     f/u low back pain not feeling any better, family h/o spinal cord cancer          History of Present Illness:       Patient is a 66 y.o. female presents with the above complaint. The symptoms of shoulder pain began 1 yearsago and started without an injury. The patient describes a sharp, aching pain that does not radiate. The symptoms are constant  and are are worsening since the onset. The symptoms do show a typical rotator cuff provacative pattern. The pain is  anterior about the shoulder. There are not associated mechanical symptoms localizing to the shoulder. The patient denies symptoms of instability about the shoulder. Treatment to date has included Recent course of Medrol. Pain levels 10. The symptoms do not correlate with head and neck movement. The patient admits to new onset weakness of the upper extremity. The patient does not have history of prior shoulder trauma. There is no history or autoimmune disease, inflammatory arthropathy or crystal arthropathy. Unfortunately her symptoms of low back pain remain problematic. He had only short-lived relief with a trial of Medrol prescribed at the time of her last visit. Focus of pain is at the thoracolumbar and lumbosacral levels and axial in location. The pain is aching and stabbing and constant    The symptoms of back pain  do not show a neurogenic claudication pattern and is worsened by sitting and standing and improved with Shifting position. There is not new onset weakness or progressive weakness of the lower extremities that has developed. The patient denies new onset bowel or bladder dysfunction. There is no history of previous spinal trauma.     Pain levels: 5     There is no  lower limb pain.   Back pain to leg pain ratio 100:0    Past history significant for breast cancer     Past Medical History:        Past Medical History:   Diagnosis Date    Acute diastolic congestive heart failure ([facility] [address] 11/26/2018    Allergic     SEASONAL    Atrial fibrillation ([facility] [address] H/O    Cancer ([facility] [address] 2009 AND 2011    BREAST right    Essential hypertension 3/13/2018    Hammertoe     Migraine     Mixed hyperlipidemia 10/19/2020         Past Surgical History:   Procedure Laterality Date    ANKLE FRACTURE SURGERY  2004,2008    APPENDECTOMY  03/1983    ATRIAL ABLATION SURGERY  X3    Cryoablation for atrial fib    AXILLARY SURGERY Right     lymph node    BREAST BIOPSY  01/07/2011    BREAST BIOPSY  07/12/2011    left breast bx - benign     BREAST CYST EXCISION  06/2003    BREAST LUMPECTOMY  03/30/2009    CARDIAC CATHETERIZATION  5/2005,01/2006    COLONOSCOPY  1993,1995,1999,2003,2006    COLONOSCOPY  11/29/2011    Dr. Dontrell Botello - benign polyps     COSMETIC SURGERY      FOOT SURGERY Right 01/15/2016    CORRECTION OF HAMMERTOES 1-5 RIGHT FOOT, WEIL OSTEOTOMY 2,3    FOOT SURGERY Right 05/12/2016    EXTENSOR TENDON LENGTHENING 4TH AND 5TH TOES RIGHT FOOT    FOOT SURGERY Right 08/11/2016    CORRECTION HAMMERTOES 1ST, 4TH AND 5TH DIGITS RIGHT FOOT WITH    HERNIA REPAIR  [id number]    HYSTERECTOMY  12/1977    JOINT REPLACEMENT  06/2015    LEFT KNEE    KNEE SURGERY  06/09/2015    left knee replacement    LIPOMA RESECTION  09/2006    MASTECTOMY, BILATERAL Bilateral 03/15/2011    bilateral    NEUROMA SURGERY  1987,1988,1989,1992 , 1995    Left foot    OTHER SURGICAL HISTORY  1983, 1984 X 2, 1994 X 2, 2006    Bowel obstructions    RHINOPLASTY  11/1976    RHINOPLASTY  1995    THORACOTOMY  1998    TONSILLECTOMY  1956    UPPER GASTROINTESTINAL ENDOSCOPY  [id number]    VARICOSE VEIN SURGERY  11/2001         Present Medications:         Current Outpatient Medications   Medication Sig Dispense Refill    methylPREDNISolone (MEDROL, MILADY,) 4 MG tablet By mouth.  1 kit 0    traMADol (ULTRAM) 50 MG tablet Take 1 tablet by mouth every 6 hours as needed for Pain for up to 7 days. 20 tablet 0    dofetilide (TIKOSYN) 500 MCG capsule Take 1 capsule by mouth 2 times daily 180 capsule 3    pantoprazole (PROTONIX) 40 MG tablet Take 1 tablet by mouth daily 30 tablet 0    acetaminophen (TYLENOL) 500 MG tablet Take 500 mg by mouth every 8 hours as needed for Pain      tiZANidine (ZANAFLEX) 2 MG tablet Take 1 tablet by mouth nightly as needed (pain) (Patient not taking: Reported on 9/8/2021) 10 tablet 0    metoprolol succinate (TOPROL XL) 100 MG extended release tablet Take 1 tablet by mouth daily 90 tablet 3    nitroGLYCERIN (NITROSTAT) 0.4 MG SL tablet Place 1 tablet under the tongue every 5 minutes as needed for Chest pain 25 tablet 3    ezetimibe (ZETIA) 10 MG tablet TAKE 1 TABLET BY MOUTH ONE TIME A DAY  90 tablet 3    verapamil (CALAN) 80 MG tablet Take 1 tablet by mouth 3 times daily 270 tablet 3    apixaban (ELIQUIS) 5 MG TABS tablet Take 1 tablet by mouth 2 times daily 180 tablet 3    furosemide (LASIX) 20 MG tablet TAKE 1 TABLET DAILY 90 tablet 3    Vitamin D, Cholecalciferol, 25 MCG (1000 UT) CAPS Take 1,000 Units by mouth daily (Patient taking differently: Take 3 capsules by mouth daily )       No current facility-administered medications for this visit. Allergies:         Allergies   Allergen Reactions    Keflex [Cephalexin] Shortness Of Breath and Nausea Only    Novocain [Procaine] Shortness Of Breath and Swelling     LIDOCAINE OK    Amiodarone Other (See Comments)     dizziness    Cephalexin     Diltiazem Other (See Comments)     dizziness    Penicillins Swelling    Sulfa Antibiotics Nausea Only and Swelling        Social History:         Social History     Socioeconomic History    Marital status:      Spouse name: Not on file    Number of children: 2    Years of education: 12    Highest education level: Not on file   Occupational History    Occupation: [employer] finish    Tobacco Use    Smoking status: Former Smoker     Packs/day: 0.25     Years: 8.00     Pack years: 2.00     Types: Cigarettes     Start date: 34     Quit date: 1988     Years since quittin.7    Smokeless tobacco: Never Used    Tobacco comment: QUIT    Substance and Sexual Activity    Alcohol use: No    Drug use: No    Sexual activity: Never   Other Topics Concern    Not on file   Social History Narrative    Not on file     Social Determinants of Health     Financial Resource Strain: Low Risk     Difficulty of Paying Living Expenses: Not hard at all   Food Insecurity: No Food Insecurity    Worried About [employer] [employer] in the Last Year: Never true    [address]  [employer] in the Last Year: Never true   Transportation Needs: No Transportation Needs    Lack of Transportation (Medical): No    Lack of Transportation (Non-Medical): No   Physical Activity:     Days of Exercise per Week:     Minutes of Exercise per Session:    Stress:     Feeling of Stress :    Social Connections:     Frequency of Communication with Friends and Family:     Frequency of Social Gatherings with Friends and Family:     Attends Baptism Services:     Active Member of Clubs or Organizations:     Attends Club or Organization Meetings:     Marital Status:    Intimate Partner Violence:     Fear of Current or Ex-Partner:     Emotionally Abused:     Physically Abused:     Sexually Abused:         Review of Symptoms:    Pertinent items are noted in HPI   10 point review of systems negative except as mentioned in HPI       Vital Signs:      Vitals:    21 0936   Temp: 97.8 °F (36.6 °C)        General Exam:     Constitutional: Patient is adequately groomed with no evidence of malnutrition  Mental Status: The patient is oriented to time, place and person. The patient's mood and affect are appropriate. Vascular: Examination reveals no swelling or calf tenderness.   Peripheral pulses are palpable and 2+. Lymphatics: no lymphadenopathy of the inguinal region or lower extremity      Physical Exam: left shoulder      Primary Exam:    Inspection: No deformity atrophy appreciable effusion      Palpation: No focal tenderness      Range of Motion: Full range symmetric pain overhead and in extremes      Strength: Normal with low-grade pain flexion and abduction      Special Tests: Impingement sign equivocal subscapularis signs negative proximal biceps signs negative      Skin: There are no rashes, ulcerations or lesions. Gait: Nonantalgic      Reflex intact upper     Additional Comments:        Additional Examinations:           Right Upper Extremity:  Examination of the right upper extremity does not show any tenderness, deformity or injury. Range of motion is unremarkable. There is no gross instability. There are no rashes, ulcerations or lesions. Strength and tone are normal.      Primary Exam: Lumbar spine:    Inspection: No deformity atrophy appreciable curvature      Palpation: No focal trigger point tenderness      Range of Motion: 50/5 pain with flexion greater than extension      Strength: Normal lower extremity      Skin: There are no rashes, ulcerations or lesions.       Gait: Nonantalgic     Neurovascular - non focal and intact       Additional Comments:        Additional Examinations:                  Office Imaging Results/Procedures PerformedToday:           Office Procedures:     Orders Placed This Encounter   Procedures    US GUIDED NEEDLE PLACEMENT     Standing Status:   Future     Number of Occurrences:   1     Standing Expiration Date:   9/28/2022     Order Specific Question:   Reason for exam:     Answer:   [facility] CSI    MRI LUMBAR SPINE WO CONTRAST     [facility], please call pt to schedule, [phone] (landline)  Pt has Medicare, NPR-schedule as soon as able  Pt advised to f/u in clinic 2-3 days after MRI for results     Standing Status:   Future     Standing contrast administration. 10cc of OptiMARK injected.       FINDINGS: Mild kyphosis noted. The vertebral bodies are normal in height. No evidence of    fracture. High T1 and T2 signal lesions are identified within the T8, T7, T6 and T5 vertebral    bodies, which demonstrate drop out signal on inversion recovery consistent with hemangiomata. No abnormal enhancement seen. No aggressive lesions identified. Disc desiccation noted at all    thoracic levels with mild loss of disc height.       No evidence of compressive disc herniation/protrusion, central canal stenosis or neural    foraminal encroachment at any thoracic level.       The spinal cord is normal in caliber and signal intensity.       The visualized lung and included upper abdomen are without abnormality.       CONCLUSION:   1. Mild kyphosis and low-grade degenerative changes of the thoracic spine. No compressive    discopathy or neural impingement. 2. No evidence of metastatic disease.       Thank you for the opportunity to provide your interpretation.       Thuy Matos. MD EVELIN Silver/JUNE/roberto   D: EVELIN 06/23/2016 4:56 AM   T: ROBERTO 06/23/2016 5:25 AM          Assessment   Impression: . Encounter Diagnoses   Name Primary?     Strain of left rotator cuff capsule, initial encounter Yes    Rotator cuff impingement syndrome of left shoulder     Bursitis of left shoulder     Degenerative spondylolisthesis     DDD (degenerative disc disease), lumbar     Lumbar spondylosis     Chronic anticoagulation               Plan:       Postinjection protocol and pendulum swing exercises-active modification rotator cuff protocol  Consider complete ultrasound evaluation of the left shoulder and/or MRI evaluation left shoulder as needed  MRI evaluation lumbar spine evaluate severity discopathy/stenosis suspected clinically  Repeat Medrol Dosepak for for therapeutic benefit and as needed use of Ultram short-term minimize use of narcotics  Consider her candidate for lumbar spine intervention injection. Pending results of MRI  Activity modification lumbar disc protocol      The nature of the finding, probable diagnosis and likely treatment was thoroughly discussed with the patient. The options, risks, complications, alternative treatment as well as some of the differential diagnosis was discussed. The patient was thoroughly informed and all questions were answered. the patient indicated understanding and satisfaction with the discussion. Orders:        Orders Placed This Encounter   Procedures    US GUIDED NEEDLE PLACEMENT     Standing Status:   Future     Number of Occurrences:   1     Standing Expiration Date:   9/28/2022     Order Specific Question:   Reason for exam:     Answer:   [facility] CSI    MRI LUMBAR SPINE WO CONTRAST     [facility], please call pt to schedule, [phone] (landline)  Pt has Medicare, NPR-schedule as soon as able  Pt advised to f/u in clinic 2-3 days after MRI for results     Standing Status:   Future     Standing Expiration Date:   9/28/2022     Order Specific Question:   Reason for exam:     Answer:   Low back pain; No applicable indication    AR ARTHROCENTESIS ASPIR&/INJ MAJOR JT/BURSA W/O US           Disclaimer: \"This note was dictated with voice recognition software. Though review and correction are routine, we apologize for any errors. \"

## 2021-09-28 NOTE — TELEPHONE ENCOUNTER
Medications have been sent to pharmacy. Pt notified. Pt asked if it was ok to get COVID vaccine booster after shoulder injection today. Per Dr. Jesús Torres, pt may get vaccine, but recommends vaccine be administered on opposite arm as shoulder injury. Pt v/u.

## 2021-10-26 ENCOUNTER — OFFICE VISIT (OUTPATIENT)
Dept: ORTHOPEDIC SURGERY | Age: 71
End: 2021-10-26
Payer: MEDICARE

## 2021-10-26 VITALS — HEIGHT: 69 IN | BODY MASS INDEX: 33.62 KG/M2 | WEIGHT: 227 LBS

## 2021-10-26 DIAGNOSIS — S46.012D STRAIN OF LEFT ROTATOR CUFF CAPSULE, SUBSEQUENT ENCOUNTER: ICD-10-CM

## 2021-10-26 DIAGNOSIS — M47.816 LUMBAR SPONDYLOSIS: ICD-10-CM

## 2021-10-26 DIAGNOSIS — M48.061 DEGENERATIVE LUMBAR SPINAL STENOSIS: ICD-10-CM

## 2021-10-26 DIAGNOSIS — M75.52 BURSITIS OF LEFT SHOULDER: ICD-10-CM

## 2021-10-26 DIAGNOSIS — M67.912 TENDINOPATHY OF LEFT ROTATOR CUFF: ICD-10-CM

## 2021-10-26 DIAGNOSIS — M51.36 DDD (DEGENERATIVE DISC DISEASE), LUMBAR: ICD-10-CM

## 2021-10-26 DIAGNOSIS — Z79.01 CHRONIC ANTICOAGULATION: ICD-10-CM

## 2021-10-26 PROCEDURE — 99214 OFFICE O/P EST MOD 30 MIN: CPT | Performed by: INTERNAL MEDICINE

## 2021-10-26 PROCEDURE — G8399 PT W/DXA RESULTS DOCUMENT: HCPCS | Performed by: INTERNAL MEDICINE

## 2021-10-26 PROCEDURE — 1123F ACP DISCUSS/DSCN MKR DOCD: CPT | Performed by: INTERNAL MEDICINE

## 2021-10-26 PROCEDURE — 1090F PRES/ABSN URINE INCON ASSESS: CPT | Performed by: INTERNAL MEDICINE

## 2021-10-26 PROCEDURE — G8417 CALC BMI ABV UP PARAM F/U: HCPCS | Performed by: INTERNAL MEDICINE

## 2021-10-26 PROCEDURE — G8482 FLU IMMUNIZE ORDER/ADMIN: HCPCS | Performed by: INTERNAL MEDICINE

## 2021-10-26 PROCEDURE — G8427 DOCREV CUR MEDS BY ELIG CLIN: HCPCS | Performed by: INTERNAL MEDICINE

## 2021-10-26 PROCEDURE — 3017F COLORECTAL CA SCREEN DOC REV: CPT | Performed by: INTERNAL MEDICINE

## 2021-10-26 PROCEDURE — 4040F PNEUMOC VAC/ADMIN/RCVD: CPT | Performed by: INTERNAL MEDICINE

## 2021-10-26 PROCEDURE — 1036F TOBACCO NON-USER: CPT | Performed by: INTERNAL MEDICINE

## 2021-10-26 PROCEDURE — G9708 BILAT MAST/HX BI /UNILAT MAS: HCPCS | Performed by: INTERNAL MEDICINE

## 2021-10-26 NOTE — PROGRESS NOTES
Chief Complaint:   Chief Complaint   Patient presents with    Back Problem     back pain, no improvement sice last visit, review MRI results    Shoulder Pain     left shoulder pain           History of Present Illness:       Patient is a 66 y.o. female returns follow up for the above complaint. The patient was last seen approximately 1 monthsago. The symptoms show no change since the last visit. The patient has had no further testing for the problem. There was delay in completing her MRI which is outlined below in detail    Symptoms suggest a neurogenic claudication pattern worsened by standing. The back pain is aching lumbosacral in distribution. Mild benefit from the Medrol Dosepak provided last visit    She denies any lower limb symptoms she denies any new onset progressive weakness of the lower extremities    Back pain to leg pain ratio 100:0    She denies any new onset bowel or bladder dysfunction    With respect to the left shoulder this is improved but remains problematic and follows a rotator cuff provocative pattern         Past Medical History:        Past Medical History:   Diagnosis Date    Acute diastolic congestive heart failure ([facility] [address] 11/26/2018    Allergic     SEASONAL    Atrial fibrillation ([facility] [address] H/O    Cancer ([facility] [address] 2009 AND 2011    BREAST right    Essential hypertension 3/13/2018    Hammertoe     Migraine     Mixed hyperlipidemia 10/19/2020        Present Medications:         Current Outpatient Medications   Medication Sig Dispense Refill    methylPREDNISolone (MEDROL, MILADY,) 4 MG tablet By mouth.  1 kit 0    dofetilide (TIKOSYN) 500 MCG capsule Take 1 capsule by mouth 2 times daily 180 capsule 3    pantoprazole (PROTONIX) 40 MG tablet Take 1 tablet by mouth daily 30 tablet 0    acetaminophen (TYLENOL) 500 MG tablet Take 500 mg by mouth every 8 hours as needed for Pain      tiZANidine (ZANAFLEX) 2 MG tablet Take 1 tablet by mouth nightly as needed (pain) (Patient not taking: Reported on 9/8/2021) 10 tablet 0    metoprolol succinate (TOPROL XL) 100 MG extended release tablet Take 1 tablet by mouth daily 90 tablet 3    nitroGLYCERIN (NITROSTAT) 0.4 MG SL tablet Place 1 tablet under the tongue every 5 minutes as needed for Chest pain 25 tablet 3    ezetimibe (ZETIA) 10 MG tablet TAKE 1 TABLET BY MOUTH ONE TIME A DAY  90 tablet 3    verapamil (CALAN) 80 MG tablet Take 1 tablet by mouth 3 times daily 270 tablet 3    apixaban (ELIQUIS) 5 MG TABS tablet Take 1 tablet by mouth 2 times daily 180 tablet 3    furosemide (LASIX) 20 MG tablet TAKE 1 TABLET DAILY 90 tablet 3    Vitamin D, Cholecalciferol, 25 MCG (1000 UT) CAPS Take 1,000 Units by mouth daily (Patient taking differently: Take 3 capsules by mouth daily )       No current facility-administered medications for this visit. Allergies: Allergies   Allergen Reactions    Keflex [Cephalexin] Shortness Of Breath and Nausea Only    Novocain [Procaine] Shortness Of Breath and Swelling     LIDOCAINE OK    Amiodarone Other (See Comments)     dizziness    Cephalexin     Diltiazem Other (See Comments)     dizziness    Penicillins Swelling    Sulfa Antibiotics Nausea Only and Swelling           Review of Systems:    Pertinent items are noted in HPI         Vital Signs: There were no vitals filed for this visit. General Exam:     Constitutional: Patient is adequately groomed with no evidence of malnutrition    Physical Exam: lower back      Primary Exam:    Inspection: No deformity atrophy appreciable curvature      Palpation: No focal trigger point tenderness      Range of Motion: 90/5 pain with extension      Strength: Normal lower extremity      Special Tests: Negative SLR      Skin: There are no rashes, ulcerations or lesions.       Gait: Nonantalgic      Neurovascular - non focal and intact       Additional Comments:        Additional Examinations:           Left shoulder-full range mild pain overhead and in extremes; strength is normal with only low-grade resisted pain. Office Imaging Results/Procedures PerformedToday:           Office Procedures:   No orders of the defined types were placed in this encounter. Other Outside Imaging and Testing Personally Reviewed:    MRI LUMBAR SPINE WO CONTRAST    Result Date: 10/24/2021  [facility]: [id number] #: [id number] Procedure: MR Lumbar Spine w/o Contrast ; Reason for Exam: Dx, degenerative spondylolisthesis, degenerative disc disease, spondylosis This document is confidential medical information. Unauthorized disclosure or use of this information is prohibited by law. If you are not the intended recipient of this document, please advise us by calling immediately [phone]. [facility] [place] [address] Patient Name: Danielle Roy Case ID: [id number] Patient : 1950 Referring Physician: Pennie Fisher MD Exam Date: 10/21/2021 Exam Description: MR Lumbar Spine w/o Contrast HISTORY:   66year-old lumbar radiculopathy. TECHNICAL FACTORS:  Long- and short-axis fat- and water-weighted images were performed. COMPARISON:  None. FINDINGS:  Conus medullaris is visualized at L1-L2 and visualized spinal cord and cauda equina nerve roots appear normal.  No evidence of an intradural or extradural mass is present. Heterogeneous marrow signal consistent with physiologic fatty infiltration. T12-L1: No focal disc herniation, spinal canal stenosis, or foraminal narrowing. Facet joints are normal. L1-L2: No focal disc herniation, spinal canal stenosis, or foraminal narrowing. Mild facet arthropathy and minimal ligamentous enlargement. L2-L3: No focal disc herniation, spinal canal stenosis, or foraminal narrowing. Mild facet arthropathy and ligamentous hypertrophy. L3-L4: No focal disc herniation, spinal canal stenosis, or foraminal narrowing. Moderate facet  arthropathy and ligamentous enlargement.   Mild narrowing of the lateral recesses bilaterally. L4-L5: No focal disc herniation. Mild central canal and moderate narrowing the lateral recess bilaterally. Moderate/severe facet arthropathy and ligamentous large mint. Ligamentum flavum 6 mm. L5-S1: No focal disc herniation, spinal canal stenosis, or foraminal narrowing. Moderate facet  hypertrophy and mild ligamentous enlargement. Mild degenerative intervertebral disc space disease. Prevertebral and paraspinal soft tissues are normal. Visualized soft tissues of the abdomen and pelvis are normal. No aortic aneurysm. No incidental pelvic masses present. CONCLUSION: 1. No evidence of focal disc herniation or high-grade central canal stenosis. 2. Multilevel facet arthropathy particularly at L4-5 with associated ligamentous hypertrophy and moderate narrowing of the L4-5 lateral recess bilaterally. 3. Multilevel lumbar spondylosis without acute fracture. Thank you for the opportunity to provide your interpretation. Sadi Graves MD A: EDNA/JUNE 10/24/2021 8:40 AM              Assessment   Impression: . Encounter Diagnoses   Name Primary?     Degenerative lumbar spinal stenosis     DDD (degenerative disc disease), lumbar     Lumbar spondylosis     Chronic anticoagulation     Bursitis of left shoulder     Strain of left rotator cuff capsule, subsequent encounter     Tendinopathy of left rotator cuff               Plan:     Lumbar spine intervention injection-L4/L5 translaminar  Activity modification lumbar strain protocol and continue maintenance HEP  Consider formal course of PT as needed lumbar spine  Work-up and evaluation of thoracic spine as needed  Rotator cuff stretching program demonstrated in the office and activity modification rotator cuff protocol MRI evaluation of the left shoulder if symptoms of shoulder pain remain problematic rule out rotator cuff tear               Orders:      No orders of the defined types were placed in this encounter. Devyn Glover MD.      Disclaimer: \"This note was dictated with voice recognition software. Though review and correction are routine, we apologize for any errors. \"

## 2021-10-26 NOTE — LETTER
[address]  [address] [facility] [address]  Phone: [phone]  Fax: [phone]    Lesley Faith MD    October 26, 2021     Barbara Leiva MD  [address] [facility] [address] [address]    Patient: Izabel Zamanutant   MR Number: [id number]   YOB: 1950   Date of Visit: 10/26/2021       Dear Barbara Leiva: Thank you for referring Shruthi Merritt to me for evaluation/treatment. Below are the relevant portions of my assessment and plan of care. Impression: . Encounter Diagnoses   Name Primary?  Degenerative lumbar spinal stenosis     DDD (degenerative disc disease), lumbar     Lumbar spondylosis     Chronic anticoagulation     Bursitis of left shoulder     Strain of left rotator cuff capsule, subsequent encounter     Tendinopathy of left rotator cuff               Plan:     Lumbar spine intervention injection-L4/L5 translaminar  Activity modification lumbar strain protocol and continue maintenance HEP  Consider formal course of PT as needed lumbar spine  Work-up and evaluation of thoracic spine as needed  Rotator cuff stretching program demonstrated in the office and activity modification rotator cuff protocol MRI evaluation of the left shoulder if symptoms of shoulder pain remain problematic rule out rotator cuff tear               Orders:      No orders of the defined types were placed in this encounter. Luzma Cohen MD.      Disclaimer: \"This note was dictated with voice recognition software. Though review and correction are routine, we apologize for any errors. \"       If you have questions, please do not hesitate to call me. I look forward to following Fredi Whatley along with you.     Sincerely,      Lesley Faith MD

## 2021-10-28 ENCOUNTER — TELEPHONE (OUTPATIENT)
Dept: CARDIOLOGY CLINIC | Age: 71
End: 2021-10-28

## 2021-10-28 NOTE — TELEPHONE ENCOUNTER
Pt requesting to hold Eliquis for 3 days prior to lumbar epidural steroid injection on 11/8/21. S/p AF ablation 9/1/21.

## 2021-10-28 NOTE — TELEPHONE ENCOUNTER
Carson Cody from [facility] called. Patient is having lumbar epidural steroid injection on Nov 8th and needs permission to hold Eliquis for a minimum of 3 days prior to procedure. Is this ok with Dr. Debby Patterson? Please call Carson Cody about this at [phone]. Her fax number is [phone].

## 2021-11-05 ENCOUNTER — HOSPITAL ENCOUNTER (OUTPATIENT)
Dept: INTERVENTIONAL RADIOLOGY/VASCULAR | Age: 71
Discharge: HOME | End: 2021-11-05
Payer: MEDICARE

## 2021-11-05 DIAGNOSIS — M51.36 DDD (DEGENERATIVE DISC DISEASE), LUMBAR: ICD-10-CM

## 2021-11-05 PROCEDURE — 6360000004 HC RX CONTRAST MEDICATION: Performed by: RADIOLOGY

## 2021-11-05 PROCEDURE — 6360000002 HC RX W HCPCS

## 2021-11-05 PROCEDURE — 62323 NJX INTERLAMINAR LMBR/SAC: CPT | Performed by: RADIOLOGY

## 2021-11-05 PROCEDURE — 2709999900 HC NON-CHARGEABLE SUPPLY

## 2021-11-05 PROCEDURE — 2500000003 HC RX 250 WO HCPCS

## 2021-11-09 DIAGNOSIS — I10 ESSENTIAL HYPERTENSION: ICD-10-CM

## 2021-11-09 NOTE — TELEPHONE ENCOUNTER
[facility] Medication Refills:    Medication: Furosemide     Dosage: 20 mg     Number: 90    Refills: 3    Last Office Visit: 05/03/2021    Next Office Visit: 12/10/2021    Last Refill: 01/06/2021    Last Labs: 08/27/2021 BMP/CBC/ PT INR

## 2021-11-11 RX ORDER — FUROSEMIDE 20 MG/1
20 TABLET ORAL DAILY
Qty: 90 TABLET | Refills: 3 | Status: SHIPPED | OUTPATIENT
Start: 2021-11-11 | End: 2022-10-25

## 2021-11-16 ENCOUNTER — OFFICE VISIT (OUTPATIENT)
Dept: ORTHOPEDIC SURGERY | Age: 71
End: 2021-11-16
Payer: MEDICARE

## 2021-11-16 VITALS — WEIGHT: 227 LBS | HEIGHT: 69 IN | BODY MASS INDEX: 33.62 KG/M2

## 2021-11-16 DIAGNOSIS — M48.061 DEGENERATIVE LUMBAR SPINAL STENOSIS: Primary | ICD-10-CM

## 2021-11-16 DIAGNOSIS — M47.816 LUMBAR SPONDYLOSIS: ICD-10-CM

## 2021-11-16 DIAGNOSIS — Z79.01 CHRONIC ANTICOAGULATION: ICD-10-CM

## 2021-11-16 DIAGNOSIS — M51.36 DDD (DEGENERATIVE DISC DISEASE), LUMBAR: ICD-10-CM

## 2021-11-16 PROCEDURE — 3017F COLORECTAL CA SCREEN DOC REV: CPT | Performed by: INTERNAL MEDICINE

## 2021-11-16 PROCEDURE — 1123F ACP DISCUSS/DSCN MKR DOCD: CPT | Performed by: INTERNAL MEDICINE

## 2021-11-16 PROCEDURE — G9708 BILAT MAST/HX BI /UNILAT MAS: HCPCS | Performed by: INTERNAL MEDICINE

## 2021-11-16 PROCEDURE — G8482 FLU IMMUNIZE ORDER/ADMIN: HCPCS | Performed by: INTERNAL MEDICINE

## 2021-11-16 PROCEDURE — 1090F PRES/ABSN URINE INCON ASSESS: CPT | Performed by: INTERNAL MEDICINE

## 2021-11-16 PROCEDURE — G8399 PT W/DXA RESULTS DOCUMENT: HCPCS | Performed by: INTERNAL MEDICINE

## 2021-11-16 PROCEDURE — 4040F PNEUMOC VAC/ADMIN/RCVD: CPT | Performed by: INTERNAL MEDICINE

## 2021-11-16 PROCEDURE — G8427 DOCREV CUR MEDS BY ELIG CLIN: HCPCS | Performed by: INTERNAL MEDICINE

## 2021-11-16 PROCEDURE — 1036F TOBACCO NON-USER: CPT | Performed by: INTERNAL MEDICINE

## 2021-11-16 PROCEDURE — 99214 OFFICE O/P EST MOD 30 MIN: CPT | Performed by: INTERNAL MEDICINE

## 2021-11-16 PROCEDURE — G8417 CALC BMI ABV UP PARAM F/U: HCPCS | Performed by: INTERNAL MEDICINE

## 2021-11-16 NOTE — PROGRESS NOTES
Chief Complaint:   Chief Complaint   Patient presents with    Back Pain     improvement in lumbar pain since PAVEL          History of Present Illness:       Patient is a 66 y.o. female presents with the above complaint. The patient was last seen 10/26/2021. She has noted significant therapeutic benefit from the most recent L TRISTEN.   Procedure is performed below and reviewed in detail    She estimates 90% improvement in pain and has noted improvement in function     Pain levels 1/10    There is no predictable pain with sitting or standing    Back pain to leg pain ratio 100:0    She denies new onset of progressive weakness of the lower extremities she denies new onset bowel or bladder dysfunction    No reliance on analgesics     Past Medical History:        Past Medical History:   Diagnosis Date    Acute diastolic congestive heart failure ([facility] [address] 11/26/2018    Allergic     SEASONAL    Atrial fibrillation ([facility] [address] H/O    Cancer ([facility] [address] 2009 AND 2011    BREAST right    Essential hypertension 3/13/2018    Hammertoe     Migraine     Mixed hyperlipidemia 10/19/2020         Past Surgical History:   Procedure Laterality Date    ANKLE FRACTURE SURGERY  2004,2008    APPENDECTOMY  03/1983    ATRIAL ABLATION SURGERY  X3    Cryoablation for atrial fib    AXILLARY SURGERY Right     lymph node    BREAST BIOPSY  01/07/2011    BREAST BIOPSY  07/12/2011    left breast bx - benign     BREAST CYST EXCISION  06/2003    BREAST LUMPECTOMY  03/30/2009    CARDIAC CATHETERIZATION  5/2005,01/2006    COLONOSCOPY  1993,1995,1999,2003,2006    COLONOSCOPY  11/29/2011    Dr. Rafiq Marsh - benign polyps     COSMETIC SURGERY      FOOT SURGERY Right 01/15/2016    CORRECTION OF HAMMERTOES 1-5 RIGHT FOOT, WEIL OSTEOTOMY 2,3    FOOT SURGERY Right 05/12/2016    EXTENSOR TENDON LENGTHENING 4TH AND 5TH TOES RIGHT FOOT    FOOT SURGERY Right 08/11/2016    CORRECTION HAMMERTOES 1ST, 4TH AND 5TH DIGITS RIGHT FOOT WITH    HERNIA REPAIR Allergies: Allergies   Allergen Reactions    Keflex [Cephalexin] Shortness Of Breath and Nausea Only    Novocain [Procaine] Shortness Of Breath and Swelling     LIDOCAINE OK    Amiodarone Other (See Comments)     dizziness    Cephalexin     Diltiazem Other (See Comments)     dizziness    Penicillins Swelling    Sulfa Antibiotics Nausea Only and Swelling        Social History:         Social History     Socioeconomic History    Marital status:      Spouse name: Not on file    Number of children: 2    Years of education: 12    Highest education level: Not on file   Occupational History    Occupation: [employer]    Tobacco Use    Smoking status: Former Smoker     Packs/day: 0.25     Years: 8.00     Pack years: 2.00     Types: Cigarettes     Start date: 34     Quit date: 1988     Years since quittin.8    Smokeless tobacco: Never Used    Tobacco comment: QUIT    Substance and Sexual Activity    Alcohol use: No    Drug use: No    Sexual activity: Never   Other Topics Concern    Not on file   Social History Narrative    Not on file     Social Determinants of Health     Financial Resource Strain:     Difficulty of Paying Living Expenses: Not on file   Food Insecurity:     Worried About Running Out of Food in the Last Year: Not on file    Jef of Food in the Last Year: Not on file   Transportation Needs:     Lack of Transportation (Medical): Not on file    Lack of Transportation (Non-Medical):  Not on file   Physical Activity:     Days of Exercise per Week: Not on file    Minutes of Exercise per Session: Not on file   Stress:     Feeling of Stress : Not on file   Social Connections:     Frequency of Communication with Friends and Family: Not on file    Frequency of Social Gatherings with Friends and Family: Not on file    Attends [employer] Services: Not on file    Active Member of Clubs or Organizations: Not on file    Attends Club or Organization Meetings: Not on file    Marital Status: Not on file   Intimate Partner Violence:     Fear of Current or Ex-Partner: Not on file    Emotionally Abused: Not on file    Physically Abused: Not on file    Sexually Abused: Not on file   Housing Stability:     Unable to Pay for Housing in the Last Year: Not on file    Number of [place] in the Last Year: Not on file    Unstable Housing in the Last Year: Not on file        Review of Symptoms:    Pertinent items are noted in HPI        Vital Signs: There were no vitals filed for this visit. General Exam:     Constitutional: Patient is adequately groomed with no evidence of malnutrition        Physical Exam: lower back      Primary Exam:    Inspection: No deformity atrophy appreciable curvature      Palpation: No focal trigger point tenderness      Range of Motion: 80/5 without pain      Strength: Normal lower extremity       Special Tests: Negative SLR      Skin: There are no rashes, ulcerations or lesions. Gait: Nonantalgic      Neurovascular - non focal and intact       Additional Comments:        Additional Examinations:                  Office Imaging Results/Procedures PerformedToday:             Office Procedures:     Orders Placed This Encounter   Procedures    Ambulatory referral to Physical Therapy     Referral Priority:   Routine     Referral Type:   Eval and Treat     Referral Reason:   Specialty Services Required     Number of Visits Requested:   1           Other Outside Imaging and Testing Personally Reviewed:         Epidural steroid injection, Epidurography       History : low back pain. Lumbar radiculopathy.       COMMENTS :       The low back was prepped and draped in sterile fashion and lidocaine used for local anesthesia.  Under fluoroscopic guidance, a 22 gauge Tuohy needle was advanced into the epidural space at the L4/5 level from an interlaminar approach and needle position    was documented with contrast injection. 80 mg Kenalog, 1cc (2.5 mg) 0.25% Sensorcaine, and 2 cc sterile saline were injected.  The patient tolerated the procedure without complication.         DIAGNOSIS :       L4/5 translaminar epidural injection of Kenalog and Sensorcaine.           Fluoroscopy time : 0.2 minutes   Number of exposures obtained : 1       Estimated blood loss: Less than 5 mL. Assessment   Impression: . Encounter Diagnoses   Name Primary?  Degenerative lumbar spinal stenosis Yes    DDD (degenerative disc disease), lumbar     Lumbar spondylosis     Chronic anticoagulation               Plan:     Lumbar stabilization home exercises and formal course of PT-I HEP  Active modification caution against overuse and emphasized safe lifting techniques  Consider repeat L TRISTEN as needed  Local measures and Tylenol for symptom control minimize use of narcotic analgesia which she has done    The nature of the finding, probable diagnosis and likely treatment was thoroughly discussed with the patient. The options, risks, complications, alternative treatment as well as some of the differential diagnosis was discussed. The patient was thoroughly informed and all questions were answered. the patient indicated understanding and satisfaction with the discussion. Orders:        Orders Placed This Encounter   Procedures    Ambulatory referral to Physical Therapy     Referral Priority:   Routine     Referral Type:   Eval and Treat     Referral Reason:   Specialty Services Required     Number of Visits Requested:   1           Disclaimer: \"This note was dictated with voice recognition software. Though review and correction are routine, we apologize for any errors. \"

## 2021-12-13 ASSESSMENT — PATIENT HEALTH QUESTIONNAIRE - PHQ9
SUM OF ALL RESPONSES TO PHQ QUESTIONS 1-9: 0
1. LITTLE INTEREST OR PLEASURE IN DOING THINGS: 0
SUM OF ALL RESPONSES TO PHQ9 QUESTIONS 1 & 2: 0
SUM OF ALL RESPONSES TO PHQ QUESTIONS 1-9: 0
SUM OF ALL RESPONSES TO PHQ QUESTIONS 1-9: 0
2. FEELING DOWN, DEPRESSED OR HOPELESS: 0

## 2021-12-13 ASSESSMENT — LIFESTYLE VARIABLES
AUDIT-C TOTAL SCORE: INCOMPLETE
HOW OFTEN DO YOU HAVE A DRINK CONTAINING ALCOHOL: NEVER
HOW OFTEN DO YOU HAVE A DRINK CONTAINING ALCOHOL: 0
AUDIT TOTAL SCORE: INCOMPLETE

## 2021-12-13 NOTE — PROGRESS NOTES
[address]   Cardiac Electrophysiology Consultation   Date: 12/16/2021  Reason for Consultation:  AF/AFL  Consult Requesting Physician: No att. providers found     Chief Complaint:   Chief Complaint   Patient presents with    3 Month Follow-Up    Atrial Fibrillation      HPI: Alyson Jameson is a 66 y.o.  woman with a h/o breast CA, HLD,  chronic dCHF, atypical AFL, pAF, S/p RFA (2005, Dr. Yeison Vanegas, 2006, Dr. Sima Torres, 2013, Dr. Dania Mcwilliams), s/p RFA/PVI of AF/typical AFL/atypical AFL (11/23/20) with recurrence of highly symptomatic AF. Failed flecainide due to widening QRS. S/p DCCV (12/9/20) with ERAF. S/p dofetilide loading with chemical conversion to SR; however, otpatient monitor (3/2021) showed 69% AF burden with 2.9K runs of VT (longest lasting 28 sec), and 128 pauses with longest lasting 4.2 sec. S/p LHC (5/2021) showed normal coronaries. Echo (6/2021) shows preserved LVEF with LAE. S/p RFA/PVI of AF, additional focal ablation for AF, outside the pulmonary vein -intra-atrial septum and posterior wall, and atypical AFL ablation -mitral isthmus line and left atrial roofline (9/1/21). Interval History: Today, she is here for 3 mo f/u post ablation, presenting [facility] [employer] [facility]. Since the ablation, she continues to have breakthrough of AF everyday. She does not have her iPad to show us the recordings. She continues to complain of palpitations, fatigue, and SOB. She is frustrate because she has gained 30 lbs since 11/2020. She reports her diet is good, no sugar or soda. However, she has been inactive.     Past Medical History:   Diagnosis Date    Acute diastolic congestive heart failure ([address] 11/26/2018    Allergic     SEASONAL    Atrial fibrillation ([address] H/O    Cancer ([address] 2009 AND 2011    BREAST right    Essential hypertension 3/13/2018    Hammertoe     Migraine     Mixed hyperlipidemia 10/19/2020        Past Surgical History:   Procedure Laterality Date    ANKLE FRACTURE SURGERY [id number]    APPENDECTOMY  03/1983    ATRIAL ABLATION SURGERY  X3    Cryoablation for atrial fib    AXILLARY SURGERY Right     lymph node    BREAST BIOPSY  01/07/2011    BREAST BIOPSY  07/12/2011    left breast bx - benign     BREAST CYST EXCISION  06/2003    BREAST LUMPECTOMY  03/30/2009    CARDIAC CATHETERIZATION  5/2005,01/2006    COLONOSCOPY  1993,1995,1999,2003,2006    COLONOSCOPY  11/29/2011    Dr. Magalie Spring - benign polyps     COSMETIC SURGERY      FOOT SURGERY Right 01/15/2016    CORRECTION OF HAMMERTOES 1-5 RIGHT FOOT, WEIL OSTEOTOMY 2,3    FOOT SURGERY Right 05/12/2016    EXTENSOR TENDON LENGTHENING 4TH AND 5TH TOES RIGHT FOOT    FOOT SURGERY Right 08/11/2016    CORRECTION HAMMERTOES 1ST, 4TH AND 5TH DIGITS RIGHT FOOT WITH    HERNIA REPAIR  1993,1998,2000,2009    HYSTERECTOMY  12/1977    JOINT REPLACEMENT  06/2015    LEFT KNEE    KNEE SURGERY  06/09/2015    left knee replacement    LIPOMA RESECTION  09/2006    MASTECTOMY, BILATERAL Bilateral 03/15/2011    bilateral    NEUROMA SURGERY  1987,1988,1989,1992 , 1995    Left foot    OTHER SURGICAL HISTORY  1983, 1984 X 2, 1994 X 2, 2006    Bowel obstructions    RHINOPLASTY  11/1976    RHINOPLASTY  1995    THORACOTOMY  1998    TONSILLECTOMY  1956    UPPER GASTROINTESTINAL ENDOSCOPY  [id number]    VARICOSE VEIN SURGERY  11/2001       Allergies: Allergies   Allergen Reactions    Keflex [Cephalexin] Shortness Of Breath and Nausea Only    Novocain [Procaine] Shortness Of Breath and Swelling     LIDOCAINE OK    Amiodarone Other (See Comments)     dizziness    Cephalexin     Diltiazem Other (See Comments)     dizziness    Penicillins Swelling    Sulfa Antibiotics Nausea Only and Swelling       Medication:   Prior to Admission medications    Medication Sig Start Date End Date Taking?  Authorizing Provider   furosemide (LASIX) 20 MG tablet Take 1 tablet by mouth daily 11/11/21  Yes Zi Lopez MD   [facility]) 500 MCG capsule Take 1 capsule by mouth 2 times daily 9/8/21  Yes MAILE Reese CNP   acetaminophen (TYLENOL) 500 MG tablet Take 500 mg by mouth every 8 hours as needed for Pain   Yes Historical Provider, MD   metoprolol succinate (TOPROL XL) 100 MG extended release tablet Take 1 tablet by mouth daily 6/10/21  Yes MAILE Camacho CNP   nitroGLYCERIN (NITROSTAT) 0.4 MG SL tablet Place 1 tablet under the tongue every 5 minutes as needed for Chest pain 4/26/21  Yes MAILE Camacho CNP   ezetimibe (ZETIA) 10 MG tablet TAKE 1 TABLET BY MOUTH ONE TIME A DAY  3/29/21  Yes MAILE Downs CNP   verapamil (CALAN) 80 MG tablet Take 1 tablet by mouth 3 times daily 3/25/21  Yes MAILE Downs CNP   apixaban (ELIQUIS) 5 MG TABS tablet Take 1 tablet by mouth 2 times daily 3/4/21  Yes Ryann Oswald MD   Vitamin D, Cholecalciferol, 25 MCG (1000 UT) CAPS Take 1,000 Units by mouth daily  Patient taking differently: Take 3 capsules by mouth daily  10/19/20  Yes Pipo Garrido MD       Social History:   reports that she quit smoking about 33 years ago. Her smoking use included cigarettes. She started smoking about 41 years ago. She has a 2.00 pack-year smoking history. She has never used smokeless tobacco. She reports that she does not drink alcohol and does not use drugs. Family History:  family history includes Cancer in her brother, father, and mother. Reviewed.  Denies family history of sudden cardiac death, arrhythmia, premature CAD    Review of System:    · General ROS: negative for - chills, fever   · Psychological ROS: negative for - anxiety or depression  · Ophthalmic ROS: negative for - eye pain or loss of vision  · ENT ROS: negative for - epistaxis, headaches, nasal discharge, sore throat   · Allergy and Immunology ROS: negative for - hives, nasal congestion   · Hematological and Lymphatic ROS: negative for - bleeding problems, blood clots, bruising or jaundice  · Endocrine ROS: negative for - skin changes, temperature intolerance or unexpected weight changes  · Respiratory ROS: negative for - cough, hemoptysis, pleuritic pain, SOB, sputum changes or wheezing  · Cardiovascular ROS: Per HPI. · Gastrointestinal ROS: negative for - abdominal pain, blood in stools, diarrhea, hematemesis, melena, nausea/vomiting or swallowing difficulty/pain  · Genito-Urinary ROS: negative for - dysuria or incontinence  · Musculoskeletal ROS: negative for - joint swelling or muscle pain  · Neurological ROS: negative for - confusion, dizziness, gait disturbance, headaches, numbness/tingling, seizures, speech problems, tremors, visual changes or weakness  · Dermatological ROS: negative for - rash    Physical Examination:  Vitals:    12/16/21 1404   BP: (!) 168/82   Pulse: 64       · Constitutional: Oriented. No distress. · Head: Normocephalic and atraumatic. · Mouth/Throat: Oropharynx is clear and moist.   · Eyes: Conjunctivae normal. EOM are normal.   · Neck: Normal range of motion. Neck supple. No rigidity. No JVD present. · Cardiovascular: Normal rate, regular rhythm, S1&S2 and intact distal pulses. · Pulmonary/Chest: Bilateral respiratory sounds. No wheezes. No rhonchi. · Abdominal: Soft. Bowel sounds present. No distension, No tenderness. · Musculoskeletal: No tenderness. No edema    · Lymphadenopathy: Has no cervical adenopathy. · Neurological: Alert and oriented. Cranial nerve appears intact, No Gross deficit   · Skin: Skin is warm and dry. No rash noted. · Psychiatric: Has a normal mood, affect and behavior     Labs:  Reviewed. ECG: reviewed, Sinus Rhythm 64, with QTc 435 ms. No pathologic Q waves, ventricular pre-excitation, or QT prolongation.      Studies:   1.  2-week [facility] (3/254/8/21):  SR with average HR 91 (33218), 2945 VT episodes with the longest lasting 28.1 seconds, 69% AF burden,  128 pauses with the longest lasting 4.2 seconds in length    Echo 6/14/21    Summary   Left ventricular cavity size is normal. There is mild concentric left   ventricular hypertrophy. Left ventricular function is normal with ejection   fraction estimated at 55-60%. No regional wall motion abnormalities are   noted. Diastolic function cannot be assessed due to an arrhythmia (AFRVR). Normal LVEDP. Mild mitral regurgitation is present. Stress Test: 2/19/2019  Summary    There is normal isotope uptake at stress and rest. There is no evidence of    myocardial ischemia or scar.    Normal LV size and systolic function.    Left ventricular ejection fraction of 75 %.    There are no regional wall motion abnormalities.    Normal myocardial perfusion study.      Cath 5/18/21  IMPRESSION:  1. Normal left ventricular systolic function. 2.  Essentially normal coronary arteries. 3.  Successful Angio-Seal of right femoral arteriotomy site. The MCOT, echocardiogram, stress test, and coronary angiography/PCI were reviewed by myself and used for my plan of care. Procedures:  1. None    Assessment/Plan:   1. Longstanding persistent AF, for more than 30 years  2. S/p total of 5 ablations including right side and left side thrice, most recent PVI in 9/2021  3. Tachy-brenden syndrome  4. NSVT, on Toprol  5. Diastolic CHF  6. HTN, stable on Toprol and verapamil    Patient continues to have symptomatic recurrence of AF with daily palpitations and SOB. S/p AF ablations x 3 (one in 2013 and the second one in 2020 and in 2021). Unable to take flecainide due to significant QRS prolongation. Recurrence of AF despite dofetilide. In SR today with stable QTc, but she reports daily breakthrough of AF.     Discussed that since she has had multiple ablations with recurrence of AF despite dofetilide; if she had highly symptomatic recurrence of atrial fibrillation, then other options would be CRT-P followed by AV node ablation versus hybrid ablation (from epicardial approach through surgery). She wishes to think about this option for now. Continue dofetilide 500 mcg BID, Toprol 100 mg daily, verapamil 80 mg TID, and Eliquis 5 mg BID    Follow up in 6 months with EP NP    Thank you for allowing me to participate in the care of Sergey Cordova. All questions and concerns were addressed to the patient/family. Alternatives to my treatment were discussed. Brenda Rayo RN, am scribing for and in the presence of Dr. Kristina De Paz. 12/16/21 2:38 PM  Armando Terrazas RN    I, Elaina Hernández MD, personally performed the services prescribed in this documentation as scribed by Ms. Armando Terrazas RN in my presence and it is both accurate and complete.      Elaina Hernández MD  Cardiac Electrophysiology  [facility]

## 2021-12-14 ENCOUNTER — OFFICE VISIT (OUTPATIENT)
Dept: PRIMARY CARE CLINIC | Age: 71
End: 2021-12-14
Payer: MEDICARE

## 2021-12-14 VITALS
WEIGHT: 230.8 LBS | SYSTOLIC BLOOD PRESSURE: 124 MMHG | BODY MASS INDEX: 34.18 KG/M2 | DIASTOLIC BLOOD PRESSURE: 80 MMHG | HEART RATE: 79 BPM | HEIGHT: 69 IN | TEMPERATURE: 98 F | OXYGEN SATURATION: 94 %

## 2021-12-14 DIAGNOSIS — E55.9 VITAMIN D DEFICIENCY: ICD-10-CM

## 2021-12-14 DIAGNOSIS — E78.2 MIXED HYPERLIPIDEMIA: ICD-10-CM

## 2021-12-14 DIAGNOSIS — D05.81: ICD-10-CM

## 2021-12-14 DIAGNOSIS — R63.5 ABNORMAL WEIGHT GAIN: ICD-10-CM

## 2021-12-14 DIAGNOSIS — I10 ESSENTIAL HYPERTENSION: ICD-10-CM

## 2021-12-14 DIAGNOSIS — R73.03 PREDIABETES: ICD-10-CM

## 2021-12-14 DIAGNOSIS — Z00.00 ROUTINE GENERAL MEDICAL EXAMINATION AT A HEALTH CARE FACILITY: Primary | ICD-10-CM

## 2021-12-14 DIAGNOSIS — Z90.13 S/P BILATERAL MASTECTOMY: ICD-10-CM

## 2021-12-14 DIAGNOSIS — I48.0 PAROXYSMAL ATRIAL FIBRILLATION (HCC): ICD-10-CM

## 2021-12-14 PROBLEM — I49.9 ARRHYTHMIA: Status: RESOLVED | Noted: 2021-03-08 | Resolved: 2021-12-14

## 2021-12-14 PROCEDURE — 4040F PNEUMOC VAC/ADMIN/RCVD: CPT | Performed by: NURSE PRACTITIONER

## 2021-12-14 PROCEDURE — 3017F COLORECTAL CA SCREEN DOC REV: CPT | Performed by: NURSE PRACTITIONER

## 2021-12-14 PROCEDURE — G0439 PPPS, SUBSEQ VISIT: HCPCS | Performed by: NURSE PRACTITIONER

## 2021-12-14 PROCEDURE — 1123F ACP DISCUSS/DSCN MKR DOCD: CPT | Performed by: NURSE PRACTITIONER

## 2021-12-14 PROCEDURE — G8482 FLU IMMUNIZE ORDER/ADMIN: HCPCS | Performed by: NURSE PRACTITIONER

## 2021-12-14 SDOH — ECONOMIC STABILITY: FOOD INSECURITY: WITHIN THE PAST 12 MONTHS, THE FOOD YOU BOUGHT JUST DIDN'T LAST AND YOU DIDN'T HAVE MONEY TO GET MORE.: NEVER TRUE

## 2021-12-14 SDOH — ECONOMIC STABILITY: FOOD INSECURITY: WITHIN THE PAST 12 MONTHS, YOU WORRIED THAT YOUR FOOD WOULD RUN OUT BEFORE YOU GOT MONEY TO BUY MORE.: NEVER TRUE

## 2021-12-14 ASSESSMENT — SOCIAL DETERMINANTS OF HEALTH (SDOH): HOW HARD IS IT FOR YOU TO PAY FOR THE VERY BASICS LIKE FOOD, HOUSING, MEDICAL CARE, AND HEATING?: NOT HARD AT ALL

## 2021-12-14 NOTE — PATIENT INSTRUCTIONS
Personalized Preventive Plan for Barbie Acuña - 12/14/2021  Medicare offers a range of preventive health benefits. Some of the tests and screenings are paid in full while other may be subject to a deductible, co-insurance, and/or copay. Some of these benefits include a comprehensive review of your medical history including lifestyle, illnesses that may run in your family, and various assessments and screenings as appropriate. After reviewing your medical record and screening and assessments performed today your provider may have ordered immunizations, labs, imaging, and/or referrals for you. A list of these orders (if applicable) as well as your Preventive Care list are included within your After Visit Summary for your review. Other Preventive Recommendations:    · A preventive eye exam performed by an eye specialist is recommended every 1-2 years to screen for glaucoma; cataracts, macular degeneration, and other eye disorders. · A preventive dental visit is recommended every 6 months. · Try to get at least 150 minutes of exercise per week or 10,000 steps per day on a pedometer . · Order or download the FREE \"Exercise & Physical Activity: Your Everyday Guide\" from [employer]. Call 7-758.904.9395 or search [employer] online. · You need 3398-4195 mg of calcium and 4445-4986 IU of vitamin D per day. It is possible to meet your calcium requirement with diet alone, but a vitamin D supplement is usually necessary to meet this goal.  · When exposed to the sun, use a sunscreen that protects against both UVA and UVB radiation with an SPF of 30 or greater. Reapply every 2 to 3 hours or after sweating, drying off with a towel, or swimming. · Always wear a seat belt when traveling in a car. Always wear a helmet when riding a bicycle or motorcycle.

## 2021-12-14 NOTE — PROGRESS NOTES
Medicare Annual Wellness Visit  Name: Puja Yan Date: 2021   MRN: [id number] Sex: Female   Age: 66 y.o. Ethnicity: Non- / Non    : 1950 Race: White (non-)      Daniela Silva is here for Medicare AWV    Screenings for behavioral, psychosocial and functional/safety risks, and cognitive dysfunction are all negative except as indicated below. These results, as well as other patient data from the [address] form, are documented in Flowsheets linked to this Encounter. Patient is here for AWV    CARDIO:  Follows with Dr Bobby Lemos for EP  H/o afib and CHF  H/o ablation x 5 and cardioversion  Is on tikosyn and verapamil. Has gained about 30lbs over the past yr. Gaining all of her wt around her abd. States her diet is good. Eats fish, chicken. Drinks water, eats veggies. Doesn't eat sweets or junk food. Thinks her wt gain is due to the verapamil. Takes diruetics. Has apt this wk. COVID:  Hospitalized last yr for 19 days  Continues to be fatigued. Has shortness of breath. No wheezing. Monitors O2 level at home and normal.     HYPERTENSION:  On norvasc 10mg daily  stable    H/O BREAST CA:  Dx  and   S/p bilateral masectomy with reconstruction  Follows with oncology yearly. CSCOPE:  2019    DEXA:  2017    FALLS:  No falls this year. All last yr when she was recovering from covid. Allergies   Allergen Reactions    Keflex [Cephalexin] Shortness Of Breath and Nausea Only    Novocain [Procaine] Shortness Of Breath and Swelling     LIDOCAINE OK    Amiodarone Other (See Comments)     dizziness    Cephalexin     Diltiazem Other (See Comments)     dizziness    Penicillins Swelling    Sulfa Antibiotics Nausea Only and Swelling         Prior to Visit Medications    Medication Sig Taking?  Authorizing Provider   furosemide (LASIX) 20 MG tablet Take 1 tablet by mouth daily Yes Alphonse Louise MD   dofetilide (TIKOSYN) 500 MCG capsule Take 1 capsule by mouth 2 times daily Yes MAILE Crenshaw CNP   acetaminophen (TYLENOL) 500 MG tablet Take 500 mg by mouth every 8 hours as needed for Pain Yes Historical Provider, MD   metoprolol succinate (TOPROL XL) 100 MG extended release tablet Take 1 tablet by mouth daily Yes MAILE Nj CNP   nitroGLYCERIN (NITROSTAT) 0.4 MG SL tablet Place 1 tablet under the tongue every 5 minutes as needed for Chest pain Yes MAILE Nj CNP   ezetimibe (ZETIA) 10 MG tablet TAKE 1 TABLET BY MOUTH ONE TIME A DAY  Yes MAILE Aaron CNP   verapamil (CALAN) 80 MG tablet Take 1 tablet by mouth 3 times daily Yes MAILE Aaron CNP   apixaban (ELIQUIS) 5 MG TABS tablet Take 1 tablet by mouth 2 times daily Yes Eli Chapman MD   Vitamin D, Cholecalciferol, 25 MCG (1000 UT) CAPS Take 1,000 Units by mouth daily  Patient taking differently: Take 3 capsules by mouth daily  Yes Caridad Warner MD         Past Medical History:   Diagnosis Date    Acute diastolic congestive heart failure ([facility] [address] 11/26/2018    Allergic     SEASONAL    Atrial fibrillation ([facility] [address] H/O    Cancer ([facility] [address] 2009 AND 2011    BREAST right    Essential hypertension 3/13/2018    Hammertoe     Migraine     Mixed hyperlipidemia 10/19/2020       Past Surgical History:   Procedure Laterality Date    ANKLE FRACTURE SURGERY  2004,2008    APPENDECTOMY  03/1983    ATRIAL ABLATION SURGERY  X3    Cryoablation for atrial fib    AXILLARY SURGERY Right     lymph node    BREAST BIOPSY  01/07/2011    BREAST BIOPSY  07/12/2011    left breast bx - benign     BREAST CYST EXCISION  06/2003    BREAST LUMPECTOMY  03/30/2009    CARDIAC CATHETERIZATION  5/2005,01/2006    COLONOSCOPY  1993,1995,1999,2003,2006    COLONOSCOPY  11/29/2011    Dr. Oral Marquez - benign polyps     COSMETIC SURGERY      FOOT SURGERY Right 01/15/2016    CORRECTION OF HAMMERTOES 1-5 RIGHT FOOT, WEIL OSTEOTOMY 2,3    FOOT SURGERY Right 05/12/2016 EXTENSOR TENDON LENGTHENING 4TH AND 5TH TOES RIGHT FOOT    FOOT SURGERY Right 2016    CORRECTION HAMMERTOES 1ST, 4TH AND 5TH DIGITS RIGHT FOOT WITH    HERNIA REPAIR  [id number]    HYSTERECTOMY  1977    JOINT REPLACEMENT  2015    LEFT KNEE    KNEE SURGERY  2015    left knee replacement    LIPOMA RESECTION  2006    MASTECTOMY, BILATERAL Bilateral 03/15/2011    bilateral    NEUROMA SURGERY  [id number] , 1995    Left foot    OTHER SURGICAL HISTORY  , 1984 X 2, 1994 X 2, 2006    Bowel obstructions    RHINOPLASTY  1976    RHINOPLASTY      THORACOTOMY      TONSILLECTOMY      UPPER GASTROINTESTINAL ENDOSCOPY  [id number]    VARICOSE VEIN SURGERY  2001         Family History   Problem Relation Age of Onset    Cancer Mother         Breast cancer with metastatic disease- at 80    Cancer Father          at [de-identified] of bladder cancer and eye cancer    Cancer Brother          at 64 of throat,lung,bone spinal column and brain cancer       CareTeam (Including outside providers/suppliers regularly involved in providing care):   Patient Care Team:  Milvia Sutton MD as PCP - General (Internal Medicine)  Milvia Sutton MD as PCP - [address] [place] [address]  [facility]  Claudette Erb, MD as Consulting Physician (Electrophysiology)  Kranthi Olivas MD as Consulting Physician (Medical Oncology)    Wt Readings from Last 3 Encounters:   21 230 lb 12.8 oz (104.7 kg)   21 227 lb (103 kg)   10/26/21 227 lb (103 kg)     Vitals:    21 1606   BP: 124/80   Site: Left Upper Arm   Position: Sitting   Cuff Size: Medium Adult   Pulse: 79   Temp: 98 °F (36.7 °C)   TempSrc: Oral   SpO2: 94%   Weight: 230 lb 12.8 oz (104.7 kg)   Height: 5' 9\" (1.753 m)     Body mass index is 34.08 kg/m². Based upon direct observation of the patient, evaluation of cognition reveals recent and remote memory intact.     Patient's complete Health Risk Assessment and screening values have been reviewed and are found in Flowsheets. The following problems were reviewed today and where indicated follow up appointments were made and/or referrals ordered. Positive Risk Factor Screenings with Interventions:     Fall Risk:  Timed Up and Go Test > 12 seconds? (Complete if either Fall Risk answers are Yes): no  2 or more falls in past year?: (!) yes  Fall with injury in past year?: (!) yes  Fall Risk Interventions:    · Home safety tips provided          General Health and ACP:  General  In general, how would you say your health is?: Good  In the past 7 days, have you experienced any of the following?  New or Increased Pain, New or Increased Fatigue, Loneliness, Social Isolation, Stress or Anger?: (!) Stress  Do you get the social and emotional support that you need?: Yes  Do you have a Living Will?: Yes  Advance Directives     Power of  Living Will ACP-Advance Directive ACP-Power of     Not on File Not on File Filed Not on File      General Health Risk Interventions:  · Stress: patient declines any further evaluation/treatment for this issue    Health Habits/Nutrition:  Health Habits/Nutrition  Do you exercise for at least 20 minutes 2-3 times per week?: Yes  Have you lost any weight without trying in the past 3 months?: No  Do you eat only one meal per day?: No  Have you seen the dentist within the past year?: Yes  Body mass index: (!) 34.08  Health Habits/Nutrition Interventions:  · Inadequate physical activity:  pt will continue to exercise to her best ability       Personalized Preventive Plan   Current Health Maintenance Status  Immunization History   Administered Date(s) Administered    COVID-19, Callahan Peter, PF, 30mcg/0.3mL 02/04/2021, 02/25/2021, 09/28/2021    Influenza 10/06/2010    Influenza Vaccine, unspecified formulation 10/24/2015, 10/24/2016    Influenza Virus Vaccine 10/01/2009, 10/01/2013, 10/01/2014    Influenza, High Dose (Fluzone 65 yrs and older) 09/20/2017, 10/04/2020, 09/28/2021    Pneumococcal Conjugate 13-valent (Arelis President) 01/13/2016, 10/04/2020    Pneumococcal Polysaccharide ([id number]) 03/15/2011, 03/13/2018    Tdap (Boostrix, Adacel) 02/26/2014    Zoster Live (Zostavax) 08/24/2015    Zoster Recombinant (Shingrix) 08/01/2015        Health Maintenance   Topic Date Due    A1C test (Diabetic or Prediabetic)  10/19/2021    Annual Wellness Visit (AWV)  12/11/2021    Shingles Vaccine (2 of 2) 12/17/2021 (Originally 10/19/2015)    Potassium monitoring  08/27/2022    Creatinine monitoring  08/27/2022    DTaP/Tdap/Td vaccine (2 - Td or Tdap) 02/26/2024    Lipid screen  09/08/2025    Colon cancer screen colonoscopy  11/19/2029    DEXA (modify frequency per FRAX score)  Completed    Flu vaccine  Completed    Pneumococcal 65+ years Vaccine  Completed    COVID-19 Vaccine  Completed    Hepatitis C screen  Completed    Hepatitis A vaccine  Aged Out    Hepatitis B vaccine  Aged Out    Hib vaccine  Aged Out    Meningococcal (ACWY) vaccine  Aged Out     Recommendations for Zyga Due: see orders and patient instructions/AVS.  . Recommended screening schedule for the next 5-10 years is provided to the patient in written form: see Patient Joanne Gamboa was seen today for medicare awv. Diagnoses and all orders for this visit:    Routine general medical examination at a health care facility  -will return to clinic for fasting labs. Orders entered  -continue with diet/exercise efforts  -vaccinations utd  -preventative screenings [facility]    Vitamin D deficiency  -     Vitamin D 25 Hydroxy; Future    S/P bilateral mastectomy    Paroxysmal atrial fibrillation (HCC)  -continue per cardiology and EP. 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

## 2021-12-15 DIAGNOSIS — E78.2 MIXED HYPERLIPIDEMIA: ICD-10-CM

## 2021-12-15 DIAGNOSIS — R73.03 PREDIABETES: ICD-10-CM

## 2021-12-15 DIAGNOSIS — E55.9 VITAMIN D DEFICIENCY: ICD-10-CM

## 2021-12-15 DIAGNOSIS — R63.5 ABNORMAL WEIGHT GAIN: ICD-10-CM

## 2021-12-15 LAB
A/G RATIO: 1.9 (ref 1.1–2.2)
ALBUMIN SERPL-MCNC: 4.3 G/DL (ref 3.4–5)
ALP BLD-CCNC: 78 U/L (ref 40–129)
ALT SERPL-CCNC: 16 U/L (ref 10–40)
ANION GAP SERPL CALCULATED.3IONS-SCNC: 13 MMOL/L (ref 3–16)
AST SERPL-CCNC: 15 U/L (ref 15–37)
BILIRUB SERPL-MCNC: 0.3 MG/DL (ref 0–1)
BUN BLDV-MCNC: 16 MG/DL (ref 7–20)
CALCIUM SERPL-MCNC: 9 MG/DL (ref 8.3–10.6)
CHLORIDE BLD-SCNC: 98 MMOL/L (ref 99–110)
CHOLESTEROL, TOTAL: 181 MG/DL (ref 0–199)
CO2: 24 MMOL/L (ref 21–32)
CREAT SERPL-MCNC: 0.6 MG/DL (ref 0.6–1.2)
GFR AFRICAN AMERICAN: >60
GFR NON-AFRICAN AMERICAN: >60
GLUCOSE BLD-MCNC: 107 MG/DL (ref 70–99)
HDLC SERPL-MCNC: 55 MG/DL (ref 40–60)
LDL CHOLESTEROL CALCULATED: 98 MG/DL
POTASSIUM SERPL-SCNC: 4.8 MMOL/L (ref 3.5–5.1)
SODIUM BLD-SCNC: 135 MMOL/L (ref 136–145)
TOTAL PROTEIN: 6.6 G/DL (ref 6.4–8.2)
TRIGL SERPL-MCNC: 140 MG/DL (ref 0–150)
TSH SERPL DL<=0.05 MIU/L-ACNC: 0.98 UIU/ML (ref 0.27–4.2)
VITAMIN D 25-HYDROXY: 40.3 NG/ML
VLDLC SERPL CALC-MCNC: 28 MG/DL

## 2021-12-16 ENCOUNTER — OFFICE VISIT (OUTPATIENT)
Dept: CARDIOLOGY CLINIC | Age: 71
End: 2021-12-16
Payer: MEDICARE

## 2021-12-16 VITALS
HEART RATE: 64 BPM | HEIGHT: 69 IN | WEIGHT: 231.2 LBS | SYSTOLIC BLOOD PRESSURE: 168 MMHG | BODY MASS INDEX: 34.24 KG/M2 | DIASTOLIC BLOOD PRESSURE: 82 MMHG

## 2021-12-16 DIAGNOSIS — I47.29 NONSUSTAINED VENTRICULAR TACHYCARDIA: ICD-10-CM

## 2021-12-16 DIAGNOSIS — I10 ESSENTIAL HYPERTENSION: ICD-10-CM

## 2021-12-16 DIAGNOSIS — I49.5 TACHY-BRADY SYNDROME (HCC): ICD-10-CM

## 2021-12-16 DIAGNOSIS — I50.32 CHRONIC DIASTOLIC HEART FAILURE (HCC): ICD-10-CM

## 2021-12-16 DIAGNOSIS — I48.11 LONGSTANDING PERSISTENT ATRIAL FIBRILLATION (HCC): Primary | ICD-10-CM

## 2021-12-16 LAB
ESTIMATED AVERAGE GLUCOSE: 119.8 MG/DL
HBA1C MFR BLD: 5.8 %

## 2021-12-16 PROCEDURE — 93000 ELECTROCARDIOGRAM COMPLETE: CPT | Performed by: INTERNAL MEDICINE

## 2021-12-16 PROCEDURE — 1090F PRES/ABSN URINE INCON ASSESS: CPT | Performed by: INTERNAL MEDICINE

## 2021-12-16 PROCEDURE — 1123F ACP DISCUSS/DSCN MKR DOCD: CPT | Performed by: INTERNAL MEDICINE

## 2021-12-16 PROCEDURE — 99214 OFFICE O/P EST MOD 30 MIN: CPT | Performed by: INTERNAL MEDICINE

## 2021-12-16 PROCEDURE — G8399 PT W/DXA RESULTS DOCUMENT: HCPCS | Performed by: INTERNAL MEDICINE

## 2021-12-16 PROCEDURE — G8417 CALC BMI ABV UP PARAM F/U: HCPCS | Performed by: INTERNAL MEDICINE

## 2021-12-16 PROCEDURE — G8427 DOCREV CUR MEDS BY ELIG CLIN: HCPCS | Performed by: INTERNAL MEDICINE

## 2021-12-16 PROCEDURE — 1036F TOBACCO NON-USER: CPT | Performed by: INTERNAL MEDICINE

## 2021-12-16 PROCEDURE — G8482 FLU IMMUNIZE ORDER/ADMIN: HCPCS | Performed by: INTERNAL MEDICINE

## 2021-12-16 PROCEDURE — G9708 BILAT MAST/HX BI /UNILAT MAS: HCPCS | Performed by: INTERNAL MEDICINE

## 2021-12-16 PROCEDURE — 4040F PNEUMOC VAC/ADMIN/RCVD: CPT | Performed by: INTERNAL MEDICINE

## 2021-12-16 PROCEDURE — 3017F COLORECTAL CA SCREEN DOC REV: CPT | Performed by: INTERNAL MEDICINE

## 2022-02-03 ENCOUNTER — PATIENT MESSAGE (OUTPATIENT)
Dept: CARDIOLOGY CLINIC | Age: 72
End: 2022-02-03

## 2022-02-17 NOTE — TELEPHONE ENCOUNTER
Called and spoke to pt. Faxed over Eliquis assistance forms Monday.  Will put out samples up front for  until we get approval.

## 2022-02-21 ENCOUNTER — TELEPHONE (OUTPATIENT)
Dept: CARDIOLOGY CLINIC | Age: 72
End: 2022-02-21

## 2022-02-25 RX ORDER — EZETIMIBE 10 MG/1
TABLET ORAL
Qty: 90 TABLET | Refills: 3 | Status: SHIPPED | OUTPATIENT
Start: 2022-02-25

## 2022-02-28 RX ORDER — PANTOPRAZOLE SODIUM 40 MG/1
TABLET, DELAYED RELEASE ORAL
Qty: 30 TABLET | Refills: 0 | OUTPATIENT
Start: 2022-02-28

## 2022-03-01 RX ORDER — PANTOPRAZOLE SODIUM 40 MG/1
40 TABLET, DELAYED RELEASE ORAL
Qty: 90 TABLET | Refills: 0 | Status: SHIPPED | OUTPATIENT
Start: 2022-03-01

## 2022-03-01 NOTE — TELEPHONE ENCOUNTER
Spoke with pt. She states that she just have a little more she needs to pay in order to receive her Eliquis for free. She is requesting Pantoprozole.

## 2022-03-07 ENCOUNTER — TELEPHONE (OUTPATIENT)
Dept: CARDIOLOGY CLINIC | Age: 72
End: 2022-03-07

## 2022-03-07 NOTE — TELEPHONE ENCOUNTER
Called and informed pt that Eliquis assistance was approved from 3/5/22-12/31/22. Pt appreciative, no further questions.

## 2022-04-04 RX ORDER — VERAPAMIL HYDROCHLORIDE 80 MG/1
TABLET ORAL
Qty: 270 TABLET | Refills: 0 | Status: SHIPPED | OUTPATIENT
Start: 2022-04-04 | End: 2022-07-05 | Stop reason: SDUPTHER

## 2022-04-11 ENCOUNTER — HOSPITAL ENCOUNTER (OUTPATIENT)
Dept: GENERAL RADIOLOGY | Age: 72
Discharge: HOME | End: 2022-04-11
Payer: MEDICARE

## 2022-04-11 ENCOUNTER — OFFICE VISIT (OUTPATIENT)
Dept: PRIMARY CARE CLINIC | Age: 72
End: 2022-04-11
Payer: MEDICARE

## 2022-04-11 VITALS
SYSTOLIC BLOOD PRESSURE: 124 MMHG | OXYGEN SATURATION: 97 % | DIASTOLIC BLOOD PRESSURE: 80 MMHG | RESPIRATION RATE: 18 BRPM | HEART RATE: 67 BPM | WEIGHT: 234 LBS | TEMPERATURE: 96.5 F | BODY MASS INDEX: 34.56 KG/M2

## 2022-04-11 DIAGNOSIS — Z85.3 HISTORY OF RIGHT BREAST CANCER: ICD-10-CM

## 2022-04-11 DIAGNOSIS — M54.9 MID BACK PAIN: ICD-10-CM

## 2022-04-11 DIAGNOSIS — M54.9 MID BACK PAIN: Primary | ICD-10-CM

## 2022-04-11 DIAGNOSIS — Z91.81 AT HIGH RISK FOR FALLS: ICD-10-CM

## 2022-04-11 DIAGNOSIS — Z23 NEED FOR SHINGLES VACCINE: ICD-10-CM

## 2022-04-11 PROCEDURE — G8427 DOCREV CUR MEDS BY ELIG CLIN: HCPCS | Performed by: INTERNAL MEDICINE

## 2022-04-11 PROCEDURE — G8399 PT W/DXA RESULTS DOCUMENT: HCPCS | Performed by: INTERNAL MEDICINE

## 2022-04-11 PROCEDURE — 99213 OFFICE O/P EST LOW 20 MIN: CPT | Performed by: INTERNAL MEDICINE

## 2022-04-11 PROCEDURE — G8417 CALC BMI ABV UP PARAM F/U: HCPCS | Performed by: INTERNAL MEDICINE

## 2022-04-11 PROCEDURE — 1090F PRES/ABSN URINE INCON ASSESS: CPT | Performed by: INTERNAL MEDICINE

## 2022-04-11 PROCEDURE — 1036F TOBACCO NON-USER: CPT | Performed by: INTERNAL MEDICINE

## 2022-04-11 PROCEDURE — 3017F COLORECTAL CA SCREEN DOC REV: CPT | Performed by: INTERNAL MEDICINE

## 2022-04-11 PROCEDURE — G9708 BILAT MAST/HX BI /UNILAT MAS: HCPCS | Performed by: INTERNAL MEDICINE

## 2022-04-11 PROCEDURE — 4040F PNEUMOC VAC/ADMIN/RCVD: CPT | Performed by: INTERNAL MEDICINE

## 2022-04-11 PROCEDURE — 1123F ACP DISCUSS/DSCN MKR DOCD: CPT | Performed by: INTERNAL MEDICINE

## 2022-04-11 PROCEDURE — 72072 X-RAY EXAM THORAC SPINE 3VWS: CPT

## 2022-04-11 RX ORDER — TIZANIDINE 2 MG/1
2 TABLET ORAL 2 TIMES DAILY PRN
Qty: 20 TABLET | Refills: 0 | Status: SHIPPED | OUTPATIENT
Start: 2022-04-11 | End: 2022-10-18

## 2022-04-11 RX ORDER — ZOSTER VACCINE RECOMBINANT, ADJUVANTED 50 MCG/0.5
0.5 KIT INTRAMUSCULAR SEE ADMIN INSTRUCTIONS
Qty: 0.5 ML | Refills: 0 | Status: SHIPPED | OUTPATIENT
Start: 2022-04-11 | End: 2022-10-08

## 2022-04-11 RX ORDER — ACETAMINOPHEN 500 MG
TABLET ORAL
COMMUNITY
Start: 2022-04-11

## 2022-04-11 NOTE — PATIENT INSTRUCTIONS
1. Mid back pain  - Increase acetaminophen (TYLENOL) 500 MG tablet; Take 2 tablets 2- 3 times daily as needed  - start tiZANidine (ZANAFLEX) 2 MG tablet; Take 1 tablet by mouth 2 times daily as needed (back pain)  Dispense: 20 tablet; Refill: 0  - XR THORACIC SPINE (3 VIEWS); Future  - Referral to Shayla López MD, Orthopedic Surgery (Primary Care Sports Medicine), [facility] - [facility]    2.  History of right breast cancer  - Referral to  [facility], MD, Breast Surgery, [facility]

## 2022-04-11 NOTE — PROGRESS NOTES
Chaparro Leung   Date ofBirth:  1950    Date of Visit:  4/11/2022    Chief Complaint   Patient presents with    Back Pain     Patient states pain was so severe she was in tears. Patient is concerned about cancer there is history of bone cancer significant in her family.  Other     Asking about a breast doctor. HPI  Patient complains of mid back pain that was severe last Friday 4/8/22 that brought her to tears. Patient states her mid back is dull and sometimes it feels like a knife in her back. Patient denies injury. Back pain has been constant since Friday but not like Friday. Patient states on Friday pain was 30 out of 10 severity and today it is 5 out of 10 severity. Patient takes Tylenol ES rapid release 2 tablets 2 times daily. Patient states she has a history of breast cancer 2009 and 2011 right breast. Patient has a history of  Bilateral mastectomy March 2011. Patient states her Breast surgeon, Dr. Venu Blackwood moved back to [place] 3 years ago. Patient states she was previously referred to [facility] but she doesn't want to go that far. Patient states she wants a Breast Surgeon closer. Review of Systems   Constitutional: Negative for activity change, chills and fever. Respiratory: Negative for shortness of breath. Cardiovascular: Negative for chest pain. Gastrointestinal: Negative for abdominal pain, nausea and vomiting. Genitourinary: Negative for dysuria, flank pain, frequency and hematuria. Musculoskeletal: Positive for back pain. Negative for gait problem and joint swelling. Skin: Negative for rash. Neurological: Negative for weakness and numbness. Psychiatric/Behavioral: Negative for sleep disturbance.        Allergies   Allergen Reactions    Keflex [Cephalexin] Shortness Of Breath and Nausea Only    Novocain [Procaine] Shortness Of Breath and Swelling     LIDOCAINE OK    Amiodarone Other (See Comments)     dizziness    Cephalexin     Diltiazem Other (See Comments)     dizziness    Penicillins Swelling    Sulfa Antibiotics Nausea Only and Swelling     Outpatient Medications Marked as Taking for the 4/11/22 encounter (Office Visit) with Keya Oh MD   Medication Sig Dispense Refill    acetaminophen (TYLENOL) 500 MG tablet Take 2 tablets 2- 3 times daily as needed      verapamil (CALAN) 80 MG tablet TAKE 1 TABLET BY MOUTH THREE TIMES A  tablet 0    pantoprazole (PROTONIX) 40 MG tablet Take 1 tablet by mouth every morning (before breakfast) 90 tablet 0    ezetimibe (ZETIA) 10 MG tablet TAKE 1 TABLET BY MOUTH EVERY DAY 90 tablet 3    apixaban (ELIQUIS) 5 MG TABS tablet Take 1 tablet by mouth 2 times daily 180 tablet 2    furosemide (LASIX) 20 MG tablet Take 1 tablet by mouth daily 90 tablet 3    dofetilide (TIKOSYN) 500 MCG capsule Take 1 capsule by mouth 2 times daily 180 capsule 3    acetaminophen (TYLENOL) 500 MG tablet Take 500 mg by mouth every 8 hours as needed for Pain      metoprolol succinate (TOPROL XL) 100 MG extended release tablet Take 1 tablet by mouth daily 90 tablet 3    nitroGLYCERIN (NITROSTAT) 0.4 MG SL tablet Place 1 tablet under the tongue every 5 minutes as needed for Chest pain 25 tablet 3    Vitamin D, Cholecalciferol, 25 MCG (1000 UT) CAPS Take 1,000 Units by mouth daily           Vitals:    04/11/22 1039   BP: 124/80   Pulse: 67   Resp: 18   Temp: 96.5 °F (35.8 °C)   SpO2: 97%   Weight: 234 lb (106.1 kg)     Body mass index is 34.56 kg/m². Physical Exam  Nursing note reviewed. Constitutional:       General: She is not in acute distress. Appearance: Normal appearance. She is well-developed. Eyes:      Extraocular Movements: Extraocular movements intact. Pupils: Pupils are equal, round, and reactive to light. Cardiovascular:      Rate and Rhythm: Normal rate and regular rhythm. Heart sounds: Normal heart sounds, S1 normal and S2 normal. No murmur heard.       Pulmonary:      Effort: Pulmonary effort is normal.      Breath sounds: Normal breath sounds. Abdominal:      General: Bowel sounds are normal. There is no distension. Palpations: Abdomen is soft. Tenderness: There is no abdominal tenderness. Musculoskeletal:      Thoracic back: Tenderness (mid back adjacent to right side of thoracic spine) present. No spasms. Neurological:      Mental Status: She is alert. Gait: Gait normal.           No results found for this visit on 04/11/22. Lab Review   Orders Only on 12/15/2021   Component Date Value    TSH 12/15/2021 0.98     Vit D, 25-Hydroxy 12/15/2021 40.3     Hemoglobin A1C 12/15/2021 5.8     eAG 12/15/2021 119.8     Cholesterol, Total 12/15/2021 181     Triglycerides 12/15/2021 140     HDL 12/15/2021 55     LDL Calculated 12/15/2021 98     VLDL Cholesterol Calcula* 12/15/2021 28     Sodium 12/15/2021 135*    Potassium 12/15/2021 4.8     Chloride 12/15/2021 98*    CO2 12/15/2021 24     Anion Gap 12/15/2021 13     Glucose 12/15/2021 107*    BUN 12/15/2021 16     CREATININE 12/15/2021 0.6     GFR Non- 12/15/2021 >60     GFR  12/15/2021 >60     Calcium 12/15/2021 9.0     Total Protein 12/15/2021 6.6     Albumin 12/15/2021 4.3     Albumin/Globulin Ratio 12/15/2021 1.9     Total Bilirubin 12/15/2021 0.3     Alkaline Phosphatase 12/15/2021 78     ALT 12/15/2021 16     AST 12/15/2021 15          Assessment/Plan     1. Mid back pain  - 5 out of 10 severity  - Increase acetaminophen (TYLENOL) 500 MG tablet; Take 2 tablets 2- 3 times daily as needed  - start tiZANidine (ZANAFLEX) 2 MG tablet; Take 1 tablet by mouth 2 times daily as needed (back pain)  Dispense: 20 tablet; Refill: 0  - XR THORACIC SPINE (3 VIEWS); Future  - Referral to Shayla López MD, Orthopedic Surgery (Primary Care Sports Medicine), [facility]    2.  History of right breast cancer  - Referral to  [facility], MD, Breast Surgery, [place]    3. Need for shingles vaccine  -Patient given prescription for Shingrix vaccine to have done at their pharmacy  - zoster recombinant adjuvanted vaccine [facility]) 50 MCG/0.5ML SUSR injection; Inject 0.5 mLs into the muscle See Admin Instructions 1 dose now and repeat in 2-6 months  Dispense: 0.5 mL; Refill: 0    4. At high risk for falls  On the basis of positive falls risk screening, assessment and plan is as follows: patient declines any further evaluation/treatment for increased falls risk. Discussed medications with patient, who voiced understanding of their use and indications. All questions answered. Return if symptoms worsen or fail to improve.

## 2022-04-12 DIAGNOSIS — M85.9 DISORDER OF BONE DENSITY AND STRUCTURE, UNSPECIFIED: Primary | ICD-10-CM

## 2022-04-12 DIAGNOSIS — M85.852 OSTEOPENIA OF LEFT FEMORAL NECK: ICD-10-CM

## 2022-04-23 PROBLEM — Z85.3 HISTORY OF RIGHT BREAST CANCER: Status: ACTIVE | Noted: 2022-04-23

## 2022-04-23 ASSESSMENT — ENCOUNTER SYMPTOMS
NAUSEA: 0
VOMITING: 0
ABDOMINAL PAIN: 0
SHORTNESS OF BREATH: 0
BACK PAIN: 1

## 2022-04-28 ENCOUNTER — PATIENT MESSAGE (OUTPATIENT)
Dept: PRIMARY CARE CLINIC | Age: 72
End: 2022-04-28

## 2022-05-04 ENCOUNTER — HOSPITAL ENCOUNTER (OUTPATIENT)
Dept: GENERAL RADIOLOGY | Age: 72
Discharge: HOME | End: 2022-05-04
Payer: MEDICARE

## 2022-05-04 DIAGNOSIS — M85.852 OSTEOPENIA OF LEFT FEMORAL NECK: ICD-10-CM

## 2022-05-04 PROCEDURE — 77080 DXA BONE DENSITY AXIAL: CPT

## 2022-05-17 ENCOUNTER — OFFICE VISIT (OUTPATIENT)
Dept: ORTHOPEDIC SURGERY | Age: 72
End: 2022-05-17
Payer: MEDICARE

## 2022-05-17 VITALS — HEIGHT: 69 IN | WEIGHT: 233.91 LBS | BODY MASS INDEX: 34.64 KG/M2

## 2022-05-17 DIAGNOSIS — G89.29 CHRONIC MIDLINE THORACIC BACK PAIN: ICD-10-CM

## 2022-05-17 DIAGNOSIS — M51.34 DDD (DEGENERATIVE DISC DISEASE), THORACIC: ICD-10-CM

## 2022-05-17 DIAGNOSIS — M47.814 THORACIC SPONDYLOSIS: Primary | ICD-10-CM

## 2022-05-17 DIAGNOSIS — M54.6 CHRONIC MIDLINE THORACIC BACK PAIN: ICD-10-CM

## 2022-05-17 PROCEDURE — G9708 BILAT MAST/HX BI /UNILAT MAS: HCPCS | Performed by: INTERNAL MEDICINE

## 2022-05-17 PROCEDURE — 1123F ACP DISCUSS/DSCN MKR DOCD: CPT | Performed by: INTERNAL MEDICINE

## 2022-05-17 PROCEDURE — 1036F TOBACCO NON-USER: CPT | Performed by: INTERNAL MEDICINE

## 2022-05-17 PROCEDURE — 1090F PRES/ABSN URINE INCON ASSESS: CPT | Performed by: INTERNAL MEDICINE

## 2022-05-17 PROCEDURE — G8417 CALC BMI ABV UP PARAM F/U: HCPCS | Performed by: INTERNAL MEDICINE

## 2022-05-17 PROCEDURE — 4040F PNEUMOC VAC/ADMIN/RCVD: CPT | Performed by: INTERNAL MEDICINE

## 2022-05-17 PROCEDURE — G8399 PT W/DXA RESULTS DOCUMENT: HCPCS | Performed by: INTERNAL MEDICINE

## 2022-05-17 PROCEDURE — G8427 DOCREV CUR MEDS BY ELIG CLIN: HCPCS | Performed by: INTERNAL MEDICINE

## 2022-05-17 PROCEDURE — 3017F COLORECTAL CA SCREEN DOC REV: CPT | Performed by: INTERNAL MEDICINE

## 2022-05-17 PROCEDURE — 99214 OFFICE O/P EST MOD 30 MIN: CPT | Performed by: INTERNAL MEDICINE

## 2022-05-17 RX ORDER — METHYLPREDNISOLONE 4 MG/1
TABLET ORAL
Qty: 1 KIT | Refills: 0 | Status: SHIPPED | OUTPATIENT
Start: 2022-05-17 | End: 2022-06-02 | Stop reason: ALTCHOICE

## 2022-05-17 NOTE — PROGRESS NOTES
Chief Complaint:   Chief Complaint   Patient presents with    Lower Back Pain     F/U lumbar, is feeling about 8% better, tylenol is helpful, numbness on both legs (anterior)          History of Present Illness:       Patient is a 66 y.o. female presents with the above complaint. The symptoms began 6 monthsago and started without an injury. The patient describes a aching pain that does radiate. The symptoms are constant  and are are worsening since the onset. Previous intervention has included L TRISTEN with therapeutic benefit    The symptoms of back pain do suggest a discogenic provacative pattern but can be equally problematic whether sitting or standing. There is not new onset weakness or progressive weakness of the lower extremities that has developed. The patient denies new onset bowel or bladder dysfunction. There is no history of previous spinal trauma. The patient does not have history or orthopaedic lumbar spine surgery. Pain localizes to the thoracic region-mid thoracic    Pain levels: 4    There is no lower limb pain . Work-up to date has included: X-ray  Prior treatment has included acetaminophen-Tylenol, local measures and intermittent use of tramadol. This patient reports little improvement with this treatment. The patient has no history or autoimmune disease, inflammatory arthropathy or crystal arthropathy.      Past Medical History:        Past Medical History:   Diagnosis Date    Acute diastolic congestive heart failure ([address] 11/26/2018    Allergic     SEASONAL    Atrial fibrillation ([address] H/O    Cancer ([address] 2009 AND 2011    BREAST right    Essential hypertension 3/13/2018    Hammertoe     Migraine     Mixed hyperlipidemia 10/19/2020         Past Surgical History:   Procedure Laterality Date    ANKLE FRACTURE SURGERY  2004,2008    APPENDECTOMY  03/1983    ATRIAL ABLATION SURGERY  X3    Cryoablation for atrial fib    AXILLARY SURGERY Right     lymph node    BREAST BIOPSY  01/07/2011    BREAST BIOPSY  07/12/2011    left breast bx - benign     BREAST CYST EXCISION  06/2003    BREAST LUMPECTOMY  03/30/2009    CARDIAC CATHETERIZATION  5/2005,01/2006    COLONOSCOPY  1993,1995,1999,2003,2006    COLONOSCOPY  11/29/2011    Dr. Amirah Stanford - benign polyps     COSMETIC SURGERY      FOOT SURGERY Right 01/15/2016    CORRECTION OF HAMMERTOES 1-5 RIGHT FOOT, WEIL OSTEOTOMY 2,3    FOOT SURGERY Right 05/12/2016    EXTENSOR TENDON LENGTHENING 4TH AND 5TH TOES RIGHT FOOT    FOOT SURGERY Right 08/11/2016    CORRECTION HAMMERTOES 1ST, 4TH AND 5TH DIGITS RIGHT FOOT WITH    HERNIA REPAIR  [id number]    HYSTERECTOMY  12/1977    JOINT REPLACEMENT  06/2015    LEFT KNEE    KNEE SURGERY  06/09/2015    left knee replacement    LIPOMA RESECTION  09/2006    MASTECTOMY, BILATERAL Bilateral 03/15/2011    bilateral    NEUROMA SURGERY  1987,1988,1989,1992 , 1995    Left foot    OTHER SURGICAL HISTORY  1983, 1984 X 2, 1994 X 2, 2006    Bowel obstructions    RHINOPLASTY  11/1976    RHINOPLASTY  1995    THORACOTOMY  1998    TONSILLECTOMY  1956    UPPER GASTROINTESTINAL ENDOSCOPY  [id number]    VARICOSE VEIN SURGERY  11/2001         Present Medications:         Current Outpatient Medications   Medication Sig Dispense Refill    methylPREDNISolone (MEDROL, MILADY,) 4 MG tablet By mouth.  1 kit 0    acetaminophen (TYLENOL) 500 MG tablet Take 2 tablets 2- 3 times daily as needed      tiZANidine (ZANAFLEX) 2 MG tablet Take 1 tablet by mouth 2 times daily as needed (back pain) 20 tablet 0    zoster recombinant adjuvanted vaccine (SHINGRIX) 50 MCG/0.5ML SUSR injection Inject 0.5 mLs into the muscle See Admin Instructions 1 dose now and repeat in 2-6 months 0.5 mL 0    verapamil (CALAN) 80 MG tablet TAKE 1 TABLET BY MOUTH THREE TIMES A  tablet 0    pantoprazole (PROTONIX) 40 MG tablet Take 1 tablet by mouth every morning (before breakfast) 90 tablet 0    ezetimibe (ZETIA) 10 MG tablet TAKE 1 TABLET BY MOUTH EVERY DAY 90 tablet 3    apixaban (ELIQUIS) 5 MG TABS tablet Take 1 tablet by mouth 2 times daily 180 tablet 2    furosemide (LASIX) 20 MG tablet Take 1 tablet by mouth daily 90 tablet 3    dofetilide (TIKOSYN) 500 MCG capsule Take 1 capsule by mouth 2 times daily 180 capsule 3    acetaminophen (TYLENOL) 500 MG tablet Take 500 mg by mouth every 8 hours as needed for Pain      metoprolol succinate (TOPROL XL) 100 MG extended release tablet Take 1 tablet by mouth daily 90 tablet 3    nitroGLYCERIN (NITROSTAT) 0.4 MG SL tablet Place 1 tablet under the tongue every 5 minutes as needed for Chest pain 25 tablet 3    Vitamin D, Cholecalciferol, 25 MCG (1000 UT) CAPS Take 1,000 Units by mouth daily       No current facility-administered medications for this visit. Allergies:         Allergies   Allergen Reactions    Keflex [Cephalexin] Shortness Of Breath and Nausea Only    Novocain [Procaine] Shortness Of Breath and Swelling     LIDOCAINE OK    Amiodarone Other (See Comments)     dizziness    Cephalexin     Diltiazem Other (See Comments)     dizziness    Penicillins Swelling    Sulfa Antibiotics Nausea Only and Swelling        Social History:         Social History     Socioeconomic History    Marital status:      Spouse name: Not on file    Number of children: 2    Years of education: 12    Highest education level: Not on file   Occupational History    Occupation: [employer]    Tobacco Use    Smoking status: Former Smoker     Packs/day: 0.25     Years: 8.00     Pack years: 2.00     Types: Cigarettes     Start date:      Quit date: 1988     Years since quittin.3    Smokeless tobacco: Never Used    Tobacco comment: Only smoked quarter pack a day if that   Substance and Sexual Activity    Alcohol use: No    Drug use: No    Sexual activity: Never   Other Topics Concern    Not on file   Social History Narrative    Not on file     Social Determinants of Health     Financial Resource Strain: Low Risk     Difficulty of Paying Living Expenses: Not hard at all   Food Insecurity: No Food Insecurity    Worried About Running Out of Food in the Last Year: Never true    [address] in the Last Year: Never true   Transportation Needs:     Lack of Transportation (Medical): Not on file    Lack of Transportation (Non-Medical): Not on file   Physical Activity:     Days of Exercise per Week: Not on file    Minutes of Exercise per Session: Not on file   Stress:     Feeling of Stress : Not on file   Social Connections:     Frequency of Communication with Friends and Family: Not on file    Frequency of Social Gatherings with Friends and Family: Not on file    Attends Sabianism Services: Not on file    Active Member of [address] [place] [address] or Organizations: Not on file    Attends Club or Organization Meetings: Not on file    Marital Status: Not on file   Intimate Partner Violence:     Fear of Current or Ex-Partner: Not on file    Emotionally Abused: Not on file    Physically Abused: Not on file    Sexually Abused: Not on file   Housing Stability:     Unable to Pay for Housing in the Last Year: Not on file    Number of [place] in the Last Year: Not on file    Unstable Housing in the Last Year: Not on file        Review of Symptoms:    Pertinent items are noted in HPI   10 point review of systems negative except as mentioned in HPI        Vital Signs: There were no vitals filed for this visit. General Exam:     Constitutional: Patient is adequately groomed with no evidence of malnutrition  Mental Status: The patient is oriented to time, place and person. The patient's mood and affect are appropriate. Vascular: Examination reveals no swelling or calf tenderness. Peripheral pulses are palpable and 2+.     Lymphatics: no lymphadenopathy of the inguinal region or lower extremity       Physical Exam: Thoracolumbar spine Primary Exam:    Inspection: No deformity atrophy appreciable effusion      Palpation: Mild tenderness mid thoracic and bilateral paraxial      Range of Motion: 80/5 with pain flexion and extension      Strength: Normal lower extremity      Special Tests: Negative SLR      Skin: There are no rashes, ulcerations or lesions. Gait: Nonantalgic      Reflex intact lower     Additional Comments:        Additional Examinations:          Neurolgic -Light touch sensation and manual muscle testing normal L2-S1. No fasiculations. Pattella tendon and Achilles tendon reflexes +2 bilaterally. Seated SLR negative           Office Imaging Results/Procedures PerformedToday:            Office Procedures:     Orders Placed This Encounter   Procedures    MRI THORACIC SPINE WO CONTRAST     Standing Status:   Future     Standing Expiration Date:   5/17/2023     Scheduling Instructions:      Elham Garcia, please call pt to schedule, [phone]      Claudia Britton will obtain auth and fwd to your facility. Pt advised to f/u in clinic 2-3 days after MRI for results. Order Specific Question:   Reason for exam:     Answer:   R/O HNP / STENOSIS     Order Specific Question:   What is the sedation requirement? Answer:   None       Thoracic spine 2 views       INDICATIONS: Mid back pain.       Thoracic vertebral body alignment is normal   Symmetric disc spaces   No fracture or osseous destructive lesion       Soft tissues: Normal       Cardiomegaly       Bone demineralization, osteopenia           Impression   1. No fracture or subluxation     X-rays reviewed by myself corroborated with report:.  Thoracic kyphosis measures 22 degrees. Other Outside Imaging and Testing Personally Reviewed:    No results found.      Site: [employer] [facility] #: [id number] #: [id number] Procedure: MR Lumbar Spine w/o Contrast ; Reason for Exam: Dx, degenerative spondylolisthesis, degenerative disc disease, spondylosis    This document is confidential medical information.  Unauthorized disclosure or use of this information is prohibited by law. If you are not the intended recipient of this document, please advise us by calling immediately [phone].       [employer] [place] [address] [address]           Patient Name: Hussein Cody   Case ID: [id number]   Patient : 1950   Referring Physician: Temi Collier MD   Exam Date: 10/21/2021   Exam Description: MR Lumbar Spine w/o Contrast            HISTORY:   66year-old lumbar radiculopathy.       TECHNICAL FACTORS:  Long- and short-axis fat- and water-weighted images were performed.       COMPARISON:  None.       FINDINGS:  Conus medullaris is visualized at L1-L2 and visualized spinal cord and cauda equina    nerve roots appear normal.  No evidence of an intradural or extradural mass is present.     Heterogeneous marrow signal consistent with physiologic fatty infiltration.       T12-L1: No focal disc herniation, spinal canal stenosis, or foraminal narrowing.  Facet joints    are normal.       L1-L2: No focal disc herniation, spinal canal stenosis, or foraminal narrowing.  Mild facet    arthropathy and minimal ligamentous enlargement.       L2-L3: No focal disc herniation, spinal canal stenosis, or foraminal narrowing.  Mild facet    arthropathy and ligamentous hypertrophy.       L3-L4: No focal disc herniation, spinal canal stenosis, or foraminal narrowing.  Moderate facet    arthropathy and ligamentous enlargement.  Mild narrowing of the lateral recesses bilaterally.       L4-L5: No focal disc herniation.  Mild central canal and moderate narrowing the lateral recess    bilaterally.  Moderate/severe facet arthropathy and ligamentous large mint.  Ligamentum flavum    6 mm.       L5-S1: No focal disc herniation, spinal canal stenosis, or foraminal narrowing.  Moderate facet    hypertrophy and mild ligamentous enlargement.  Mild degenerative intervertebral disc space disease.       Prevertebral and paraspinal soft tissues are normal.       Visualized soft tissues of the abdomen and pelvis are normal.       No aortic aneurysm.       No incidental pelvic masses present.       CONCLUSION:   1. No evidence of focal disc herniation or high-grade central canal stenosis. 2. Multilevel facet arthropathy particularly at L4-5 with associated ligamentous hypertrophy    and moderate narrowing of the L4-5 lateral recess bilaterally. 3. Multilevel lumbar spondylosis without acute fracture.       Thank you for the opportunity to provide your interpretation.               Connor Quiros MD       A: EDNA/JUNE 10/24/2021 8:40 AM           Assessment   Impression: . Encounter Diagnoses   Name Primary?  Thoracic spondylosis Yes    DDD (degenerative disc disease), thoracic     Chronic midline thoracic back pain               Plan: Activity modification thoracic disc precautions  Medrol for hopeful therapeutic benefit and only as needed use of tramadol  MRI evaluation thoracic spine evaluate for high-grade discopathy consider her candidate for the sick spine intervention injection        The nature of the finding, probable diagnosis and likely treatment was thoroughly discussed with the patient. The options, risks, complications, alternative treatment as well as some of the differential diagnosis was discussed. The patient was thoroughly informed and all questions were answered. the patient indicated understanding and satisfaction with the discussion. Orders:        Orders Placed This Encounter   Procedures    MRI THORACIC SPINE WO CONTRAST     Standing Status:   Future     Standing Expiration Date:   5/17/2023     Scheduling Instructions:      Austin Valdez, please call pt to schedule, [phone]      [facility] will obtain auth and fwd to your facility. Pt advised to f/u in clinic 2-3 days after MRI for results.      Order Specific Question:   Reason for exam:     Answer: R/O HNP / STENOSIS     Order Specific Question:   What is the sedation requirement? Answer:   None           Disclaimer: \"This note was dictated with voice recognition software. Though review and correction are routine, we apologize for any errors. \"

## 2022-05-27 RX ORDER — METOPROLOL SUCCINATE 100 MG/1
100 TABLET, EXTENDED RELEASE ORAL DAILY
Qty: 90 TABLET | Refills: 3 | Status: SHIPPED | OUTPATIENT
Start: 2022-05-27

## 2022-05-27 NOTE — TELEPHONE ENCOUNTER
[facility] Medication Refills:    Medication: Metoprolol Succinate  MG    Dosage:  Take 1 tablet daily    Number: 90    Refills: 3    Last Office Visit: 12/16/2021    Next Office Visit: 06/23/2022    Last Labs: 12/15/2021

## 2022-05-31 ENCOUNTER — OFFICE VISIT (OUTPATIENT)
Dept: ORTHOPEDIC SURGERY | Age: 72
End: 2022-05-31
Payer: MEDICARE

## 2022-05-31 VITALS — BODY MASS INDEX: 34.64 KG/M2 | HEIGHT: 69 IN | WEIGHT: 233.91 LBS

## 2022-05-31 DIAGNOSIS — M51.36 DDD (DEGENERATIVE DISC DISEASE), LUMBAR: ICD-10-CM

## 2022-05-31 DIAGNOSIS — M51.24: Primary | ICD-10-CM

## 2022-05-31 DIAGNOSIS — M47.814 THORACIC SPONDYLOSIS: ICD-10-CM

## 2022-05-31 DIAGNOSIS — M48.061 DEGENERATIVE LUMBAR SPINAL STENOSIS: ICD-10-CM

## 2022-05-31 DIAGNOSIS — Z79.01 CHRONIC ANTICOAGULATION: ICD-10-CM

## 2022-05-31 DIAGNOSIS — M47.816 LUMBAR SPONDYLOSIS: ICD-10-CM

## 2022-05-31 PROCEDURE — 1090F PRES/ABSN URINE INCON ASSESS: CPT | Performed by: INTERNAL MEDICINE

## 2022-05-31 PROCEDURE — 99214 OFFICE O/P EST MOD 30 MIN: CPT | Performed by: INTERNAL MEDICINE

## 2022-05-31 PROCEDURE — 1123F ACP DISCUSS/DSCN MKR DOCD: CPT | Performed by: INTERNAL MEDICINE

## 2022-05-31 PROCEDURE — G8427 DOCREV CUR MEDS BY ELIG CLIN: HCPCS | Performed by: INTERNAL MEDICINE

## 2022-05-31 PROCEDURE — G9708 BILAT MAST/HX BI /UNILAT MAS: HCPCS | Performed by: INTERNAL MEDICINE

## 2022-05-31 PROCEDURE — 1036F TOBACCO NON-USER: CPT | Performed by: INTERNAL MEDICINE

## 2022-05-31 PROCEDURE — G8399 PT W/DXA RESULTS DOCUMENT: HCPCS | Performed by: INTERNAL MEDICINE

## 2022-05-31 PROCEDURE — 3017F COLORECTAL CA SCREEN DOC REV: CPT | Performed by: INTERNAL MEDICINE

## 2022-05-31 PROCEDURE — G8417 CALC BMI ABV UP PARAM F/U: HCPCS | Performed by: INTERNAL MEDICINE

## 2022-05-31 RX ORDER — LIDOCAINE 50 MG/G
1 PATCH TOPICAL EVERY 12 HOURS
Qty: 30 PATCH | Refills: 0 | Status: SHIPPED | OUTPATIENT
Start: 2022-05-31 | End: 2022-10-18

## 2022-05-31 NOTE — PROGRESS NOTES
[facility]) 50 MCG/0.5ML SUSR injection Inject 0.5 mLs into the muscle See Admin Instructions 1 dose now and repeat in 2-6 months 0.5 mL 0    verapamil (CALAN) 80 MG tablet TAKE 1 TABLET BY MOUTH THREE TIMES A  tablet 0    pantoprazole (PROTONIX) 40 MG tablet Take 1 tablet by mouth every morning (before breakfast) 90 tablet 0    ezetimibe (ZETIA) 10 MG tablet TAKE 1 TABLET BY MOUTH EVERY DAY 90 tablet 3    apixaban (ELIQUIS) 5 MG TABS tablet Take 1 tablet by mouth 2 times daily 180 tablet 2    furosemide (LASIX) 20 MG tablet Take 1 tablet by mouth daily 90 tablet 3    dofetilide (TIKOSYN) 500 MCG capsule Take 1 capsule by mouth 2 times daily 180 capsule 3    acetaminophen (TYLENOL) 500 MG tablet Take 500 mg by mouth every 8 hours as needed for Pain      nitroGLYCERIN (NITROSTAT) 0.4 MG SL tablet Place 1 tablet under the tongue every 5 minutes as needed for Chest pain 25 tablet 3    Vitamin D, Cholecalciferol, 25 MCG (1000 UT) CAPS Take 1,000 Units by mouth daily       No current facility-administered medications for this visit. Allergies: Allergies   Allergen Reactions    Keflex [Cephalexin] Shortness Of Breath and Nausea Only    Novocain [Procaine] Shortness Of Breath and Swelling     LIDOCAINE OK    Amiodarone Other (See Comments)     dizziness    Cephalexin     Diltiazem Other (See Comments)     dizziness    Penicillins Swelling    Sulfa Antibiotics Nausea Only and Swelling           Review of Systems:    Pertinent items are noted in HPI    Review of systems reviewed from Patient History Form dated on today's date and   available in the patient's chart under the Media tab. Vital Signs: There were no vitals filed for this visit.      General Exam:     Constitutional: Patient is adequately groomed with no evidence of malnutrition    Physical Exam: Thoracolumbar and lumbosacral back      Primary Exam:    Inspection: No deformity atrophy appreciable curvature      Palpation: There is tenderness to palpation mid/lower cervical axial and para axial bilateral distribution      Range of Motion: 80/15 without pain      Strength: Normal lower extremity      Special Tests: Negative SLR      Skin: There are no rashes, ulcerations or lesions. Gait: Nonantalgic     Neurovascular - non focal and intact       Additional Comments:        Additional Examinations:                   Office Imaging Results/Procedures PerformedToday:             Office Procedures:   No orders of the defined types were placed in this encounter. Other Outside Imaging and Testing Personally Reviewed:    No results found. Assessment   Impression: . Encounter Diagnoses   Name Primary?  Herniation of intervertebral disc between T8 and T9 Yes    Thoracic spondylosis     Chronic anticoagulation     Degenerative lumbar spinal stenosis     DDD (degenerative disc disease), lumbar     Lumbar spondylosis               Plan:     Maximize local measures for symptom control in addition to Tylenol NSAIDs contraindicated  Trial of Lidoderm patches prescribed today  Continue thoracolumbar stabilization home exercise program and consider supervised sessions of PT which she is not inclined to proceed with at this time  Consider repeat L TRISTEN as needed, consider thoracic epidural at T8/T9 only as needed    Her thoracic spine pain may well be discogenic in etiology. I would like her to consider formal course of PT and traction trial prior to considering thoracic epidural injection.     Proceed as outlined above      Approximately 30 minutes was spent related to previewing pertinent medical documentation prior to the patient's visit along with counseling during the patient's visit with respect to treatment options inclusive of alternatives to treatment and the complications and risks related to those treatment options along with expectations of outcome related to those treatments and inclusive of time in the documentation and ordering of testing and treatment after the visit. Orders:      No orders of the defined types were placed in this encounter. Kian Hernandez MD.      Disclaimer: \"This note was dictated with voice recognition software. Though review and correction are routine, we apologize for any errors. \"

## 2022-06-09 NOTE — PROGRESS NOTES
[facility]   Electrophysiology  Office Visit  Date: 6/23/2022    Chief Complaint   Patient presents with    Atrial Fibrillation    Atrial Flutter    Tachycardia       Cardiac HX: Luna Chambers is a 66 y.o. woman with a h/o breast CA, HLD,  chronic dCHF, atypical AFL, pAF, s/p RFA (2005, Dr. Norma Vance, 2006, Dr. Bro Arce), cryo-ablation of SVT/AF (2013, Dr. Bhupendra Rudolph), s/p RFA/PVI of AF/typical AFL/atypical AFL (11/23/2020, Dr. Ralph Mcdonald), recurrent AF/AFL, s/p DCCV to NSR, s/p dofetilide loading (3/8/2021) with conversion to NSR, recurrent AF post dofetilide loading, 2-week Zio patch (3/25/2021-4/8/2021) showed an average HR 91 (), 2945 NSVT episodes with the longest lasting 28.1 seconds, 69% AF burden,128 pauses with the longest lasting 4.2 seconds in length, s/p LHC (5/18/2021, Dr. Osbaldo Craig) showed normal coronaries, s/p RFA/PVI of AF, atypical AFL (9/1/2021, Dr. Ralph Mcdonald). Interval History/HPI: Patient is here for follow-up for AF/AFL. Patient with a longstanding history of AF/AFL. She underwent a catheter ablation for AFL in 2005 by Dr. Norma Vance. She had a repeat AFL ablation in 2006 by Dr. Bro Arce and underwent a cryoablation of SVT and AF (11/1/2013, Dr. Bhupendra Rudolph). She continued to have recurrent AF/AFL and underwent an RFA/PVI of AF, typical AFL and atypical AFL on 11/23/2020. She was placed on Toprol-XL 50 mg after the procedure then developed symptoms of dizziness, lightheadedness, bradycardia and the dose was decreased to 25 mg daily. She had recurrent AF with RVR and the dose was increased back to 50 mg daily. In follow-up she was noted to be in AF with a heart rate of 91. She underwent a DCCV to NSR (12/9/2020, Dr. Ralph Mcdonald). She was admitted in March 2021 for dofetilide and converted to normal sinus rhythm on her own however had nonsustained paroxysmal AF post dofetilide loading.   She wore a 2-week Zio patch that showed 2945 NSVT episodes with the longest lasting 28.1 seconds in length, a 69% AF burden and 128 pauses with the longest lasting 4.2 seconds in length. She underwent an LHC in May 2021 that showed normal coronaries. She continued to have breakthrough of her atrial fibrillation. We discussed rhythm management at length. Patient is undergone multiple ablations and continues to have recurrent AF/AFL. Patient opted to try an ablation 1 more time. She underwent an RFA/PVI of AF and atypical atrial flutter in September 2021. In follow-up she was in normal sinus rhythm however she continued to have breakthrough of AF daily. We have discussed CRT-P placement followed by an AV node ablation versus a hybrid ablation (from an epicardial approach through surgery). Patient has opted to want to think about it. Today she presents in AF with an HR of 109 bpm.  She remains on dofetilide 500 mcg twice a day, Toprol- mg daily, Eliquis 5 mg twice daily  Patient states she has gained 35 pounds since she started the dofetilide. She is also concerned about the interaction with verapamil and dofetilide. Her QTC today is 443 ms. She can tell when she is in atrial fibrillation and states that she is in A. fib 80 to 90% of the time. She wants to know if she can go back on her flecainide. She is aware that this would not be effective. She did have a sleep study in the past however has not been wearing any device. States that she cannot wear a mask although she has never tried. She is complaining of shortness of breath and states that she can barely go to her mailbox and back without stopping due to shortness of breath. She denies any lower extremity edema. She has not done much thinking about ablate and pace. She states that she will be in atrial fibrillation and this would not take care of it. She is aware that it would prevent high heart rates.      Home medications:   Current Outpatient Medications on File Prior to Visit   Medication Sig Dispense Refill    metoprolol succinate (TOPROL XL) 100 MG extended release tablet Take 1 tablet by mouth daily 90 tablet 3    acetaminophen (TYLENOL) 500 MG tablet Take 2 tablets 2- 3 times daily as needed      zoster recombinant adjuvanted vaccine (SHINGRIX) 50 MCG/0.5ML SUSR injection Inject 0.5 mLs into the muscle See Admin Instructions 1 dose now and repeat in 2-6 months 0.5 mL 0    verapamil (CALAN) 80 MG tablet TAKE 1 TABLET BY MOUTH THREE TIMES A  tablet 0    pantoprazole (PROTONIX) 40 MG tablet Take 1 tablet by mouth every morning (before breakfast) 90 tablet 0    ezetimibe (ZETIA) 10 MG tablet TAKE 1 TABLET BY MOUTH EVERY DAY 90 tablet 3    apixaban (ELIQUIS) 5 MG TABS tablet Take 1 tablet by mouth 2 times daily 180 tablet 2    furosemide (LASIX) 20 MG tablet Take 1 tablet by mouth daily 90 tablet 3    dofetilide (TIKOSYN) 500 MCG capsule Take 1 capsule by mouth 2 times daily 180 capsule 3    acetaminophen (TYLENOL) 500 MG tablet Take 500 mg by mouth every 8 hours as needed for Pain      nitroGLYCERIN (NITROSTAT) 0.4 MG SL tablet Place 1 tablet under the tongue every 5 minutes as needed for Chest pain 25 tablet 3    Vitamin D, Cholecalciferol, 25 MCG (1000 UT) CAPS Take 1,000 Units by mouth daily      lidocaine (LIDODERM) 5 % Place 1 patch onto the skin every 12 hours 12 hours on, 12 hours off. Max 3 patches at time. (Patient not taking: Reported on 6/23/2022) 30 patch 0    tiZANidine (ZANAFLEX) 2 MG tablet Take 1 tablet by mouth 2 times daily as needed (back pain) (Patient not taking: Reported on 6/23/2022) 20 tablet 0     No current facility-administered medications on file prior to visit.        Past Medical History:   Diagnosis Date    Acute diastolic congestive heart failure ([address] 11/26/2018    Allergic     SEASONAL    Atrial fibrillation ([facility] H/O    Cancer ([facility] 2009 AND 2011    BREAST right    Essential hypertension 3/13/2018    Hammertoe     Migraine     Mixed hyperlipidemia 10/19/2020        Past Surgical History: Procedure Laterality Date    ANKLE FRACTURE SURGERY  [id number]    APPENDECTOMY  03/1983    ATRIAL ABLATION SURGERY  X3    Cryoablation for atrial fib    AXILLARY SURGERY Right     lymph node    BREAST BIOPSY  01/07/2011    BREAST BIOPSY  07/12/2011    left breast bx - benign     BREAST CYST EXCISION  06/2003    BREAST LUMPECTOMY  03/30/2009    CARDIAC CATHETERIZATION  5/2005,01/2006    COLONOSCOPY  1993,1995,1999,2003,2006    COLONOSCOPY  11/29/2011    Dr. Forrest Paula - benign polyps     COSMETIC SURGERY      FOOT SURGERY Right 01/15/2016    CORRECTION OF HAMMERTOES 1-5 RIGHT FOOT, WEIL OSTEOTOMY 2,3    FOOT SURGERY Right 05/12/2016    EXTENSOR TENDON LENGTHENING 4TH AND 5TH TOES RIGHT FOOT    FOOT SURGERY Right 08/11/2016    CORRECTION HAMMERTOES 1ST, 4TH AND 5TH DIGITS RIGHT FOOT WITH    HERNIA REPAIR  [id number]    HYSTERECTOMY (CERVIX STATUS UNKNOWN)  12/1977    JOINT REPLACEMENT  06/2015    LEFT KNEE    KNEE SURGERY  06/09/2015    left knee replacement    LIPOMA RESECTION  09/2006    MASTECTOMY, BILATERAL Bilateral 03/15/2011    bilateral    NEUROMA SURGERY  1987,1988,1989,1992 , 1995    Left foot    OTHER SURGICAL HISTORY  1983, 1984 X 2, 1994 X 2, 2006    Bowel obstructions    RHINOPLASTY  11/1976    RHINOPLASTY  1995    THORACOTOMY  1998    TONSILLECTOMY  1956    UPPER GASTROINTESTINAL ENDOSCOPY  [id number]    VARICOSE VEIN SURGERY  11/2001       Allergies   Allergen Reactions    Keflex [Cephalexin] Shortness Of Breath and Nausea Only    Novocain [Procaine] Shortness Of Breath and Swelling     LIDOCAINE OK    Amiodarone Other (See Comments)     dizziness    Cephalexin     Diltiazem Other (See Comments)     dizziness    Penicillins Swelling    Sulfa Antibiotics Nausea Only and Swelling       Social History:  Reviewed. reports that she quit smoking about 34 years ago. Her smoking use included cigarettes. She started smoking about 42 years ago.  She has a 2.00 pack-year smoking history. She has never used smokeless tobacco. She reports that she does not drink alcohol and does not use drugs. Family History:  Reviewed. family history includes Cancer in her brother, father, and mother. Review of System:    · Constitutional: No fevers, chills. · Eyes: No visual changes or diplopia. No scleral icterus. · ENT: No Headaches. No mouth sores or sore throat. · Cardiovascular: No for chest pain, Yes for dyspnea on exertion, Yes for palpitations or No for loss of consciousness. No cough, hemoptysis, No for pleuritic pain, or phlebitis. · Respiratory: No for cough or wheezing. No hematemesis. · Gastrointestinal: No abdominal pain, blood in stools. · Genitourinary: No dysuria, or hematuria. · Musculoskeletal: No gait disturbance,    · Integumentary: No rash or pruritis. · Neurological: No headache, change in muscle strength, numbness or tingling. · Psychiatric: No anxiety, or depression. · Endocrine: No temperature intolerance. No excessive thirst, fluid intake, or urination. · Hem/Lymph: No abnormal bruising or bleeding, blood clots or swollen lymph nodes. · Allergic/Immunologic: No nasal congestion or hives. Physical Examination:  Vitals:    06/23/22 1121   BP: 126/84   Pulse: (!) 109         Wt Readings from Last 3 Encounters:   06/23/22 235 lb (106.6 kg)   05/31/22 233 lb 14.5 oz (106.1 kg)   05/17/22 233 lb 14.5 oz (106.1 kg)       · Constitutional: Oriented. No distress. · Head: Normocephalic and atraumatic. · Mouth/Throat: Oropharynx is clear and moist.   · Eyes: Conjunctivae clear without jaunduice. PERRL. · Neck: Neck supple. No rigidity. No JVD present. · Cardiovascular: Normal rate, regular rhythm, S1&S2. · Pulmonary/Chest: Bilateral respiratory sounds. No wheezes, No rhonchi. · Abdominal: Soft. Bowel sounds present. No distension, No tenderness. · Musculoskeletal: No tenderness.  No edema    · Lymphadenopathy: Has no cervical adenopathy. · Neurological: Alert and oriented. Cranial nerve appears intact, No Gross deficit   · Skin: Skin is warm and dry. No rash noted. · Psychiatric: Has a normal mood, affect and behavior     Labs:  Reviewed. No results for input(s): NA, K, CL, CO2, PHOS, BUN, CREATININE, CA in the last 72 hours. Invalid input(s):  TSH  No results for input(s): WBC, HGB, HCT, MCV, PLT in the last 72 hours. No results found for: CKTOTAL, CKMB, CKMBINDEX, TROPONINI  No results found for: BNP  Lab Results   Component Value Date    PROTIME 11.7 08/27/2021    PROTIME 10.9 05/18/2021    PROTIME 13.5 03/05/2021    PROTIME 31.5 03/08/2012    PROTIME 21.4 02/09/2012    PROTIME 18.8 12/16/2011    PROTIME 23.5 10/06/2010    INR 1.03 08/27/2021    INR 0.94 05/18/2021    INR 1.16 03/05/2021     Lab Results   Component Value Date    CHOL 181 12/15/2021    HDL 55 12/15/2021    TRIG 140 12/15/2021       ECG: Personally reviewed: AF, , QRS 94, QTc 443    ECHO: 8.18.2020 ([facility])  Study Conclusions  - Left ventricle: Systolic function was normal. The estimated ejection fraction was in the range of    55% to 60%. Wall motion was normal; there were no regional wall motion abnormalities. - Right ventricle: Systolic function was normal by visual assessment. 11/30/2018 (Complete)   Summary   Left ventricular cavity size is normal. There is mild concentric left   ventricular hypertrophy. Overall left ventricular systolic function appears   normal with an ejection fraction of 55-60%. No regional wall motion   abnormalities are noted. Indeterminate diastolic function. Trivial mitral regurgitation is present. The aortic valve leaflets appear slightly thickened but opens well. Mild tricuspid regurgitation. Estimated pulmonary artery systolic pressure is normal at 30 mmHg assuming a   right atrial pressure of 8 mmHg.     Stress Test: 2/19/2019  Summary    Ivana Abida is normal isotope uptake at stress and rest. There is no evidence of     myocardial ischemia or scar.     Normal LV size and systolic function.     Left ventricular ejection fraction of 75 %.     There are no regional wall motion abnormalities.     Normal myocardial perfusion study. 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 12/20/2010    History of right breast cancer 04/23/2022    Abdominal adhesions 02/26/2014    Osteopenia 02/26/2014    Lumbar facet arthropathy 02/26/2014    Tremor 06/15/2012    S/P ablation of atrial flutter 06/15/2012    A-fib ([address] 03/08/2011    Ventral hernia 08/28/2013    Seborrheic keratosis 10/06/2010    Mid back pain 04/11/2022    Acute low back pain without sciatica 07/30/2021    Left arm pain 07/30/2021    VT (ventricular tachycardia) ([address] 05/18/2021    Tachycardia     Bradycardia     Chronic diastolic CHF (congestive heart failure) ([address]     Encounter for monitoring dofetilide therapy     Paroxysmal atrial fibrillation ([address] 03/08/2021    Atypical atrial flutter ([address]     Vitamin D deficiency 10/19/2020    Mixed hyperlipidemia 10/19/2020    Constipation 10/19/2020    Prediabetes 10/19/2020    Hypervolemia 11/26/2018    Other chest pain 11/26/2018    Acute diastolic congestive heart failure ([address] 11/26/2018    S/P bilateral mastectomy 03/13/2018    Essential hypertension 03/13/2018    Facet arthropathy, thoracic 05/17/2017    Hammer toe of right foot 08/01/2016    Concussion 03/31/2015        Assessment:   1. Paroxysmal atrial fibrillation (HCC)    2. SOB (shortness of breath)    3. Chronic diastolic CHF (congestive heart failure) ([address]    4. On continuous oral anticoagulation    5. Encounter for monitoring dofetilide therapy    6.  NSVT (nonsustained ventricular tachycardia) ([address]         Cardiac HX: Chioma Boswell is a 66 y.o. woman with a h/o breast CA, HLD,  chronic dCHF, atypical AFL, pAF, s/p RFA (2005, Dr. Timmy Perez, 2006, Dr. Kristian Tang), cryo-ablation of SVT/AF (2013, Dr. Dorlene Pallas), s/p RFA/PVI of AF/typical AFL/atypical AFL (11/23/2020, Dr. Stacie Perry), recurrent AF/AFL, s/p DCCV to NSR, s/p dofetilide loading (3/8/2021) with conversion to NSR, recurrent AF post dofetilide loading, 2-week Zio patch (3/25/2021-4/8/2021) showed an average HR 91 (), 2945 NSVT episodes with the longest lasting 28.1 seconds, 69% AF burden,128 pauses with the longest lasting 4.2 seconds in length, s/p LHC (5/18/2021, Dr. Claudette Velasco) showed normal coronaries, s/p RFA/PVI of AF, atypical AFL (9/1/2021, Dr. Stacie Perry). [id number] 2. TSH 0.98 (12/15/2021). AT/AF/AFL  - In an AF today - HR  tachy at 109  - S/p RFA/PVI 11/23/2020, 9/1/2021  - S/p DCCV to NSR (12/9/2020) then back in AF the next day   - On Eliquis - no s/s bleeding - continue, has not missed any doses  - Intolerant to flecainide -  - intolerant to amio and dilt, aware would not be effective  - On Toprol  mg daily, verapamil 80 mg 3 times daily   - Long discussion again today regarding patient not a candidate for a repeat catheter ablation for atrial fibrillation. She currently has undergone five ablations with recurrent AF. We discussed AV node ablation and CRT-P placement. Patient is not sure she wants to proceed.    - On dofetilide 500 mcg - will do a 2 week CAM, if AF burden high then will d/c dofetilide  - QTc 443  - Will check a CBC  - Sleep study - not interested  - ECG ordered and results personally reviewed     Weight gain  - Patient thinks it is related to medications  - Does not appear to be fluid overloaded  - Will check an echo  - Will check a BMP and BNP  - Recent TSH 0.98    NSVT  - Seen in the past - 2945 episodes on the monitor with the longest lasting 28.1 seconds  - LHC showed no CAD    SOB/edema  - Patient on Lasix 40 mg daily  - BMP, Mg  - Will check an echo    EF of 56-33%  No systolic HF  No known CAD  Anticoagulation for AF   No Tobacco use. All questions and concerns were addressed to the patient/family. Alternatives to my treatment were discussed. The note was completed using EMR. Every effort was made to ensure accuracy; however, inadvertent computerized transcription errors may be present. Patient received education regarding their diagnosis, treatment and medications while in the office today. Reny Talbert [facility]     I  have spent > 40 minutes of face to face time directly with the patient/family with more than 50% spent counseling and coordinating care for this patient. , including treatment options and the side effects of medications

## 2022-06-23 ENCOUNTER — PATIENT MESSAGE (OUTPATIENT)
Dept: CARDIOLOGY CLINIC | Age: 72
End: 2022-06-23

## 2022-06-23 ENCOUNTER — OFFICE VISIT (OUTPATIENT)
Dept: CARDIOLOGY CLINIC | Age: 72
End: 2022-06-23
Payer: MEDICARE

## 2022-06-23 VITALS
DIASTOLIC BLOOD PRESSURE: 84 MMHG | HEART RATE: 109 BPM | SYSTOLIC BLOOD PRESSURE: 126 MMHG | BODY MASS INDEX: 34.69 KG/M2 | WEIGHT: 235 LBS

## 2022-06-23 DIAGNOSIS — I50.32 CHRONIC DIASTOLIC CHF (CONGESTIVE HEART FAILURE) (HCC): ICD-10-CM

## 2022-06-23 DIAGNOSIS — I47.29 NSVT (NONSUSTAINED VENTRICULAR TACHYCARDIA): ICD-10-CM

## 2022-06-23 DIAGNOSIS — R06.02 SOB (SHORTNESS OF BREATH): ICD-10-CM

## 2022-06-23 DIAGNOSIS — Z51.81 ENCOUNTER FOR MONITORING DOFETILIDE THERAPY: ICD-10-CM

## 2022-06-23 DIAGNOSIS — Z79.01 ON CONTINUOUS ORAL ANTICOAGULATION: ICD-10-CM

## 2022-06-23 DIAGNOSIS — I48.0 PAROXYSMAL ATRIAL FIBRILLATION (HCC): Primary | ICD-10-CM

## 2022-06-23 DIAGNOSIS — Z79.899 ENCOUNTER FOR MONITORING DOFETILIDE THERAPY: ICD-10-CM

## 2022-06-23 PROCEDURE — 1123F ACP DISCUSS/DSCN MKR DOCD: CPT | Performed by: NURSE PRACTITIONER

## 2022-06-23 PROCEDURE — G9708 BILAT MAST/HX BI /UNILAT MAS: HCPCS | Performed by: NURSE PRACTITIONER

## 2022-06-23 PROCEDURE — G8417 CALC BMI ABV UP PARAM F/U: HCPCS | Performed by: NURSE PRACTITIONER

## 2022-06-23 PROCEDURE — 3017F COLORECTAL CA SCREEN DOC REV: CPT | Performed by: NURSE PRACTITIONER

## 2022-06-23 PROCEDURE — 99215 OFFICE O/P EST HI 40 MIN: CPT | Performed by: NURSE PRACTITIONER

## 2022-06-23 PROCEDURE — 93246 EXT ECG>7D<15D RECORDING: CPT | Performed by: INTERNAL MEDICINE

## 2022-06-23 PROCEDURE — 1036F TOBACCO NON-USER: CPT | Performed by: NURSE PRACTITIONER

## 2022-06-23 PROCEDURE — G8399 PT W/DXA RESULTS DOCUMENT: HCPCS | Performed by: NURSE PRACTITIONER

## 2022-06-23 PROCEDURE — G8427 DOCREV CUR MEDS BY ELIG CLIN: HCPCS | Performed by: NURSE PRACTITIONER

## 2022-06-23 PROCEDURE — 93000 ELECTROCARDIOGRAM COMPLETE: CPT | Performed by: NURSE PRACTITIONER

## 2022-06-23 PROCEDURE — 1090F PRES/ABSN URINE INCON ASSESS: CPT | Performed by: NURSE PRACTITIONER

## 2022-06-30 ENCOUNTER — PROCEDURE VISIT (OUTPATIENT)
Dept: CARDIOLOGY CLINIC | Age: 72
End: 2022-06-30
Payer: MEDICARE

## 2022-06-30 DIAGNOSIS — I48.0 PAROXYSMAL ATRIAL FIBRILLATION (HCC): ICD-10-CM

## 2022-06-30 DIAGNOSIS — R06.02 SOB (SHORTNESS OF BREATH): ICD-10-CM

## 2022-06-30 DIAGNOSIS — Z79.01 ON CONTINUOUS ORAL ANTICOAGULATION: ICD-10-CM

## 2022-06-30 DIAGNOSIS — I50.32 CHRONIC DIASTOLIC CHF (CONGESTIVE HEART FAILURE) (HCC): ICD-10-CM

## 2022-06-30 LAB
ANION GAP SERPL CALCULATED.3IONS-SCNC: 15 MMOL/L (ref 3–16)
BUN BLDV-MCNC: 17 MG/DL (ref 7–20)
CALCIUM SERPL-MCNC: 9.5 MG/DL (ref 8.3–10.6)
CHLORIDE BLD-SCNC: 100 MMOL/L (ref 99–110)
CO2: 23 MMOL/L (ref 21–32)
CREAT SERPL-MCNC: <0.5 MG/DL (ref 0.6–1.2)
GFR AFRICAN AMERICAN: >60
GFR NON-AFRICAN AMERICAN: >60
GLUCOSE BLD-MCNC: 114 MG/DL (ref 70–99)
HCT VFR BLD CALC: 37.5 % (ref 36–48)
HEMOGLOBIN: 12.7 G/DL (ref 12–16)
LV EF: 58 %
LVEF MODALITY: NORMAL
MCH RBC QN AUTO: 30.2 PG (ref 26–34)
MCHC RBC AUTO-ENTMCNC: 34 G/DL (ref 31–36)
MCV RBC AUTO: 89 FL (ref 80–100)
PDW BLD-RTO: 14 % (ref 12.4–15.4)
PLATELET # BLD: 283 K/UL (ref 135–450)
PMV BLD AUTO: 8.1 FL (ref 5–10.5)
POTASSIUM SERPL-SCNC: 4.3 MMOL/L (ref 3.5–5.1)
PRO-BNP: 189 PG/ML (ref 0–124)
RBC # BLD: 4.21 M/UL (ref 4–5.2)
SODIUM BLD-SCNC: 138 MMOL/L (ref 136–145)
WBC # BLD: 6.2 K/UL (ref 4–11)

## 2022-06-30 PROCEDURE — 93306 TTE W/DOPPLER COMPLETE: CPT | Performed by: INTERNAL MEDICINE

## 2022-07-05 RX ORDER — VERAPAMIL HYDROCHLORIDE 80 MG/1
TABLET ORAL
Qty: 270 TABLET | Refills: 3 | Status: SHIPPED | OUTPATIENT
Start: 2022-07-05

## 2022-07-05 NOTE — TELEPHONE ENCOUNTER
Requested Prescriptions     Pending Prescriptions Disp Refills    verapamil (CALAN) 80 MG tablet 270 tablet 3     Sig: TAKE 1 TABLET BY MOUTH THREE TIMES A DAY                  Last Office Visit: 6/30/2022     Next Office Visit: 7/25/2022         Last Labs: 6/30/2022

## 2022-07-18 NOTE — PROGRESS NOTES
of AF and atypical AFL in September 2021. Patient been seen on 6/23/2022. We had a long discussion regarding rhythm management. Patient is undergone multiple ablations and continues to have recurrent AF/AFL. We have discussed an AV node ablation followed by a CRT-P placement versus a hybrid ablation from an epicardial approach through surgery. Patient has wanted to consider her options and was not ready to proceed. She had presented in AF with an HR of 109 bpm.  She was also complaining about weight gain since starting the dofetilide. She states that she gained 35 pounds. She was also concerned about the interaction between verapamil and dofetilide however QTC was only 443 ms. She feels that she is in atrial fibrillation 80 to 90% of the time. She was asking to go back on the flecainide. We did discuss that this was not effective. She did have a sleep study in the past however is not wearing the mask as she states she could not tolerate it. She never did try. Patient wore a 2-week CAM (6/23/2022 - 7/7/2022) that showed an average HR 71 (), NSR, 48.1% AF burden, 0.2% AFL burden, 1 AT episode lasting up to 6 beats, 70 pauses up to 3.7 seconds in length. Today she presents in NSR, HR 60. We reviewed her monitor in the office today. Patient is aware that she is not out of rhythm 100% of the time. Once again patient is concerned about interaction with verapamil and dofetilide. Her QTC is 440 in the office today. She continues to complain of shortness of breath with minimal exertion. Her weight is up now about 37 pounds. She states that she is only eating chicken fish and vegetables and is not sure why her weight is going up. We did do a BNP that was 189. She does not appear wet in the office today. She has no lower extremity edema or crackles in her lungs. She does take Lasix 20 mg daily however not taken yet today due to the fact that she was coming into the office.   Patient states that she can tell when her heart rate is slowing down and her atrial arrhythmias and that she has pauses prior to returning to [facility]. She states that she feels dizzy and lightheaded during these pauses. She has not fallen or passed out. Home medications:   Current Outpatient Medications on File Prior to Visit   Medication Sig Dispense Refill    verapamil (CALAN) 80 MG tablet TAKE 1 TABLET BY MOUTH THREE TIMES A  tablet 3    metoprolol succinate (TOPROL XL) 100 MG extended release tablet Take 1 tablet by mouth daily 90 tablet 3    acetaminophen (TYLENOL) 500 MG tablet Take 2 tablets 2- 3 times daily as needed      tiZANidine (ZANAFLEX) 2 MG tablet Take 1 tablet by mouth 2 times daily as needed (back pain) 20 tablet 0    zoster recombinant adjuvanted vaccine (SHINGRIX) 50 MCG/0.5ML SUSR injection Inject 0.5 mLs into the muscle See Admin Instructions 1 dose now and repeat in 2-6 months 0.5 mL 0    pantoprazole (PROTONIX) 40 MG tablet Take 1 tablet by mouth every morning (before breakfast) 90 tablet 0    ezetimibe (ZETIA) 10 MG tablet TAKE 1 TABLET BY MOUTH EVERY DAY 90 tablet 3    apixaban (ELIQUIS) 5 MG TABS tablet Take 1 tablet by mouth 2 times daily 180 tablet 2    furosemide (LASIX) 20 MG tablet Take 1 tablet by mouth daily 90 tablet 3    dofetilide (TIKOSYN) 500 MCG capsule Take 1 capsule by mouth 2 times daily 180 capsule 3    acetaminophen (TYLENOL) 500 MG tablet Take 500 mg by mouth every 8 hours as needed for Pain      nitroGLYCERIN (NITROSTAT) 0.4 MG SL tablet Place 1 tablet under the tongue every 5 minutes as needed for Chest pain 25 tablet 3    Vitamin D, Cholecalciferol, 25 MCG (1000 UT) CAPS Take 1,000 Units by mouth daily      lidocaine (LIDODERM) 5 % Place 1 patch onto the skin every 12 hours 12 hours on, 12 hours off. Max 3 patches at time. (Patient not taking: No sig reported) 30 patch 0     No current facility-administered medications on file prior to visit.        Past Medical History: Diagnosis Date    Acute diastolic congestive heart failure ([facility] [address] 11/26/2018    Allergic     SEASONAL    Atrial fibrillation ([facility] [address] H/O    Cancer ([facility] [address] 2009 AND 2011    BREAST right    Essential hypertension 3/13/2018    Hammertoe     Migraine     Mixed hyperlipidemia 10/19/2020        Past Surgical History:   Procedure Laterality Date    ANKLE FRACTURE SURGERY  2004,2008    APPENDECTOMY  03/1983    ATRIAL ABLATION SURGERY  X3    Cryoablation for atrial fib    AXILLARY SURGERY Right     lymph node    BREAST BIOPSY  01/07/2011    BREAST BIOPSY  07/12/2011    left breast bx - benign     BREAST CYST EXCISION  06/2003    BREAST LUMPECTOMY  03/30/2009    CARDIAC CATHETERIZATION  5/2005,01/2006    COLONOSCOPY  1993,1995,1999,2003,2006    COLONOSCOPY  11/29/2011    Dr. Martines Iba - benign polyps     COSMETIC SURGERY      FOOT SURGERY Right 01/15/2016    CORRECTION OF HAMMERTOES 1-5 RIGHT FOOT, WEIL OSTEOTOMY 2,3    FOOT SURGERY Right 05/12/2016    EXTENSOR TENDON LENGTHENING 4TH AND 5TH TOES RIGHT FOOT    FOOT SURGERY Right 08/11/2016    CORRECTION HAMMERTOES 1ST, 4TH AND 5TH DIGITS RIGHT FOOT WITH    HERNIA REPAIR  1993,1998,2000,2009    HYSTERECTOMY (CERVIX STATUS UNKNOWN)  12/1977    JOINT REPLACEMENT  06/2015    LEFT KNEE    KNEE SURGERY  06/09/2015    left knee replacement    LIPOMA RESECTION  09/2006    MASTECTOMY, BILATERAL Bilateral 03/15/2011    bilateral    NEUROMA SURGERY  1987,1988,1989,1992 , 1995    Left foot    OTHER SURGICAL HISTORY  1983, 1984 X 2, 1994 X 2, 2006    Bowel obstructions    RHINOPLASTY  11/1976    [address]    UPPER GASTROINTESTINAL ENDOSCOPY  [id number]    VARICOSE VEIN SURGERY  11/2001       Allergies   Allergen Reactions    Keflex [Cephalexin] Shortness Of Breath and Nausea Only    Novocain [Procaine] Shortness Of Breath and Swelling     LIDOCAINE OK    Amiodarone Other (See Comments)     dizziness    Cephalexin     Diltiazem Other (See Comments)     dizziness    Penicillins Swelling    Sulfa Antibiotics Nausea Only and Swelling       Social History:  Reviewed. reports that she quit smoking about 34 years ago. Her smoking use included cigarettes. She started smoking about 42 years ago. She has a 2.00 pack-year smoking history. She has never used smokeless tobacco. She reports that she does not drink alcohol and does not use drugs. Family History:  Reviewed. family history includes Cancer in her brother, father, and mother. Review of System:    Constitutional: No fevers, chills. Eyes: No visual changes or diplopia. No scleral icterus. ENT: No Headaches. No mouth sores or sore throat. Cardiovascular: No for chest pain, Yes for dyspnea on exertion, Yes for palpitations or No for loss of consciousness. No cough, hemoptysis, No for pleuritic pain, or phlebitis. Respiratory: No for cough or wheezing. No hematemesis. Gastrointestinal: No abdominal pain, blood in stools. Genitourinary: No dysuria, or hematuria. Musculoskeletal: No gait disturbance,    Integumentary: No rash or pruritis. Neurological: No headache, change in muscle strength, numbness or tingling. Psychiatric: No anxiety, or depression. Endocrine: No temperature intolerance. No excessive thirst, fluid intake, or urination. Hem/Lymph: No abnormal bruising or bleeding, blood clots or swollen lymph nodes. Allergic/Immunologic: No nasal congestion or hives. Physical Examination:  Vitals:    07/20/22 0950   BP: 128/84   Pulse: 60           Wt Readings from Last 3 Encounters:   07/20/22 237 lb (107.5 kg)   06/23/22 235 lb (106.6 kg)   05/31/22 233 lb 14.5 oz (106.1 kg)       Constitutional: Oriented. No distress. Head: Normocephalic and atraumatic. Mouth/Throat: Oropharynx is clear and moist.   Eyes: Conjunctivae clear without jaunduice. PERRL. Neck: Neck supple. No rigidity. No JVD present. Cardiovascular: Normal rate, regular rhythm, S1&S2. Pulmonary/Chest: Bilateral respiratory sounds. No wheezes, No rhonchi. Abdominal: Soft. Bowel sounds present. No distension, No tenderness. Musculoskeletal: No tenderness. No edema    Lymphadenopathy: Has no cervical adenopathy. Neurological: Alert and oriented. Cranial nerve appears intact, No Gross deficit   Skin: Skin is warm and dry. No rash noted. Psychiatric: Has a normal mood, affect and behavior     Labs:  Reviewed. No results for input(s): NA, K, CL, CO2, PHOS, BUN, CREATININE, CA in the last 72 hours. Invalid input(s):  TSH  No results for input(s): WBC, HGB, HCT, MCV, PLT in the last 72 hours. No results found for: CKTOTAL, CKMB, CKMBINDEX, TROPONINI  No results found for: BNP  Lab Results   Component Value Date/Time    PROTIME 11.7 08/27/2021 10:06 AM    PROTIME 10.9 05/18/2021 07:57 AM    PROTIME 13.5 03/05/2021 11:48 AM    PROTIME 31.5 03/08/2012 03:21 PM    PROTIME 21.4 02/09/2012 03:07 PM    PROTIME 18.8 12/16/2011 11:55 AM    PROTIME 23.5 10/06/2010 01:41 PM    INR 1.03 08/27/2021 10:06 AM    INR 0.94 05/18/2021 07:57 AM    INR 1.16 03/05/2021 11:48 AM     Lab Results   Component Value Date/Time    CHOL 181 12/15/2021 08:51 AM    HDL 55 12/15/2021 08:51 AM    TRIG 140 12/15/2021 08:51 AM       ECG: Personally reviewed: NSR, HR 60, , QRS 94, QTc 440    ECHO: 8.18.2020 ([facility])  Study Conclusions  - Left ventricle: Systolic function was normal. The estimated ejection fraction was in the range of    55% to 60%. Wall motion was normal; there were no regional wall motion abnormalities. - Right ventricle: Systolic function was normal by visual assessment. 11/30/2018 (Complete)   Summary   Left ventricular cavity size is normal. There is mild concentric left   ventricular hypertrophy. Overall left ventricular systolic function appears   normal with an ejection fraction of 55-60%. No regional wall motion   abnormalities are noted. Indeterminate diastolic function. Trivial mitral regurgitation is present. The aortic valve leaflets appear slightly thickened but opens well. Mild tricuspid regurgitation. Estimated pulmonary artery systolic pressure is normal at 30 mmHg assuming a   right atrial pressure of 8 mmHg. Stress Test: 2/19/2019  Summary     There is normal isotope uptake at stress and rest. There is no evidence of     myocardial ischemia or scar. Normal LV size and systolic function. Left ventricular ejection fraction of 75 %. There are no regional wall motion abnormalities. Normal myocardial perfusion study. 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 12/20/2010    History of right breast cancer 04/23/2022    Abdominal adhesions 02/26/2014    Osteopenia 02/26/2014    Lumbar facet arthropathy 02/26/2014    Tremor 06/15/2012    S/P ablation of atrial flutter 06/15/2012    A-fib ([address] 03/08/2011    Ventral hernia 08/28/2013    Seborrheic keratosis 10/06/2010    Mid back pain 04/11/2022    Acute low back pain without sciatica 07/30/2021    Left arm pain 07/30/2021    VT (ventricular tachycardia) ([address] 05/18/2021    Tachycardia     Bradycardia     Chronic diastolic CHF (congestive heart failure) ([address]     Encounter for monitoring dofetilide therapy     Paroxysmal atrial fibrillation ([address] 03/08/2021    Atypical atrial flutter ([address]     Vitamin D deficiency 10/19/2020    Mixed hyperlipidemia 10/19/2020    Constipation 10/19/2020    Prediabetes 10/19/2020    Hypervolemia 11/26/2018    Other chest pain 07/79/6743    Acute diastolic congestive heart failure ([address] 11/26/2018    S/P bilateral mastectomy 03/13/2018    Essential hypertension 03/13/2018    Facet arthropathy, thoracic 05/17/2017    Hammer toe of right foot 08/01/2016    Concussion 03/31/2015        Assessment:   1.  Paroxysmal atrial fibrillation ([facility] [address]    2. Pulmonary vein stenosis    3. SOB (shortness of breath)    4. Chronic diastolic CHF (congestive heart failure) ([facility] [address]         Cardiac HX: Daniela Silva is a 66 y.o. woman with a h/o breast CA, HLD,  chronic dCHF, atypical AFL, pAF, s/p RFA (2005, Dr. Sabas Mays, 2006, Dr. Alisha Christian), cryo-ablation of SVT/AF (2013, Dr. Deo Mcdaniel), s/p RFA/PVI of AF/typical AFL/atypical AFL (11/23/2020, Dr. Melody Lemos), recurrent AF/AFL, s/p DCCV to NSR, s/p dofetilide loading (3/8/2021) with conversion to NSR, recurrent AF post dofetilide loading, 2-week Zio patch (3/25/2021-4/8/2021) showed an average HR 91 (), 2945 NSVT episodes with the longest lasting 28.1 seconds, 69% AF burden,128 pauses with the longest lasting 4.2 seconds in length, s/p LHC (5/18/2021, Dr. Kelly Palomares) showed normal coronaries, s/p RFA/PVI of AF, atypical AFL (9/1/2021, Dr. Melody Lemos). [id number] 2. TSH 0.98 (12/15/2021). AT/AF/AFL  - In NSR today  - S/p RFA/PVI 11/23/2020, 9/1/2021  - S/p DCCV to NSR (12/9/2020) then back in AF the next day   - On Eliquis - no s/s bleeding - continue, has not missed any doses  - Intolerant to flecainide -  - intolerant to amio and dilt, aware would not be effective  - On Toprol  mg daily, verapamil 80 mg 3 times daily   - Long discussion again today regarding patient not a candidate for a repeat catheter ablation for atrial fibrillation per Dr. Melody Lemos. Dr. Melody Lemos had discussed a hybrid ablation from an epicardial approach through surgery might be an option. We also discussed a CRT-P followed by an AV node ablation.   Patient is concerned as she will always be in atrial fibrillation and feels that that is a contributor to her symptoms.  - On dofetilide 500 mcg -50% AF burden  - 2 week CAM ( 6/23/2022- 7/07/2022) showed an avg HR 71, () showed NSR, AF burden of 48.1%, 0.2% AFL burden, 1 AT lasting 6 beats and 70 pauses up to 3.7 secs in length, conversion pauses  - QTc 440  - Reviewed recent labs  - Sleep study - not interested, not able to wear mask  - ECG ordered and results personally reviewed     SOB  - Ongoing  - Will check a CT to assess for PV stenosis    Weight gain  - Patient thinks it is related to medications  - Does not appear to be fluid overloaded  - Will check an echo  - Will check a BMP and BNP  - Recent TSH 0.98    NSVT  - None seen on monitor  - LHC showed no CAD    SOB/edema/chronic dCHF  - Patient on Lasix 40 mg daily  - Echo showed a normal EF    EF of 00-12%  No systolic HF  No known CAD  Anticoagulation for AF   No Tobacco use. All questions and concerns were addressed to the patient/family. Alternatives to my treatment were discussed. The note was completed using EMR. Every effort was made to ensure accuracy; however, inadvertent computerized transcription errors may be present. Patient received education regarding their diagnosis, treatment and medications while in the office today. Geri Malhotra CNP  [address]     I  have spent > 40 minutes of face to face time directly with the patient/family with more than 50% spent counseling and coordinating care for this patient. , including treatment options and the side effects of medications

## 2022-07-20 ENCOUNTER — OFFICE VISIT (OUTPATIENT)
Dept: CARDIOLOGY CLINIC | Age: 72
End: 2022-07-20
Payer: MEDICARE

## 2022-07-20 VITALS
WEIGHT: 237 LBS | BODY MASS INDEX: 34.98 KG/M2 | HEART RATE: 60 BPM | DIASTOLIC BLOOD PRESSURE: 84 MMHG | SYSTOLIC BLOOD PRESSURE: 128 MMHG

## 2022-07-20 DIAGNOSIS — I48.0 PAROXYSMAL ATRIAL FIBRILLATION (HCC): Primary | ICD-10-CM

## 2022-07-20 DIAGNOSIS — I50.32 CHRONIC DIASTOLIC CHF (CONGESTIVE HEART FAILURE) (HCC): ICD-10-CM

## 2022-07-20 DIAGNOSIS — Z79.899 ENCOUNTER FOR MONITORING DOFETILIDE THERAPY: ICD-10-CM

## 2022-07-20 DIAGNOSIS — Q26.8 PULMONARY VEIN STENOSIS: ICD-10-CM

## 2022-07-20 DIAGNOSIS — Z51.81 ENCOUNTER FOR MONITORING DOFETILIDE THERAPY: ICD-10-CM

## 2022-07-20 DIAGNOSIS — I48.92 PAROXYSMAL ATRIAL FLUTTER (HCC): ICD-10-CM

## 2022-07-20 DIAGNOSIS — I47.1 PAROXYSMAL ATRIAL TACHYCARDIA (HCC): ICD-10-CM

## 2022-07-20 DIAGNOSIS — I47.29 NSVT (NONSUSTAINED VENTRICULAR TACHYCARDIA): ICD-10-CM

## 2022-07-20 DIAGNOSIS — Z79.01 ON CONTINUOUS ORAL ANTICOAGULATION: ICD-10-CM

## 2022-07-20 DIAGNOSIS — R06.02 SOB (SHORTNESS OF BREATH): ICD-10-CM

## 2022-07-20 PROCEDURE — 99215 OFFICE O/P EST HI 40 MIN: CPT | Performed by: NURSE PRACTITIONER

## 2022-07-20 PROCEDURE — 93000 ELECTROCARDIOGRAM COMPLETE: CPT | Performed by: NURSE PRACTITIONER

## 2022-07-20 PROCEDURE — 1123F ACP DISCUSS/DSCN MKR DOCD: CPT | Performed by: NURSE PRACTITIONER

## 2022-07-20 PROCEDURE — 93248 EXT ECG>7D<15D REV&INTERPJ: CPT | Performed by: INTERNAL MEDICINE

## 2022-07-20 NOTE — PATIENT INSTRUCTIONS
20 Tips For Changing the Way You Eat    If you think you are hungry, drink a glass of water or a cup of coffee before eating. Eat s-[id number]-y! It takes your brain 20 minutes to catch up to your stomach and by then most of us have overeaten. Eat for 10 minutes and rest for 5 minutes. If you are still hungry, start over. Eat to live not live to eat! You control food, it does not control you! Your stomach is the size of your fist. If you eat more than that, you have overeaten. Eat when you are hungry and not because it is a scheduled time to eat. If you are hungry and it is not time yet to eat your meal, eat a small snack (such as 2 peanut butter crackers, 1 tsp of peanut butter, 15 peanuts, small apple or a cup of light popcorn). Make sure you take 10 minutes to eat this snack so it will hold you over to your next meal.    Eat your fruit first as it will help breakdown you meal.    If given a lot of options at a meal - pick only 3. People who put more than one item on their plate tend to eat more. Eat the one item you want the most - first! This will help fill you up more and control your hunger. Get moving! Get a pedometer and try to get in as many steps as you can. 2000 steps = 1 mile. Make 10,000 steps a day your goal.    [place] are the only country that practices eating to prevent getting hungry. Identify if you are an emotional eater - you want something salty or sweet instead of something healthy or you want to eat when you are not hungry. Sugar contributes nothing in the way of nutrients. Measure your level of hunger. The hungrier you are, the more fat you burn when you eat. If you eat dessert more than 30 minutes after a meal, it turns to fat. The calories in alcohol are used before stored fat calories. It is ok to miss a meal if you are not hungry. If snacks are not around the house, then they wont tempt you.  Make better choices at the store for those times when you are really hungry. Remember that food will only make you feel better when you are really hungry not when you are sad, mad, stressed or depressed.

## 2022-07-25 ENCOUNTER — OFFICE VISIT (OUTPATIENT)
Dept: CARDIOLOGY CLINIC | Age: 72
End: 2022-07-25
Payer: MEDICARE

## 2022-07-25 VITALS
BODY MASS INDEX: 34.86 KG/M2 | OXYGEN SATURATION: 94 % | WEIGHT: 236.2 LBS | DIASTOLIC BLOOD PRESSURE: 60 MMHG | HEART RATE: 50 BPM | SYSTOLIC BLOOD PRESSURE: 110 MMHG

## 2022-07-25 DIAGNOSIS — Z98.890 S/P ABLATION OF ATRIAL FIBRILLATION: ICD-10-CM

## 2022-07-25 DIAGNOSIS — I47.20 VT (VENTRICULAR TACHYCARDIA): ICD-10-CM

## 2022-07-25 DIAGNOSIS — I48.3 TYPICAL ATRIAL FLUTTER (HCC): ICD-10-CM

## 2022-07-25 DIAGNOSIS — I50.32 CHRONIC DIASTOLIC CONGESTIVE HEART FAILURE (HCC): Primary | ICD-10-CM

## 2022-07-25 DIAGNOSIS — I48.0 PAROXYSMAL ATRIAL FIBRILLATION (HCC): ICD-10-CM

## 2022-07-25 DIAGNOSIS — Z98.890 S/P ABLATION OF ATRIAL FLUTTER: ICD-10-CM

## 2022-07-25 DIAGNOSIS — Z86.79 S/P ABLATION OF ATRIAL FLUTTER: ICD-10-CM

## 2022-07-25 DIAGNOSIS — Z86.79 S/P ABLATION OF ATRIAL FIBRILLATION: ICD-10-CM

## 2022-07-25 PROCEDURE — G8399 PT W/DXA RESULTS DOCUMENT: HCPCS | Performed by: INTERNAL MEDICINE

## 2022-07-25 PROCEDURE — 1123F ACP DISCUSS/DSCN MKR DOCD: CPT | Performed by: INTERNAL MEDICINE

## 2022-07-25 PROCEDURE — 3017F COLORECTAL CA SCREEN DOC REV: CPT | Performed by: INTERNAL MEDICINE

## 2022-07-25 PROCEDURE — 1090F PRES/ABSN URINE INCON ASSESS: CPT | Performed by: INTERNAL MEDICINE

## 2022-07-25 PROCEDURE — G8417 CALC BMI ABV UP PARAM F/U: HCPCS | Performed by: INTERNAL MEDICINE

## 2022-07-25 PROCEDURE — 1036F TOBACCO NON-USER: CPT | Performed by: INTERNAL MEDICINE

## 2022-07-25 PROCEDURE — 99214 OFFICE O/P EST MOD 30 MIN: CPT | Performed by: INTERNAL MEDICINE

## 2022-07-25 PROCEDURE — G9708 BILAT MAST/HX BI /UNILAT MAS: HCPCS | Performed by: INTERNAL MEDICINE

## 2022-07-25 PROCEDURE — G8427 DOCREV CUR MEDS BY ELIG CLIN: HCPCS | Performed by: INTERNAL MEDICINE

## 2022-07-25 NOTE — PROGRESS NOTES
fee  Subjective:      Patient ID: Lena Lemus is a 66 y.o. female. HPI Chucho Deleon is being seen for cardiac follow up for afib/arrhythmia and now VT. Ablation 11/20, then ECV 12/20. When started on dofetilide started having sob. Wt gain of 30-35 lbs since. Sob with short walks. No swelling. Having PND. Can lie flat. No palp/syncope. No sob.          Past Medical History:   Diagnosis Date    Acute diastolic congestive heart failure ([address] 11/26/2018    Allergic     SEASONAL    Atrial fibrillation ([address] H/O    Cancer ([facility] [address] 2009 AND 2011    BREAST right    Essential hypertension 3/13/2018    Hammertoe     Migraine     Mixed hyperlipidemia 10/19/2020     Past Surgical History:   Procedure Laterality Date    ANKLE FRACTURE SURGERY  2004,2008    APPENDECTOMY  03/1983    ATRIAL ABLATION SURGERY  X3    Cryoablation for atrial fib    AXILLARY SURGERY Right     lymph node    BREAST BIOPSY  01/07/2011    BREAST BIOPSY  07/12/2011    left breast bx - benign     BREAST CYST EXCISION  06/2003    BREAST LUMPECTOMY  03/30/2009    CARDIAC CATHETERIZATION  5/2005,01/2006    COLONOSCOPY  1993,1995,1999,2003,2006    COLONOSCOPY  11/29/2011    Dr. Oral Marquez - benign polyps     COSMETIC SURGERY      FOOT SURGERY Right 01/15/2016    CORRECTION OF HAMMERTOES 1-5 RIGHT FOOT, WEIL OSTEOTOMY 2,3    FOOT SURGERY Right 05/12/2016    EXTENSOR TENDON LENGTHENING 4TH AND 5TH TOES RIGHT FOOT    FOOT SURGERY Right 08/11/2016    CORRECTION HAMMERTOES 1ST, 4TH AND 5TH DIGITS RIGHT FOOT WITH    HERNIA REPAIR  1993,1998,2000,2009    HYSTERECTOMY (CERVIX STATUS UNKNOWN)  12/1977    JOINT REPLACEMENT  06/2015    LEFT KNEE    KNEE SURGERY  06/09/2015    left knee replacement    LIPOMA RESECTION  09/2006    MASTECTOMY, BILATERAL Bilateral 03/15/2011    bilateral    NEUROMA SURGERY  1987,1988,1989,1992 , 1995    Left foot    OTHER SURGICAL HISTORY  1983, 1984 X 2, 1994 X 2, 2006    Bowel obstructions    RHINOPLASTY  11/1976    RHINOPLASTY  1995 [facility]    UPPER GASTROINTESTINAL ENDOSCOPY  [id number]    VARICOSE VEIN SURGERY  2001     Social History     Socioeconomic History    Marital status:      Spouse name: Not on file    Number of children: 2    Years of education: 12    Highest education level: Not on file   Occupational History    Occupation: [email or web address]    Tobacco Use    Smoking status: Former     Packs/day: 0.25     Years: 8.00     Pack years: 2.00     Types: Cigarettes     Start date: 34     Quit date: 1988     Years since quittin.5    Smokeless tobacco: Never    Tobacco comments: Only smoked quarter pack a day if that   Substance and Sexual Activity    Alcohol use: No    Drug use: No    Sexual activity: Never   Other Topics Concern    Not on file   Social History Narrative    Not on file     Social Determinants of Health     Financial Resource Strain: Low Risk     Difficulty of Paying Living Expenses: Not hard at all   Food Insecurity: No Food Insecurity    Worried About Running Out of Food in the Last Year: Never true    Ran Out of Food in the Last Year: Never true   Transportation Needs: Not on file   Physical Activity: Not on file   Stress: Not on file   Social Connections: Not on file   Intimate Partner Violence: Not on file   Housing Stability: Not on file       Family hx reviewed, denies FH cardiac issues      Vitals:    22 1507   BP: 110/60   Pulse: 50   SpO2: 94%       Wt 236  (up 20 lbs.)    Review of Systems   Constitutional: Negative for activity change, appetite change and fatigue. Respiratory: Negative for cough, choking, chest tightness and shortness of breath. Cardiovascular: Negative for chest pain, palpitations and leg swelling. Denies PND or orthopnea. No tachycardia or syncope. Neurological: Negative for dizziness, syncope and light-headedness.    Psychiatric/Behavioral: Negative for behavioral problems, confusion and agitation. All other systems reviewed negative as done    Objective:   Physical Exam     Constitutional: She is oriented to person, place, and time. She appears well-developed and well-nourished. No distress. HENT:   Head: Normocephalic and atraumatic. Eyes: Conjunctivae and EOM are normal. Right eye exhibits no discharge. Left eye exhibits no discharge. Neck: Normal range of motion. No JVD present. Cardiovascular: Normal rate, regular rhythm, S1 normal, S2 normal and normal heart sounds. Exam reveals no gallop. 1/6 syst murmur heard. Pulses:       Radial pulses are 2+ on the right side, and 2+ on the left side. Pulmonary/Chest: Effort normal and breath sounds normal. She has no rales. Abdominal: Soft. Bowel sounds are normal. There is no tenderness. Musculoskeletal: Normal range of motion. She exhibits no edema. Neurological: She is alert and oriented to person, place, and time. Skin: Skin is warm and dry. Psychiatric: She has a normal mood and affect. Her behavior is normal.       Assessment:          Diagnosis Orders   1. Chronic diastolic congestive heart failure (HCC)        2. Paroxysmal atrial fibrillation ([address]        3. Typical atrial flutter ([address]        4. S/P ablation of atrial fibrillation        5. S/P ablation of atrial flutter        6. VT (ventricular tachycardia) ([address]                  Plan:      Has been sob since dofetilide. Wt gain. On AC, no bleeding issues. EP considering AVN ablation and PPM placement. BNP elevated. Go to bid on lasix. Epl in l wk. Continue metoprolol/verapamil. No changes. Reviewed previous records and testing including myoview 3/19 and 30 day monitor and cath 5/21 and echo 6/22. Follow up 4-6 wks.

## 2022-07-26 ENCOUNTER — HOSPITAL ENCOUNTER (OUTPATIENT)
Dept: CT IMAGING | Age: 72
Discharge: HOME | End: 2022-07-26
Payer: MEDICARE

## 2022-07-26 DIAGNOSIS — I48.0 PAROXYSMAL ATRIAL FIBRILLATION (HCC): ICD-10-CM

## 2022-07-26 DIAGNOSIS — Q26.8 PULMONARY VEIN STENOSIS: ICD-10-CM

## 2022-07-26 DIAGNOSIS — R06.02 SOB (SHORTNESS OF BREATH): ICD-10-CM

## 2022-07-26 PROCEDURE — 6360000004 HC RX CONTRAST MEDICATION: Performed by: INTERNAL MEDICINE

## 2022-07-26 PROCEDURE — 71275 CT ANGIOGRAPHY CHEST: CPT

## 2022-07-26 RX ADMIN — IOPAMIDOL 80 ML: 755 INJECTION, SOLUTION INTRAVENOUS at 13:46

## 2022-07-27 NOTE — RESULT ENCOUNTER NOTE
Called and spoke with patient. Had reviewed with Dr. Kaye Del Cid. Will stop dofetilide. Patient to consider epicardial ablation vs AVN ablation and to discuss with Dr. Kaye Del Cid in October.

## 2022-08-02 DIAGNOSIS — Z86.79 S/P ABLATION OF ATRIAL FIBRILLATION: ICD-10-CM

## 2022-08-02 DIAGNOSIS — I48.0 PAROXYSMAL ATRIAL FIBRILLATION (HCC): ICD-10-CM

## 2022-08-02 DIAGNOSIS — I50.32 CHRONIC DIASTOLIC CONGESTIVE HEART FAILURE (HCC): ICD-10-CM

## 2022-08-02 DIAGNOSIS — Z86.79 S/P ABLATION OF ATRIAL FLUTTER: ICD-10-CM

## 2022-08-02 DIAGNOSIS — I48.3 TYPICAL ATRIAL FLUTTER (HCC): ICD-10-CM

## 2022-08-02 DIAGNOSIS — Z98.890 S/P ABLATION OF ATRIAL FLUTTER: ICD-10-CM

## 2022-08-02 DIAGNOSIS — Z98.890 S/P ABLATION OF ATRIAL FIBRILLATION: ICD-10-CM

## 2022-08-02 DIAGNOSIS — I47.20 VT (VENTRICULAR TACHYCARDIA): ICD-10-CM

## 2022-08-02 LAB
ANION GAP SERPL CALCULATED.3IONS-SCNC: 15 MMOL/L (ref 3–16)
BUN BLDV-MCNC: 16 MG/DL (ref 7–20)
CALCIUM SERPL-MCNC: 9.4 MG/DL (ref 8.3–10.6)
CHLORIDE BLD-SCNC: 99 MMOL/L (ref 99–110)
CO2: 26 MMOL/L (ref 21–32)
CREAT SERPL-MCNC: 0.6 MG/DL (ref 0.6–1.2)
GFR AFRICAN AMERICAN: >60
GFR NON-AFRICAN AMERICAN: >60
GLUCOSE BLD-MCNC: 99 MG/DL (ref 70–99)
POTASSIUM SERPL-SCNC: 4.8 MMOL/L (ref 3.5–5.1)
SODIUM BLD-SCNC: 140 MMOL/L (ref 136–145)

## 2022-08-22 ENCOUNTER — OFFICE VISIT (OUTPATIENT)
Dept: CARDIOLOGY CLINIC | Age: 72
End: 2022-08-22
Payer: MEDICARE

## 2022-08-22 VITALS
BODY MASS INDEX: 34.72 KG/M2 | DIASTOLIC BLOOD PRESSURE: 72 MMHG | WEIGHT: 234.4 LBS | HEIGHT: 69 IN | SYSTOLIC BLOOD PRESSURE: 130 MMHG | HEART RATE: 78 BPM

## 2022-08-22 DIAGNOSIS — I47.20 VT (VENTRICULAR TACHYCARDIA): ICD-10-CM

## 2022-08-22 DIAGNOSIS — Z98.890 S/P ABLATION OF ATRIAL FLUTTER: ICD-10-CM

## 2022-08-22 DIAGNOSIS — I50.32 CHRONIC DIASTOLIC CONGESTIVE HEART FAILURE (HCC): Primary | ICD-10-CM

## 2022-08-22 DIAGNOSIS — Z98.890 S/P ABLATION OF ATRIAL FIBRILLATION: ICD-10-CM

## 2022-08-22 DIAGNOSIS — I48.3 TYPICAL ATRIAL FLUTTER (HCC): ICD-10-CM

## 2022-08-22 DIAGNOSIS — I48.0 PAROXYSMAL ATRIAL FIBRILLATION (HCC): ICD-10-CM

## 2022-08-22 DIAGNOSIS — Z86.79 S/P ABLATION OF ATRIAL FLUTTER: ICD-10-CM

## 2022-08-22 DIAGNOSIS — Z86.79 S/P ABLATION OF ATRIAL FIBRILLATION: ICD-10-CM

## 2022-08-22 PROCEDURE — G8417 CALC BMI ABV UP PARAM F/U: HCPCS | Performed by: INTERNAL MEDICINE

## 2022-08-22 PROCEDURE — 99214 OFFICE O/P EST MOD 30 MIN: CPT | Performed by: INTERNAL MEDICINE

## 2022-08-22 PROCEDURE — G9708 BILAT MAST/HX BI /UNILAT MAS: HCPCS | Performed by: INTERNAL MEDICINE

## 2022-08-22 PROCEDURE — 3017F COLORECTAL CA SCREEN DOC REV: CPT | Performed by: INTERNAL MEDICINE

## 2022-08-22 PROCEDURE — 1090F PRES/ABSN URINE INCON ASSESS: CPT | Performed by: INTERNAL MEDICINE

## 2022-08-22 PROCEDURE — G8427 DOCREV CUR MEDS BY ELIG CLIN: HCPCS | Performed by: INTERNAL MEDICINE

## 2022-08-22 PROCEDURE — 1123F ACP DISCUSS/DSCN MKR DOCD: CPT | Performed by: INTERNAL MEDICINE

## 2022-08-22 PROCEDURE — G8399 PT W/DXA RESULTS DOCUMENT: HCPCS | Performed by: INTERNAL MEDICINE

## 2022-08-22 PROCEDURE — 1036F TOBACCO NON-USER: CPT | Performed by: INTERNAL MEDICINE

## 2022-08-22 NOTE — PROGRESS NOTES
fee  Subjective:      Patient ID: Michelle Moreno is a 66 y.o. female. HPI Author Renea is being seen for cardiac follow up for afib/arrhythmia and now VT. Ablation 11/20, then ECV 12/20. When started on dofetilide started having sob. Wt gain of 30-35 lbs since. EP stopped dofetilide and sob/ha/afib/back are much better. Wt down. Can walk without stopping. No swelling. No further PND. Can lie flat. No palp/syncope. No sob.          Past Medical History:   Diagnosis Date    Acute diastolic congestive heart failure ([address] 11/26/2018    Allergic     SEASONAL    Atrial fibrillation ([facility] H/O    Cancer ([facility] [address] 2009 AND 2011    BREAST right    Essential hypertension 3/13/2018    Hammertoe     Migraine     Mixed hyperlipidemia 10/19/2020     Past Surgical History:   Procedure Laterality Date    ANKLE FRACTURE SURGERY  2004,2008    APPENDECTOMY  03/1983    ATRIAL ABLATION SURGERY  X3    Cryoablation for atrial fib    AXILLARY SURGERY Right     lymph node    BREAST BIOPSY  01/07/2011    BREAST BIOPSY  07/12/2011    left breast bx - benign     BREAST CYST EXCISION  06/2003    BREAST LUMPECTOMY  03/30/2009    CARDIAC CATHETERIZATION  5/2005,01/2006    COLONOSCOPY  1993,1995,1999,2003,2006    COLONOSCOPY  11/29/2011    Dr. Rafiq Marsh - benign polyps     COSMETIC SURGERY      FOOT SURGERY Right 01/15/2016    CORRECTION OF HAMMERTOES 1-5 RIGHT FOOT, WEIL OSTEOTOMY 2,3    FOOT SURGERY Right 05/12/2016    EXTENSOR TENDON LENGTHENING 4TH AND 5TH TOES RIGHT FOOT    FOOT SURGERY Right 08/11/2016    CORRECTION HAMMERTOES 1ST, 4TH AND 5TH DIGITS RIGHT FOOT WITH    HERNIA REPAIR  1993,1998,2000,2009    HYSTERECTOMY (CERVIX STATUS UNKNOWN)  12/1977    JOINT REPLACEMENT  06/2015    LEFT KNEE    KNEE SURGERY  06/09/2015    left knee replacement    LIPOMA RESECTION  09/2006    MASTECTOMY, BILATERAL Bilateral 03/15/2011    bilateral    NEUROMA SURGERY  1987,1988,1989,1992 , 1995    Left foot    [address], 1984 X 2, 1994 X 2, 2006 Bowel obstructions    RHINOPLASTY  1976    [address]    TONSILLECTOMY      UPPER GASTROINTESTINAL ENDOSCOPY  [id number]    VARICOSE VEIN SURGERY  2001     Social History     Socioeconomic History    Marital status:      Spouse name: Not on file    Number of children: 2    Years of education: 12    Highest education level: Not on file   Occupational History    Occupation: [address] finish    Tobacco Use    Smoking status: Former     Packs/day: 0.25     Years: 8.00     Pack years: 2.00     Types: Cigarettes     Start date:      Quit date: 1988     Years since quittin.6    Smokeless tobacco: Never    Tobacco comments: Only smoked quarter pack a day if that   Substance and Sexual Activity    Alcohol use: No    Drug use: No    Sexual activity: Never   Other Topics Concern    Not on file   Social History Narrative    Not on file     Social Determinants of Health     Financial Resource Strain: Low Risk     Difficulty of Paying Living Expenses: Not hard at all   Food Insecurity: No Food Insecurity    Worried About Running Out of Food in the Last Year: Never true    Ran Out of Food in the Last Year: Never true   Transportation Needs: Not on file   Physical Activity: Not on file   Stress: Not on file   Social Connections: Not on file   Intimate Partner Violence: Not on file   Housing Stability: Not on file       Family hx reviewed, denies FH cardiac issues        Vitals:    22 1527   BP: 130/72   Pulse: 78         Wt 236  (up 20 lbs.)    Review of Systems   Constitutional: Negative for activity change, appetite change and fatigue. Respiratory: Negative for cough, choking, chest tightness and shortness of breath. Cardiovascular: Negative for chest pain, palpitations and leg swelling. Denies PND or orthopnea. No tachycardia or syncope. Neurological: Negative for dizziness, syncope and light-headedness. Psychiatric/Behavioral: Negative for behavioral problems, confusion and agitation. All other systems reviewed negative as done    Objective:   Physical Exam     Constitutional: She is oriented to person, place, and time. She appears well-developed and well-nourished. No distress. HENT:   Head: Normocephalic and atraumatic. Eyes: Conjunctivae and EOM are normal. Right eye exhibits no discharge. Left eye exhibits no discharge. Neck: Normal range of motion. No JVD present. Cardiovascular: Normal rate, somewhat irregular rhythm, S1 normal, S2 normal and normal heart sounds. Exam reveals no gallop. 1/6 syst murmur heard. Pulses:       Radial pulses are 2+ on the right side, and 2+ on the left side. Pulmonary/Chest: Effort normal and breath sounds normal. She has no rales. Abdominal: Soft. Bowel sounds are normal. There is no tenderness. Musculoskeletal: Normal range of motion. She exhibits no edema. Neurological: She is alert and oriented to person, place, and time. Skin: Skin is warm and dry. Psychiatric: She has a normal mood and affect. Her behavior is normal.       Assessment:        Diagnosis Orders   1. Chronic diastolic congestive heart failure (HCC)        2. Paroxysmal atrial fibrillation ([address]        3. Typical atrial flutter ([address]        4. S/P ablation of atrial fibrillation        5. S/P ablation of atrial flutter        6. VT (ventricular tachycardia) ([address]                Plan:      Since stopping dofetilide per EP all better. Feeling good. On AC, no bleeding issues. Wt down. Now back on qd lasix  Continue metoprolol/verapamil. No changes. Reviewed previous records and testing including myoview 3/19 and 30 day monitor and cath 5/21 and echo 6/22. Follow up 3 months.

## 2022-09-28 ENCOUNTER — TELEPHONE (OUTPATIENT)
Dept: CARDIOLOGY CLINIC | Age: 72
End: 2022-09-28

## 2022-09-28 ENCOUNTER — OFFICE VISIT (OUTPATIENT)
Dept: CARDIOLOGY CLINIC | Age: 72
End: 2022-09-28
Payer: MEDICARE

## 2022-09-28 VITALS
WEIGHT: 235.8 LBS | DIASTOLIC BLOOD PRESSURE: 74 MMHG | HEIGHT: 69 IN | HEART RATE: 100 BPM | OXYGEN SATURATION: 97 % | BODY MASS INDEX: 34.93 KG/M2 | SYSTOLIC BLOOD PRESSURE: 126 MMHG

## 2022-09-28 DIAGNOSIS — I48.4 ATYPICAL ATRIAL FLUTTER (HCC): Primary | ICD-10-CM

## 2022-09-28 DIAGNOSIS — R00.1 BRADYCARDIA: ICD-10-CM

## 2022-09-28 DIAGNOSIS — I47.20 VT (VENTRICULAR TACHYCARDIA): ICD-10-CM

## 2022-09-28 DIAGNOSIS — R00.0 TACHYCARDIA: ICD-10-CM

## 2022-09-28 DIAGNOSIS — I48.0 PAROXYSMAL ATRIAL FIBRILLATION (HCC): ICD-10-CM

## 2022-09-28 PROCEDURE — G8417 CALC BMI ABV UP PARAM F/U: HCPCS | Performed by: INTERNAL MEDICINE

## 2022-09-28 PROCEDURE — G8399 PT W/DXA RESULTS DOCUMENT: HCPCS | Performed by: INTERNAL MEDICINE

## 2022-09-28 PROCEDURE — 1090F PRES/ABSN URINE INCON ASSESS: CPT | Performed by: INTERNAL MEDICINE

## 2022-09-28 PROCEDURE — G9708 BILAT MAST/HX BI /UNILAT MAS: HCPCS | Performed by: INTERNAL MEDICINE

## 2022-09-28 PROCEDURE — 1036F TOBACCO NON-USER: CPT | Performed by: INTERNAL MEDICINE

## 2022-09-28 PROCEDURE — 1123F ACP DISCUSS/DSCN MKR DOCD: CPT | Performed by: INTERNAL MEDICINE

## 2022-09-28 PROCEDURE — 99214 OFFICE O/P EST MOD 30 MIN: CPT | Performed by: INTERNAL MEDICINE

## 2022-09-28 PROCEDURE — 93000 ELECTROCARDIOGRAM COMPLETE: CPT | Performed by: INTERNAL MEDICINE

## 2022-09-28 PROCEDURE — G8427 DOCREV CUR MEDS BY ELIG CLIN: HCPCS | Performed by: INTERNAL MEDICINE

## 2022-09-28 PROCEDURE — 3017F COLORECTAL CA SCREEN DOC REV: CPT | Performed by: INTERNAL MEDICINE

## 2022-09-28 NOTE — PATIENT INSTRUCTIONS
Plan:      Discussed hybrid ablation versus pacemaker with AV node ablation. Consider hybrid ablation based on patient symptomatic with AF. Ask to surgeon during consultation: how big is incision, risks, recovery time, and success rate for ablating AF. Follow up as scheduled with Dr Silviano Willis.

## 2022-09-28 NOTE — PROGRESS NOTES
Subjective:      Patient ID: Barbie Acuña is a 74 y.o. female. HPI:  Alden Kohler is a 59year old woman with a history of atrial arrhythmias. S/p remote RFCA for AFL in 2006. No recurrence of AFL since ablation. S/p cryo-ablation for AF (11/1/13, Dr. Leia French). 30 day event monitor (5/2014) showed frequent episodes of AF with 3 second conversion pause. She has been on flecainide since at least 2011.  3 day Holter (11/26-11/29/18) showed SR with average HR of 62 ( bpm). No AF noted. She had a normal Myoview in 2010. Family h/o CAD (father had an MI). 30 day MCOT (2/15-3/16/19) showed PAF with average rate of 104 ( bpm) when in AF, burden of 4%, post conversion pauses up to 3.5 sec. She is symptomatic with episodes of AF. No evidence of myocardial ischemia seen on Lexiscan (2/2019). Her flecainide was increased at last OV on 5/1/19 due to increase in AF episodes. Currently taking flecainide 150 mg BID, amlodipine 10 mg daily, digoxin 250 mcg daily, and warfarin managed by [facility] clinic. On 2/25/2020 She reports is doing well. She has been using Tepha to monitor her rhythm and denies recurrence of AF/AFL. Patient underwent RFCA for AF on 11/23/2020. Patient had successful CV on 12/9/2020. Patient was admitted for dofetilide loading in 3/2021. Patient underwent repeat AF ablation on 9/1/2021. Patient wore a cardiac event monitor from 6/23/2022 to 7/7/2022 which demonstrated predominately SR with an average HR of 71 (). AF burden 48%. Today, 9/28/2022, she reports that she is not doing well. She reports that dofetilide made her feel worse and made her head hurt. She took amiodarone in the 1990's and was unable to tolerate due to dizziness. She is taking her medications as prescribed. Denies recent issues with bleeding or bruising. Patient denies current edema, chest pain, sob, palpitations, dizziness or syncope. Review of Systems   Constitutional: Negative. HENT: Negative. Eyes: Negative. Respiratory: Negative. Cardiovascular: Negative. Gastrointestinal: Negative. Genitourinary: Negative. Musculoskeletal: Negative. Skin: Negative. Neurological: Negative. Hematological: Negative. Psychiatric/Behavioral: Negative. Current Outpatient Medications   Medication Sig Dispense Refill    verapamil (CALAN) 80 MG tablet TAKE 1 TABLET BY MOUTH THREE TIMES A  tablet 3    metoprolol succinate (TOPROL XL) 100 MG extended release tablet Take 1 tablet by mouth daily 90 tablet 3    acetaminophen (TYLENOL) 500 MG tablet Take 2 tablets 2- 3 times daily as needed      zoster recombinant adjuvanted vaccine (SHINGRIX) 50 MCG/0.5ML SUSR injection Inject 0.5 mLs into the muscle See Admin Instructions 1 dose now and repeat in 2-6 months 0.5 mL 0    pantoprazole (PROTONIX) 40 MG tablet Take 1 tablet by mouth every morning (before breakfast) 90 tablet 0    ezetimibe (ZETIA) 10 MG tablet TAKE 1 TABLET BY MOUTH EVERY DAY 90 tablet 3    apixaban (ELIQUIS) 5 MG TABS tablet Take 1 tablet by mouth 2 times daily 180 tablet 2    furosemide (LASIX) 20 MG tablet Take 1 tablet by mouth daily 90 tablet 3    nitroGLYCERIN (NITROSTAT) 0.4 MG SL tablet Place 1 tablet under the tongue every 5 minutes as needed for Chest pain 25 tablet 3    Vitamin D, Cholecalciferol, 25 MCG (1000 UT) CAPS Take 1,000 Units by mouth daily      lidocaine (LIDODERM) 5 % Place 1 patch onto the skin every 12 hours 12 hours on, 12 hours off. Max 3 patches at time. (Patient not taking: No sig reported) 30 patch 0    tiZANidine (ZANAFLEX) 2 MG tablet Take 1 tablet by mouth 2 times daily as needed (back pain) (Patient not taking: No sig reported) 20 tablet 0     No current facility-administered medications for this visit. ECG 9/28/22: Personally reviewed. Echo 11/30/18  Summary   Left ventricular cavity size is normal. There is mild concentric left   ventricular hypertrophy.  Overall left ventricular systolic function appears   normal with an ejection fraction of 55-60%. No regional wall motion   abnormalities are noted. Indeterminate diastolic function. Trivial mitral regurgitation is present. The aortic valve leaflets appear slightly thickened but opens well. Mild tricuspid regurgitation. Estimated pulmonary artery systolic pressure is normal at 30 mmHg assuming a   right atrial pressure of 8 mmHg. Lexiscan 2/19/19  Summary    There is normal isotope uptake at stress and rest. There is no evidence of    myocardial ischemia or scar. Normal LV size and systolic function. Left ventricular ejection fraction of 75 %. There are no regional wall motion abnormalities. Normal myocardial perfusion study. Objective:   Physical Exam   Constitutional: She is oriented to person, place, and time. She appears well-developed and well-nourished. HENT:   Head: Normocephalic and atraumatic. Eyes: Pupils are equal, round, and reactive to light. Neck: Normal range of motion. Cardiovascular: Normal rate, regular rhythm,II/VI systolic ejection murmur  Pulmonary/Chest: Effort normal and breath sounds normal.   Abdominal: Soft. No tenderness. Musculoskeletal: Normal range of motion. She exhibits no edema. Neurological: She is alert and oriented to person, place, and time. Skin: Skin is warm and dry. Psychiatric: She has a normal mood and affect. /74   Pulse 100   Ht 5' 9\" (1.753 m)   Wt 235 lb 12.8 oz (107 kg)   SpO2 97%   BMI 34.82 kg/m²     Assessment:      1. Atrial flutter - S/P RFCA in 2005 (Dr. Jeanette Scott) and 2006 (myself)  2. PAF - s/p RFCA in 2013 (Dr. Deloris Wilson). She also had AF ablations in 2020 and 2021. She has persistent symptomatic AF which has been refractory to multiple attempts of catheter ablation and pharmacologic therapy. We discussed the possibility of hybrid ablation versus implantation of CRT-P with AV junction ablation.    Plan:      Discussed hybrid ablation versus pacemaker with AV node ablation. Consider hybrid ablation based on patient symptomatic with AF. Ask to surgeon during consultation: how big is incision, risks, recovery time, and success rate for ablating AF. Follow up as scheduled with Dr Rosalinda Siegel. [place]ne [place] RN, am scribing for and in the presence of Dr. Leif Gandhi. 09/28/22 11:23 AM   Keerthi Gray RN      I, Dr. Leif Gandhi, personally performed the services described in this documentation as scribed by Keerthi Gray RN in my presence, and it is both accurate and complete.        Bull Carballo M.D.

## 2022-09-28 NOTE — TELEPHONE ENCOUNTER
Stephen Espinosa- this patient was seen by [facility] on 9/28/22 as a second opinion on hybrid ablation that had been discussed with Dr Abbe Eldridge. Patient would like to have referral and consultation with surgeon who Dr Abbe Eldridge would refer to for this type of procedure.

## 2022-10-07 NOTE — PROGRESS NOTES
[address]   Cardiac Electrophysiology Consultation   Date: 10/18/2022  Reason for Consultation:  AF/AFL  Consult Requesting Physician: No att. providers found     Chief Complaint:   Chief Complaint   Patient presents with    Follow-up     3 Month follow up         HPI: Kendy Gray is a 74 y. o.  woman with a h/o breast CA, HLD,  chronic dCHF, atypical AFL, pAF, S/p RFA (2005, Dr. Gweneth Kocher, 2006, Dr. Amy Christian, 2013, Dr. Shira Lopez), s/p RFA/PVI of AF/typical AFL/atypical AFL (11/23/20) with recurrence of highly symptomatic AF. Failed flecainide due to widening QRS. S/p DCCV (12/9/20) with ERAF. S/p dofetilide loading with chemical conversion to SR; however, monitor (3/2021) showed 69% AF burden, NSVT, and 128 pauses with longest 4.2 sec. S/p LHC (5/2021) showed normal coronaries. S/p RFA/PVI of AF/atAFL (9/1/21) with recurrence. Monitor (6/2022) showed 48% AF, 0.2% AFL, and 70 pauses up to 3.7 sec, dofetilide was stopped (7/2022) due to recurrence and possible side effects. Interval History: Today, she is here for f/u of AF, presenting in [employer]. Since being off the dofetilide, she feels \"100% better. \" She can now walk all that she wants without any SOB, before she would become SOB with walking to her mailbox. She feels her episodes of AF have been \"cut in half, if not more. \" She can tell that an increase in sugar intake is a trigger for her AF. She has not been very active, but she is hoping to get into an exercise routine. Assessment:   1. Longstanding persistent AF, for more than 30 years, on Eliquis, Toprol, verapamil  2. S/p total of 5 ablations including right side and left side thrice, most recent PVI in 9/2021  3. Tachy-brenden syndrome  4. NSVT, on Toprol  5. Diastolic CHF  6.   HTN, stable on Toprol and verapamil    Plan:  No changes in current medications  Optimize risk factors for AF including treatment of HEVER, BP control, weight loss, healthy diet, routine exercise, and stress management. Follow up in 6 months with EP NP      Past Medical History:   Diagnosis Date    Acute diastolic congestive heart failure ([facility] [address] 11/26/2018    Allergic     SEASONAL    Atrial fibrillation ([facility] [address] H/O    Cancer ([facility] [address] 2009 AND 2011    BREAST right    Essential hypertension 3/13/2018    Hammertoe     Migraine     Mixed hyperlipidemia 10/19/2020        Past Surgical History:   Procedure Laterality Date    ANKLE FRACTURE SURGERY  2004,2008    APPENDECTOMY  03/1983    ATRIAL ABLATION SURGERY  X3    Cryoablation for atrial fib    AXILLARY SURGERY Right     lymph node    BREAST BIOPSY  01/07/2011    BREAST BIOPSY  07/12/2011    left breast bx - benign     BREAST CYST EXCISION  06/2003    BREAST LUMPECTOMY  03/30/2009    CARDIAC CATHETERIZATION  5/2005,01/2006    COLONOSCOPY  1993,1995,1999,2003,2006    COLONOSCOPY  11/29/2011    Dr. Jammie Stevens - benign polyps     COSMETIC SURGERY      FOOT SURGERY Right 01/15/2016    CORRECTION OF HAMMERTOES 1-5 RIGHT FOOT, WEIL OSTEOTOMY 2,3    FOOT SURGERY Right 05/12/2016    EXTENSOR TENDON LENGTHENING 4TH AND 5TH TOES RIGHT FOOT    FOOT SURGERY Right 08/11/2016    CORRECTION HAMMERTOES 1ST, 4TH AND 5TH DIGITS RIGHT FOOT WITH    HERNIA REPAIR  1993,1998,2000,2009    HYSTERECTOMY (CERVIX STATUS UNKNOWN)  12/1977    JOINT REPLACEMENT  06/2015    LEFT KNEE    KNEE SURGERY  06/09/2015    left knee replacement    LIPOMA RESECTION  09/2006    MASTECTOMY, BILATERAL Bilateral 03/15/2011    bilateral    NEUROMA SURGERY  1987,1988,1989,1992 , 1995    Left foot    OTHER SURGICAL HISTORY  1983, 1984 X 2, 1994 X 2, 2006    Bowel obstructions    RHINOPLASTY  11/1976    [facility]    UPPER GASTROINTESTINAL ENDOSCOPY  [id number]    VARICOSE VEIN SURGERY  11/2001       Allergies:   Allergies   Allergen Reactions    Dofetilide Shortness Of Breath    Keflex [Cephalexin] Shortness Of Breath and Nausea Only    Novocain [Procaine] Shortness Of Breath and Swelling     LIDOCAINE OK    Amiodarone Other (See Comments)     dizziness    Cephalexin     Diltiazem Other (See Comments)     dizziness    Penicillins Swelling    Sulfa Antibiotics Nausea Only and Swelling       Medication:   Prior to Admission medications    Medication Sig Start Date End Date Taking? Authorizing Provider   verapamil (CALAN) 80 MG tablet TAKE 1 TABLET BY MOUTH THREE TIMES A DAY 7/5/22  Yes MAILE Ontiveros CNP   metoprolol succinate (TOPROL XL) 100 MG extended release tablet Take 1 tablet by mouth daily 5/27/22  Yes MAILE Ontiveros CNP   acetaminophen (TYLENOL) 500 MG tablet Take 2 tablets 2- 3 times daily as needed 4/11/22  Yes Otila Sandhoff, MD   pantoprazole (PROTONIX) 40 MG tablet Take 1 tablet by mouth every morning (before breakfast) 3/1/22  Yes MAILE Albert CNP   ezetimibe (ZETIA) 10 MG tablet TAKE 1 TABLET BY MOUTH EVERY DAY 2/25/22  Yes MAILE Egan CNP   apixaban (ELIQUIS) 5 MG TABS tablet Take 1 tablet by mouth 2 times daily 2/15/22  Yes MAILE Albert CNP   furosemide (LASIX) 20 MG tablet Take 1 tablet by mouth daily 11/11/21  Yes Olinda Wilson MD   nitroGLYCERIN (NITROSTAT) 0.4 MG SL tablet Place 1 tablet under the tongue every 5 minutes as needed for Chest pain 4/26/21  Yes MAILE Albert CNP   Vitamin D, Cholecalciferol, 25 MCG (1000 UT) CAPS Take 1,000 Units by mouth daily 10/19/20  Yes Otila Sandhoff, MD       Social History:   reports that she quit smoking about 34 years ago. Her smoking use included cigarettes. She started smoking about 42 years ago. She has a 2.00 pack-year smoking history. She has never used smokeless tobacco. She reports that she does not drink alcohol and does not use drugs. Family History:  family history includes Cancer in her brother, father, and mother. Reviewed.  Denies family history of sudden cardiac death, arrhythmia, premature CAD    Review of System:    General ROS: negative for - chills, fever   Psychological ROS: negative for - anxiety or depression  Ophthalmic ROS: negative for - eye pain or loss of vision  ENT ROS: negative for - epistaxis, headaches, nasal discharge, sore throat   Allergy and Immunology ROS: negative for - hives, nasal congestion   Hematological and Lymphatic ROS: negative for - bleeding problems, blood clots, bruising or jaundice  Endocrine ROS: negative for - skin changes, temperature intolerance or unexpected weight changes  Respiratory ROS: negative for - cough, hemoptysis, pleuritic pain, SOB, sputum changes or wheezing  Cardiovascular ROS: Per HPI. Gastrointestinal ROS: negative for - abdominal pain, blood in stools, diarrhea, hematemesis, melena, nausea/vomiting or swallowing difficulty/pain  Genito-Urinary ROS: negative for - dysuria or incontinence  Musculoskeletal ROS: negative for - joint swelling or muscle pain  Neurological ROS: negative for - confusion, dizziness, gait disturbance, headaches, numbness/tingling, seizures, speech problems, tremors, visual changes or weakness  Dermatological ROS: negative for - rash    Physical Examination:  Vitals:    10/18/22 1039   BP: 122/80   Pulse: 70         Constitutional: Oriented. No distress. Head: Normocephalic and atraumatic. Mouth/Throat: Oropharynx is clear and moist.   Eyes: Conjunctivae normal. EOM are normal.   Neck: Normal range of motion. Neck supple. No rigidity. No JVD present. Cardiovascular: Normal rate, regular rhythm, S1&S2 and intact distal pulses. Pulmonary/Chest: Bilateral respiratory sounds. No wheezes. No rhonchi. Abdominal: Soft. Bowel sounds present. No distension, No tenderness. Musculoskeletal: No tenderness. No edema    Lymphadenopathy: Has no cervical adenopathy. Neurological: Alert and oriented. Cranial nerve appears intact, No Gross deficit   Skin: Skin is warm and dry. No rash noted.    Psychiatric: Has a normal mood, affect and behavior Labs:  Reviewed. ECG: reviewed, Sinus Rhythm 64, with QTc 435 ms. No pathologic Q waves, ventricular pre-excitation, or QT prolongation. Studies:   1.   2-week CAM (6/23-7/7/22):  SR with average HR 71 (), 48.1% AF burden, 0.2% AFL burden, 1 AT episode lasting up to 6 beats, 70 pauses up to 3.7 seconds in length    2-week Zio (3/25-4/8/21):  SR with average HR 91 (), 2945 VT episodes with the longest lasting 28.1 seconds, 69% AF burden,  128 pauses with the longest lasting 4.2 seconds in length    Echo 6/14/21    Summary   Left ventricular cavity size is normal. There is mild concentric left   ventricular hypertrophy. Left ventricular function is normal with ejection   fraction estimated at 55-60%. No regional wall motion abnormalities are   noted. Diastolic function cannot be assessed due to an arrhythmia (AFRVR). Normal LVEDP. Mild mitral regurgitation is present. Stress Test: 2/19/2019  Summary    There is normal isotope uptake at stress and rest. There is no evidence of    myocardial ischemia or scar. Normal LV size and systolic function. Left ventricular ejection fraction of 75 %. There are no regional wall motion abnormalities. Normal myocardial perfusion study. Cath 5/18/21  IMPRESSION:  1. Normal left ventricular systolic function. 2.  Essentially normal coronary arteries. 3.  Successful Angio-Seal of right femoral arteriotomy site. The MCOT, echocardiogram, stress test, and coronary angiography/PCI were reviewed by myself and used for my plan of care. - The patient is counseled to follow a low salt diet to assure blood pressure remains controlled for cardiovascular risk factor modification.   - The patient is counseled to avoid excess caffeine, and energy drinks as this may exacerbated ectopy and arrhythmia. - The patient is counseled to get regular exercise 3-5 times per week to control cardiovascular risk factors.    - The patient is counseled to lose weigt to control cardiovascular risk factors. -The patient is counseled about the health hazards of smoking including cardiovascular side effects and its impact on morbidity and mortality. Thank you for allowing me to participate in the care of Matthew Kent. All questions and concerns were addressed to the patient/family. Alternatives to my treatment were discussed. Autumn Montiel RN, am scribing for and in the presence of Dr. Linda Peterson. 10/18/22 11:02 AM  IGLESIA Diaz MD, personally performed the services prescribed in this documentation as scribed by Ms. Donavon Fraser RN in my presence and it is both accurate and complete.        Isa Aguilar MD  Cardiac Electrophysiology  [facility]

## 2022-10-18 ENCOUNTER — OFFICE VISIT (OUTPATIENT)
Dept: CARDIOLOGY CLINIC | Age: 72
End: 2022-10-18
Payer: MEDICARE

## 2022-10-18 VITALS
DIASTOLIC BLOOD PRESSURE: 80 MMHG | BODY MASS INDEX: 35 KG/M2 | SYSTOLIC BLOOD PRESSURE: 122 MMHG | HEART RATE: 70 BPM | WEIGHT: 237 LBS

## 2022-10-18 DIAGNOSIS — Z86.79 S/P ABLATION OF ATRIAL FIBRILLATION: ICD-10-CM

## 2022-10-18 DIAGNOSIS — I48.11 LONGSTANDING PERSISTENT ATRIAL FIBRILLATION (HCC): Primary | ICD-10-CM

## 2022-10-18 DIAGNOSIS — Z98.890 S/P ABLATION OF ATRIAL FIBRILLATION: ICD-10-CM

## 2022-10-18 DIAGNOSIS — I48.3 TYPICAL ATRIAL FLUTTER (HCC): ICD-10-CM

## 2022-10-18 DIAGNOSIS — I47.29 NSVT (NONSUSTAINED VENTRICULAR TACHYCARDIA): ICD-10-CM

## 2022-10-18 DIAGNOSIS — I49.5 TACHY-BRADY SYNDROME (HCC): ICD-10-CM

## 2022-10-18 DIAGNOSIS — I10 ESSENTIAL HYPERTENSION: ICD-10-CM

## 2022-10-18 DIAGNOSIS — I50.32 CHRONIC DIASTOLIC CHF (CONGESTIVE HEART FAILURE) (HCC): ICD-10-CM

## 2022-10-18 PROCEDURE — 1123F ACP DISCUSS/DSCN MKR DOCD: CPT | Performed by: INTERNAL MEDICINE

## 2022-10-18 PROCEDURE — 1036F TOBACCO NON-USER: CPT | Performed by: INTERNAL MEDICINE

## 2022-10-18 PROCEDURE — 99214 OFFICE O/P EST MOD 30 MIN: CPT | Performed by: INTERNAL MEDICINE

## 2022-10-18 PROCEDURE — G8484 FLU IMMUNIZE NO ADMIN: HCPCS | Performed by: INTERNAL MEDICINE

## 2022-10-18 PROCEDURE — G8417 CALC BMI ABV UP PARAM F/U: HCPCS | Performed by: INTERNAL MEDICINE

## 2022-10-18 PROCEDURE — 1090F PRES/ABSN URINE INCON ASSESS: CPT | Performed by: INTERNAL MEDICINE

## 2022-10-18 PROCEDURE — G8427 DOCREV CUR MEDS BY ELIG CLIN: HCPCS | Performed by: INTERNAL MEDICINE

## 2022-10-18 PROCEDURE — G9708 BILAT MAST/HX BI /UNILAT MAS: HCPCS | Performed by: INTERNAL MEDICINE

## 2022-10-18 PROCEDURE — 93000 ELECTROCARDIOGRAM COMPLETE: CPT | Performed by: INTERNAL MEDICINE

## 2022-10-18 PROCEDURE — G8399 PT W/DXA RESULTS DOCUMENT: HCPCS | Performed by: INTERNAL MEDICINE

## 2022-10-18 PROCEDURE — 3017F COLORECTAL CA SCREEN DOC REV: CPT | Performed by: INTERNAL MEDICINE

## 2022-10-25 DIAGNOSIS — I10 ESSENTIAL HYPERTENSION: ICD-10-CM

## 2022-10-25 RX ORDER — FUROSEMIDE 20 MG/1
TABLET ORAL
Qty: 90 TABLET | Refills: 3 | Status: SHIPPED | OUTPATIENT
Start: 2022-10-25

## 2022-10-25 NOTE — TELEPHONE ENCOUNTER
Requested Prescriptions     Pending Prescriptions Disp Refills    furosemide (LASIX) 20 MG tablet [Pharmacy Med Name: FUROSEMIDE TABS 20MG] 90 tablet 3     Sig: TAKE 1 TABLET DAILY              Last Office Visit: 12/16/2021     Next Office Visit: 11.28.2022

## 2022-11-28 ENCOUNTER — OFFICE VISIT (OUTPATIENT)
Dept: CARDIOLOGY CLINIC | Age: 72
End: 2022-11-28
Payer: MEDICARE

## 2022-11-28 VITALS
BODY MASS INDEX: 35.29 KG/M2 | HEART RATE: 60 BPM | WEIGHT: 239 LBS | DIASTOLIC BLOOD PRESSURE: 80 MMHG | SYSTOLIC BLOOD PRESSURE: 134 MMHG

## 2022-11-28 DIAGNOSIS — I48.3 TYPICAL ATRIAL FLUTTER (HCC): ICD-10-CM

## 2022-11-28 DIAGNOSIS — I50.32 CHRONIC DIASTOLIC CONGESTIVE HEART FAILURE (HCC): Primary | ICD-10-CM

## 2022-11-28 DIAGNOSIS — Z98.890 S/P ABLATION OF ATRIAL FIBRILLATION: ICD-10-CM

## 2022-11-28 DIAGNOSIS — Z86.79 S/P ABLATION OF ATRIAL FIBRILLATION: ICD-10-CM

## 2022-11-28 DIAGNOSIS — I47.20 VT (VENTRICULAR TACHYCARDIA): ICD-10-CM

## 2022-11-28 DIAGNOSIS — Z98.890 S/P ABLATION OF ATRIAL FLUTTER: ICD-10-CM

## 2022-11-28 DIAGNOSIS — Z86.79 S/P ABLATION OF ATRIAL FLUTTER: ICD-10-CM

## 2022-11-28 DIAGNOSIS — I48.0 PAROXYSMAL ATRIAL FIBRILLATION (HCC): ICD-10-CM

## 2022-11-28 PROCEDURE — 3078F DIAST BP <80 MM HG: CPT | Performed by: INTERNAL MEDICINE

## 2022-11-28 PROCEDURE — G8417 CALC BMI ABV UP PARAM F/U: HCPCS | Performed by: INTERNAL MEDICINE

## 2022-11-28 PROCEDURE — G9708 BILAT MAST/HX BI /UNILAT MAS: HCPCS | Performed by: INTERNAL MEDICINE

## 2022-11-28 PROCEDURE — 99214 OFFICE O/P EST MOD 30 MIN: CPT | Performed by: INTERNAL MEDICINE

## 2022-11-28 PROCEDURE — 3074F SYST BP LT 130 MM HG: CPT | Performed by: INTERNAL MEDICINE

## 2022-11-28 PROCEDURE — G8484 FLU IMMUNIZE NO ADMIN: HCPCS | Performed by: INTERNAL MEDICINE

## 2022-11-28 PROCEDURE — 1036F TOBACCO NON-USER: CPT | Performed by: INTERNAL MEDICINE

## 2022-11-28 PROCEDURE — 3017F COLORECTAL CA SCREEN DOC REV: CPT | Performed by: INTERNAL MEDICINE

## 2022-11-28 PROCEDURE — G8399 PT W/DXA RESULTS DOCUMENT: HCPCS | Performed by: INTERNAL MEDICINE

## 2022-11-28 PROCEDURE — 1123F ACP DISCUSS/DSCN MKR DOCD: CPT | Performed by: INTERNAL MEDICINE

## 2022-11-28 PROCEDURE — 1090F PRES/ABSN URINE INCON ASSESS: CPT | Performed by: INTERNAL MEDICINE

## 2022-11-28 PROCEDURE — G8427 DOCREV CUR MEDS BY ELIG CLIN: HCPCS | Performed by: INTERNAL MEDICINE

## 2022-11-28 NOTE — PROGRESS NOTES
fee  Subjective:      Patient ID: Shital Beckford is a 74 y.o. female. FAMILIA Light is being seen for cardiac follow up for afib/arrhythmia and now VT. Ablation 11/20, then ECV 12/20. EP stopped dofetilide and sob/ha/afib/back are much better. Wt up somewhat. Rhythm reasonable. Brief and occasional. Can walk without stopping. No swelling. No pnd or orthopnea. No palp/syncope. No sob.          Past Medical History:   Diagnosis Date    Acute diastolic congestive heart failure ([address] 11/26/2018    Allergic     SEASONAL    Atrial fibrillation ([address] H/O    Cancer ([facility] [address] 2009 AND 2011    BREAST right    Essential hypertension 3/13/2018    [place]     Migraine     Mixed hyperlipidemia 10/19/2020     Past Surgical History:   Procedure Laterality Date    ANKLE FRACTURE SURGERY  2004,2008    APPENDECTOMY  03/1983    ATRIAL ABLATION SURGERY  X3    Cryoablation for atrial fib    AXILLARY SURGERY Right     lymph node    BREAST BIOPSY  01/07/2011    BREAST BIOPSY  07/12/2011    left breast bx - benign     BREAST CYST EXCISION  06/2003    BREAST LUMPECTOMY  03/30/2009    CARDIAC CATHETERIZATION  5/2005,01/2006    COLONOSCOPY  1993,1995,1999,2003,2006    COLONOSCOPY  11/29/2011    Dr. Leydi Durham - benign polyps     COSMETIC SURGERY      FOOT SURGERY Right 01/15/2016    CORRECTION OF HAMMERTOES 1-5 RIGHT FOOT, WEIL OSTEOTOMY 2,3    FOOT SURGERY Right 05/12/2016    EXTENSOR TENDON LENGTHENING 4TH AND 5TH TOES RIGHT FOOT    FOOT SURGERY Right 08/11/2016    CORRECTION HAMMERTOES 1ST, 4TH AND 5TH DIGITS RIGHT FOOT WITH    HERNIA REPAIR  1993,1998,2000,2009    HYSTERECTOMY (CERVIX STATUS UNKNOWN)  12/1977    JOINT REPLACEMENT  06/2015    LEFT KNEE    KNEE SURGERY  06/09/2015    left knee replacement    LIPOMA RESECTION  09/2006    MASTECTOMY, BILATERAL Bilateral 03/15/2011    bilateral    NEUROMA SURGERY  1987,1988,1989,1992 , 1995    Left foot    OTHER SURGICAL HISTORY  1983, 1984 X 2, 1994 X 2, 2006    Bowel obstructions    RHINOPLASTY 1976    [facility]    UPPER GASTROINTESTINAL ENDOSCOPY  [id number]    VARICOSE VEIN SURGERY  2001     Social History     Socioeconomic History    Marital status:      Spouse name: Not on file    Number of children: 2    Years of education: 12    Highest education level: Not on file   Occupational History    Occupation: [employer] finish    Tobacco Use    Smoking status: Former     Packs/day: 0.25     Years: 8.00     Pack years: 2.00     Types: Cigarettes     Start date:      Quit date: 1988     Years since quittin.9    Smokeless tobacco: Never    Tobacco comments: Only smoked quarter pack a day if that   Substance and Sexual Activity    Alcohol use: No    Drug use: No    Sexual activity: Never   Other Topics Concern    Not on file   Social History Narrative    Not on file     Social Determinants of Health     Financial Resource Strain: Low Risk     Difficulty of Paying Living Expenses: Not hard at all   Food Insecurity: No Food Insecurity    Worried About Running Out of Food in the Last Year: Never true    Ran Out of Food in the Last Year: Never true   Transportation Needs: Not on file   Physical Activity: Not on file   Stress: Not on file   Social Connections: Not on file   Intimate Partner Violence: Not on file   Housing Stability: Not on file       Family hx reviewed, denies FH cardiac issues        Vitals:    22 1239   BP: 134/80   Pulse: 60         Wt 239      Review of Systems   Constitutional: Negative for activity change, appetite change and fatigue. Respiratory: Negative for cough, choking, chest tightness and shortness of breath. Cardiovascular: Negative for chest pain, palpitations and leg swelling. Denies PND or orthopnea. No tachycardia or syncope. Neurological: Negative for dizziness, syncope and light-headedness.    Psychiatric/Behavioral: Negative for behavioral problems, confusion and agitation. All other systems reviewed negative as done    Objective:   Physical Exam     Constitutional: She is oriented to person, place, and time. She appears well-developed and well-nourished. No distress. HENT:   Head: Normocephalic and atraumatic. Eyes: Conjunctivae and EOM are normal. Right eye exhibits no discharge. Left eye exhibits no discharge. Neck: Normal range of motion. No JVD present. Cardiovascular: Normal rate, somewhat irregular rhythm, S1 normal, S2 normal and normal heart sounds. Exam reveals no gallop. 1/6 syst murmur heard. Pulses:       Radial pulses are 2+ on the right side, and 2+ on the left side. Pulmonary/Chest: Effort normal and breath sounds normal. She has no rales. Abdominal: Soft. Bowel sounds are normal. There is no tenderness. Musculoskeletal: Normal range of motion. She exhibits no edema. Neurological: She is alert and oriented to person, place, and time. Skin: Skin is warm and dry. Psychiatric: She has a normal mood and affect. Her behavior is normal.       Assessment:        Diagnosis Orders   1. Chronic diastolic congestive heart failure ([address]        2. Typical atrial flutter (HCC)        3. Paroxysmal atrial fibrillation ([address]        4. S/P ablation of atrial flutter        5. S/P ablation of atrial fibrillation        6. VT (ventricular tachycardia)                Plan:      Since stopping dofetilide per EP all better. Feeling good. Compensated. On AC, no bleeding issues. Wt relatively stable. Now back on qd lasix  Continue metoprolol/verapamil. EP following. No changes. Reviewed previous records and testing including myoview 3/19 and 30 day monitor and cath 5/21 and echo 6/22. Follow up 3 months.

## 2022-12-14 ENCOUNTER — TELEPHONE (OUTPATIENT)
Dept: PRIMARY CARE CLINIC | Age: 72
End: 2022-12-14

## 2022-12-14 NOTE — TELEPHONE ENCOUNTER
Pt would like a call back in regards to her visit that was scheduled for today. She got a confirmation and showed up for the appointment only to be told that she was a day early for the AWV. Pt stated that this is the second year in a row that this has happened.     [phone]

## 2022-12-15 NOTE — TELEPHONE ENCOUNTER
Spoke with pt and addressed her concerns. Unfortunately she was scheduled 365 days from her last AWV and it needs to be 366. Pt is rescheduled.

## 2023-01-09 SDOH — HEALTH STABILITY: PHYSICAL HEALTH: ON AVERAGE, HOW MANY DAYS PER WEEK DO YOU ENGAGE IN MODERATE TO STRENUOUS EXERCISE (LIKE A BRISK WALK)?: 5 DAYS

## 2023-01-09 SDOH — HEALTH STABILITY: PHYSICAL HEALTH: ON AVERAGE, HOW MANY MINUTES DO YOU ENGAGE IN EXERCISE AT THIS LEVEL?: 30 MIN

## 2023-01-09 ASSESSMENT — PATIENT HEALTH QUESTIONNAIRE - PHQ9
1. LITTLE INTEREST OR PLEASURE IN DOING THINGS: 0
SUM OF ALL RESPONSES TO PHQ QUESTIONS 1-9: 0
SUM OF ALL RESPONSES TO PHQ9 QUESTIONS 1 & 2: 0
2. FEELING DOWN, DEPRESSED OR HOPELESS: 0

## 2023-01-09 ASSESSMENT — LIFESTYLE VARIABLES
HOW MANY STANDARD DRINKS CONTAINING ALCOHOL DO YOU HAVE ON A TYPICAL DAY: 0
HOW OFTEN DO YOU HAVE SIX OR MORE DRINKS ON ONE OCCASION: 1
HOW OFTEN DO YOU HAVE A DRINK CONTAINING ALCOHOL: 1
HOW OFTEN DO YOU HAVE A DRINK CONTAINING ALCOHOL: NEVER
HOW MANY STANDARD DRINKS CONTAINING ALCOHOL DO YOU HAVE ON A TYPICAL DAY: PATIENT DOES NOT DRINK

## 2023-01-16 ENCOUNTER — TELEPHONE (OUTPATIENT)
Dept: CARDIOLOGY CLINIC | Age: 73
End: 2023-01-16

## 2023-01-16 ENCOUNTER — ANTI-COAG VISIT (OUTPATIENT)
Dept: CARDIOLOGY CLINIC | Age: 73
End: 2023-01-16

## 2023-01-16 ENCOUNTER — OFFICE VISIT (OUTPATIENT)
Dept: PRIMARY CARE CLINIC | Age: 73
End: 2023-01-16
Payer: MEDICARE

## 2023-01-16 VITALS
DIASTOLIC BLOOD PRESSURE: 78 MMHG | BODY MASS INDEX: 34.69 KG/M2 | TEMPERATURE: 97.1 F | SYSTOLIC BLOOD PRESSURE: 124 MMHG | HEIGHT: 69 IN | WEIGHT: 234.2 LBS | HEART RATE: 92 BPM | RESPIRATION RATE: 16 BRPM | OXYGEN SATURATION: 95 %

## 2023-01-16 DIAGNOSIS — R73.03 PREDIABETES: ICD-10-CM

## 2023-01-16 DIAGNOSIS — I50.32 CHRONIC DIASTOLIC CONGESTIVE HEART FAILURE (HCC): ICD-10-CM

## 2023-01-16 DIAGNOSIS — I48.0 PAROXYSMAL ATRIAL FIBRILLATION (HCC): ICD-10-CM

## 2023-01-16 DIAGNOSIS — I10 ESSENTIAL HYPERTENSION: ICD-10-CM

## 2023-01-16 DIAGNOSIS — Z00.00 MEDICARE ANNUAL WELLNESS VISIT, SUBSEQUENT: Primary | ICD-10-CM

## 2023-01-16 DIAGNOSIS — K21.9 GASTROESOPHAGEAL REFLUX DISEASE, UNSPECIFIED WHETHER ESOPHAGITIS PRESENT: ICD-10-CM

## 2023-01-16 DIAGNOSIS — E55.9 VITAMIN D DEFICIENCY: ICD-10-CM

## 2023-01-16 DIAGNOSIS — E78.2 MIXED HYPERLIPIDEMIA: ICD-10-CM

## 2023-01-16 DIAGNOSIS — M54.50 RIGHT LOW BACK PAIN, UNSPECIFIED CHRONICITY, UNSPECIFIED WHETHER SCIATICA PRESENT: ICD-10-CM

## 2023-01-16 LAB
A/G RATIO: 2 (ref 1.1–2.2)
ALBUMIN SERPL-MCNC: 4.5 G/DL (ref 3.4–5)
ALP BLD-CCNC: 86 U/L (ref 40–129)
ALT SERPL-CCNC: 15 U/L (ref 10–40)
ANION GAP SERPL CALCULATED.3IONS-SCNC: 15 MMOL/L (ref 3–16)
AST SERPL-CCNC: 17 U/L (ref 15–37)
BILIRUB SERPL-MCNC: 0.3 MG/DL (ref 0–1)
BILIRUBIN, POC: NORMAL
BLOOD URINE, POC: NORMAL
BUN BLDV-MCNC: 17 MG/DL (ref 7–20)
CALCIUM SERPL-MCNC: 9.4 MG/DL (ref 8.3–10.6)
CHLORIDE BLD-SCNC: 100 MMOL/L (ref 99–110)
CHOLESTEROL, TOTAL: 205 MG/DL (ref 0–199)
CLARITY, POC: CLEAR
CO2: 22 MMOL/L (ref 21–32)
COLOR, POC: YELLOW
CREAT SERPL-MCNC: 0.6 MG/DL (ref 0.6–1.2)
GFR SERPL CREATININE-BSD FRML MDRD: >60 ML/MIN/{1.73_M2}
GLUCOSE BLD-MCNC: 112 MG/DL (ref 70–99)
GLUCOSE URINE, POC: NORMAL
HDLC SERPL-MCNC: 48 MG/DL (ref 40–60)
KETONES, POC: NORMAL
LDL CHOLESTEROL CALCULATED: 127 MG/DL
LEUKOCYTE EST, POC: NORMAL
NITRITE, POC: NORMAL
PH, POC: 5.5
POTASSIUM SERPL-SCNC: 4.5 MMOL/L (ref 3.5–5.1)
PROTEIN, POC: NORMAL
SODIUM BLD-SCNC: 137 MMOL/L (ref 136–145)
SPECIFIC GRAVITY, POC: 1.03
TOTAL PROTEIN: 6.8 G/DL (ref 6.4–8.2)
TRIGL SERPL-MCNC: 149 MG/DL (ref 0–150)
UROBILINOGEN, POC: 0.2
VITAMIN D 25-HYDROXY: 46.9 NG/ML
VLDLC SERPL CALC-MCNC: 30 MG/DL

## 2023-01-16 PROCEDURE — 3017F COLORECTAL CA SCREEN DOC REV: CPT | Performed by: INTERNAL MEDICINE

## 2023-01-16 PROCEDURE — 3074F SYST BP LT 130 MM HG: CPT | Performed by: INTERNAL MEDICINE

## 2023-01-16 PROCEDURE — 3078F DIAST BP <80 MM HG: CPT | Performed by: INTERNAL MEDICINE

## 2023-01-16 PROCEDURE — 81002 URINALYSIS NONAUTO W/O SCOPE: CPT | Performed by: INTERNAL MEDICINE

## 2023-01-16 PROCEDURE — G8484 FLU IMMUNIZE NO ADMIN: HCPCS | Performed by: INTERNAL MEDICINE

## 2023-01-16 PROCEDURE — 1123F ACP DISCUSS/DSCN MKR DOCD: CPT | Performed by: INTERNAL MEDICINE

## 2023-01-16 PROCEDURE — G0439 PPPS, SUBSEQ VISIT: HCPCS | Performed by: INTERNAL MEDICINE

## 2023-01-16 SDOH — ECONOMIC STABILITY: FOOD INSECURITY: WITHIN THE PAST 12 MONTHS, YOU WORRIED THAT YOUR FOOD WOULD RUN OUT BEFORE YOU GOT MONEY TO BUY MORE.: NEVER TRUE

## 2023-01-16 SDOH — ECONOMIC STABILITY: FOOD INSECURITY: WITHIN THE PAST 12 MONTHS, THE FOOD YOU BOUGHT JUST DIDN'T LAST AND YOU DIDN'T HAVE MONEY TO GET MORE.: NEVER TRUE

## 2023-01-16 ASSESSMENT — SOCIAL DETERMINANTS OF HEALTH (SDOH): HOW HARD IS IT FOR YOU TO PAY FOR THE VERY BASICS LIKE FOOD, HOUSING, MEDICAL CARE, AND HEATING?: NOT HARD AT ALL

## 2023-01-16 NOTE — PROGRESS NOTES
Medicare Annual Wellness Visit    Rosalva Sr is here for Medicare AWV    Assessment & Plan   1. Medicare annual wellness visit, subsequent  -Medicare AWV done    2. Essential hypertension  -stable  -Continue Metoprolol ER 100mg once daily   -Continue Verapamil 80mg once daily  -Low sodium diet  - Comprehensive Metabolic Panel; Future    3. Mixed hyperlipidemia  - stable  - Continue Zetia 10mg once daily  -Continue same medications  -Low fat, low cholesterol diet  -Regular aerobic exercise  - Comprehensive Metabolic Panel; Future  - Lipid Panel; Future    4. Prediabetes  - stable  -Low carbohydrate diet  -Regular aerobic exercise  - Hemoglobin A1C; Future    5. Vitamin D deficiency  -stable  -Continue Vitamin D 1,000 IU once daily  - Vitamin D 25 Hydroxy; Future    6. Right low back pain, unspecified chronicity, unspecified whether sciatica present  -Tylenol 650mg 3 times daily as needed  -back exercises  -Follow up with Orthopedics  - POCT Urinalysis no Micro    7. Chronic diastolic congestive heart failure (HCC)  -stable  -Continue Furosemide 20mg once daily  -Low sodium diet  -continue care per Cardiology    8. Paroxysmal atrial fibrillation (HCC)  -stable  -Continue care per Cardiology        Recommendations for Preventive Services Due: see orders and patient instructions/AVS.  Recommended screening schedule for the next 5-10 years is provided to the patient in written form: see Patient Instructions/AVS.           Return in 1 year (on 1/17/2024) for Medicare Annual Wellness Visit in 1 year.           Subjective   The following acute and/or chronic problems were also addressed today:    Patient has hypertension. Patient takes Metoprolol ER 100mg once daily and Verapamil 80mg once daily.    Patient has CHF. Patient takes Furosemide 20mg once daily.    Patient sees Cardiology. Patient states she had gained weight with verapamil and Tikosyn. Patient is still on Verapamil. Patient has noticed a little weight loss  since she stopped Tikosyn    Patient has prediabetes. Patient decreases carbohydrates.     Patient has hyperlipidemia. Patient takes Zetia. Patient had side effects with Lipitor in the past. Patient decreases fat and cholesterol. Patient walks 20 minutes daily weather depending.    Patient has GERD. Patient takes Pantoprazole 40mg once daily and it helps. Patient has occasional heartburn and reflux. Patient states she doesn't eat spicy foods. Patient states she eats spaghetti and cherry tomatoes once in awhile. Patient drinks 2 cups of coffee in the morning. Patient states she has a history of paraesophageal hernia and had a a repair.     Patient has Afib and states she has had 5 ablations.    Patient complains of right lower back pain x 1.5  weeks. Low back pain is dull and intermittent.   Tylenol helps. Patient states she doesn't like Physical Therapy.    Patient's complete Health Risk Assessment and screening values have been reviewed and are found in Flowsheets. The following problems were reviewed today and where indicated follow up appointments were made and/or referrals ordered.    Positive Risk Factor Screenings with Interventions:                 Weight and Activity:  Physical Activity: Sufficiently Active    Days of Exercise per Week: 5 days    Minutes of Exercise per Session: 30 min     On average, how many days per week do you engage in moderate to strenuous exercise (like a brisk walk)?: 5 days  Have you lost any weight without trying in the past 3 months?: No  Body mass index: (!) 34.58  Obesity Interventions:  Patient declines any further evaluation or treatment                               Objective   Vitals:    01/16/23 1030   BP: 124/78   Pulse: 92   Resp: 16   Temp: 97.1 °F (36.2 °C)   SpO2: 95%   Weight: 234 lb 3.2 oz (106.2 kg)   Height: 5' 9\" (1.753 m)      Body mass index is 34.59 kg/m².      General Appearance: alert and oriented to person, place and time, well-developed and well-nourished,  in no acute distress  Head: normocephalic and atraumatic  Eyes: pupils equal, round, and reactive to light, extraocular eye movements intact, conjunctivae normal  ENT: tympanic membrane, external ear and ear canal normal bilaterally, oropharynx clear and moist with normal mucous membranes  Neck: neck supple and non tender without mass, no thyromegaly or thyroid nodules, no cervical lymphadenopathy   Pulmonary/Chest: clear to auscultation bilaterally- no wheezes, rales or rhonchi, normal air movement, no respiratory distress  Cardiovascular: normal rate, regular rhythm, normal S1 and S2, and no carotid bruits  Abdomen: soft, non-tender, non-distended, normal bowel sounds, no masses or organomegaly  Extremities: no edema  Musculoskeletal: tenderness present over  right low back , back decreased range of motion  Neurologic: gait and coordination normal and speech normal       Allergies   Allergen Reactions    Dofetilide Shortness Of Breath    Keflex [Cephalexin] Shortness Of Breath and Nausea Only    Novocain [Procaine] Shortness Of Breath and Swelling     LIDOCAINE OK    Amiodarone Other (See Comments)     dizziness    Cephalexin     Diltiazem Other (See Comments)     dizziness    Penicillins Swelling    Sulfa Antibiotics Nausea Only and Swelling     Prior to Visit Medications    Medication Sig Taking?  Authorizing Provider   furosemide (LASIX) 20 MG tablet TAKE 1 TABLET DAILY Yes Radha Garrett MD   verapamil (CALAN) 80 MG tablet TAKE 1 TABLET BY MOUTH THREE TIMES A DAY Yes MAILE Palencia CNP   metoprolol succinate (TOPROL XL) 100 MG extended release tablet Take 1 tablet by mouth daily Yes MAILE Palencia CNP   acetaminophen (TYLENOL) 500 MG tablet Take 2 tablets 2- 3 times daily as needed Yes Angelica Ochoa MD   pantoprazole (PROTONIX) 40 MG tablet Take 1 tablet by mouth every morning (before breakfast) Yes MAILE Linton CNP   ezetimibe (ZETIA) 10 MG tablet TAKE 1 TABLET BY MOUTH EVERY DAY Yes MAILE Schwartz - CNP   nitroGLYCERIN (NITROSTAT) 0.4 MG SL tablet Place 1 tablet under the tongue every 5 minutes as needed for Chest pain Yes Louise Valdez, MAILE - CNP   Vitamin D, Cholecalciferol, 25 MCG (1000 UT) CAPS Take 1,000 Units by mouth daily Yes Uday Hutchinson MD   apixaban (ELIQUIS) 5 MG TABS tablet Take 1 tablet by mouth 2 times daily  Mayda Gudino MD       [facility] (Including outside providers/suppliers regularly involved in providing care):   Patient Care Team:  Uday Hutchinson MD as PCP - General (Internal Medicine)  Uday Hutchinson MD as PCP - Empaneled Provider  Tiffany Randle MD as Consulting Physician (Electrophysiology)  Kelly Jordan MD as Consulting Physician (Medical Oncology)     Reviewed and updated this visit:  Tobacco  Allergies  Meds  Med Hx  Surg Hx  Soc Hx  Fam Hx

## 2023-01-16 NOTE — PATIENT INSTRUCTIONS
Advance Directives: Care Instructions  Overview  An advance directive is a legal way to state your wishes at the end of your life. It tells your family and your doctor what to do if you can't say what you want. There are two main types of advance directives. You can change them any time your wishes change. Living will. This form tells your family and your doctor your wishes about life support and other treatment. The form is also called a declaration. Medical power of . This form lets you name a person to make treatment decisions for you when you can't speak for yourself. This person is called a health care agent (health care proxy, health care surrogate). The form is also called a durable power of  for health care. If you do not have an advance directive, decisions about your medical care may be made by a family member, or by a doctor or a  who doesn't know you. It may help to think of an advance directive as a gift to the people who care for you. If you have one, they won't have to make tough decisions by themselves. For more information, including forms for your state, see the [address] website (www.caringinfo.org/planning/advance-directives/). Follow-up care is a key part of your treatment and safety. Be sure to make and go to all appointments, and call your doctor if you are having problems. It's also a good idea to know your test results and keep a list of the medicines you take. What should you include in an advance directive? Many states have a unique advance directive form. (It may ask you to address specific issues.) Or you might use a universal form that's approved by many states. If your form doesn't tell you what to address, it may be hard to know what to include in your advance directive. Use the questions below to help you get started. Who do you want to make decisions about your medical care if you are not able to?   What life-support measures do you want if you have a serious illness that gets worse over time or can't be cured? What are you most afraid of that might happen? (Maybe you're afraid of having pain, losing your independence, or being kept alive by machines.)  Where would you prefer to die? (Your home? A hospital? A nursing home?)  Do you want to donate your organs when you die? Do you want certain Confucianism practices performed before you die? When should you call for help? Be sure to contact your doctor if you have any questions. Where can you learn more? Go to http://www.patino.com/ and enter R264 to learn more about \"Advance Directives: Care Instructions. \"  Current as of: June 16, 2022               Content Version: 13.5  © 4271-2975 Healthwise, Incorporated. Care instructions adapted under license by [facility] ([facility]). If you have questions about a medical condition or this instruction, always ask your healthcare professional. [place] [address] any warranty or liability for your use of this information. Personalized Preventive Plan for Chel Calzada - 1/16/2023  Medicare offers a range of preventive health benefits. Some of the tests and screenings are paid in full while other may be subject to a deductible, co-insurance, and/or copay. Some of these benefits include a comprehensive review of your medical history including lifestyle, illnesses that may run in your family, and various assessments and screenings as appropriate. After reviewing your medical record and screening and assessments performed today your provider may have ordered immunizations, labs, imaging, and/or referrals for you. A list of these orders (if applicable) as well as your Preventive Care list are included within your After Visit Summary for your review.     Other Preventive Recommendations:    A preventive eye exam performed by an eye specialist is recommended every 1-2 years to screen for glaucoma; cataracts, macular degeneration, and other eye disorders. A preventive dental visit is recommended every 6 months. Try to get at least 150 minutes of exercise per week or 10,000 steps per day on a pedometer . Order or download the FREE \"Exercise & Physical Activity: Your Everyday Guide\" from The [employer]. Call 3-128.100.9557 or search The [employer] online. You need 4027-7115 mg of calcium and 3561-8560 IU of vitamin D per day. It is possible to meet your calcium requirement with diet alone, but a vitamin D supplement is usually necessary to meet this goal.  When exposed to the sun, use a sunscreen that protects against both UVA and UVB radiation with an SPF of 30 or greater. Reapply every 2 to 3 hours or after sweating, drying off with a towel, or swimming. Always wear a seat belt when traveling in a car. Always wear a helmet when riding a bicycle or motorcycle.

## 2023-01-17 LAB
ESTIMATED AVERAGE GLUCOSE: 114 MG/DL
HBA1C MFR BLD: 5.6 %

## 2023-01-31 PROBLEM — I50.32 CHRONIC DIASTOLIC CONGESTIVE HEART FAILURE (HCC): Status: ACTIVE | Noted: 2023-01-31

## 2023-01-31 PROBLEM — M54.50 RIGHT LOW BACK PAIN: Status: ACTIVE | Noted: 2023-01-31

## 2023-02-13 RX ORDER — EZETIMIBE 10 MG/1
TABLET ORAL
Qty: 90 TABLET | Refills: 3 | Status: SHIPPED | OUTPATIENT
Start: 2023-02-13

## 2023-02-22 ENCOUNTER — TELEPHONE (OUTPATIENT)
Dept: CARDIOLOGY CLINIC | Age: 73
End: 2023-02-22

## 2023-02-22 RX ORDER — METOPROLOL SUCCINATE 100 MG/1
100 TABLET, EXTENDED RELEASE ORAL DAILY
Qty: 90 TABLET | Refills: 3 | Status: SHIPPED | OUTPATIENT
Start: 2023-02-22

## 2023-02-22 NOTE — TELEPHONE ENCOUNTER
Patient needs refills for the following medications:    apixaban (ELIQUIS) 5 MG TABS tablet [[id number]]     Order Details  Dose: 5 mg Route: Oral Frequency: 2 TIMES DAILY   Dispense Quantity: 2 tablet Refills: 0    Note to Pharmacy: Eliquis 5mg [id number] EXP 1/2025 X 2 boxes         Sig: Take 1 tablet by mouth 2 times daily     metoprolol succinate (TOPROL XL) 100 MG extended release tablet [[id number]]     Order Details  Dose: 100 mg Route: Oral Frequency: DAILY   Dispense Quantity: 90 tablet Refills: 3          Sig: Take 1 tablet by mouth daily         [facility] [address] [facility], [address] [facility] [phone] Aleshia Reed [phone]    Lov 11/28/22  Nov 3/6/23

## 2023-03-06 ENCOUNTER — OFFICE VISIT (OUTPATIENT)
Dept: CARDIOLOGY CLINIC | Age: 73
End: 2023-03-06
Payer: MEDICARE

## 2023-03-06 VITALS
DIASTOLIC BLOOD PRESSURE: 80 MMHG | HEART RATE: 68 BPM | BODY MASS INDEX: 34.85 KG/M2 | SYSTOLIC BLOOD PRESSURE: 134 MMHG | WEIGHT: 236 LBS

## 2023-03-06 DIAGNOSIS — Z86.79 S/P ABLATION OF ATRIAL FLUTTER: ICD-10-CM

## 2023-03-06 DIAGNOSIS — I48.3 TYPICAL ATRIAL FLUTTER (HCC): ICD-10-CM

## 2023-03-06 DIAGNOSIS — I48.0 PAROXYSMAL ATRIAL FIBRILLATION (HCC): ICD-10-CM

## 2023-03-06 DIAGNOSIS — Z86.79 S/P ABLATION OF ATRIAL FIBRILLATION: ICD-10-CM

## 2023-03-06 DIAGNOSIS — Z98.890 S/P ABLATION OF ATRIAL FIBRILLATION: ICD-10-CM

## 2023-03-06 DIAGNOSIS — I50.32 CHRONIC DIASTOLIC CONGESTIVE HEART FAILURE (HCC): Primary | ICD-10-CM

## 2023-03-06 DIAGNOSIS — Z98.890 S/P ABLATION OF ATRIAL FLUTTER: ICD-10-CM

## 2023-03-06 DIAGNOSIS — I47.20 VT (VENTRICULAR TACHYCARDIA): ICD-10-CM

## 2023-03-06 PROCEDURE — 3017F COLORECTAL CA SCREEN DOC REV: CPT | Performed by: INTERNAL MEDICINE

## 2023-03-06 PROCEDURE — G9708 BILAT MAST/HX BI /UNILAT MAS: HCPCS | Performed by: INTERNAL MEDICINE

## 2023-03-06 PROCEDURE — G8399 PT W/DXA RESULTS DOCUMENT: HCPCS | Performed by: INTERNAL MEDICINE

## 2023-03-06 PROCEDURE — 1036F TOBACCO NON-USER: CPT | Performed by: INTERNAL MEDICINE

## 2023-03-06 PROCEDURE — G8484 FLU IMMUNIZE NO ADMIN: HCPCS | Performed by: INTERNAL MEDICINE

## 2023-03-06 PROCEDURE — 1090F PRES/ABSN URINE INCON ASSESS: CPT | Performed by: INTERNAL MEDICINE

## 2023-03-06 PROCEDURE — 1123F ACP DISCUSS/DSCN MKR DOCD: CPT | Performed by: INTERNAL MEDICINE

## 2023-03-06 PROCEDURE — G8417 CALC BMI ABV UP PARAM F/U: HCPCS | Performed by: INTERNAL MEDICINE

## 2023-03-06 PROCEDURE — 3075F SYST BP GE 130 - 139MM HG: CPT | Performed by: INTERNAL MEDICINE

## 2023-03-06 PROCEDURE — 99214 OFFICE O/P EST MOD 30 MIN: CPT | Performed by: INTERNAL MEDICINE

## 2023-03-06 PROCEDURE — 3079F DIAST BP 80-89 MM HG: CPT | Performed by: INTERNAL MEDICINE

## 2023-03-06 PROCEDURE — G8428 CUR MEDS NOT DOCUMENT: HCPCS | Performed by: INTERNAL MEDICINE

## 2023-03-06 NOTE — PROGRESS NOTES
fee  Subjective:      Patient ID: Pam Hatchet is a 74 y.o. female. HPI Shaniqua Garza is being seen for cardiac follow up for afib/arrhythmia and now VT. Ablation 11/20, then ECV 12/20. EP stopped dofetilide and sob/ha/afib/back are much better. Wt stable. Rhythm reasonable. Brief and occasional. Activity level good. No swelling. No pnd or orthopnea. No palp/syncope. No sob.          Past Medical History:   Diagnosis Date    Acute diastolic congestive heart failure ([address] 11/26/2018    Allergic     SEASONAL    Anticoagulant long-term use     Atrial fibrillation ([address] H/O    Cancer ([facility] 2009 AND 2011    BREAST right    Chronic back pain     Essential hypertension 03/13/2018    GERD (gastroesophageal reflux disease) 1983    Hammertoe     Migraine     Mixed hyperlipidemia 10/19/2020     Past Surgical History:   Procedure Laterality Date    ANKLE FRACTURE SURGERY  2004,2008    APPENDECTOMY  03/1983    ATRIAL ABLATION SURGERY  X3    Cryoablation for atrial fib    AXILLARY SURGERY Right     lymph node    BREAST BIOPSY  01/07/2011    BREAST BIOPSY  07/12/2011    left breast bx - benign     BREAST CYST EXCISION  06/2003    BREAST LUMPECTOMY  03/30/2009    CARDIAC CATHETERIZATION  5/2005,01/2006    COLONOSCOPY  1993,1995,1999,2003,2006    COLONOSCOPY  11/29/2011    Dr. Adriana Booth - benign polyps     COSMETIC SURGERY      FOOT SURGERY Right 01/15/2016    CORRECTION OF HAMMERTOES 1-5 RIGHT FOOT, WEIL OSTEOTOMY 2,3    FOOT SURGERY Right 05/12/2016    EXTENSOR TENDON LENGTHENING 4TH AND 5TH TOES RIGHT FOOT    FOOT SURGERY Right 08/11/2016    CORRECTION HAMMERTOES 1ST, 4TH AND 5TH DIGITS RIGHT FOOT WITH    FRACTURE SURGERY      To many to list    HERNIA REPAIR  1993,1998,2000,2009    HYSTERECTOMY (CERVIX STATUS UNKNOWN)  12/1977    HYSTERECTOMY, VAGINAL      JOINT REPLACEMENT  06/2015    LEFT KNEE    KNEE SURGERY  06/09/2015    left knee replacement    LIPOMA RESECTION  09/2006    MASTECTOMY, BILATERAL Bilateral 03/15/2011 bilateral    NEUROMA SURGERY  5770,0988,1727,8233 , 1995    Left foot    OTHER SURGICAL HISTORY  , 1984 X 2, 1994 X 2, 2006    Bowel obstructions    PARTIAL HYSTERECTOMY (CERVIX NOT REMOVED)      RHINOPLASTY  1976    RHINOPLASTY      SMALL INTESTINE SURGERY   +    To many to list    [address] ENDOSCOPY  0894,2864,7077,7081,8570,9820    VARICOSE VEIN SURGERY  2001     Social History     Socioeconomic History    Marital status:      Spouse name: Not on file    Number of children: 2    Years of education: 12    Highest education level: Not on file   Occupational History    Occupation: [employer] [address] finish    Tobacco Use    Smoking status: Former     Packs/day: 0.25     Years: 8.00     Pack years: 2.00     Types: Cigarettes     Start date: 1980     Quit date: 1988     Years since quittin.2    Smokeless tobacco: Never    Tobacco comments:      Only smoked quarter pack a day if that   Substance and Sexual Activity    Alcohol use: No    Drug use: No    Sexual activity: Not Currently     Partners: Male   Other Topics Concern    Not on file   Social History Narrative    Not on file     Social Determinants of Health     Financial Resource Strain: Low Risk     Difficulty of Paying Living Expenses: Not hard at all   Food Insecurity: No Food Insecurity    Worried About Running Out of Food in the Last Year: Never true    Ran Out of Food in the Last Year: Never true   Transportation Needs: Not on file   Physical Activity: Sufficiently Active    Days of Exercise per Week: 5 days    Minutes of Exercise per Session: 30 min   Stress: Not on file   Social Connections: Not on file   Intimate Partner Violence: Not on file   Housing Stability: Not on file       Family hx reviewed, denies FH cardiac issues        Vitals:    23 1308   BP: 134/80   Pulse: 68         Wt 236    Review of Systems   Constitutional: Negative for activity change, appetite change and fatigue. Respiratory: Negative for cough, choking, chest tightness and shortness of breath. Cardiovascular: Negative for chest pain, palpitations and leg swelling. Denies PND or orthopnea. No tachycardia or syncope. Neurological: Negative for dizziness, syncope and light-headedness. Psychiatric/Behavioral: Negative for behavioral problems, confusion and agitation. All other systems reviewed negative as done    Objective:   Physical Exam     Constitutional: She is oriented to person, place, and time. She appears well-developed and well-nourished. No distress. HENT:   Head: Normocephalic and atraumatic. Eyes: Conjunctivae and EOM are normal. Right eye exhibits no discharge. Left eye exhibits no discharge. Neck: Normal range of motion. No JVD present. Cardiovascular: Normal rate, somewhat irregular rhythm, S1 normal, S2 normal and normal heart sounds. Exam reveals no gallop. 1/6 syst murmur heard. Pulses:       Radial pulses are 2+ on the right side, and 2+ on the left side. Pulmonary/Chest: Effort normal and breath sounds normal. She has no rales. Abdominal: Soft. Bowel sounds are normal. There is no tenderness. Musculoskeletal: Normal range of motion. She exhibits no edema. Neurological: She is alert and oriented to person, place, and time. Skin: Skin is warm and dry. Psychiatric: She has a normal mood and affect. Her behavior is normal.       Assessment:        Diagnosis Orders   1. Chronic diastolic congestive heart failure ([address]        2. Typical atrial flutter (HCC)        3. Paroxysmal atrial fibrillation ([address]        4. S/P ablation of atrial flutter        5. S/P ablation of atrial fibrillation        6. VT (ventricular tachycardia)                  Plan:      Since stopping dofetilide per EP all better. still. Feeling good. Compensated. On AC, no bleeding issues. Wt relatively stable.    Now back on qd lasix  Continue metoprolol/verapamil. EP following. No changes. Reviewed previous records and testing including myoview 3/19 and 30 day monitor and cath 5/21 and echo 6/22. Follow up 6 months.

## 2023-03-21 ENCOUNTER — OFFICE VISIT (OUTPATIENT)
Dept: ORTHOPEDIC SURGERY | Age: 73
End: 2023-03-21
Payer: MEDICARE

## 2023-03-21 VITALS — BODY MASS INDEX: 34.96 KG/M2 | HEIGHT: 69 IN | WEIGHT: 236 LBS

## 2023-03-21 DIAGNOSIS — M48.061 DEGENERATIVE LUMBAR SPINAL STENOSIS: Primary | ICD-10-CM

## 2023-03-21 DIAGNOSIS — M51.34 DDD (DEGENERATIVE DISC DISEASE), THORACIC: ICD-10-CM

## 2023-03-21 DIAGNOSIS — M47.816 LUMBAR SPONDYLOSIS: ICD-10-CM

## 2023-03-21 DIAGNOSIS — Z79.01 CHRONIC ANTICOAGULATION: ICD-10-CM

## 2023-03-21 PROCEDURE — 99214 OFFICE O/P EST MOD 30 MIN: CPT | Performed by: INTERNAL MEDICINE

## 2023-03-21 PROCEDURE — G8484 FLU IMMUNIZE NO ADMIN: HCPCS | Performed by: INTERNAL MEDICINE

## 2023-03-21 PROCEDURE — 1090F PRES/ABSN URINE INCON ASSESS: CPT | Performed by: INTERNAL MEDICINE

## 2023-03-21 PROCEDURE — 1123F ACP DISCUSS/DSCN MKR DOCD: CPT | Performed by: INTERNAL MEDICINE

## 2023-03-21 PROCEDURE — 3017F COLORECTAL CA SCREEN DOC REV: CPT | Performed by: INTERNAL MEDICINE

## 2023-03-21 PROCEDURE — G8417 CALC BMI ABV UP PARAM F/U: HCPCS | Performed by: INTERNAL MEDICINE

## 2023-03-21 PROCEDURE — 1036F TOBACCO NON-USER: CPT | Performed by: INTERNAL MEDICINE

## 2023-03-21 PROCEDURE — G8428 CUR MEDS NOT DOCUMENT: HCPCS | Performed by: INTERNAL MEDICINE

## 2023-03-21 PROCEDURE — G8399 PT W/DXA RESULTS DOCUMENT: HCPCS | Performed by: INTERNAL MEDICINE

## 2023-03-21 PROCEDURE — G9708 BILAT MAST/HX BI /UNILAT MAS: HCPCS | Performed by: INTERNAL MEDICINE

## 2023-03-21 RX ORDER — LIDOCAINE 50 MG/G
1 PATCH TOPICAL EVERY 12 HOURS
Qty: 30 PATCH | Refills: 1 | Status: SHIPPED | OUTPATIENT
Start: 2023-03-21

## 2023-03-21 NOTE — PROGRESS NOTES
file   Social History Narrative    Not on file     Social Determinants of Health     Financial Resource Strain: Low Risk     Difficulty of Paying Living Expenses: Not hard at all   Food Insecurity: No Food Insecurity    Worried About Running Out of Food in the Last Year: Never true    Ran Out of Food in the Last Year: Never true   Transportation Needs: Not on file   Physical Activity: Sufficiently Active    Days of Exercise per Week: 5 days    Minutes of Exercise per Session: 30 min   Stress: Not on file   Social Connections: Not on file   Intimate Partner Violence: Not on file   Housing Stability: Not on file        Review of Symptoms:    Pertinent items are noted in HPI    10 point review of systems negative except as mentioned in HPI    Vital Signs: There were no vitals filed for this visit. General Exam:     Constitutional: Patient is adequately groomed with no evidence of malnutrition  Mental Status: The patient is oriented to time, place and person. The patient's mood and affect are appropriate. Vascular: Examination reveals no swelling or calf tenderness. Peripheral pulses are palpable and 2+. Lymphatics: no lymphadenopathy of the inguinal region or lower extremity      Physical Exam: lower back      Primary Exam:    Inspection: No deformity atrophy appreciable curvature      Palpation: No focal trigger point tenderness      Range of Motion: 68/5 pain with flexion greater than extension      Strength: Normal lower extremity      Special Tests: Negative SLR      Skin: There are no rashes, ulcerations or lesions. Gait: Antalgic      Reflex intact lower     Additional Comments:        Additional Examinations:           Neurolgic -Light touch sensation and manual muscle testing normal L2-S1. No fasiculations. Pattella tendon and Achilles tendon reflexes +2 bilaterally.   Seated SLR negative           Office Imaging Results/Procedures PerformedToday:            Office Procedures:     Orders Placed

## 2023-03-29 ENCOUNTER — TELEPHONE (OUTPATIENT)
Dept: CARDIOLOGY CLINIC | Age: 73
End: 2023-03-29

## 2023-03-29 NOTE — TELEPHONE ENCOUNTER
Nupur Price from [facility] called to get permission to omit medication. CARDIAC CLEARANCE     What type of procedure are you having? Lumbar Epidural Steroid Injection    Which physician is performing your procedure? Dr. Katiuska Bhat    When is your procedure scheduled for? Not scheduled yet    Where are you having this procedure? [address]    Are you taking Blood Thinners? yes  apixaban (ELIQUIS) 5 MG TABS tablet        Does the surgeon want you to stop your blood thinner? yes If so for how long?  3 days prior    Phone Number and Contact Name for Physicians office: Nupur Price [phone]    Fax number to send information: [phone]

## 2023-04-25 ENCOUNTER — TELEPHONE (OUTPATIENT)
Dept: CARDIOLOGY CLINIC | Age: 73
End: 2023-04-25

## 2023-04-25 ENCOUNTER — OFFICE VISIT (OUTPATIENT)
Dept: CARDIOLOGY CLINIC | Age: 73
End: 2023-04-25
Payer: MEDICARE

## 2023-04-25 VITALS
HEART RATE: 102 BPM | SYSTOLIC BLOOD PRESSURE: 118 MMHG | BODY MASS INDEX: 33.97 KG/M2 | DIASTOLIC BLOOD PRESSURE: 76 MMHG | WEIGHT: 230 LBS

## 2023-04-25 DIAGNOSIS — D68.69 SECONDARY HYPERCOAGULABLE STATE (HCC): ICD-10-CM

## 2023-04-25 DIAGNOSIS — I47.29 NSVT (NONSUSTAINED VENTRICULAR TACHYCARDIA) (HCC): ICD-10-CM

## 2023-04-25 DIAGNOSIS — Z79.01 ON CONTINUOUS ORAL ANTICOAGULATION: ICD-10-CM

## 2023-04-25 DIAGNOSIS — I45.5 SINUS PAUSE: ICD-10-CM

## 2023-04-25 DIAGNOSIS — I50.32 CHRONIC DIASTOLIC CHF (CONGESTIVE HEART FAILURE) (HCC): ICD-10-CM

## 2023-04-25 DIAGNOSIS — I47.1 ATRIAL TACHYCARDIA (HCC): ICD-10-CM

## 2023-04-25 DIAGNOSIS — I48.3 TYPICAL ATRIAL FLUTTER (HCC): ICD-10-CM

## 2023-04-25 DIAGNOSIS — I48.0 PAROXYSMAL ATRIAL FIBRILLATION (HCC): Primary | ICD-10-CM

## 2023-04-25 PROCEDURE — G9708 BILAT MAST/HX BI /UNILAT MAS: HCPCS | Performed by: NURSE PRACTITIONER

## 2023-04-25 PROCEDURE — 93000 ELECTROCARDIOGRAM COMPLETE: CPT | Performed by: NURSE PRACTITIONER

## 2023-04-25 PROCEDURE — G8417 CALC BMI ABV UP PARAM F/U: HCPCS | Performed by: NURSE PRACTITIONER

## 2023-04-25 PROCEDURE — G8399 PT W/DXA RESULTS DOCUMENT: HCPCS | Performed by: NURSE PRACTITIONER

## 2023-04-25 PROCEDURE — 1036F TOBACCO NON-USER: CPT | Performed by: NURSE PRACTITIONER

## 2023-04-25 PROCEDURE — 93246 EXT ECG>7D<15D RECORDING: CPT | Performed by: INTERNAL MEDICINE

## 2023-04-25 PROCEDURE — G8427 DOCREV CUR MEDS BY ELIG CLIN: HCPCS | Performed by: NURSE PRACTITIONER

## 2023-04-25 PROCEDURE — 1123F ACP DISCUSS/DSCN MKR DOCD: CPT | Performed by: NURSE PRACTITIONER

## 2023-04-25 PROCEDURE — 99215 OFFICE O/P EST HI 40 MIN: CPT | Performed by: NURSE PRACTITIONER

## 2023-04-25 PROCEDURE — 3078F DIAST BP <80 MM HG: CPT | Performed by: NURSE PRACTITIONER

## 2023-04-25 PROCEDURE — 3074F SYST BP LT 130 MM HG: CPT | Performed by: NURSE PRACTITIONER

## 2023-04-25 PROCEDURE — 1090F PRES/ABSN URINE INCON ASSESS: CPT | Performed by: NURSE PRACTITIONER

## 2023-04-25 PROCEDURE — 3017F COLORECTAL CA SCREEN DOC REV: CPT | Performed by: NURSE PRACTITIONER

## 2023-05-02 ENCOUNTER — OFFICE VISIT (OUTPATIENT)
Dept: ORTHOPEDIC SURGERY | Age: 73
End: 2023-05-02

## 2023-05-02 VITALS — WEIGHT: 230 LBS | BODY MASS INDEX: 33.97 KG/M2

## 2023-05-02 DIAGNOSIS — M51.34 DDD (DEGENERATIVE DISC DISEASE), THORACIC: ICD-10-CM

## 2023-05-02 DIAGNOSIS — M48.061 DEGENERATIVE LUMBAR SPINAL STENOSIS: Primary | ICD-10-CM

## 2023-05-02 DIAGNOSIS — M47.816 LUMBAR SPONDYLOSIS: ICD-10-CM

## 2023-05-02 DIAGNOSIS — Z79.01 CHRONIC ANTICOAGULATION: ICD-10-CM

## 2023-05-06 NOTE — PROGRESS NOTES
Chief Complaint:   Chief Complaint   Patient presents with    Back Pain     Thoracic and lumbar f/u. History of Present Illness:       Patient is a 74 y.o. female returns follow up for the above complaint. The patient was last seen approximately 6 weeksago. The symptoms are improving since the last visit. The patient has had further testing for the problem. The patient underwent lumbar L TRISTEN in the interim. She estimates percent improvement related to this    Back: leg pain 100:0. Pain in back is aching in quality. Pain levels:1. The patient denies new onset or progressive weakness of the lower extremities. The patient denies new onset bowel or bladder dysfunction. She continues with as needed use of Lidoderm     Past Medical History:        Past Medical History:   Diagnosis Date    Acute diastolic congestive heart failure ([facility] 11/26/2018    Allergic     SEASONAL    Anticoagulant long-term use     Atrial fibrillation ([facility] H/O    Cancer ([facility] 2009 AND 2011    BREAST right    Chronic back pain     Essential hypertension 03/13/2018    GERD (gastroesophageal reflux disease) 1983    Hammertoe     Migraine     Mixed hyperlipidemia 10/19/2020        Present Medications:         Current Outpatient Medications   Medication Sig Dispense Refill    lidocaine (LIDODERM) 5 % Place 1 patch onto the skin in the morning and 1 patch in the evening. 12 hours on, 12 hours off. . 30 patch 1    apixaban (ELIQUIS) 5 MG TABS tablet Take 1 tablet by mouth 2 times daily 60 tablet 5    metoprolol succinate (TOPROL XL) 100 MG extended release tablet Take 1 tablet by mouth daily 90 tablet 3    ezetimibe (ZETIA) 10 MG tablet TAKE 1 TABLET BY MOUTH EVERY DAY 90 tablet 3    furosemide (LASIX) 20 MG tablet TAKE 1 TABLET DAILY 90 tablet 3    verapamil (CALAN) 80 MG tablet TAKE 1 TABLET BY MOUTH THREE TIMES A  tablet 3    acetaminophen (TYLENOL) 500 MG tablet Take 2 tablets 2- 3 times daily as needed

## 2023-05-16 ENCOUNTER — PATIENT MESSAGE (OUTPATIENT)
Dept: CARDIOLOGY CLINIC | Age: 73
End: 2023-05-16

## 2023-05-16 NOTE — TELEPHONE ENCOUNTER
From: Matthew Kent  To: Mayela Bass  Sent: 5/16/2023 2:34 PM EDT  Subject: Monitor     Good afternoon, just checking to see if you received my results from the monitor that I wore for 2 weeks? Please let me know how the ticker is working.  Thank you, Louise Gray

## 2023-05-18 PROCEDURE — 93248 EXT ECG>7D<15D REV&INTERPJ: CPT | Performed by: INTERNAL MEDICINE

## 2023-05-18 NOTE — TELEPHONE ENCOUNTER
Reviewed results with pt on the phone, 77% AF/AFL burden. Reviewed options, per UL. See [employer] message as pt requested options in writing.

## 2023-05-22 DIAGNOSIS — I48.3 TYPICAL ATRIAL FLUTTER (HCC): Primary | ICD-10-CM

## 2023-06-09 DIAGNOSIS — I10 ESSENTIAL HYPERTENSION: ICD-10-CM

## 2023-06-09 RX ORDER — FUROSEMIDE 20 MG/1
20 TABLET ORAL DAILY
Qty: 90 TABLET | Refills: 3 | Status: SHIPPED | OUTPATIENT
Start: 2023-06-09

## 2023-06-09 NOTE — TELEPHONE ENCOUNTER
I Medication Refills:    furosemide (LASIX) 20 MG tablet [[id number]]     Order Details  Dose, Route, Frequency: As Directed   Dispense Quantity: 90 tablet Refills: 3          Sig: TAKE 1 TABLET DAILY         Start Date: 10/25/22 End Date: --   Written Date: 10/25/22 Expiration Date: 10/25/23       Pharmacy    [address] [facility] [address] [phone] - F [phone]   [address] [place] [address]. [address]   Phone:  [phone]  Fax:  [phone]       Last Office Visit: 04/25/23    Next Office Visit: 12/19/23    Last Refill: 10/25/22    Last Labs: 01/16/23

## 2023-06-30 RX ORDER — VERAPAMIL HYDROCHLORIDE 80 MG/1
TABLET ORAL
Qty: 270 TABLET | Refills: 0 | Status: SHIPPED | OUTPATIENT
Start: 2023-06-30

## 2023-07-05 ENCOUNTER — OFFICE VISIT (OUTPATIENT)
Dept: ORTHOPEDIC SURGERY | Age: 73
End: 2023-07-05
Payer: MEDICARE

## 2023-07-05 VITALS — WEIGHT: 232 LBS | BODY MASS INDEX: 34.36 KG/M2 | HEIGHT: 69 IN

## 2023-07-05 DIAGNOSIS — M51.36 DDD (DEGENERATIVE DISC DISEASE), LUMBAR: ICD-10-CM

## 2023-07-05 DIAGNOSIS — M47.814 THORACIC SPONDYLOSIS: Primary | ICD-10-CM

## 2023-07-05 PROCEDURE — G9708 BILAT MAST/HX BI /UNILAT MAS: HCPCS | Performed by: INTERNAL MEDICINE

## 2023-07-05 PROCEDURE — 1090F PRES/ABSN URINE INCON ASSESS: CPT | Performed by: INTERNAL MEDICINE

## 2023-07-05 PROCEDURE — G8427 DOCREV CUR MEDS BY ELIG CLIN: HCPCS | Performed by: INTERNAL MEDICINE

## 2023-07-05 PROCEDURE — 99214 OFFICE O/P EST MOD 30 MIN: CPT | Performed by: INTERNAL MEDICINE

## 2023-07-05 PROCEDURE — G8417 CALC BMI ABV UP PARAM F/U: HCPCS | Performed by: INTERNAL MEDICINE

## 2023-07-05 PROCEDURE — 3017F COLORECTAL CA SCREEN DOC REV: CPT | Performed by: INTERNAL MEDICINE

## 2023-07-05 PROCEDURE — G8399 PT W/DXA RESULTS DOCUMENT: HCPCS | Performed by: INTERNAL MEDICINE

## 2023-07-05 PROCEDURE — 1123F ACP DISCUSS/DSCN MKR DOCD: CPT | Performed by: INTERNAL MEDICINE

## 2023-07-05 PROCEDURE — 1036F TOBACCO NON-USER: CPT | Performed by: INTERNAL MEDICINE

## 2023-07-07 RX ORDER — LIDOCAINE 50 MG/G
1 PATCH TOPICAL EVERY 12 HOURS
Qty: 30 PATCH | Refills: 1 | Status: SHIPPED | OUTPATIENT
Start: 2023-07-07

## 2023-07-07 NOTE — H&P (VIEW-ONLY)
Chief Complaint:   Chief Complaint   Patient presents with    Follow-up     F/u thoracic. Feels that her pain has increased since her last visit. Has continued her HEP. Previously had thoracic MRI in 2022. NKI.  5 1/2-10          History of Present Illness:       Patient is a 74 y.o. female presents with the above complaint. Patient has recently noted a subjective \"lump\" at mid back at bra line level. She senses this primarily when seated with contact pressure against the back. The symptoms are intermittent  and are show no change since the onset. The symptoms of back pain  do show a neurogenic claudication pattern and is worsened by sitting and improved with standing. There is not new onset weakness or progressive weakness of the lower extremities that has developed. The patient denies new onset bowel or bladder dysfunction. There is no history of previous spinal trauma. The patient does not have history or orthopaedic lumbar spine surgery. Pain localizes to the lower thoracic region-right paraxial    Pain levels: Mild. There is no reproduction of pain with deep inspiration. There is no  lower limb pain. Work-up to date has included:MRI thoracic spine referenced below  Prior treatment has included exercises. This patient reports some improvement with this treatment. The patient has no history or autoimmune disease, inflammatory arthropathy or crystal arthropathy.       Past Medical History:        Past Medical History:   Diagnosis Date    Acute diastolic congestive heart failure ([address]) 11/26/2018    Allergic     SEASONAL    Anticoagulant long-term use     Atrial fibrillation ([address]) H/O    Cancer ([address]) 2009 AND 2011    BREAST right    Chronic back pain     Essential hypertension 03/13/2018    GERD (gastroesophageal reflux disease) 1983    Hammertoe     Migraine     Mixed hyperlipidemia 10/19/2020         Past Surgical History:   Procedure Laterality Date    ANKLE FRACTURE SURGERY

## 2023-07-27 DIAGNOSIS — I48.0 PAROXYSMAL ATRIAL FIBRILLATION (HCC): ICD-10-CM

## 2023-07-27 LAB
ANION GAP SERPL CALCULATED.3IONS-SCNC: 13 MMOL/L (ref 3–16)
BUN SERPL-MCNC: 17 MG/DL (ref 7–20)
CALCIUM SERPL-MCNC: 9.3 MG/DL (ref 8.3–10.6)
CHLORIDE SERPL-SCNC: 102 MMOL/L (ref 99–110)
CO2 SERPL-SCNC: 25 MMOL/L (ref 21–32)
CREAT SERPL-MCNC: 0.6 MG/DL (ref 0.6–1.2)
DEPRECATED RDW RBC AUTO: 13.6 % (ref 12.4–15.4)
GFR SERPLBLD CREATININE-BSD FMLA CKD-EPI: >60 ML/MIN/{1.73_M2}
GLUCOSE SERPL-MCNC: 111 MG/DL (ref 70–99)
HCT VFR BLD AUTO: 40 % (ref 36–48)
HGB BLD-MCNC: 13.2 G/DL (ref 12–16)
INR PPP: 1.05 (ref 0.84–1.16)
MCH RBC QN AUTO: 29.9 PG (ref 26–34)
MCHC RBC AUTO-ENTMCNC: 33 G/DL (ref 31–36)
MCV RBC AUTO: 90.8 FL (ref 80–100)
PLATELET # BLD AUTO: 315 K/UL (ref 135–450)
PMV BLD AUTO: 8.2 FL (ref 5–10.5)
POTASSIUM SERPL-SCNC: 4.2 MMOL/L (ref 3.5–5.1)
PROTHROMBIN TIME: 13.7 SEC (ref 11.5–14.8)
RBC # BLD AUTO: 4.41 M/UL (ref 4–5.2)
SODIUM SERPL-SCNC: 140 MMOL/L (ref 136–145)
WBC # BLD AUTO: 8.2 K/UL (ref 4–11)

## 2023-08-02 ENCOUNTER — HOSPITAL ENCOUNTER (OUTPATIENT)
Dept: GENERAL RADIOLOGY | Age: 73
Discharge: HOME | End: 2023-08-02
Attending: INTERNAL MEDICINE
Payer: MEDICARE

## 2023-08-02 ENCOUNTER — HOSPITAL ENCOUNTER (OUTPATIENT)
Dept: CARDIAC CATH/INVASIVE PROCEDURES | Age: 73
Setting detail: OBSERVATION
Discharge: HOME | End: 2023-08-03
Attending: INTERNAL MEDICINE | Admitting: INTERNAL MEDICINE
Payer: MEDICARE

## 2023-08-02 ENCOUNTER — ANESTHESIA (OUTPATIENT)
Dept: CARDIAC CATH/INVASIVE PROCEDURES | Age: 73
End: 2023-08-02

## 2023-08-02 ENCOUNTER — ANESTHESIA EVENT (OUTPATIENT)
Dept: CARDIAC CATH/INVASIVE PROCEDURES | Age: 73
End: 2023-08-02

## 2023-08-02 DIAGNOSIS — I47.20 VT (VENTRICULAR TACHYCARDIA) (HCC): ICD-10-CM

## 2023-08-02 DIAGNOSIS — I50.32 CHRONIC DIASTOLIC CHF (CONGESTIVE HEART FAILURE) (HCC): ICD-10-CM

## 2023-08-02 DIAGNOSIS — I48.21 PERMANENT ATRIAL FIBRILLATION (HCC): ICD-10-CM

## 2023-08-02 DIAGNOSIS — I50.32 CHRONIC DIASTOLIC CONGESTIVE HEART FAILURE (HCC): ICD-10-CM

## 2023-08-02 DIAGNOSIS — I48.4 ATYPICAL ATRIAL FLUTTER (HCC): ICD-10-CM

## 2023-08-02 DIAGNOSIS — I48.0 PAROXYSMAL ATRIAL FIBRILLATION (HCC): ICD-10-CM

## 2023-08-02 DIAGNOSIS — R00.1 BRADYCARDIA: ICD-10-CM

## 2023-08-02 DIAGNOSIS — Z95.0 CARDIAC RESYNCHRONIZATION THERAPY PACEMAKER (CRT-P) IN PLACE: Primary | ICD-10-CM

## 2023-08-02 LAB
EKG ATRIAL RATE: 366 BPM
EKG DIAGNOSIS: NORMAL
EKG Q-T INTERVAL: 342 MS
EKG QRS DURATION: 86 MS
EKG QTC CALCULATION (BAZETT): 434 MS
EKG R AXIS: 9 DEGREES
EKG T AXIS: 39 DEGREES
EKG VENTRICULAR RATE: 97 BPM

## 2023-08-02 PROCEDURE — 33999 UNLISTED PX CARDIAC SURGERY: CPT

## 2023-08-02 PROCEDURE — 71045 X-RAY EXAM CHEST 1 VIEW: CPT

## 2023-08-02 PROCEDURE — 2580000003 HC RX 258: Performed by: INTERNAL MEDICINE

## 2023-08-02 PROCEDURE — 2580000003 HC RX 258: Performed by: NURSE ANESTHETIST, CERTIFIED REGISTERED

## 2023-08-02 PROCEDURE — 6360000002 HC RX W HCPCS: Performed by: INTERNAL MEDICINE

## 2023-08-02 PROCEDURE — 2500000003 HC RX 250 WO HCPCS: Performed by: NURSE ANESTHETIST, CERTIFIED REGISTERED

## 2023-08-02 PROCEDURE — 6360000002 HC RX W HCPCS: Performed by: ANESTHESIOLOGY

## 2023-08-02 PROCEDURE — C1894 INTRO/SHEATH, NON-LASER: HCPCS

## 2023-08-02 PROCEDURE — C1730 CATH, EP, 19 OR FEW ELECT: HCPCS

## 2023-08-02 PROCEDURE — 7100000001 HC PACU RECOVERY - ADDTL 15 MIN

## 2023-08-02 PROCEDURE — 93010 ELECTROCARDIOGRAM REPORT: CPT | Performed by: INTERNAL MEDICINE

## 2023-08-02 PROCEDURE — C1769 GUIDE WIRE: HCPCS

## 2023-08-02 PROCEDURE — G0379 DIRECT REFER HOSPITAL OBSERV: HCPCS

## 2023-08-02 PROCEDURE — G0378 HOSPITAL OBSERVATION PER HR: HCPCS

## 2023-08-02 PROCEDURE — C1898 LEAD, PMKR, OTHER THAN TRANS: HCPCS

## 2023-08-02 PROCEDURE — 6360000002 HC RX W HCPCS

## 2023-08-02 PROCEDURE — C1892 INTRO/SHEATH,FIXED,PEEL-AWAY: HCPCS

## 2023-08-02 PROCEDURE — 3700000000 HC ANESTHESIA ATTENDED CARE

## 2023-08-02 PROCEDURE — 6370000000 HC RX 637 (ALT 250 FOR IP): Performed by: INTERNAL MEDICINE

## 2023-08-02 PROCEDURE — 6360000002 HC RX W HCPCS: Performed by: NURSE ANESTHETIST, CERTIFIED REGISTERED

## 2023-08-02 PROCEDURE — 93005 ELECTROCARDIOGRAM TRACING: CPT | Performed by: INTERNAL MEDICINE

## 2023-08-02 PROCEDURE — 3700000001 HC ADD 15 MINUTES (ANESTHESIA)

## 2023-08-02 PROCEDURE — C2621 PMKR, OTHER THAN SING/DUAL: HCPCS

## 2023-08-02 PROCEDURE — C1900 LEAD, CORONARY VENOUS: HCPCS

## 2023-08-02 PROCEDURE — C1887 CATHETER, GUIDING: HCPCS

## 2023-08-02 PROCEDURE — 7100000000 HC PACU RECOVERY - FIRST 15 MIN

## 2023-08-02 PROCEDURE — 2709999900 HC NON-CHARGEABLE SUPPLY

## 2023-08-02 RX ORDER — SODIUM CHLORIDE 9 MG/ML
INJECTION, SOLUTION INTRAVENOUS PRN
Status: DISCONTINUED | OUTPATIENT
Start: 2023-08-02 | End: 2023-08-03 | Stop reason: HOSPADM

## 2023-08-02 RX ORDER — ACETAMINOPHEN 325 MG/1
650 TABLET ORAL EVERY 6 HOURS PRN
Status: DISCONTINUED | OUTPATIENT
Start: 2023-08-02 | End: 2023-08-03 | Stop reason: HOSPADM

## 2023-08-02 RX ORDER — NITROGLYCERIN 0.4 MG/1
0.4 TABLET SUBLINGUAL EVERY 5 MIN PRN
Status: DISCONTINUED | OUTPATIENT
Start: 2023-08-02 | End: 2023-08-03 | Stop reason: HOSPADM

## 2023-08-02 RX ORDER — PANTOPRAZOLE SODIUM 40 MG/1
40 TABLET, DELAYED RELEASE ORAL
Status: DISCONTINUED | OUTPATIENT
Start: 2023-08-03 | End: 2023-08-03 | Stop reason: HOSPADM

## 2023-08-02 RX ORDER — SODIUM CHLORIDE 9 MG/ML
INJECTION, SOLUTION INTRAVENOUS PRN
Status: DISCONTINUED | OUTPATIENT
Start: 2023-08-02 | End: 2023-08-02 | Stop reason: SDUPTHER

## 2023-08-02 RX ORDER — FUROSEMIDE 20 MG/1
20 TABLET ORAL DAILY
Status: DISCONTINUED | OUTPATIENT
Start: 2023-08-03 | End: 2023-08-03 | Stop reason: HOSPADM

## 2023-08-02 RX ORDER — FENTANYL CITRATE 50 UG/ML
INJECTION, SOLUTION INTRAMUSCULAR; INTRAVENOUS PRN
Status: DISCONTINUED | OUTPATIENT
Start: 2023-08-02 | End: 2023-08-02 | Stop reason: SDUPTHER

## 2023-08-02 RX ORDER — SODIUM CHLORIDE 9 MG/ML
INJECTION, SOLUTION INTRAVENOUS PRN
Status: DISCONTINUED | OUTPATIENT
Start: 2023-08-02 | End: 2023-08-02

## 2023-08-02 RX ORDER — KETAMINE HYDROCHLORIDE 50 MG/ML
INJECTION, SOLUTION, CONCENTRATE INTRAMUSCULAR; INTRAVENOUS PRN
Status: DISCONTINUED | OUTPATIENT
Start: 2023-08-02 | End: 2023-08-02 | Stop reason: SDUPTHER

## 2023-08-02 RX ORDER — FUROSEMIDE 10 MG/ML
40 INJECTION INTRAMUSCULAR; INTRAVENOUS ONCE
Status: COMPLETED | OUTPATIENT
Start: 2023-08-02 | End: 2023-08-02

## 2023-08-02 RX ORDER — ONDANSETRON 2 MG/ML
INJECTION INTRAMUSCULAR; INTRAVENOUS PRN
Status: DISCONTINUED | OUTPATIENT
Start: 2023-08-02 | End: 2023-08-02 | Stop reason: SDUPTHER

## 2023-08-02 RX ORDER — EZETIMIBE 10 MG/1
10 TABLET ORAL DAILY
Status: DISCONTINUED | OUTPATIENT
Start: 2023-08-02 | End: 2023-08-03 | Stop reason: HOSPADM

## 2023-08-02 RX ORDER — MIDAZOLAM HYDROCHLORIDE 1 MG/ML
INJECTION INTRAMUSCULAR; INTRAVENOUS PRN
Status: DISCONTINUED | OUTPATIENT
Start: 2023-08-02 | End: 2023-08-02 | Stop reason: SDUPTHER

## 2023-08-02 RX ORDER — ONDANSETRON 2 MG/ML
4 INJECTION INTRAMUSCULAR; INTRAVENOUS
Status: DISCONTINUED | OUTPATIENT
Start: 2023-08-02 | End: 2023-08-02

## 2023-08-02 RX ORDER — LIDOCAINE HYDROCHLORIDE 20 MG/ML
INJECTION, SOLUTION INTRAVENOUS PRN
Status: DISCONTINUED | OUTPATIENT
Start: 2023-08-02 | End: 2023-08-02 | Stop reason: SDUPTHER

## 2023-08-02 RX ORDER — LIDOCAINE 4 G/G
1 PATCH TOPICAL DAILY
Status: DISCONTINUED | OUTPATIENT
Start: 2023-08-02 | End: 2023-08-03 | Stop reason: HOSPADM

## 2023-08-02 RX ORDER — SODIUM CHLORIDE 0.9 % (FLUSH) 0.9 %
5-40 SYRINGE (ML) INJECTION PRN
Status: DISCONTINUED | OUTPATIENT
Start: 2023-08-02 | End: 2023-08-03 | Stop reason: HOSPADM

## 2023-08-02 RX ORDER — PROPOFOL 10 MG/ML
INJECTION, EMULSION INTRAVENOUS CONTINUOUS PRN
Status: DISCONTINUED | OUTPATIENT
Start: 2023-08-02 | End: 2023-08-02 | Stop reason: SDUPTHER

## 2023-08-02 RX ORDER — SODIUM CHLORIDE 9 MG/ML
INJECTION, SOLUTION INTRAVENOUS CONTINUOUS PRN
Status: DISCONTINUED | OUTPATIENT
Start: 2023-08-02 | End: 2023-08-02 | Stop reason: SDUPTHER

## 2023-08-02 RX ORDER — PROPOFOL 10 MG/ML
INJECTION, EMULSION INTRAVENOUS PRN
Status: DISCONTINUED | OUTPATIENT
Start: 2023-08-02 | End: 2023-08-02 | Stop reason: SDUPTHER

## 2023-08-02 RX ORDER — METOPROLOL SUCCINATE 100 MG/1
100 TABLET, EXTENDED RELEASE ORAL DAILY
Status: DISCONTINUED | OUTPATIENT
Start: 2023-08-02 | End: 2023-08-03 | Stop reason: HOSPADM

## 2023-08-02 RX ORDER — CLINDAMYCIN HYDROCHLORIDE 150 MG/1
150 CAPSULE ORAL AS NEEDED
COMMUNITY
Start: 2023-06-09

## 2023-08-02 RX ORDER — SODIUM CHLORIDE 0.9 % (FLUSH) 0.9 %
5-40 SYRINGE (ML) INJECTION EVERY 12 HOURS SCHEDULED
Status: DISCONTINUED | OUTPATIENT
Start: 2023-08-02 | End: 2023-08-03 | Stop reason: HOSPADM

## 2023-08-02 RX ORDER — VERAPAMIL HYDROCHLORIDE 80 MG/1
80 TABLET ORAL 3 TIMES DAILY
Status: DISCONTINUED | OUTPATIENT
Start: 2023-08-02 | End: 2023-08-03 | Stop reason: HOSPADM

## 2023-08-02 RX ORDER — SODIUM CHLORIDE 0.9 % (FLUSH) 0.9 %
5-40 SYRINGE (ML) INJECTION PRN
Status: DISCONTINUED | OUTPATIENT
Start: 2023-08-02 | End: 2023-08-02

## 2023-08-02 RX ORDER — SODIUM CHLORIDE 0.9 % (FLUSH) 0.9 %
5-40 SYRINGE (ML) INJECTION EVERY 12 HOURS SCHEDULED
Status: DISCONTINUED | OUTPATIENT
Start: 2023-08-02 | End: 2023-08-02

## 2023-08-02 RX ADMIN — SODIUM CHLORIDE, PRESERVATIVE FREE 10 ML: 5 INJECTION INTRAVENOUS at 20:59

## 2023-08-02 RX ADMIN — FUROSEMIDE 40 MG: 10 INJECTION, SOLUTION INTRAMUSCULAR; INTRAVENOUS at 18:57

## 2023-08-02 RX ADMIN — KETAMINE HYDROCHLORIDE 5 MG: 50 INJECTION, SOLUTION INTRAMUSCULAR; INTRAVENOUS at 15:03

## 2023-08-02 RX ADMIN — PROPOFOL 20 MG: 10 INJECTION, EMULSION INTRAVENOUS at 13:22

## 2023-08-02 RX ADMIN — PHENYLEPHRINE HYDROCHLORIDE 200 MCG: 10 INJECTION, SOLUTION INTRAMUSCULAR; INTRAVENOUS; SUBCUTANEOUS at 15:40

## 2023-08-02 RX ADMIN — METOPROLOL SUCCINATE 100 MG: 100 TABLET, EXTENDED RELEASE ORAL at 20:58

## 2023-08-02 RX ADMIN — DEXMEDETOMIDINE HYDROCHLORIDE 4 MCG: 100 INJECTION, SOLUTION INTRAVENOUS at 16:01

## 2023-08-02 RX ADMIN — LIDOCAINE HYDROCHLORIDE 100 MG: 20 INJECTION, SOLUTION INTRAVENOUS at 13:16

## 2023-08-02 RX ADMIN — PROPOFOL 60 MCG/KG/MIN: 10 INJECTION, EMULSION INTRAVENOUS at 14:55

## 2023-08-02 RX ADMIN — PHENYLEPHRINE HYDROCHLORIDE 200 MCG: 10 INJECTION, SOLUTION INTRAMUSCULAR; INTRAVENOUS; SUBCUTANEOUS at 15:24

## 2023-08-02 RX ADMIN — HYDROMORPHONE HYDROCHLORIDE 0.25 MG: 1 INJECTION, SOLUTION INTRAMUSCULAR; INTRAVENOUS; SUBCUTANEOUS at 17:25

## 2023-08-02 RX ADMIN — FENTANYL CITRATE 100 MCG: 50 INJECTION, SOLUTION INTRAMUSCULAR; INTRAVENOUS at 14:46

## 2023-08-02 RX ADMIN — APIXABAN 5 MG: 5 TABLET, FILM COATED ORAL at 20:58

## 2023-08-02 RX ADMIN — DEXMEDETOMIDINE HYDROCHLORIDE 4 MCG: 100 INJECTION, SOLUTION INTRAVENOUS at 15:40

## 2023-08-02 RX ADMIN — PROPOFOL 143.13 MCG/KG/MIN: 10 INJECTION, EMULSION INTRAVENOUS at 13:16

## 2023-08-02 RX ADMIN — PROPOFOL 50 MG: 10 INJECTION, EMULSION INTRAVENOUS at 14:49

## 2023-08-02 RX ADMIN — PROPOFOL 150 MG: 10 INJECTION, EMULSION INTRAVENOUS at 15:13

## 2023-08-02 RX ADMIN — KETAMINE HYDROCHLORIDE 15 MG: 50 INJECTION, SOLUTION INTRAMUSCULAR; INTRAVENOUS at 15:18

## 2023-08-02 RX ADMIN — ONDANSETRON 4 MG: 2 INJECTION INTRAMUSCULAR; INTRAVENOUS at 14:46

## 2023-08-02 RX ADMIN — Medication 10 ML: at 21:20

## 2023-08-02 RX ADMIN — FENTANYL CITRATE 100 MCG: 50 INJECTION, SOLUTION INTRAMUSCULAR; INTRAVENOUS at 13:15

## 2023-08-02 RX ADMIN — MIDAZOLAM HYDROCHLORIDE 2 MG: 2 INJECTION, SOLUTION INTRAMUSCULAR; INTRAVENOUS at 13:13

## 2023-08-02 RX ADMIN — SODIUM CHLORIDE: 9 INJECTION, SOLUTION INTRAVENOUS at 13:08

## 2023-08-02 RX ADMIN — EZETIMIBE 10 MG: 10 TABLET ORAL at 20:58

## 2023-08-02 RX ADMIN — SODIUM CHLORIDE: 9 INJECTION, SOLUTION INTRAVENOUS at 16:07

## 2023-08-02 RX ADMIN — PROPOFOL 15 MG: 10 INJECTION, EMULSION INTRAVENOUS at 14:25

## 2023-08-02 RX ADMIN — HYDROMORPHONE HYDROCHLORIDE 0.25 MG: 1 INJECTION, SOLUTION INTRAMUSCULAR; INTRAVENOUS; SUBCUTANEOUS at 17:19

## 2023-08-02 RX ADMIN — VANCOMYCIN HYDROCHLORIDE 1500 MG: 10 INJECTION, POWDER, LYOPHILIZED, FOR SOLUTION INTRAVENOUS at 13:13

## 2023-08-02 RX ADMIN — FENTANYL CITRATE 50 MCG: 50 INJECTION, SOLUTION INTRAMUSCULAR; INTRAVENOUS at 16:13

## 2023-08-02 RX ADMIN — VERAPAMIL HYDROCHLORIDE 80 MG: 80 TABLET ORAL at 20:58

## 2023-08-02 RX ADMIN — DEXMEDETOMIDINE HYDROCHLORIDE 2 MCG: 100 INJECTION, SOLUTION INTRAVENOUS at 15:52

## 2023-08-02 RX ADMIN — FENTANYL CITRATE 50 MCG: 50 INJECTION, SOLUTION INTRAMUSCULAR; INTRAVENOUS at 16:16

## 2023-08-02 ASSESSMENT — PAIN DESCRIPTION - LOCATION
LOCATION: CHEST

## 2023-08-02 ASSESSMENT — PAIN SCALES - GENERAL
PAINLEVEL_OUTOF10: 2
PAINLEVEL_OUTOF10: 3
PAINLEVEL_OUTOF10: 9
PAINLEVEL_OUTOF10: 3
PAINLEVEL_OUTOF10: 9

## 2023-08-02 ASSESSMENT — PAIN DESCRIPTION - ORIENTATION
ORIENTATION: LEFT
ORIENTATION: UPPER;LEFT
ORIENTATION: LEFT
ORIENTATION: UPPER;LEFT
ORIENTATION: UPPER;LEFT

## 2023-08-02 ASSESSMENT — PAIN DESCRIPTION - FREQUENCY: FREQUENCY: CONTINUOUS

## 2023-08-02 ASSESSMENT — PAIN DESCRIPTION - DESCRIPTORS
DESCRIPTORS: DISCOMFORT
DESCRIPTORS: PRESSURE
DESCRIPTORS: PRESSURE
DESCRIPTORS: SORE
DESCRIPTORS: DISCOMFORT

## 2023-08-02 ASSESSMENT — PAIN DESCRIPTION - PAIN TYPE
TYPE: SURGICAL PAIN
TYPE: ACUTE PAIN;SURGICAL PAIN
TYPE: ACUTE PAIN;SURGICAL PAIN
TYPE: SURGICAL PAIN

## 2023-08-02 ASSESSMENT — ENCOUNTER SYMPTOMS: SHORTNESS OF BREATH: 1

## 2023-08-02 NOTE — FLOWSHEET NOTE
Call received from Tripp [address] [place] [address] (cath lab). She states patient is to be discharged from PACU. Tripp also states she gave patient's clothing to son, Taye Sanz. Tripp will tube discharge instructions to PACU.     This RN made Tripp aware of the following vitals signs included patient in Afib with frequent PVC's.         08/02/23 1730   Vital Signs   Pulse (!) 114   Respirations 12   /83   MAP (Calculated) 95   MAP (mmHg) 94   Oxygen Therapy   SpO2 93 %

## 2023-08-02 NOTE — PROGRESS NOTES
Sari Gomez, cath lab RN at bedside and talking with patient and son and they stated that they would prefer that she stays overnight and Anne sent a message to Dr. Jody Balbuena who will put in orders for admission. Carlos Mendez giving discharge orders to Maynor Cintron, patient's son, just in preparation for her future discharge regarding her pacemaker and sling.

## 2023-08-02 NOTE — PROGRESS NOTES
Report from [facility], patient awake and alert, pain 2-3/10, dressing on left chest CDI,with sling in place, just finished some snacks, saltines with peanut butter and orange jello. /87,  in afib, on 1 L O2. Per Francisca's report, she talked to Tripp, cath lab RN at 1800, and she will still contact Dr. Julia Daly regarding CXR result post procedure.

## 2023-08-02 NOTE — INTERVAL H&P NOTE
Update History & Physical    The patient's History and Physical of July 7,  was reviewed with the patient and I examined the patient. There was no change. The surgical site was confirmed by the patient and me. Plan: The risks, benefits, expected outcome, and alternative to the recommended procedure have been discussed with the patient. Patient understands and wants to proceed with the procedure.      Electronically signed by Monica Ordonez MD on 8/2/2023 at 12:41 PM

## 2023-08-02 NOTE — PROGRESS NOTES
Patient admitted to PACU # 8 from OR at 1632 post Pacemaker placement per Dr Malik Nelson. Attached to PACU monitoring system and report received from anesthesia provider. Patient was reported to be hemodynamically stable during procedure. Pt arrived to PACU with gauze and tegaderm to surgical site and sling in place to LUE.

## 2023-08-02 NOTE — PROCEDURES
[facility]     Electrophysiology Procedure Note       Date of Procedure: 8/2/2023  Patient's Name: Judge Morrison  YOB: 1950   Medical Record Number: [id number]  Procedure Performed by: Johnny Morley MD    Procedures performed:  Selective venography of left upper extremity. Insertion of MRI compatible right ventricular pacing lead under fluoroscopy. Insertion of MRI compatible right atrial lead under fluoroscopy  Insertion of a MRI compatible dual chamber Pacemaker. Electronic analysis of lead and device. Anesthesia: Local and Monitored Anesthesia Care  An independent trained observer pushed medications at my direction. We monitored the patient's level of consciousness and vital signs/physiologic status throughout the procedure duration (see start and stop times below). Level of sedation plan: Moderate sedation (conscious sedation) with intravenous Midazolam *** mg and Fentanyl *** mcg   Start time: ***  Stop time: ***  Mallampati airway assessment class: I  ASA class: 1  Estimated blood loss less than 20 cc    Indication of the procedure: Judge Morrison is a 74 y.o. female with symptomatic bradycardia. The patient is referred for pacemaker implantation due to non-reversible bradycardia secondary to *** with symptoms of ***. Details of procedure: The patient was brought to the electrophysiology laboratory in stable condition. The patient was in a fasting and non-sedated state. The risks, benefits and alternatives of the procedure were discussed with the patient. The risks including, but not limited to, the risks of vascular injury, bleeding, infection, device malfunction, lead dislodgement, radiation exposure, injury to cardiac and surrounding structures (including pneumothorax), stroke, myocardial infarction and death were discussed in detail. The patient opted to proceed with the device implantation. Written informed consent was signed and placed in the chart. reported as correct at the end of the procedure. The patient tolerated the procedure well and there were no complications. Patient was transported to the holding area in stable condition. Device and Leads information:            Device was programmed to MVP with lower rate of 60 and upper tracking rate of 130. Impression:    Pre- and post-procedural diagnoses of *** with symptoms of *** with successful implantation of a Medtronic 2-chamber PPM.     Plan:     The patient will be admitted and have usual post-implant care, including chest x-ray, intravenous antibiotic therapy, and interrogation of the device. From an EP perspective, if the interrogation and CXR tomorrow AM are showing appropriate functioning, parameters and placement, the patient may be discharged from the hospital. Follow-up will be a 1-week wound check with our nurse in the [facility] and a 1-month follow-up with Ms. Anushka Maradiaga. Thank you for allowing us to participate in the care of your patient. If you have any questions, please do not hesitate to contact me.     Sherice Barker MD   Cardiac Electrophysiology  [facility]

## 2023-08-02 NOTE — ANESTHESIA POSTPROCEDURE EVALUATION
Department of Anesthesiology  Postprocedure Note    Patient: Jay Bauman  MRN: [id number]  YOB: 1950  Date of evaluation: 8/2/2023      Procedure Summary     Date: 08/02/23 Room / Location: [facility] Cath Lab    Anesthesia Start: 1311 Anesthesia Stop: 6380    Procedure: Catheline Laws W ANES Diagnosis:       Permanent atrial fibrillation ([address])      A-fib ([address])    Scheduled Providers: Elissa Bernal MD Responsible Provider: Elissa Bernal MD    Anesthesia Type: general, MAC ASA Status: 3          Anesthesia Type: No value filed.     Rambo Phase I: Rambo Score: 9    Rambo Phase II:        Anesthesia Post Evaluation    Patient location during evaluation: PACU  Patient participation: complete - patient participated  Level of consciousness: awake and alert  Airway patency: patent  Nausea & Vomiting: no nausea and no vomiting  Cardiovascular status: blood pressure returned to baseline  Respiratory status: acceptable  Hydration status: euvolemic  Pain management: adequate

## 2023-08-02 NOTE — ANESTHESIA PRE PROCEDURE
There is no height or weight on file to calculate BMI.    CBC:   Lab Results   Component Value Date/Time    WBC 8.2 07/27/2023 10:17 AM    RBC 4.41 07/27/2023 10:17 AM    RBC 4.26 05/23/2017 09:15 AM    HGB 13.2 07/27/2023 10:17 AM    HCT 40.0 07/27/2023 10:17 AM    MCV 90.8 07/27/2023 10:17 AM    RDW 13.6 07/27/2023 10:17 AM     07/27/2023 10:17 AM       CMP:   Lab Results   Component Value Date/Time     07/27/2023 10:17 AM    K 4.2 07/27/2023 10:17 AM     07/27/2023 10:17 AM    CO2 25 07/27/2023 10:17 AM    BUN 17 07/27/2023 10:17 AM    CREATININE 0.6 07/27/2023 10:17 AM    GFRAA >60 08/02/2022 11:15 AM    GFRAA >60 06/03/2011 12:30 AM    AGRATIO 2.0 01/16/2023 12:20 PM    LABGLOM >60 07/27/2023 10:17 AM    GLUCOSE 111 07/27/2023 10:17 AM    GLUCOSE 128 05/23/2017 09:15 AM    PROT 6.8 01/16/2023 12:20 PM    PROT 7.4 05/23/2017 09:15 AM    CALCIUM 9.3 07/27/2023 10:17 AM    BILITOT 0.3 01/16/2023 12:20 PM    ALKPHOS 86 01/16/2023 12:20 PM    AST 17 01/16/2023 12:20 PM    ALT 15 01/16/2023 12:20 PM       POC Tests: No results for input(s): POCGLU, POCNA, POCK, POCCL, POCBUN, POCHEMO, POCHCT in the last 72 hours.     Coags:   Lab Results   Component Value Date/Time    PROTIME 13.7 07/27/2023 10:17 AM    PROTIME 31.5 03/08/2012 03:21 PM    PROTIME 23.5 10/06/2010 01:41 PM    INR 1.05 07/27/2023 10:17 AM       HCG (If Applicable): No results found for: PREGTESTUR, PREGSERUM, HCG, HCGQUANT     ABGs: No results found for: PHART, PO2ART, AYW2SYZ, BNK6MOC, BEART, E5BCIZVG     Type & Screen (If Applicable):  No results found for: LABABO, LABRH    Drug/Infectious Status (If Applicable):  No results found for: HIV, HEPCAB    COVID-19 Screening (If Applicable):   Lab Results   Component Value Date/Time    COVID19 Not Detected 09/02/2021 07:48 PM           Anesthesia Evaluation  Patient summary reviewed and Nursing notes reviewed no history of anesthetic complications:   Airway: Mallampati: III  TM distance:

## 2023-08-02 NOTE — DISCHARGE INSTRUCTIONS
THE OFFICE. FOLLOW-UP APPOINTMENTS    Follow-up with device technician,  in 7-10 days for a wound and device check. Follow-up with *** at 1 month for a wound and device check. Follow-up with *** at 3 months for an appointment and device check. [address] [place] [address], [place] [address] Phone: [phone]. If you are unable to make this appointment, please call to reschedule. Extra Heart Failure sites:     https://Image [email or web address]. Digital Performance/publication/?o=281560   --- this is American Heart Association interactive Healthier Living with Heart Failure guidebook. Please click hyperlink or copy / paste link into search bar. Use your mouse to scroll through the pages. Lots of information about weight monitoring, diet tips, activity, meds, etc    [facility] [place] willard  -- this is a free smart phone willard available for iPhone and Android download. Use your phone to track sodium / fluid intake, zone tool symptom tracking, weights, medications, etc. Click on this hyperlink  [facility] [place] Willard   for QR code for easy download. DASH (Dietary Approach to Stop Hypertension) diet --  [email or web address].no -- this diet is a flexible eating plan that promotes heart healthy eating style. Click on hyperlink or copy / paste link into search bar. Lots of low sodium recipes and tips.     [email or web address]  -- more free recipes

## 2023-08-03 ENCOUNTER — APPOINTMENT (OUTPATIENT)
Dept: GENERAL RADIOLOGY | Age: 73
End: 2023-08-03
Attending: INTERNAL MEDICINE
Payer: MEDICARE

## 2023-08-03 VITALS
SYSTOLIC BLOOD PRESSURE: 147 MMHG | TEMPERATURE: 98.7 F | BODY MASS INDEX: 34.81 KG/M2 | HEIGHT: 69 IN | WEIGHT: 235.01 LBS | RESPIRATION RATE: 16 BRPM | OXYGEN SATURATION: 96 % | DIASTOLIC BLOOD PRESSURE: 79 MMHG | HEART RATE: 91 BPM

## 2023-08-03 DIAGNOSIS — I50.32 CHRONIC DIASTOLIC CONGESTIVE HEART FAILURE (HCC): ICD-10-CM

## 2023-08-03 DIAGNOSIS — I48.21 PERMANENT ATRIAL FIBRILLATION (HCC): ICD-10-CM

## 2023-08-03 DIAGNOSIS — Z95.0 CARDIAC RESYNCHRONIZATION THERAPY PACEMAKER (CRT-P) IN PLACE: Primary | ICD-10-CM

## 2023-08-03 DIAGNOSIS — I48.4 ATYPICAL ATRIAL FLUTTER (HCC): ICD-10-CM

## 2023-08-03 DIAGNOSIS — R00.1 BRADYCARDIA: ICD-10-CM

## 2023-08-03 DIAGNOSIS — I47.20 VT (VENTRICULAR TACHYCARDIA) (HCC): ICD-10-CM

## 2023-08-03 DIAGNOSIS — I48.0 PAROXYSMAL ATRIAL FIBRILLATION (HCC): ICD-10-CM

## 2023-08-03 LAB
ALBUMIN SERPL-MCNC: 3.7 G/DL (ref 3.4–5)
ALBUMIN/GLOB SERPL: 1.7 {RATIO} (ref 1.1–2.2)
ALP SERPL-CCNC: 79 U/L (ref 40–129)
ALT SERPL-CCNC: 12 U/L (ref 10–40)
ANION GAP SERPL CALCULATED.3IONS-SCNC: 9 MMOL/L (ref 3–16)
AST SERPL-CCNC: 17 U/L (ref 15–37)
BILIRUB SERPL-MCNC: 0.4 MG/DL (ref 0–1)
BUN SERPL-MCNC: 12 MG/DL (ref 7–20)
CALCIUM SERPL-MCNC: 8.9 MG/DL (ref 8.3–10.6)
CHLORIDE SERPL-SCNC: 103 MMOL/L (ref 99–110)
CO2 SERPL-SCNC: 27 MMOL/L (ref 21–32)
CREAT SERPL-MCNC: 0.6 MG/DL (ref 0.6–1.2)
GFR SERPLBLD CREATININE-BSD FMLA CKD-EPI: >60 ML/MIN/{1.73_M2}
GLUCOSE SERPL-MCNC: 117 MG/DL (ref 70–99)
POTASSIUM SERPL-SCNC: 4.2 MMOL/L (ref 3.5–5.1)
PROT SERPL-MCNC: 5.9 G/DL (ref 6.4–8.2)
SODIUM SERPL-SCNC: 139 MMOL/L (ref 136–145)

## 2023-08-03 PROCEDURE — 2580000003 HC RX 258: Performed by: INTERNAL MEDICINE

## 2023-08-03 PROCEDURE — 99239 HOSP IP/OBS DSCHRG MGMT >30: CPT | Performed by: NURSE PRACTITIONER

## 2023-08-03 PROCEDURE — G0378 HOSPITAL OBSERVATION PER HR: HCPCS

## 2023-08-03 PROCEDURE — 80053 COMPREHEN METABOLIC PANEL: CPT

## 2023-08-03 PROCEDURE — 71046 X-RAY EXAM CHEST 2 VIEWS: CPT

## 2023-08-03 PROCEDURE — 6370000000 HC RX 637 (ALT 250 FOR IP): Performed by: INTERNAL MEDICINE

## 2023-08-03 PROCEDURE — 36415 COLL VENOUS BLD VENIPUNCTURE: CPT

## 2023-08-03 RX ADMIN — SODIUM CHLORIDE, PRESERVATIVE FREE 10 ML: 5 INJECTION INTRAVENOUS at 07:54

## 2023-08-03 RX ADMIN — APIXABAN 5 MG: 5 TABLET, FILM COATED ORAL at 07:54

## 2023-08-03 RX ADMIN — FUROSEMIDE 20 MG: 20 TABLET ORAL at 07:54

## 2023-08-03 RX ADMIN — ACETAMINOPHEN 650 MG: 325 TABLET ORAL at 09:40

## 2023-08-03 RX ADMIN — METOPROLOL SUCCINATE 100 MG: 100 TABLET, EXTENDED RELEASE ORAL at 07:54

## 2023-08-03 RX ADMIN — VERAPAMIL HYDROCHLORIDE 80 MG: 80 TABLET ORAL at 07:54

## 2023-08-03 RX ADMIN — PANTOPRAZOLE SODIUM 40 MG: 40 TABLET, DELAYED RELEASE ORAL at 06:26

## 2023-08-03 RX ADMIN — ACETAMINOPHEN 650 MG: 325 TABLET ORAL at 00:56

## 2023-08-03 ASSESSMENT — PAIN SCALES - GENERAL
PAINLEVEL_OUTOF10: 0
PAINLEVEL_OUTOF10: 3
PAINLEVEL_OUTOF10: 3
PAINLEVEL_OUTOF10: 0
PAINLEVEL_OUTOF10: 0

## 2023-08-03 ASSESSMENT — PAIN DESCRIPTION - ORIENTATION
ORIENTATION: LEFT
ORIENTATION: LEFT

## 2023-08-03 ASSESSMENT — PAIN DESCRIPTION - LOCATION
LOCATION: ARM
LOCATION: INCISION

## 2023-08-03 ASSESSMENT — PAIN DESCRIPTION - FREQUENCY
FREQUENCY: INTERMITTENT
FREQUENCY: CONTINUOUS

## 2023-08-03 ASSESSMENT — PAIN DESCRIPTION - DESCRIPTORS
DESCRIPTORS: THROBBING;DISCOMFORT
DESCRIPTORS: DISCOMFORT

## 2023-08-03 ASSESSMENT — PAIN DESCRIPTION - ONSET
ONSET: ON-GOING
ONSET: ON-GOING

## 2023-08-03 ASSESSMENT — PAIN DESCRIPTION - PAIN TYPE
TYPE: SURGICAL PAIN
TYPE: SURGICAL PAIN

## 2023-08-03 ASSESSMENT — PAIN - FUNCTIONAL ASSESSMENT
PAIN_FUNCTIONAL_ASSESSMENT: ACTIVITIES ARE NOT PREVENTED
PAIN_FUNCTIONAL_ASSESSMENT: ACTIVITIES ARE NOT PREVENTED

## 2023-08-03 NOTE — PROGRESS NOTES
Patient admitted to bed [phone] with family present. VS and standing weight obtained. Tele placed. Patient educated on safety measures such as using call light and bed alarm. All patient/family questions answered at this time. Bed in lowest position, wheels locked, call light in reach, family at bedside, side rails up x3, and bed alarm on.    4 Eyes Skin Assessment     NAME:  Kenny Worthingtond:  1950  MEDICAL RECORD NUMBER:  [id number]    The patient is being assessed for  Admission    I agree that at least one RN has performed a thorough Head to Toe Skin Assessment on the patient. ALL assessment sites listed below have been assessed. Areas assessed by both nurses:    Head, Face, Ears, Shoulders, Back, Chest, Arms, Elbows, Hands, Sacrum. Buttock, Coccyx, Ischium, Legs. Feet and Heels, and Under Medical Devices         Does the Patient have a Wound? Surgical Site left chest wall, S/P pacemaker placement.          Rajat Prevention initiated by RN: No  Wound Care Orders initiated by RN: No    Pressure Injury (Stage 3,4, Unstageable, DTI, NWPT, and Complex wounds) if present, place Wound referral order by RN under : No    New Ostomies, if present place, Ostomy referral order under : No     Nurse 1 eSignature: Electronically signed by Yesica Palmer RN on 8/2/23 at 9:21 PM EDT        Nurse 2 eSignature: Electronically signed by Debby Cline RN on 8/2/23 at 9:53 PM EDT

## 2023-08-03 NOTE — PLAN OF CARE
Problem: Chronic Conditions and Co-morbidities  Goal: Patient's chronic conditions and co-morbidity symptoms are monitored and maintained or improved  Outcome: Progressing     Problem: Discharge Planning  Goal: Discharge to home or other facility with appropriate resources  Outcome: Progressing     Problem: Pain  Goal: Verbalizes/displays adequate comfort level or baseline comfort level  Outcome: Progressing  Flowsheets (Taken 8/3/2023 5372)  Verbalizes/displays adequate comfort level or baseline comfort level: Encourage patient to monitor pain and request assistance     Problem: ABCDS Injury Assessment  Goal: Absence of physical injury  Outcome: Progressing     Problem: Safety - Adult  Goal: Free from fall injury  Outcome: Progressing

## 2023-08-03 NOTE — DISCHARGE SUMMARY
EP Discharge [address]  MD Anushka Beltran CNP  Lisa Acevedo CNP  8/3/23    Patient :Kaitlin Bailey  Room/Bed: [facility]7998-96  Medical Record: [id number]  YOB: 1950    Admit date: 8/2/2023  Discharge date and time: 08/03/23     Admitting Physician: Fabiola Wright MD   Discharge Physician: Fabiola Wright MD    Admission Diagnoses: Permanent atrial fibrillation ([address] [facility]) [I48.21]  A-fib [facility]) [I48.91]  Discharge Diagnoses: chronic persistent atrial fib, SSS, s/p CRT-P w/ 3830 lead. Discharged Condition: stable    Hospital Course: Kaitlin Bailey is a 74 y. o. woman with a h/o HLD, breast CA,  chronic dCHF, atypical AFL, pAF, s/p RFA (2005, Dr. Erlin Tay, 2006, Dr. Luisa Chilel), cryo-ablation of SVT/AF (2013, Dr. Ninfa Singh), s/p RFA/PVI of AF/typical AFL/atypical AFL (11/23/2020, Dr. Colin Robertson), recurrent AF/AFL, s/p DCCV to NSR, s/p dofetilide loading (3/8/2021) with conversion to NSR, recurrent AF post dofetilide loading, 2-week Zio patch (3/25/2021-4/8/2021) showed an average HR 91 (), 2945 NSVT episodes with the longest lasting 28.1 seconds, 69% AF burden,128 pauses with the longest lasting 4.2 seconds in length, s/p LHC (5/18/2021, Dr. Aliyah Redding) showed normal coronaries, s/p RFA/PVI of AF, atypical AFL (9/1/2021, Dr. Colin Robertson), admitted for MDT CRT-P, implanted w/ a His bundle lead and RV backup, CXR showed pulm edema, given IV lasix x 1, +1,480 L, f/u CXR showed sig improvement, labs stable, L chest drsg CDI, CXR showed stable lead placement, device interrogation showed normal parameters, reviewed wound care and activity restrictions with patient, to home in stable condition.      Consults: none    Significant Diagnostic Studies: labs: see chart    Discharge Medications:   Current Discharge Medication List        CONTINUE these medications which have NOT CHANGED    Details   clindamycin (CLEOCIN) 150 MG capsule Take 1 capsule by mouth as needed for Other 1 HR before going to dentist      lidocaine (LIDODERM) 5 % Place 1 patch onto the skin in the morning and 1 patch in the evening. 12 hours on, 12 hours off. Darletta Loach: 30 patch, Refills: 1      verapamil (CALAN) 80 MG tablet TAKE 1 TABLET BY MOUTH THREE TIMES A DAY  Qty: 270 tablet, Refills: 0      furosemide (LASIX) 20 MG tablet Take 1 tablet by mouth daily  Qty: 90 tablet, Refills: 3    Associated Diagnoses: Essential hypertension      apixaban (ELIQUIS) 5 MG TABS tablet Take 1 tablet by mouth 2 times daily  Qty: 60 tablet, Refills: 5      metoprolol succinate (TOPROL XL) 100 MG extended release tablet Take 1 tablet by mouth daily  Qty: 90 tablet, Refills: 3      ezetimibe (ZETIA) 10 MG tablet TAKE 1 TABLET BY MOUTH EVERY DAY  Qty: 90 tablet, Refills: 3      acetaminophen (TYLENOL) 500 MG tablet Take 2 tablets 2- 3 times daily as needed    Associated Diagnoses: Mid back pain      pantoprazole (PROTONIX) 40 MG tablet Take 1 tablet by mouth every morning (before breakfast)  Qty: 90 tablet, Refills: 0      nitroGLYCERIN (NITROSTAT) 0.4 MG SL tablet Place 1 tablet under the tongue every 5 minutes as needed for Chest pain  Qty: 25 tablet, Refills: 3      Vitamin D, Cholecalciferol, 25 MCG (1000 UT) CAPS Take 1,000 Units by mouth daily    Associated Diagnoses: Vitamin D deficiency           Discharge Exam:  Carotid Arteries: normal pulsation bilaterally  Lungs: clear to auscultation without wheezes or rales  Heart: regular rate and rhythm, S1, S2 normal, no murmur, click, rub or gallop  Extremities: negative  Incision: Left chest dressing CDI    Disposition: home    Patient Instructions: Activity: See discharge instructions,  Diet: cardiac diet. Wound Care: See discharge instructions. Follow-up with device technician at scheduled appointment. Call [phone] with any questions. Signed:  MAILE Cruz CNP  8/3/2023  7:54 AM    Greater than 40 minutes total spent on discharge.

## 2023-08-03 NOTE — PROGRESS NOTES
Patient discharged per order. PIV removed with no complications. Tele box removed and taken to . discharge instructions reviewed with patient and there were no questions. Patient transported via wheelchair to car. Patient sister escorting patient home.

## 2023-08-03 NOTE — DISCHARGE INSTR - COC
Continuity of Care Form    Patient Name: Sandra Link   :    MRN:  [id number]    Admit date:  2023  Discharge date:  ***    Code Status Order: Full Code   Advance Directives:     Admitting Physician:  Yamini Malone MD  PCP: Malik Neff MD    Discharging Nurse: [facility] Unit/Room#: 9280/3016-63  Discharging Unit Phone Number: ***    Emergency Contact:   Extended Emergency Contact Information  Primary Emergency Contact: [address], [address] [facility] [place] [address] [address] Phone: [phone]  Work Phone: [phone]  Mobile Phone: [phone]  Relation: Child  Secondary Emergency Contact: [address] of [address] [address] Phone: [phone]  Mobile Phone: [phone]  Relation: Grandchild    Past Surgical History:  Past Surgical History:   Procedure Laterality Date    ANKLE FRACTURE SURGERY  ,    APPENDECTOMY  1983    ATRIAL ABLATION SURGERY  X3    Cryoablation for atrial fib    AXILLARY SURGERY Right     lymph node    BREAST BIOPSY  2011    BREAST BIOPSY  2011    left breast bx - benign     BREAST CYST EXCISION  2003    BREAST LUMPECTOMY  2009    CARDIAC CATHETERIZATION  2005,2006    COLONOSCOPY  ,,,,    COLONOSCOPY  2011    Dr. Anmol Eaton - benign polyps     COSMETIC SURGERY      FOOT SURGERY Right 01/15/2016    CORRECTION OF HAMMERTOES 1-5 RIGHT FOOT, WEIL OSTEOTOMY 2,3    FOOT SURGERY Right 2016    EXTENSOR TENDON LENGTHENING 4TH AND 5TH TOES RIGHT FOOT    FOOT SURGERY Right 2016    CORRECTION HAMMERTOES 1ST, 4TH AND 5TH DIGITS RIGHT FOOT WITH    FRACTURE SURGERY      To many to list    HERNIA REPAIR  ,,,2009    HYSTERECTOMY (CERVIX STATUS UNKNOWN)  1977    HYSTERECTOMY, VAGINAL      JOINT REPLACEMENT  2015    LEFT KNEE    KNEE SURGERY  2015    left knee replacement    LIPOMA RESECTION  2006    MASTECTOMY, BILATERAL Bilateral Schedule:  Phone:  Fax:    / signature: {Esignature:[id number]}    PHYSICIAN SECTION    Prognosis: {Prognosis:0075279521}    Condition at Discharge: [address] Patient Condition:563185720}    Rehab Potential (if transferring to Rehab): {Prognosis:4370517717}    Recommended Labs or Other Treatments After Discharge: ***    Physician Certification: I certify the above information and transfer of Rachael Jimenez  is necessary for the continuing treatment of the diagnosis listed and that she requires {Admit to Appropriate Level of Care:82352} for {GREATER/LESS:303189110} 30 days.      Update Admission H&P: {CHP DME Changes in OQZLL:809595838}    PHYSICIAN SIGNATURE:  {Esignature:197425335}

## 2023-08-07 ENCOUNTER — TELEPHONE (OUTPATIENT)
Dept: PRIMARY CARE CLINIC | Age: 73
End: 2023-08-07

## 2023-08-07 NOTE — TELEPHONE ENCOUNTER
Care Transitions Initial Follow Up Call    Outreach made within 2 business days of discharge: Yes    Patient: Osvaldo Tucker Patient : 1950   MRN: [id number]  Reason for Admission: There are no discharge diagnoses documented for the most recent discharge. Discharge Date: 8/3/23       Spoke with: Patient     Discharge department/facility: [facility] Interactive Patient Contact:  Was patient able to fill all prescriptions: Yes  Was patient instructed to bring all medications to the follow-up visit: Yes  Is patient taking all medications as directed in the discharge summary?  Yes  Does patient understand their discharge instructions: Yes  Does patient have questions or concerns that need addressed prior to 7-14 day follow up office visit: no    Patient said she is following up with cardiologist.     Scheduled appointment with PCP within 7-14 days    Follow Up  Future Appointments   Date Time Provider [address] [place] [address]   2023 11:30 AM SCHEDULE, [address] Card [facility]   2023 12:00 PM SCHEDULE, [facility] CATH LAB ROOM 3 [facility]   2023  2:00 PM SCHEDULE, [address] Card [facility]   2023  2:30 PM MAILE Guy CNP [place] [facility]   2023 12:15 PM MD RAMAN Hurd CARDIO [facility]   2023 10:45 AM Mel Bucio, [address] Card [facility]   2023 11:00 AM MAILE Guy CNP [place] [facility]   2024 11:00 AM Moshe Smart MD [address] [place] [address]       Sangeetha Jaime, KETTYN

## 2023-08-09 ENCOUNTER — NURSE ONLY (OUTPATIENT)
Dept: CARDIOLOGY CLINIC | Age: 73
End: 2023-08-09
Payer: MEDICARE

## 2023-08-09 DIAGNOSIS — I48.0 PAROXYSMAL ATRIAL FIBRILLATION (HCC): ICD-10-CM

## 2023-08-09 DIAGNOSIS — R00.1 BRADYCARDIA: ICD-10-CM

## 2023-08-09 DIAGNOSIS — I48.4 ATYPICAL ATRIAL FLUTTER (HCC): ICD-10-CM

## 2023-08-09 DIAGNOSIS — I50.32 CHRONIC DIASTOLIC CONGESTIVE HEART FAILURE (HCC): ICD-10-CM

## 2023-08-09 DIAGNOSIS — I47.20 VT (VENTRICULAR TACHYCARDIA) (HCC): ICD-10-CM

## 2023-08-09 DIAGNOSIS — I48.21 PERMANENT ATRIAL FIBRILLATION (HCC): ICD-10-CM

## 2023-08-09 DIAGNOSIS — Z95.0 CARDIAC RESYNCHRONIZATION THERAPY PACEMAKER (CRT-P) IN PLACE: Primary | ICD-10-CM

## 2023-08-09 DIAGNOSIS — R00.0 TACHYCARDIA: ICD-10-CM

## 2023-08-09 PROCEDURE — 93281 PM DEVICE PROGR EVAL MULTI: CPT | Performed by: INTERNAL MEDICINE

## 2023-08-09 NOTE — PROGRESS NOTES
Patient comes in for a 1 week wound check s/p [employer] CRTP by ANANDA on 08.02.2023. (3830 RV lead, 5076 backup RV lead. Atrial port PIN PLUG, Programmed VVI/40 bpm.)    Discussed remote monitoring, frequencies and communications. Relay monitor ordered today. Relay literature, KIM brochure and AVS provided today. Dressing removed from left chest device site. Incision is well approximated, CDI, SS remain. Reviewed post op wound care and restrictions. Pt informed to call office if she develops any fever, chills, increased swelling, redness or drainage from site. Pt voiced an understanding. Patient will follow up in 1 month w/EPNP and device. We will monitor remotely once established.

## 2023-08-10 NOTE — PROGRESS NOTES
[facility]   Electrophysiology  Office Visit  Date: 9/6/2023    Chief Complaint   Patient presents with    Atrial Fibrillation    Atrial Flutter    Tachycardia    Congestive Heart Failure     Cardiac HX: Faby Mcconnell is a 74 y. o. woman with a h/o HLD, breast CA,  chronic dCHF, atypical AFL, pAF, s/p RFA (2005, Dr. Dulce Li, 2006, Dr. Rossy Diaz), cryo-ablation of SVT/AF (2013, Dr. Blaine Carey), s/p RFA/PVI of AF/typical AFL/atypical AFL (11/23/2020, Dr. Lucian Scott), recurrent AF/AFL in f/u, s/p DCCV to NSR, s/p dofetilide loading (3/8/2021) with conversion to NSR, recurrent AF post dofetilide loading, 2-week Zio patch (3/25/2021-4/8/2021) showed an average HR 91 (), 2945 NSVT episodes with the longest lasting 28.1 seconds, 69% AF burden,128 pauses with the longest lasting 4.2 seconds in length, s/p LHC (5/18/2021, Dr. Luisa Blanca) showed normal coronaries, s/p RFA/PVI of AF, atypical AFL (9/1/2021, Dr. Lucian Scott), recurrent AF/AFL, s/p upgrade to MDT CRT-P (His bundle w/ RV backup) (08.02.2023, Dr Lucian Scott), s/p AVN RFA (8/30/23, Dr. Lucian Scott). Interval History/HPI: Patient is here for follow-up for AF, AFL, NSVT, chronic diastolic CHF and PPM management. Patient has a longstanding history of AF AFL dating back to 2005. She had an ablation for atrial flutter in 2005 by Dr. Dulce Li. She had recurrent AFL and underwent an RFA for AFL in 2006 by Dr. Rossy Diaz. She then had a cryoablation of SVT and AF in 2013 by Dr. Blaine Carey. He continued to have recurrent AF/AFL and underwent an RFA/PVI of AF, typical AFL and atypical AFL in November 2020. Post ablation she continued to have recurrent AF/AFL. She had undergone a DC CCB to NSR and then was admitted for dofetilide loading in March 2021. She then had recurrent AF.   She wore a 2-week Zio patch in March 2020 that showed almost 3000 nonsustained VT episodes with the longest lasting 28 seconds in length, and 89% AF burden and 128 pauses with the longest lasting 4.2 seconds in

## 2023-08-24 DIAGNOSIS — I48.11 LONGSTANDING PERSISTENT ATRIAL FIBRILLATION (HCC): ICD-10-CM

## 2023-08-24 LAB
ALBUMIN SERPL-MCNC: 4.5 G/DL (ref 3.4–5)
ALBUMIN/GLOB SERPL: 2 {RATIO} (ref 1.1–2.2)
ALP SERPL-CCNC: 95 U/L (ref 40–129)
ALT SERPL-CCNC: 17 U/L (ref 10–40)
ANION GAP SERPL CALCULATED.3IONS-SCNC: 10 MMOL/L (ref 3–16)
AST SERPL-CCNC: 18 U/L (ref 15–37)
BILIRUB SERPL-MCNC: 0.4 MG/DL (ref 0–1)
BUN SERPL-MCNC: 15 MG/DL (ref 7–20)
CALCIUM SERPL-MCNC: 9.3 MG/DL (ref 8.3–10.6)
CHLORIDE SERPL-SCNC: 103 MMOL/L (ref 99–110)
CO2 SERPL-SCNC: 28 MMOL/L (ref 21–32)
CREAT SERPL-MCNC: 0.5 MG/DL (ref 0.6–1.2)
DEPRECATED RDW RBC AUTO: 13.9 % (ref 12.4–15.4)
GFR SERPLBLD CREATININE-BSD FMLA CKD-EPI: >60 ML/MIN/{1.73_M2}
GLUCOSE SERPL-MCNC: 113 MG/DL (ref 70–99)
HCT VFR BLD AUTO: 38.1 % (ref 36–48)
HGB BLD-MCNC: 12.6 G/DL (ref 12–16)
INR PPP: 0.99 (ref 0.84–1.16)
MCH RBC QN AUTO: 30 PG (ref 26–34)
MCHC RBC AUTO-ENTMCNC: 33 G/DL (ref 31–36)
MCV RBC AUTO: 91 FL (ref 80–100)
PLATELET # BLD AUTO: 308 K/UL (ref 135–450)
PMV BLD AUTO: 8.2 FL (ref 5–10.5)
POTASSIUM SERPL-SCNC: 4.6 MMOL/L (ref 3.5–5.1)
PROT SERPL-MCNC: 6.8 G/DL (ref 6.4–8.2)
PROTHROMBIN TIME: 13.1 SEC (ref 11.5–14.8)
RBC # BLD AUTO: 4.19 M/UL (ref 4–5.2)
SODIUM SERPL-SCNC: 141 MMOL/L (ref 136–145)
WBC # BLD AUTO: 7.6 K/UL (ref 4–11)

## 2023-08-29 NOTE — PROGRESS NOTES
Called patient about procedure. Told to be here at 1030 for procedure at 1200. Must be NPO after midnight but can take morning medication with sips of water. Patient stated they will take blood thinner tonight but hold tomorrow morning prior to procedure as directed by the office. Told to have a responsible adult with them to take them home and stay with them afterwards, if they do not get admitted to hospital. Also, to bring a current list of medications. No other questions or concerns.

## 2023-08-30 ENCOUNTER — ANESTHESIA EVENT (OUTPATIENT)
Dept: CARDIAC CATH/INVASIVE PROCEDURES | Age: 73
End: 2023-08-30

## 2023-08-30 ENCOUNTER — HOSPITAL ENCOUNTER (OUTPATIENT)
Dept: CARDIAC CATH/INVASIVE PROCEDURES | Age: 73
Discharge: HOME | End: 2023-09-01
Payer: MEDICARE

## 2023-08-30 ENCOUNTER — ANESTHESIA (OUTPATIENT)
Dept: CARDIAC CATH/INVASIVE PROCEDURES | Age: 73
End: 2023-08-30

## 2023-08-30 VITALS
WEIGHT: 229 LBS | SYSTOLIC BLOOD PRESSURE: 116 MMHG | HEART RATE: 91 BPM | RESPIRATION RATE: 18 BRPM | HEIGHT: 69 IN | BODY MASS INDEX: 33.92 KG/M2 | TEMPERATURE: 98 F | DIASTOLIC BLOOD PRESSURE: 61 MMHG

## 2023-08-30 LAB
PERFORMED ON: ABNORMAL
POC CREATININE: 0.5 MG/DL (ref 0.6–1.2)
POC SAMPLE TYPE: ABNORMAL

## 2023-08-30 PROCEDURE — 3700000000 HC ANESTHESIA ATTENDED CARE

## 2023-08-30 PROCEDURE — C1760 CLOSURE DEV, VASC: HCPCS

## 2023-08-30 PROCEDURE — 2720000010 HC SURG SUPPLY STERILE

## 2023-08-30 PROCEDURE — G0269 OCCLUSIVE DEVICE IN VEIN ART: HCPCS

## 2023-08-30 PROCEDURE — 93005 ELECTROCARDIOGRAM TRACING: CPT | Performed by: INTERNAL MEDICINE

## 2023-08-30 PROCEDURE — C1759 CATH, INTRA ECHOCARDIOGRAPHY: HCPCS

## 2023-08-30 PROCEDURE — 93650 ICAR CATH ABLTJ AV NODE FUNC: CPT

## 2023-08-30 PROCEDURE — C1766 INTRO/SHEATH,STRBLE,NON-PEEL: HCPCS

## 2023-08-30 PROCEDURE — 82565 ASSAY OF CREATININE: CPT

## 2023-08-30 PROCEDURE — 2500000003 HC RX 250 WO HCPCS: Performed by: NURSE ANESTHETIST, CERTIFIED REGISTERED

## 2023-08-30 PROCEDURE — 2580000003 HC RX 258: Performed by: NURSE ANESTHETIST, CERTIFIED REGISTERED

## 2023-08-30 PROCEDURE — 93620 COMP EP EVL R AT VEN PAC&REC: CPT | Performed by: INTERNAL MEDICINE

## 2023-08-30 PROCEDURE — 6360000002 HC RX W HCPCS: Performed by: NURSE ANESTHETIST, CERTIFIED REGISTERED

## 2023-08-30 PROCEDURE — 93613 INTRACARDIAC EPHYS 3D MAPG: CPT | Performed by: INTERNAL MEDICINE

## 2023-08-30 PROCEDURE — 85610 PROTHROMBIN TIME: CPT

## 2023-08-30 PROCEDURE — C1732 CATH, EP, DIAG/ABL, 3D/VECT: HCPCS

## 2023-08-30 PROCEDURE — 6360000002 HC RX W HCPCS

## 2023-08-30 PROCEDURE — 2709999900 HC NON-CHARGEABLE SUPPLY

## 2023-08-30 PROCEDURE — 2500000003 HC RX 250 WO HCPCS

## 2023-08-30 PROCEDURE — 3700000001 HC ADD 15 MINUTES (ANESTHESIA)

## 2023-08-30 PROCEDURE — 93662 INTRACARDIAC ECG (ICE): CPT | Performed by: INTERNAL MEDICINE

## 2023-08-30 PROCEDURE — 93650 ICAR CATH ABLTJ AV NODE FUNC: CPT | Performed by: INTERNAL MEDICINE

## 2023-08-30 PROCEDURE — C1894 INTRO/SHEATH, NON-LASER: HCPCS

## 2023-08-30 RX ORDER — SODIUM CHLORIDE, SODIUM LACTATE, POTASSIUM CHLORIDE, CALCIUM CHLORIDE 600; 310; 30; 20 MG/100ML; MG/100ML; MG/100ML; MG/100ML
INJECTION, SOLUTION INTRAVENOUS CONTINUOUS PRN
Status: DISCONTINUED | OUTPATIENT
Start: 2023-08-30 | End: 2023-08-30 | Stop reason: SDUPTHER

## 2023-08-30 RX ORDER — LIDOCAINE HYDROCHLORIDE 20 MG/ML
INJECTION, SOLUTION INTRAVENOUS PRN
Status: DISCONTINUED | OUTPATIENT
Start: 2023-08-30 | End: 2023-08-30 | Stop reason: SDUPTHER

## 2023-08-30 RX ORDER — MEPERIDINE HYDROCHLORIDE 25 MG/ML
12.5 INJECTION INTRAMUSCULAR; INTRAVENOUS; SUBCUTANEOUS EVERY 5 MIN PRN
Status: DISCONTINUED | OUTPATIENT
Start: 2023-08-30 | End: 2023-09-02 | Stop reason: HOSPADM

## 2023-08-30 RX ORDER — HEPARIN SODIUM 1000 [USP'U]/ML
INJECTION, SOLUTION INTRAVENOUS; SUBCUTANEOUS PRN
Status: DISCONTINUED | OUTPATIENT
Start: 2023-08-30 | End: 2023-08-30 | Stop reason: SDUPTHER

## 2023-08-30 RX ORDER — SODIUM CHLORIDE 0.9 % (FLUSH) 0.9 %
5-40 SYRINGE (ML) INJECTION EVERY 12 HOURS SCHEDULED
Status: DISCONTINUED | OUTPATIENT
Start: 2023-08-30 | End: 2023-09-02 | Stop reason: HOSPADM

## 2023-08-30 RX ORDER — ISOPROTERENOL HYDROCHLORIDE 0.2 MG/ML
INJECTION, SOLUTION INTRAMUSCULAR; INTRAVENOUS PRN
Status: DISCONTINUED | OUTPATIENT
Start: 2023-08-30 | End: 2023-08-30

## 2023-08-30 RX ORDER — HYDROMORPHONE HYDROCHLORIDE 1 MG/ML
0.25 INJECTION, SOLUTION INTRAMUSCULAR; INTRAVENOUS; SUBCUTANEOUS EVERY 5 MIN PRN
Status: DISCONTINUED | OUTPATIENT
Start: 2023-08-30 | End: 2023-09-02 | Stop reason: HOSPADM

## 2023-08-30 RX ORDER — HYDROMORPHONE HYDROCHLORIDE 1 MG/ML
0.5 INJECTION, SOLUTION INTRAMUSCULAR; INTRAVENOUS; SUBCUTANEOUS EVERY 5 MIN PRN
Status: DISCONTINUED | OUTPATIENT
Start: 2023-08-30 | End: 2023-09-02 | Stop reason: HOSPADM

## 2023-08-30 RX ORDER — FENTANYL CITRATE 50 UG/ML
INJECTION, SOLUTION INTRAMUSCULAR; INTRAVENOUS PRN
Status: DISCONTINUED | OUTPATIENT
Start: 2023-08-30 | End: 2023-08-30 | Stop reason: SDUPTHER

## 2023-08-30 RX ORDER — DIPHENHYDRAMINE HYDROCHLORIDE 50 MG/ML
12.5 INJECTION INTRAMUSCULAR; INTRAVENOUS
Status: ACTIVE | OUTPATIENT
Start: 2023-08-30 | End: 2023-08-31

## 2023-08-30 RX ORDER — SODIUM CHLORIDE 0.9 % (FLUSH) 0.9 %
5-40 SYRINGE (ML) INJECTION PRN
Status: DISCONTINUED | OUTPATIENT
Start: 2023-08-30 | End: 2023-09-02 | Stop reason: HOSPADM

## 2023-08-30 RX ORDER — PROPOFOL 10 MG/ML
INJECTION, EMULSION INTRAVENOUS PRN
Status: DISCONTINUED | OUTPATIENT
Start: 2023-08-30 | End: 2023-08-30 | Stop reason: SDUPTHER

## 2023-08-30 RX ORDER — PROPOFOL 10 MG/ML
INJECTION, EMULSION INTRAVENOUS CONTINUOUS PRN
Status: DISCONTINUED | OUTPATIENT
Start: 2023-08-30 | End: 2023-08-30 | Stop reason: SDUPTHER

## 2023-08-30 RX ORDER — PROCHLORPERAZINE EDISYLATE 5 MG/ML
5 INJECTION INTRAMUSCULAR; INTRAVENOUS
Status: ACTIVE | OUTPATIENT
Start: 2023-08-30 | End: 2023-08-31

## 2023-08-30 RX ORDER — SODIUM CHLORIDE 9 MG/ML
INJECTION, SOLUTION INTRAVENOUS PRN
Status: DISCONTINUED | OUTPATIENT
Start: 2023-08-30 | End: 2023-09-02 | Stop reason: HOSPADM

## 2023-08-30 RX ORDER — ACETAMINOPHEN 325 MG/1
650 TABLET ORAL EVERY 4 HOURS PRN
Status: DISCONTINUED | OUTPATIENT
Start: 2023-08-30 | End: 2023-09-02 | Stop reason: HOSPADM

## 2023-08-30 RX ORDER — ONDANSETRON 2 MG/ML
4 INJECTION INTRAMUSCULAR; INTRAVENOUS
Status: ACTIVE | OUTPATIENT
Start: 2023-08-30 | End: 2023-08-31

## 2023-08-30 RX ADMIN — PROPOFOL 20 MG: 10 INJECTION, EMULSION INTRAVENOUS at 12:09

## 2023-08-30 RX ADMIN — LIDOCAINE HYDROCHLORIDE 100 MG: 20 INJECTION, SOLUTION INTRAVENOUS at 12:07

## 2023-08-30 RX ADMIN — HEPARIN SODIUM 2000 UNITS: 1000 INJECTION INTRAVENOUS; SUBCUTANEOUS at 12:26

## 2023-08-30 RX ADMIN — PROPOFOL 50 MCG/KG/MIN: 10 INJECTION, EMULSION INTRAVENOUS at 12:22

## 2023-08-30 RX ADMIN — PROPOFOL 20 MG: 10 INJECTION, EMULSION INTRAVENOUS at 12:13

## 2023-08-30 RX ADMIN — SODIUM CHLORIDE, SODIUM LACTATE, POTASSIUM CHLORIDE, AND CALCIUM CHLORIDE: .6; .31; .03; .02 INJECTION, SOLUTION INTRAVENOUS at 12:03

## 2023-08-30 RX ADMIN — FENTANYL CITRATE 100 MCG: 50 INJECTION, SOLUTION INTRAMUSCULAR; INTRAVENOUS at 12:03

## 2023-08-30 RX ADMIN — PROPOFOL 20 MG: 10 INJECTION, EMULSION INTRAVENOUS at 12:07

## 2023-08-30 RX ADMIN — ISOPROTERENOL HYDROCHLORIDE 2 MCG/MIN: 0.2 INJECTION, SOLUTION INTRAMUSCULAR; INTRAVENOUS at 12:48

## 2023-08-30 RX ADMIN — PROPOFOL 20 MG: 10 INJECTION, EMULSION INTRAVENOUS at 12:17

## 2023-08-30 ASSESSMENT — LIFESTYLE VARIABLES: SMOKING_STATUS: 0

## 2023-08-30 ASSESSMENT — ENCOUNTER SYMPTOMS: SHORTNESS OF BREATH: 1

## 2023-08-30 NOTE — ANESTHESIA POSTPROCEDURE EVALUATION
Department of Anesthesiology  Postprocedure Note    Patient: Lourena Gaucher  MRN: [id number]  YOB: 1950  Date of evaluation: 8/30/2023      Procedure Summary     Date: 08/30/23 Room / Location: [facility] Cath Lab    Anesthesia Start: 0393 Anesthesia Stop: 3869    Procedure: CATH LAB WITH ANESTHESIA Diagnosis:     Scheduled Providers: Shital Milner DO; MAILE Dawson CRNA Responsible Provider: Shital Milner DO    Anesthesia Type: general ASA Status: 3          Anesthesia Type: No value filed.     Rambo Phase I:      Rambo Phase II:        Anesthesia Post Evaluation    Patient location during evaluation: PACU  Patient participation: complete - patient participated  Level of consciousness: awake and alert  Pain score: 0  Airway patency: patent  Nausea & Vomiting: no nausea and no vomiting  Complications: no  Cardiovascular status: hemodynamically stable  Respiratory status: acceptable  Hydration status: stable  Pain management: adequate and satisfactory to patient

## 2023-08-30 NOTE — ANESTHESIA PRE PROCEDURE
Results   Component Value Date/Time    PROTIME 13.1 08/24/2023 10:43 AM    PROTIME 31.5 03/08/2012 03:21 PM    PROTIME 23.5 10/06/2010 01:41 PM    INR 0.99 08/24/2023 10:43 AM       HCG (If Applicable): No results found for: PREGTESTUR, PREGSERUM, HCG, HCGQUANT     ABGs: No results found for: PHART, PO2ART, YKP1QFE, TNC2RGK, BEART, X9QHAWHR     Type & Screen (If Applicable):  No results found for: LABABO, LABRH    Drug/Infectious Status (If Applicable):  No results found for: HIV, HEPCAB    COVID-19 Screening (If Applicable):   Lab Results   Component Value Date/Time    COVID19 Not Detected 09/02/2021 07:48 PM           Anesthesia Evaluation  Patient summary reviewed and Nursing notes reviewed no history of anesthetic complications:   Airway: Mallampati: III  TM distance: >3 FB   Neck ROM: full  Mouth opening: > = 3 FB   Dental: normal exam         Pulmonary:normal exam    (+) shortness of breath:  sleep apnea (probable ):      (-) not a current smoker                          ROS comment: covid last August 2020 and short of breast with exertion since   Cardiovascular:  Exercise tolerance: poor (<4 METS),   (+) hypertension: moderate, dysrhythmias: atrial fibrillation, atrial flutter and ventricular tachycardia, CHF:,     (-) past MI    NYHA Classification: II  ECG reviewed  Rhythm: irregular  Rate: normal  Echocardiogram reviewed         Beta Blocker:  Dose within 24 Hrs      ROS comment: ECHO 6/22     Left ventricular cavity size is normal. There is mild basal septal   hypertrophy. Left ventricular function is normal with ejection fraction   estimated at 55-60%. No regional wall motion abnormalities are noted. Diastolic function cannot be assessed due to an arrhythmia. Mitral valve leaflets are structurally normal. Mitral annular calcification   is present. Mild mitral regurgitation is present. No evidence of mitral   stenosis.       This will be the 6 th ablation in past 13 or so yrs       Neuro/Psych:   (+)

## 2023-08-31 LAB
EKG ATRIAL RATE: 319 BPM
EKG DIAGNOSIS: NORMAL
EKG Q-T INTERVAL: 292 MS
EKG QRS DURATION: 90 MS
EKG QTC CALCULATION (BAZETT): 410 MS
EKG R AXIS: -3 DEGREES
EKG T AXIS: 15 DEGREES
EKG VENTRICULAR RATE: 119 BPM
INR BLD: 1.4 (ref 0.84–1.16)

## 2023-08-31 NOTE — PROCEDURES
[facility]     Electrophysiology Procedure Note       Date of Procedure: 8/30/2023  Patient's Name: Rafi Allan  YOB: 1950   Medical Record Number: [id number]  Referring Physician: Christine att. providers found  Procedure Performed by: Ana María Vaughn MD    Indications for procedure: Rafi Allan 74 y.o. female  with PMH of permanent atrial fibrillation, not a candidate for other rhythm control strategy, nonischemic cardiomyopathy, status post PPM implant who is here for AV node ablation. Procedures performed:  Comprehensive electrophysiology study  Successful AV node ablation  Electroanatomic mapping of the right atrium  Intracardiac Echocardiogram  Anesthesia: Monitored Anesthesia Care  EBL <20 cc    Details of Procedure: The risks, benefits and alternatives of the ablation procedure were discussed with the patient. The risks including, but not limited to, the risks of bleeding, infection, radiation exposure, injury to vascular, cardiac and surrounding structures (including pneumothorax), stroke, cardiac perforation, tamponade, need for emergent open heart surgery, need for pacemaker implantation, myocardial infarction and death were discussed in detail. The patient opted to proceed with the ablation. Written informed consent was signed and placed in the chart. The patient was brought to the electrophysiology lab in a fasting nonsedated state. Pre-sedation evaluation and airway assessment was completed. IV sedation was provided by anesthesia colleagues. The patient was monitored continuously with ECG, pulse oximetry, blood pressure monitoring, and direct observation. Both groins were prepped and draped in the usual sterile fashion. The CIED was interrogated and reprogrammed at the beginning and at the end of the procedure. An 8 Fr SR0 sheath was placed in the right femoral vein. 1 9 Fr sheath was placed for Intrac cardiac echocardiogram catheter.       The baseline intervals in

## 2023-08-31 NOTE — H&P
[facility] [place] operated by [facility]   Cardiac Electrophysiology H & P  Date: 8/30/2023  Admit Date:  8/30/2023  Reason for admission: AVN ablation  Consult Requesting Physician: No att. providers found     No chief complaint on file. HPI: Faby Mcconnell is a 74 y.o. here for AVN ablation.        Past Medical History:   Diagnosis Date    Acute diastolic congestive heart failure ([address]) 11/26/2018    Allergic     SEASONAL    Anticoagulant long-term use     Atrial fibrillation ([address]) H/O    Cancer ([address]) 2009 AND 2011    BREAST right    Chronic back pain     Essential hypertension 03/13/2018    GERD (gastroesophageal reflux disease) 1983    [place]ammertoe     Migraine     Mixed hyperlipidemia 10/19/2020        Past Surgical History:   Procedure Laterality Date    ANKLE FRACTURE SURGERY  2004,2008    APPENDECTOMY  03/1983    ATRIAL ABLATION SURGERY  X3    Cryoablation for atrial fib    AXILLARY SURGERY Right     lymph node    BREAST BIOPSY  01/07/2011    BREAST BIOPSY  07/12/2011    left breast bx - benign     BREAST CYST EXCISION  06/2003    BREAST LUMPECTOMY  03/30/2009    CARDIAC CATHETERIZATION  5/2005,01/2006    COLONOSCOPY  1993,1995,1999,2003,2006    COLONOSCOPY  11/29/2011    Dr. Dave Lerner - benign polyps     COSMETIC SURGERY      FOOT SURGERY Right 01/15/2016    CORRECTION OF HAMMERTOES 1-5 RIGHT FOOT, WEIL OSTEOTOMY 2,3    FOOT SURGERY Right 05/12/2016    EXTENSOR TENDON LENGTHENING 4TH AND 5TH TOES RIGHT FOOT    FOOT SURGERY Right 08/11/2016    CORRECTION HAMMERTOES 1ST, 4TH AND 5TH DIGITS RIGHT FOOT WITH    FRACTURE SURGERY      To many to list    HERNIA REPAIR  1993,1998,2000,2009    HYSTERECTOMY (CERVIX STATUS UNKNOWN)  12/1977    HYSTERECTOMY, VAGINAL      JOINT REPLACEMENT  06/2015    LEFT KNEE    KNEE SURGERY  06/09/2015    left knee replacement    LIPOMA RESECTION  09/2006    MASTECTOMY, BILATERAL Bilateral 03/15/2011    bilateral    NEUROMA SURGERY  1987,1988,1989,1992 , 1995    Left foot    [place], 1984 X

## 2023-09-06 ENCOUNTER — NURSE ONLY (OUTPATIENT)
Dept: CARDIOLOGY CLINIC | Age: 73
End: 2023-09-06

## 2023-09-06 ENCOUNTER — OFFICE VISIT (OUTPATIENT)
Dept: CARDIOLOGY CLINIC | Age: 73
End: 2023-09-06
Payer: MEDICARE

## 2023-09-06 VITALS
HEART RATE: 80 BPM | SYSTOLIC BLOOD PRESSURE: 130 MMHG | WEIGHT: 232 LBS | DIASTOLIC BLOOD PRESSURE: 90 MMHG | BODY MASS INDEX: 34.26 KG/M2

## 2023-09-06 DIAGNOSIS — Z95.0 PACEMAKER: ICD-10-CM

## 2023-09-06 DIAGNOSIS — Z95.0 CARDIAC RESYNCHRONIZATION THERAPY PACEMAKER (CRT-P) IN PLACE: Primary | ICD-10-CM

## 2023-09-06 DIAGNOSIS — I10 BENIGN ESSENTIAL HTN: ICD-10-CM

## 2023-09-06 DIAGNOSIS — I50.32 CHRONIC DIASTOLIC CHF (CONGESTIVE HEART FAILURE) (HCC): ICD-10-CM

## 2023-09-06 DIAGNOSIS — I47.29 NSVT (NONSUSTAINED VENTRICULAR TACHYCARDIA) (HCC): ICD-10-CM

## 2023-09-06 DIAGNOSIS — I48.4 ATYPICAL ATRIAL FLUTTER (HCC): ICD-10-CM

## 2023-09-06 DIAGNOSIS — I48.0 PAROXYSMAL ATRIAL FIBRILLATION (HCC): Primary | ICD-10-CM

## 2023-09-06 DIAGNOSIS — I48.3 TYPICAL ATRIAL FLUTTER (HCC): ICD-10-CM

## 2023-09-06 DIAGNOSIS — Z79.01 ON CONTINUOUS ORAL ANTICOAGULATION: ICD-10-CM

## 2023-09-06 PROCEDURE — 3075F SYST BP GE 130 - 139MM HG: CPT | Performed by: NURSE PRACTITIONER

## 2023-09-06 PROCEDURE — G9708 BILAT MAST/HX BI /UNILAT MAS: HCPCS | Performed by: NURSE PRACTITIONER

## 2023-09-06 PROCEDURE — 3080F DIAST BP >= 90 MM HG: CPT | Performed by: NURSE PRACTITIONER

## 2023-09-06 PROCEDURE — 93000 ELECTROCARDIOGRAM COMPLETE: CPT | Performed by: NURSE PRACTITIONER

## 2023-09-06 PROCEDURE — 99215 OFFICE O/P EST HI 40 MIN: CPT | Performed by: NURSE PRACTITIONER

## 2023-09-06 PROCEDURE — 1036F TOBACCO NON-USER: CPT | Performed by: NURSE PRACTITIONER

## 2023-09-06 PROCEDURE — G8399 PT W/DXA RESULTS DOCUMENT: HCPCS | Performed by: NURSE PRACTITIONER

## 2023-09-06 PROCEDURE — 1123F ACP DISCUSS/DSCN MKR DOCD: CPT | Performed by: NURSE PRACTITIONER

## 2023-09-06 PROCEDURE — G8427 DOCREV CUR MEDS BY ELIG CLIN: HCPCS | Performed by: NURSE PRACTITIONER

## 2023-09-06 PROCEDURE — 1090F PRES/ABSN URINE INCON ASSESS: CPT | Performed by: NURSE PRACTITIONER

## 2023-09-06 PROCEDURE — 3017F COLORECTAL CA SCREEN DOC REV: CPT | Performed by: NURSE PRACTITIONER

## 2023-09-06 PROCEDURE — G8417 CALC BMI ABV UP PARAM F/U: HCPCS | Performed by: NURSE PRACTITIONER

## 2023-09-06 RX ORDER — LISINOPRIL 20 MG/1
20 TABLET ORAL DAILY
Qty: 90 TABLET | Refills: 3 | Status: SHIPPED | OUTPATIENT
Start: 2023-09-06

## 2023-09-06 RX ORDER — PANTOPRAZOLE SODIUM 40 MG/1
40 TABLET, DELAYED RELEASE ORAL
Qty: 90 TABLET | Refills: 0 | Status: SHIPPED | OUTPATIENT
Start: 2023-09-06

## 2023-09-11 ENCOUNTER — OFFICE VISIT (OUTPATIENT)
Dept: CARDIOLOGY CLINIC | Age: 73
End: 2023-09-11
Payer: MEDICARE

## 2023-09-11 VITALS
HEART RATE: 80 BPM | WEIGHT: 231 LBS | BODY MASS INDEX: 34.11 KG/M2 | DIASTOLIC BLOOD PRESSURE: 80 MMHG | SYSTOLIC BLOOD PRESSURE: 160 MMHG

## 2023-09-11 DIAGNOSIS — I48.0 PAROXYSMAL ATRIAL FIBRILLATION (HCC): ICD-10-CM

## 2023-09-11 DIAGNOSIS — Z98.890 S/P ABLATION OF ATRIAL FIBRILLATION: ICD-10-CM

## 2023-09-11 DIAGNOSIS — I47.20 VT (VENTRICULAR TACHYCARDIA) (HCC): ICD-10-CM

## 2023-09-11 DIAGNOSIS — I48.3 TYPICAL ATRIAL FLUTTER (HCC): ICD-10-CM

## 2023-09-11 DIAGNOSIS — I50.32 CHRONIC DIASTOLIC CONGESTIVE HEART FAILURE (HCC): Primary | ICD-10-CM

## 2023-09-11 DIAGNOSIS — Z86.79 S/P ABLATION OF ATRIAL FIBRILLATION: ICD-10-CM

## 2023-09-11 DIAGNOSIS — Z95.0 PACEMAKER: ICD-10-CM

## 2023-09-11 DIAGNOSIS — Z86.79 STATUS POST ABLATION OF ATRIAL FLUTTER: ICD-10-CM

## 2023-09-11 DIAGNOSIS — Z98.890 STATUS POST ABLATION OF ATRIAL FLUTTER: ICD-10-CM

## 2023-09-11 PROCEDURE — 3079F DIAST BP 80-89 MM HG: CPT | Performed by: INTERNAL MEDICINE

## 2023-09-11 PROCEDURE — G8399 PT W/DXA RESULTS DOCUMENT: HCPCS | Performed by: INTERNAL MEDICINE

## 2023-09-11 PROCEDURE — 3077F SYST BP >= 140 MM HG: CPT | Performed by: INTERNAL MEDICINE

## 2023-09-11 PROCEDURE — 1036F TOBACCO NON-USER: CPT | Performed by: INTERNAL MEDICINE

## 2023-09-11 PROCEDURE — 99215 OFFICE O/P EST HI 40 MIN: CPT | Performed by: INTERNAL MEDICINE

## 2023-09-11 PROCEDURE — 1123F ACP DISCUSS/DSCN MKR DOCD: CPT | Performed by: INTERNAL MEDICINE

## 2023-09-11 PROCEDURE — 3017F COLORECTAL CA SCREEN DOC REV: CPT | Performed by: INTERNAL MEDICINE

## 2023-09-11 PROCEDURE — G9708 BILAT MAST/HX BI /UNILAT MAS: HCPCS | Performed by: INTERNAL MEDICINE

## 2023-09-11 PROCEDURE — 1090F PRES/ABSN URINE INCON ASSESS: CPT | Performed by: INTERNAL MEDICINE

## 2023-09-11 PROCEDURE — G8427 DOCREV CUR MEDS BY ELIG CLIN: HCPCS | Performed by: INTERNAL MEDICINE

## 2023-09-11 PROCEDURE — G8417 CALC BMI ABV UP PARAM F/U: HCPCS | Performed by: INTERNAL MEDICINE

## 2023-09-11 NOTE — PROGRESS NOTES
BILATERAL Bilateral 03/15/2011    bilateral    NEUROMA SURGERY  9063,3760,5213,0049 , 1995    Left foot    OTHER SURGICAL HISTORY  , 1984 X 2, 1994 X 2, 2006    Bowel obstructions    PARTIAL HYSTERECTOMY (CERVIX NOT REMOVED)      RHINOPLASTY  1976    RHINOPLASTY      SMALL INTESTINE SURGERY   +    To many to list    [facility] ENDOSCOPY  5767,1897,3390,2863,6025,2849    VARICOSE VEIN SURGERY  2001     Social History     Socioeconomic History    Marital status:      Spouse name: Not on file    Number of children: 2    Years of education: 12    Highest education level: Not on file   Occupational History    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   Tobacco Use    Smoking status: Former     Packs/day: 0.25     Years: 8.00     Additional pack years: 0.00     Total pack years: 2.00     Types: Cigarettes     Start date: 1980     Quit date: 1988     Years since quittin.7    Smokeless tobacco: Never    Tobacco comments: Only smoked quarter pack a day if that   Substance and Sexual Activity    Alcohol use: No    Drug use: No    Sexual activity: Not Currently     Partners: Male   Other Topics Concern    Not on file   Social History Narrative    Not on file     Social Determinants of Health     Financial Resource Strain: Low Risk  (2023)    Overall Financial Resource Strain (CARDIA)     Difficulty of Paying Living Expenses: Not hard at all   Food Insecurity: No Food Insecurity (2023)    Hunger Vital Sign     Worried About Running Out of Food in the Last Year: Never true     Ran Out of Food in the Last Year: Never true   Transportation Needs: No Transportation Needs (10/19/2020)    PRAPARE - Transportation     Lack of Transportation (Medical): No     Lack of Transportation (Non-Medical):  No   Physical Activity: Sufficiently Active (2023)    Exercise Vital Sign     Days of Exercise per Week: 5 days     Minutes of

## 2023-11-15 NOTE — TELEPHONE ENCOUNTER
Pt says that her HR is now 140 at 1:25pmand she has already been taking 50 mg daily. 125/75 was her BP today. Please advise. Pt is getting increasingly worried. No

## 2023-11-18 PROCEDURE — 93294 REM INTERROG EVL PM/LDLS PM: CPT | Performed by: INTERNAL MEDICINE

## 2023-11-18 PROCEDURE — 93296 REM INTERROG EVL PM/IDS: CPT | Performed by: INTERNAL MEDICINE

## 2023-12-11 ENCOUNTER — OFFICE VISIT (OUTPATIENT)
Dept: CARDIOLOGY CLINIC | Age: 73
End: 2023-12-11
Payer: MEDICARE

## 2023-12-11 VITALS
DIASTOLIC BLOOD PRESSURE: 80 MMHG | HEART RATE: 80 BPM | BODY MASS INDEX: 33.97 KG/M2 | SYSTOLIC BLOOD PRESSURE: 134 MMHG | WEIGHT: 230 LBS

## 2023-12-11 DIAGNOSIS — I48.3 TYPICAL ATRIAL FLUTTER (HCC): ICD-10-CM

## 2023-12-11 DIAGNOSIS — Z95.0 PACEMAKER: ICD-10-CM

## 2023-12-11 DIAGNOSIS — I48.21 PERMANENT ATRIAL FIBRILLATION (HCC): ICD-10-CM

## 2023-12-11 DIAGNOSIS — Z86.79 S/P ABLATION OF ATRIAL FLUTTER: ICD-10-CM

## 2023-12-11 DIAGNOSIS — Z86.79 S/P ABLATION OF ATRIAL FIBRILLATION: ICD-10-CM

## 2023-12-11 DIAGNOSIS — Z98.890 S/P ABLATION OF ATRIAL FLUTTER: ICD-10-CM

## 2023-12-11 DIAGNOSIS — Z98.890 S/P ABLATION OF ATRIAL FIBRILLATION: ICD-10-CM

## 2023-12-11 DIAGNOSIS — I47.20 VT (VENTRICULAR TACHYCARDIA) (HCC): ICD-10-CM

## 2023-12-11 DIAGNOSIS — I50.32 CHRONIC DIASTOLIC CONGESTIVE HEART FAILURE (HCC): Primary | ICD-10-CM

## 2023-12-11 DIAGNOSIS — I49.5 SSS (SICK SINUS SYNDROME) (HCC): ICD-10-CM

## 2023-12-11 PROCEDURE — G8484 FLU IMMUNIZE NO ADMIN: HCPCS | Performed by: INTERNAL MEDICINE

## 2023-12-11 PROCEDURE — 99214 OFFICE O/P EST MOD 30 MIN: CPT | Performed by: INTERNAL MEDICINE

## 2023-12-11 PROCEDURE — 1090F PRES/ABSN URINE INCON ASSESS: CPT | Performed by: INTERNAL MEDICINE

## 2023-12-11 PROCEDURE — 1036F TOBACCO NON-USER: CPT | Performed by: INTERNAL MEDICINE

## 2023-12-11 PROCEDURE — G8399 PT W/DXA RESULTS DOCUMENT: HCPCS | Performed by: INTERNAL MEDICINE

## 2023-12-11 PROCEDURE — G8427 DOCREV CUR MEDS BY ELIG CLIN: HCPCS | Performed by: INTERNAL MEDICINE

## 2023-12-11 PROCEDURE — G8417 CALC BMI ABV UP PARAM F/U: HCPCS | Performed by: INTERNAL MEDICINE

## 2023-12-11 PROCEDURE — 3075F SYST BP GE 130 - 139MM HG: CPT | Performed by: INTERNAL MEDICINE

## 2023-12-11 PROCEDURE — 3079F DIAST BP 80-89 MM HG: CPT | Performed by: INTERNAL MEDICINE

## 2023-12-11 PROCEDURE — 3017F COLORECTAL CA SCREEN DOC REV: CPT | Performed by: INTERNAL MEDICINE

## 2023-12-11 PROCEDURE — 1123F ACP DISCUSS/DSCN MKR DOCD: CPT | Performed by: INTERNAL MEDICINE

## 2023-12-11 NOTE — PROGRESS NOTES
fee  Subjective:      Patient ID: Joseph Duran is a 74 y.o. female. HPI Mele Boyce is being seen for cardiac follow up for afib/arrhythmia and now VT. Ablation of AVN and sp pacer. EP stopped dofetilide long ago and sob/ha/afib/back are much better. Wt stable. Rhythm good since AVN ablation. Activity level good. No swelling. No pnd or orthopnea. No palp/syncope. Breathing reasonable.     Past Medical History:   Diagnosis Date    Acute diastolic congestive heart failure ([address]) 11/26/2018    Allergic     SEASONAL    Anticoagulant long-term use     Atrial fibrillation ([address]) H/O    Cancer ([address]) 2009 AND 2011    BREAST right    Chronic back pain     Essential hypertension 03/13/2018    GERD (gastroesophageal reflux disease) 1983    Hammertoe     Migraine     Mixed hyperlipidemia 10/19/2020     Past Surgical History:   Procedure Laterality Date    ANKLE FRACTURE SURGERY  2004,2008    APPENDECTOMY  03/1983    ATRIAL ABLATION SURGERY  X3    Cryoablation for atrial fib    AXILLARY SURGERY Right     lymph node    BREAST BIOPSY  01/07/2011    BREAST BIOPSY  07/12/2011    left breast bx - benign     BREAST CYST EXCISION  06/2003    BREAST LUMPECTOMY  03/30/2009    CARDIAC CATHETERIZATION  5/2005,01/2006    COLONOSCOPY  1993,1995,1999,2003,2006    COLONOSCOPY  11/29/2011    Dr. Lorenzo Braun - benign polyps     COSMETIC SURGERY      FOOT SURGERY Right 01/15/2016    CORRECTION OF HAMMERTOES 1-5 RIGHT FOOT, WEIL OSTEOTOMY 2,3    FOOT SURGERY Right 05/12/2016    EXTENSOR TENDON LENGTHENING 4TH AND 5TH TOES RIGHT FOOT    FOOT SURGERY Right 08/11/2016    CORRECTION HAMMERTOES 1ST, 4TH AND 5TH DIGITS RIGHT FOOT WITH    FRACTURE SURGERY      To many to list    HERNIA REPAIR  1993,1998,2000,2009    HYSTERECTOMY (CERVIX STATUS UNKNOWN)  12/1977    HYSTERECTOMY, VAGINAL      JOINT REPLACEMENT  06/2015    LEFT KNEE    KNEE SURGERY  06/09/2015    left knee replacement    LIPOMA RESECTION  09/2006    MASTECTOMY, BILATERAL Bilateral 03/15/2011

## 2023-12-19 ENCOUNTER — TELEPHONE (OUTPATIENT)
Dept: CARDIOLOGY CLINIC | Age: 73
End: 2023-12-19

## 2023-12-19 NOTE — TELEPHONE ENCOUNTER
Requested Prescriptions      No prescriptions requested or ordered in this encounter      Last Office Visit: 12/19/2023     Next Office Visit: 5/30/2024

## 2023-12-26 RX ORDER — PANTOPRAZOLE SODIUM 40 MG/1
40 TABLET, DELAYED RELEASE ORAL
Qty: 90 TABLET | Refills: 0 | Status: SHIPPED | OUTPATIENT
Start: 2023-12-26

## 2024-01-17 SDOH — HEALTH STABILITY: PHYSICAL HEALTH: ON AVERAGE, HOW MANY DAYS PER WEEK DO YOU ENGAGE IN MODERATE TO STRENUOUS EXERCISE (LIKE A BRISK WALK)?: 4 DAYS

## 2024-01-17 SDOH — ECONOMIC STABILITY: INCOME INSECURITY: HOW HARD IS IT FOR YOU TO PAY FOR THE VERY BASICS LIKE FOOD, HOUSING, MEDICAL CARE, AND HEATING?: NOT HARD AT ALL

## 2024-01-17 SDOH — ECONOMIC STABILITY: FOOD INSECURITY: WITHIN THE PAST 12 MONTHS, THE FOOD YOU BOUGHT JUST DIDN'T LAST AND YOU DIDN'T HAVE MONEY TO GET MORE.: NEVER TRUE

## 2024-01-17 SDOH — ECONOMIC STABILITY: FOOD INSECURITY: WITHIN THE PAST 12 MONTHS, YOU WORRIED THAT YOUR FOOD WOULD RUN OUT BEFORE YOU GOT MONEY TO BUY MORE.: NEVER TRUE

## 2024-01-17 SDOH — ECONOMIC STABILITY: HOUSING INSECURITY
IN THE LAST 12 MONTHS, WAS THERE A TIME WHEN YOU DID NOT HAVE A STEADY PLACE TO SLEEP OR SLEPT IN A SHELTER (INCLUDING NOW)?: NO

## 2024-01-17 SDOH — HEALTH STABILITY: PHYSICAL HEALTH: ON AVERAGE, HOW MANY MINUTES DO YOU ENGAGE IN EXERCISE AT THIS LEVEL?: 50 MIN

## 2024-01-17 ASSESSMENT — LIFESTYLE VARIABLES
HOW OFTEN DO YOU HAVE SIX OR MORE DRINKS ON ONE OCCASION: 1
HOW MANY STANDARD DRINKS CONTAINING ALCOHOL DO YOU HAVE ON A TYPICAL DAY: PATIENT DOES NOT DRINK
HOW MANY STANDARD DRINKS CONTAINING ALCOHOL DO YOU HAVE ON A TYPICAL DAY: 0
HOW OFTEN DO YOU HAVE A DRINK CONTAINING ALCOHOL: NEVER
HOW OFTEN DO YOU HAVE A DRINK CONTAINING ALCOHOL: 1

## 2024-01-17 ASSESSMENT — PATIENT HEALTH QUESTIONNAIRE - PHQ9
SUM OF ALL RESPONSES TO PHQ QUESTIONS 1-9: 0
SUM OF ALL RESPONSES TO PHQ QUESTIONS 1-9: 0
SUM OF ALL RESPONSES TO PHQ9 QUESTIONS 1 & 2: 0
SUM OF ALL RESPONSES TO PHQ QUESTIONS 1-9: 0
2. FEELING DOWN, DEPRESSED OR HOPELESS: 0
1. LITTLE INTEREST OR PLEASURE IN DOING THINGS: 0
SUM OF ALL RESPONSES TO PHQ QUESTIONS 1-9: 0

## 2024-01-18 ENCOUNTER — OFFICE VISIT (OUTPATIENT)
Dept: PRIMARY CARE CLINIC | Age: 74
End: 2024-01-18
Payer: MEDICARE

## 2024-01-18 VITALS
RESPIRATION RATE: 16 BRPM | TEMPERATURE: 97.9 F | HEART RATE: 93 BPM | BODY MASS INDEX: 33.33 KG/M2 | HEIGHT: 69 IN | WEIGHT: 225 LBS | OXYGEN SATURATION: 97 % | DIASTOLIC BLOOD PRESSURE: 86 MMHG | SYSTOLIC BLOOD PRESSURE: 128 MMHG

## 2024-01-18 DIAGNOSIS — R73.03 PREDIABETES: ICD-10-CM

## 2024-01-18 DIAGNOSIS — E78.2 MIXED HYPERLIPIDEMIA: ICD-10-CM

## 2024-01-18 DIAGNOSIS — Z85.3 HISTORY OF RIGHT BREAST CANCER: ICD-10-CM

## 2024-01-18 DIAGNOSIS — I10 ESSENTIAL HYPERTENSION: ICD-10-CM

## 2024-01-18 DIAGNOSIS — E55.9 VITAMIN D DEFICIENCY: ICD-10-CM

## 2024-01-18 DIAGNOSIS — K21.9 GASTROESOPHAGEAL REFLUX DISEASE, UNSPECIFIED WHETHER ESOPHAGITIS PRESENT: ICD-10-CM

## 2024-01-18 DIAGNOSIS — Z00.00 MEDICARE ANNUAL WELLNESS VISIT, SUBSEQUENT: Primary | ICD-10-CM

## 2024-01-18 LAB
25(OH)D3 SERPL-MCNC: 54.1 NG/ML
ALBUMIN SERPL-MCNC: 4.8 G/DL (ref 3.4–5)
ALBUMIN/GLOB SERPL: 2.2 {RATIO} (ref 1.1–2.2)
ALP SERPL-CCNC: 83 U/L (ref 40–129)
ALT SERPL-CCNC: 12 U/L (ref 10–40)
ANION GAP SERPL CALCULATED.3IONS-SCNC: 14 MMOL/L (ref 3–16)
AST SERPL-CCNC: 14 U/L (ref 15–37)
BILIRUB SERPL-MCNC: 0.4 MG/DL (ref 0–1)
BUN SERPL-MCNC: 14 MG/DL (ref 7–20)
CALCIUM SERPL-MCNC: 9.1 MG/DL (ref 8.3–10.6)
CHLORIDE SERPL-SCNC: 105 MMOL/L (ref 99–110)
CHOLEST SERPL-MCNC: 203 MG/DL (ref 0–199)
CO2 SERPL-SCNC: 27 MMOL/L (ref 21–32)
CREAT SERPL-MCNC: 0.6 MG/DL (ref 0.6–1.2)
GFR SERPLBLD CREATININE-BSD FMLA CKD-EPI: >60 ML/MIN/{1.73_M2}
GLUCOSE SERPL-MCNC: 118 MG/DL (ref 70–99)
HDLC SERPL-MCNC: 47 MG/DL (ref 40–60)
LDLC SERPL CALC-MCNC: 124 MG/DL
POTASSIUM SERPL-SCNC: 4.5 MMOL/L (ref 3.5–5.1)
PROT SERPL-MCNC: 7 G/DL (ref 6.4–8.2)
SODIUM SERPL-SCNC: 146 MMOL/L (ref 136–145)
TRIGL SERPL-MCNC: 162 MG/DL (ref 0–150)
VLDLC SERPL CALC-MCNC: 32 MG/DL

## 2024-01-18 PROCEDURE — 3074F SYST BP LT 130 MM HG: CPT | Performed by: INTERNAL MEDICINE

## 2024-01-18 PROCEDURE — 1123F ACP DISCUSS/DSCN MKR DOCD: CPT | Performed by: INTERNAL MEDICINE

## 2024-01-18 PROCEDURE — 3017F COLORECTAL CA SCREEN DOC REV: CPT | Performed by: INTERNAL MEDICINE

## 2024-01-18 PROCEDURE — G8484 FLU IMMUNIZE NO ADMIN: HCPCS | Performed by: INTERNAL MEDICINE

## 2024-01-18 PROCEDURE — G0439 PPPS, SUBSEQ VISIT: HCPCS | Performed by: INTERNAL MEDICINE

## 2024-01-18 PROCEDURE — 3079F DIAST BP 80-89 MM HG: CPT | Performed by: INTERNAL MEDICINE

## 2024-01-18 NOTE — PROGRESS NOTES
Final    Total Protein 01/16/2023 6.8  6.4 - 8.2 g/dL Final    Albumin 01/16/2023 4.5  3.4 - 5.0 g/dL Final    Albumin/Globulin Ratio 01/16/2023 2.0  1.1 - 2.2 Final    Total Bilirubin 01/16/2023 0.3  0.0 - 1.0 mg/dL Final    Alkaline Phosphatase 01/16/2023 86  40 - 129 U/L Final    ALT 01/16/2023 15  10 - 40 U/L Final    AST 01/16/2023 17  15 - 37 U/L Final    Vit D, 25-Hydroxy 01/16/2023 46.9  >=30 ng/mL Final   Office Visit on 01/16/2023   Component Date Value Ref Range Status    Color, UA 01/16/2023 yellow   Final    Clarity, UA 01/16/2023 clear   Final    Glucose, UA POC 01/16/2023 neg   Final    Bilirubin, UA 01/16/2023 neg   Final    Ketones, UA 01/16/2023 neg   Final    Spec Grav, UA 01/16/2023 1.030   Final    Blood, UA POC 01/16/2023 neg   Final    pH, UA 01/16/2023 5.5   Final    Protein, UA POC 01/16/2023 trace   Final    Urobilinogen, UA 01/16/2023 0.2   Final    Leukocytes, UA 01/16/2023 neg   Final    Nitrite, UA 01/16/2023 neg   Final             CareTeam (Including outside providers/suppliers regularly involved in providing care):   Patient Care Team:  Nabil Beard MD as PCP - General (Internal Medicine)  Nabil Beard MD as PCP - Empaneled Provider  Darnell Adan MD as Consulting Physician (Electrophysiology)  Logan Hoyos MD as Consulting Physician (Medical Oncology)     Reviewed and updated this visit:  Tobacco  Allergies  Meds  Med Hx  Surg Hx  Soc Hx  Fam Hx

## 2024-01-19 LAB
EST. AVERAGE GLUCOSE BLD GHB EST-MCNC: 108.3 MG/DL
HBA1C MFR BLD: 5.4 %

## 2024-02-02 ENCOUNTER — TELEPHONE (OUTPATIENT)
Dept: CARDIOLOGY CLINIC | Age: 74
End: 2024-02-02

## 2024-02-02 NOTE — TELEPHONE ENCOUNTER
Spoke with pt regarding her application. Informed her that we will make a copy of application and give her a copy of she would like.

## 2024-02-02 NOTE — TELEPHONE ENCOUNTER
Regarding: Eliquis  [place] [facility] paper work  Contact: [phone]  ----- Message from Merle Aleman RN sent at 2/1/2024  3:32 PM EST -----       ----- Message from Rosalva Sr to Sahra Perkins APRN - CNP sent at 2/1/2024  3:09 PM -----   Rosalva Mitchell here, 1950 I need some help with my [place] [employer] Patient Assistance papers. If someone could please give me a call, I would appreciate it. Thank you, Rosalva Sr

## 2024-03-04 NOTE — TELEPHONE ENCOUNTER
Requested Prescriptions     Pending Prescriptions Disp Refills    apixaban (ELIQUIS) 5 MG TABS tablet 180 tablet 3     Sig: Take 1 tablet by mouth 2 times daily            Last Office Visit: 12/19/2023     Next Office Visit: 5/30/2024       Last Labs: 01.18.2024

## 2024-03-05 RX ORDER — METOPROLOL SUCCINATE 100 MG/1
100 TABLET, EXTENDED RELEASE ORAL DAILY
Qty: 90 TABLET | Refills: 3 | Status: SHIPPED | OUTPATIENT
Start: 2024-03-05

## 2024-03-08 PROCEDURE — 93294 REM INTERROG EVL PM/LDLS PM: CPT | Performed by: INTERNAL MEDICINE

## 2024-03-08 PROCEDURE — 93296 REM INTERROG EVL PM/IDS: CPT | Performed by: INTERNAL MEDICINE

## 2024-03-14 RX ORDER — EZETIMIBE 10 MG/1
TABLET ORAL
Qty: 90 TABLET | Refills: 1 | Status: SHIPPED | OUTPATIENT
Start: 2024-03-14

## 2024-04-08 ENCOUNTER — OFFICE VISIT (OUTPATIENT)
Dept: CARDIOLOGY CLINIC | Age: 74
End: 2024-04-08
Payer: MEDICARE

## 2024-04-08 VITALS
WEIGHT: 223 LBS | BODY MASS INDEX: 32.93 KG/M2 | HEART RATE: 76 BPM | DIASTOLIC BLOOD PRESSURE: 80 MMHG | SYSTOLIC BLOOD PRESSURE: 136 MMHG

## 2024-04-08 DIAGNOSIS — Z86.79 S/P ABLATION OF ATRIAL FIBRILLATION: ICD-10-CM

## 2024-04-08 DIAGNOSIS — I48.3 TYPICAL ATRIAL FLUTTER (HCC): ICD-10-CM

## 2024-04-08 DIAGNOSIS — Z98.890 S/P ABLATION OF ATRIAL FIBRILLATION: ICD-10-CM

## 2024-04-08 DIAGNOSIS — I49.5 SSS (SICK SINUS SYNDROME) (HCC): ICD-10-CM

## 2024-04-08 DIAGNOSIS — I50.32 CHRONIC DIASTOLIC CONGESTIVE HEART FAILURE (HCC): Primary | ICD-10-CM

## 2024-04-08 DIAGNOSIS — Z95.0 PACEMAKER: ICD-10-CM

## 2024-04-08 DIAGNOSIS — I48.19 PERSISTENT ATRIAL FIBRILLATION (HCC): ICD-10-CM

## 2024-04-08 DIAGNOSIS — I47.20 VT (VENTRICULAR TACHYCARDIA) (HCC): ICD-10-CM

## 2024-04-08 PROCEDURE — 3075F SYST BP GE 130 - 139MM HG: CPT | Performed by: INTERNAL MEDICINE

## 2024-04-08 PROCEDURE — 1123F ACP DISCUSS/DSCN MKR DOCD: CPT | Performed by: INTERNAL MEDICINE

## 2024-04-08 PROCEDURE — G8427 DOCREV CUR MEDS BY ELIG CLIN: HCPCS | Performed by: INTERNAL MEDICINE

## 2024-04-08 PROCEDURE — 1090F PRES/ABSN URINE INCON ASSESS: CPT | Performed by: INTERNAL MEDICINE

## 2024-04-08 PROCEDURE — 99214 OFFICE O/P EST MOD 30 MIN: CPT | Performed by: INTERNAL MEDICINE

## 2024-04-08 PROCEDURE — 3079F DIAST BP 80-89 MM HG: CPT | Performed by: INTERNAL MEDICINE

## 2024-04-08 PROCEDURE — G8417 CALC BMI ABV UP PARAM F/U: HCPCS | Performed by: INTERNAL MEDICINE

## 2024-04-08 PROCEDURE — 1036F TOBACCO NON-USER: CPT | Performed by: INTERNAL MEDICINE

## 2024-04-08 PROCEDURE — 3017F COLORECTAL CA SCREEN DOC REV: CPT | Performed by: INTERNAL MEDICINE

## 2024-04-08 PROCEDURE — G8399 PT W/DXA RESULTS DOCUMENT: HCPCS | Performed by: INTERNAL MEDICINE

## 2024-04-08 NOTE — PROGRESS NOTES
Bilateral 03/15/2011    bilateral    NEUROMA SURGERY  ,,, ,     Left foot    OTHER SURGICAL HISTORY  , 1984 X 2, 1994 X 2, 2006    Bowel obstructions    PARTIAL HYSTERECTOMY (CERVIX NOT REMOVED)      RHINOPLASTY  1976    RHINOPLASTY      SMALL INTESTINE SURGERY   +    To many to list    THORACOTOMY      TONSILLECTOMY      UPPER GASTROINTESTINAL ENDOSCOPY  ,,,,,2006    VARICOSE VEIN SURGERY  2001     Social History     Socioeconomic History    Marital status:      Spouse name: Not on file    Number of children: 2    Years of education: 12    Highest education level: Not on file   Occupational History    Occupation: [employer] finish    Tobacco Use    Smoking status: Former     Current packs/day: 0.00     Average packs/day: 0.3 packs/day for 8.0 years (2.0 ttl pk-yrs)     Types: Cigarettes     Start date: 1980     Quit date: 1988     Years since quittin.2    Smokeless tobacco: Never    Tobacco comments:     Only smoked quarter pack a day if that   Substance and Sexual Activity    Alcohol use: No    Drug use: No    Sexual activity: Not Currently     Partners: Male   Other Topics Concern    Not on file   Social History Narrative    Not on file     Social Determinants of Health     Financial Resource Strain: Low Risk  (2024)    Overall Financial Resource Strain (CARDIA)     Difficulty of Paying Living Expenses: Not hard at all   Food Insecurity: No Food Insecurity (2024)    Hunger Vital Sign     Worried About Running Out of Food in the Last Year: Never true     Ran Out of Food in the Last Year: Never true   Transportation Needs: Unknown (2024)    PRAPARE - Transportation     Lack of Transportation (Medical): Not on file     Lack of Transportation (Non-Medical): No   Physical Activity: Sufficiently Active (2024)    Exercise Vital Sign     Days of Exercise per Week: 4 days     Minutes of Exercise per Session:

## 2024-05-29 NOTE — PROGRESS NOTES
rigidity.  No JVD present.   Cardiovascular: Normal rate, regular rhythm, S1&S2 and intact distal pulses.   Pulmonary/Chest: Bilateral respiratory sounds. No wheezes. No rhonchi.    Abdominal: Soft. Bowel sounds present. No distension, No tenderness.   Musculoskeletal: No tenderness. No edema    Lymphadenopathy: Has no cervical adenopathy.   Neurological: Alert and oriented. Cranial nerve appears intact, No Gross deficit   Skin: Skin is warm and dry. No rash noted.   Psychiatric: Has a normal mood, affect and behavior     Labs:  Reviewed.     ECG: reviewed, BiVP 78,  ms     Studies:   1.   2-week CAM (6/23-7/7/22):  SR with average HR 71 (), 48.1% AF burden, 0.2% AFL burden, 1 AT episode lasting up to 6 beats, 70 pauses up to 3.7 seconds in length    2-week Zio (3/25-4/8/21):  SR with average HR 91 (), 2945 VT episodes with the longest lasting 28.1 seconds, 69% AF burden,  128 pauses with the longest lasting 4.2 seconds in length    Echo 6/30/22:   Summary   Left ventricular cavity size is normal. There is mild basal septal   hypertrophy. Left ventricular function is normal with ejection fraction   estimated at 55-60%. No regional wall motion abnormalities are noted.   Diastolic function cannot be assessed due to an arrhythmia.   Mitral valve leaflets are structurally normal. Mitral annular calcification   is present. Mild mitral regurgitation is present. No evidence of mitral   stenosis.     Stress Test: 2/19/2019  Summary    There is normal isotope uptake at stress and rest. There is no evidence of    myocardial ischemia or scar.    Normal LV size and systolic function.    Left ventricular ejection fraction of 75 %.    There are no regional wall motion abnormalities.    Normal myocardial perfusion study.      Cath 5/18/21  IMPRESSION:  1.  Normal left ventricular systolic function.  2.  Essentially normal coronary arteries.  3.  Successful Angio-Seal of right femoral arteriotomy site.    The

## 2024-05-30 ENCOUNTER — OFFICE VISIT (OUTPATIENT)
Dept: CARDIOLOGY CLINIC | Age: 74
End: 2024-05-30
Payer: MEDICARE

## 2024-05-30 ENCOUNTER — NURSE ONLY (OUTPATIENT)
Dept: CARDIOLOGY CLINIC | Age: 74
End: 2024-05-30

## 2024-05-30 VITALS
BODY MASS INDEX: 32.64 KG/M2 | SYSTOLIC BLOOD PRESSURE: 132 MMHG | HEART RATE: 78 BPM | WEIGHT: 221 LBS | DIASTOLIC BLOOD PRESSURE: 80 MMHG

## 2024-05-30 DIAGNOSIS — I50.32 CHRONIC DIASTOLIC CONGESTIVE HEART FAILURE (HCC): ICD-10-CM

## 2024-05-30 DIAGNOSIS — I42.9 CARDIOMYOPATHY, UNSPECIFIED TYPE (HCC): ICD-10-CM

## 2024-05-30 DIAGNOSIS — Z98.890 HX OF ATRIOVENTRICULAR NODE ABLATION: ICD-10-CM

## 2024-05-30 DIAGNOSIS — I50.32 CHRONIC DIASTOLIC CHF (CONGESTIVE HEART FAILURE) (HCC): ICD-10-CM

## 2024-05-30 DIAGNOSIS — I49.5 TACHY-BRADY SYNDROME (HCC): ICD-10-CM

## 2024-05-30 DIAGNOSIS — I48.21 PERMANENT ATRIAL FIBRILLATION (HCC): ICD-10-CM

## 2024-05-30 DIAGNOSIS — Z95.0 CARDIAC RESYNCHRONIZATION THERAPY PACEMAKER (CRT-P) IN PLACE: ICD-10-CM

## 2024-05-30 DIAGNOSIS — I48.0 PAROXYSMAL ATRIAL FIBRILLATION (HCC): ICD-10-CM

## 2024-05-30 DIAGNOSIS — I47.29 NSVT (NONSUSTAINED VENTRICULAR TACHYCARDIA) (HCC): ICD-10-CM

## 2024-05-30 DIAGNOSIS — I10 ESSENTIAL HYPERTENSION: ICD-10-CM

## 2024-05-30 DIAGNOSIS — I48.11 LONGSTANDING PERSISTENT ATRIAL FIBRILLATION (HCC): Primary | ICD-10-CM

## 2024-05-30 DIAGNOSIS — I47.20 VT (VENTRICULAR TACHYCARDIA) (HCC): ICD-10-CM

## 2024-05-30 DIAGNOSIS — Z95.0 CARDIAC RESYNCHRONIZATION THERAPY PACEMAKER (CRT-P) IN PLACE: Primary | ICD-10-CM

## 2024-05-30 DIAGNOSIS — R00.1 BRADYCARDIA: ICD-10-CM

## 2024-05-30 PROCEDURE — G8399 PT W/DXA RESULTS DOCUMENT: HCPCS | Performed by: INTERNAL MEDICINE

## 2024-05-30 PROCEDURE — 1036F TOBACCO NON-USER: CPT | Performed by: INTERNAL MEDICINE

## 2024-05-30 PROCEDURE — G8427 DOCREV CUR MEDS BY ELIG CLIN: HCPCS | Performed by: INTERNAL MEDICINE

## 2024-05-30 PROCEDURE — 99214 OFFICE O/P EST MOD 30 MIN: CPT | Performed by: INTERNAL MEDICINE

## 2024-05-30 PROCEDURE — 93000 ELECTROCARDIOGRAM COMPLETE: CPT | Performed by: INTERNAL MEDICINE

## 2024-05-30 PROCEDURE — 3075F SYST BP GE 130 - 139MM HG: CPT | Performed by: INTERNAL MEDICINE

## 2024-05-30 PROCEDURE — 1090F PRES/ABSN URINE INCON ASSESS: CPT | Performed by: INTERNAL MEDICINE

## 2024-05-30 PROCEDURE — 1123F ACP DISCUSS/DSCN MKR DOCD: CPT | Performed by: INTERNAL MEDICINE

## 2024-05-30 PROCEDURE — 3079F DIAST BP 80-89 MM HG: CPT | Performed by: INTERNAL MEDICINE

## 2024-05-30 PROCEDURE — 3017F COLORECTAL CA SCREEN DOC REV: CPT | Performed by: INTERNAL MEDICINE

## 2024-05-30 PROCEDURE — G8417 CALC BMI ABV UP PARAM F/U: HCPCS | Performed by: INTERNAL MEDICINE

## 2024-05-30 PROCEDURE — G2211 COMPLEX E/M VISIT ADD ON: HCPCS | Performed by: INTERNAL MEDICINE

## 2024-06-09 ENCOUNTER — PATIENT MESSAGE (OUTPATIENT)
Dept: CARDIOLOGY CLINIC | Age: 74
End: 2024-06-09

## 2024-06-10 NOTE — TELEPHONE ENCOUNTER
From: Rosalva Sr  To: Dr. Chrystal Mcdonald  Sent: 6/9/2024 1:24 PM EDT  Subject: Echo    What does abnormal mean for my echo I had done on June 7, 2024 My name is Rosalva Sr 1950. My number is [phone] This is freaking me out! Thank you Rosalva Sr

## 2024-07-15 NOTE — PROGRESS NOTES
normal isotope uptake at stress and rest. There is no evidence of    myocardial ischemia or scar.    Normal LV size and systolic function.    Left ventricular ejection fraction of 75 %.    There are no regional wall motion abnormalities.    Normal myocardial perfusion study.     Last Cath (if available): 5/18/21  IMPRESSION:  1.  Normal left ventricular systolic function.  2.  Essentially normal coronary arteries.  3.  Successful Angio-Seal of right femoral arteriotomy site.    Last TTE/ELSIE(if available): 6/7/24    Image quality is adequate.    Left Ventricle: Normal left ventricular systolic function with a visually estimated EF of 55 - 60%. Left ventricle size is normal. Mildly increased wall thickness. Findings consistent with mild concentric hypertrophy. Septal motion is consistent with pacing. Normal wall motion. Indeterminate diastolic function.    Right Ventricle: Right ventricle size is normal.  Pacer wire visualized. Normal systolic function. TAPSE is 2.1 cm. RV Peak S' is 13 cm/s.    Aortic Valve: Mild sclerosis of the aortic valve cusps. Trace regurgitation. No stenosis.    Mitral Valve: Mildly thickened leaflet, at the anterior and posterior leaflets. Mild to moderate regurgitation. No stenosis noted.    Tricuspid Valve: Valve structure is normal. Mild regurgitation. The estimated RVSP is 22 mmHg. No stenosis noted.    Left Atrium: Left atrium is mildly dilated.     Last CMR  (if available):    Last Coronary Artery Calcium Score:       Assessment / Plan:     A-fib ([facility])  Follows with EP, continue Eliquis and metoprolol.  Had ablation/pacemaker.  Very fatigued, will discuss with EP if there is an alternative option to metoprolol    Chronic diastolic congestive heart failure (HCC)  Compensated, continue current dose of Lasix.  Discussed Jardiance, will let us know if interested    Mixed hyperlipidemia  On Zetia.  No side effects    --> Follow-up echo in 1 to 2 years for mitral regurg    Follow up in 6

## 2024-07-16 ENCOUNTER — OFFICE VISIT (OUTPATIENT)
Dept: CARDIOLOGY CLINIC | Age: 74
End: 2024-07-16
Payer: MEDICARE

## 2024-07-16 VITALS
WEIGHT: 223.8 LBS | BODY MASS INDEX: 33.05 KG/M2 | HEART RATE: 98 BPM | DIASTOLIC BLOOD PRESSURE: 80 MMHG | OXYGEN SATURATION: 97 % | SYSTOLIC BLOOD PRESSURE: 138 MMHG

## 2024-07-16 DIAGNOSIS — E78.2 MIXED HYPERLIPIDEMIA: ICD-10-CM

## 2024-07-16 DIAGNOSIS — Z95.0 CARDIAC RESYNCHRONIZATION THERAPY PACEMAKER (CRT-P) IN PLACE: ICD-10-CM

## 2024-07-16 DIAGNOSIS — I50.32 CHRONIC DIASTOLIC CONGESTIVE HEART FAILURE (HCC): ICD-10-CM

## 2024-07-16 DIAGNOSIS — I48.0 PAROXYSMAL ATRIAL FIBRILLATION (HCC): Primary | ICD-10-CM

## 2024-07-16 PROCEDURE — G8427 DOCREV CUR MEDS BY ELIG CLIN: HCPCS | Performed by: INTERNAL MEDICINE

## 2024-07-16 PROCEDURE — 1036F TOBACCO NON-USER: CPT | Performed by: INTERNAL MEDICINE

## 2024-07-16 PROCEDURE — 1090F PRES/ABSN URINE INCON ASSESS: CPT | Performed by: INTERNAL MEDICINE

## 2024-07-16 PROCEDURE — 99214 OFFICE O/P EST MOD 30 MIN: CPT | Performed by: INTERNAL MEDICINE

## 2024-07-16 PROCEDURE — G8417 CALC BMI ABV UP PARAM F/U: HCPCS | Performed by: INTERNAL MEDICINE

## 2024-07-16 PROCEDURE — 3017F COLORECTAL CA SCREEN DOC REV: CPT | Performed by: INTERNAL MEDICINE

## 2024-07-16 PROCEDURE — 3079F DIAST BP 80-89 MM HG: CPT | Performed by: INTERNAL MEDICINE

## 2024-07-16 PROCEDURE — 1123F ACP DISCUSS/DSCN MKR DOCD: CPT | Performed by: INTERNAL MEDICINE

## 2024-07-16 PROCEDURE — G8399 PT W/DXA RESULTS DOCUMENT: HCPCS | Performed by: INTERNAL MEDICINE

## 2024-07-16 PROCEDURE — 3075F SYST BP GE 130 - 139MM HG: CPT | Performed by: INTERNAL MEDICINE

## 2024-07-16 ASSESSMENT — ENCOUNTER SYMPTOMS
FACIAL SWELLING: 0
CHEST TIGHTNESS: 0
BLOOD IN STOOL: 0
COLOR CHANGE: 0
WHEEZING: 0
COUGH: 0
VOMITING: 0
BACK PAIN: 0
SHORTNESS OF BREATH: 0
ABDOMINAL PAIN: 0
ABDOMINAL DISTENTION: 0
EYE DISCHARGE: 0

## 2024-07-16 NOTE — ASSESSMENT & PLAN NOTE
Follows with EP, continue Eliquis and metoprolol.  Had ablation/pacemaker.  Very fatigued, will discuss with EP if there is an alternative option to metoprolol

## 2024-08-19 DIAGNOSIS — I10 ESSENTIAL HYPERTENSION: ICD-10-CM

## 2024-08-19 DIAGNOSIS — I10 BENIGN ESSENTIAL HTN: ICD-10-CM

## 2024-08-19 RX ORDER — FUROSEMIDE 20 MG/1
20 TABLET ORAL DAILY
Qty: 90 TABLET | Refills: 3 | Status: SHIPPED | OUTPATIENT
Start: 2024-08-19

## 2024-08-19 RX ORDER — LISINOPRIL 20 MG/1
20 TABLET ORAL DAILY
Qty: 90 TABLET | Refills: 3 | Status: SHIPPED | OUTPATIENT
Start: 2024-08-19

## 2024-08-19 NOTE — TELEPHONE ENCOUNTER
Requested Prescriptions     Pending Prescriptions Disp Refills    lisinopril (PRINIVIL;ZESTRIL) 20 MG tablet 90 tablet 3     Sig: Take 1 tablet by mouth daily    furosemide (LASIX) 20 MG tablet 90 tablet 3     Sig: Take 1 tablet by mouth daily            Checked Correct Pharmacy: Yes    Any changes since last refill? No     Number: 90    Refills: 3    Last Office Visit: 5/30/2024     Next Office Visit: 11/6/2024

## 2024-09-12 DIAGNOSIS — E78.2 MIXED HYPERLIPIDEMIA: Primary | ICD-10-CM

## 2024-09-13 RX ORDER — EZETIMIBE 10 MG/1
TABLET ORAL
Qty: 90 TABLET | Refills: 3 | Status: SHIPPED | OUTPATIENT
Start: 2024-09-13

## 2024-10-24 NOTE — PROGRESS NOTES
range of    55% to 60%. Wall motion was normal; there were no regional wall motion abnormalities.  - Right ventricle: Systolic function was normal by visual assessment.    11/30/2018 (Complete)   Summary   Left ventricular cavity size is normal. There is mild concentric left   ventricular hypertrophy. Overall left ventricular systolic function appears   normal with an ejection fraction of 55-60%. No regional wall motion   abnormalities are noted.   Indeterminate diastolic function.   Trivial mitral regurgitation is present.   The aortic valve leaflets appear slightly thickened but opens well.   Mild tricuspid regurgitation.   Estimated pulmonary artery systolic pressure is normal at 30 mmHg assuming a   right atrial pressure of 8 mmHg.    Stress Test: 2/19/2019  Summary     There is normal isotope uptake at stress and rest. There is no evidence of     myocardial ischemia or scar.     Normal LV size and systolic function.     Left ventricular ejection fraction of 75 %.     There are no regional wall motion abnormalities.     Normal myocardial perfusion study.      Cardiac Angiography: 5/18/2021, Dr Andrade  HEMODYNAMICS:  Left ventricular end-diastolic pressure equals 12.     There was no significant gradient across the aortic valve by pullback  post cineangiography.     LEFT VENTRICULOGRAM:  Left ventriculogram demonstrates uniform wall  motion.  Estimated ejection fraction is 55%.     LEFT MAIN:  Left main is a short vessel that is normal.     LEFT ANTERIOR DESCENDING:  The LAD courses to and wraps around the apex.  It gave off a large first diagonal branch followed by two small diagonal  branches.  The LAD is normal.     LEFT CIRCUMFLEX:  Circumflex consists of two marginal branches before a  small posterolateral branch.  Circumflex is normal.     RIGHT CORONARY ARTERY:  Right coronary artery is a dominant vessel.  It  gives off moderate sized high-rising PDA and two posterolateral  branches.  The right coronary

## 2024-11-06 ENCOUNTER — OFFICE VISIT (OUTPATIENT)
Dept: CARDIOLOGY CLINIC | Age: 74
End: 2024-11-06

## 2024-11-06 ENCOUNTER — NURSE ONLY (OUTPATIENT)
Dept: CARDIOLOGY CLINIC | Age: 74
End: 2024-11-06

## 2024-11-06 VITALS
BODY MASS INDEX: 33.79 KG/M2 | WEIGHT: 228.8 LBS | DIASTOLIC BLOOD PRESSURE: 92 MMHG | SYSTOLIC BLOOD PRESSURE: 156 MMHG | HEART RATE: 61 BPM

## 2024-11-06 DIAGNOSIS — Z86.79 S/P ABLATION OF ATRIAL FLUTTER: ICD-10-CM

## 2024-11-06 DIAGNOSIS — Z98.890 S/P ABLATION OF ATRIAL FLUTTER: ICD-10-CM

## 2024-11-06 DIAGNOSIS — I48.4 ATYPICAL ATRIAL FLUTTER (HCC): ICD-10-CM

## 2024-11-06 DIAGNOSIS — I50.32 CHRONIC DIASTOLIC CONGESTIVE HEART FAILURE (HCC): ICD-10-CM

## 2024-11-06 DIAGNOSIS — I49.5 SSS (SICK SINUS SYNDROME) (HCC): ICD-10-CM

## 2024-11-06 DIAGNOSIS — Z79.01 ON CONTINUOUS ORAL ANTICOAGULATION: ICD-10-CM

## 2024-11-06 DIAGNOSIS — Z98.890 HX OF ATRIOVENTRICULAR NODE ABLATION: ICD-10-CM

## 2024-11-06 DIAGNOSIS — R00.1 BRADYCARDIA: ICD-10-CM

## 2024-11-06 DIAGNOSIS — I47.20 VT (VENTRICULAR TACHYCARDIA) (HCC): ICD-10-CM

## 2024-11-06 DIAGNOSIS — Z95.0 CARDIAC RESYNCHRONIZATION THERAPY PACEMAKER (CRT-P) IN PLACE: Primary | ICD-10-CM

## 2024-11-06 DIAGNOSIS — Z95.0 CARDIAC RESYNCHRONIZATION THERAPY PACEMAKER (CRT-P) IN PLACE: ICD-10-CM

## 2024-11-06 DIAGNOSIS — Z98.890 S/P ABLATION OF ATRIAL FIBRILLATION: ICD-10-CM

## 2024-11-06 DIAGNOSIS — I48.11 LONGSTANDING PERSISTENT ATRIAL FIBRILLATION (HCC): Primary | ICD-10-CM

## 2024-11-06 DIAGNOSIS — I49.5 TACHY-BRADY SYNDROME (HCC): ICD-10-CM

## 2024-11-06 DIAGNOSIS — I47.29 NSVT (NONSUSTAINED VENTRICULAR TACHYCARDIA) (HCC): ICD-10-CM

## 2024-11-06 DIAGNOSIS — I47.19 ATRIAL TACHYCARDIA (HCC): ICD-10-CM

## 2024-11-06 DIAGNOSIS — I48.21 PERMANENT ATRIAL FIBRILLATION (HCC): ICD-10-CM

## 2024-11-06 DIAGNOSIS — I48.3 TYPICAL ATRIAL FLUTTER (HCC): ICD-10-CM

## 2024-11-06 DIAGNOSIS — Z86.79 S/P ABLATION OF ATRIAL FIBRILLATION: ICD-10-CM

## 2024-11-06 RX ORDER — PANTOPRAZOLE SODIUM 40 MG/1
40 TABLET, DELAYED RELEASE ORAL
Qty: 90 TABLET | Refills: 0 | Status: SHIPPED | OUTPATIENT
Start: 2024-11-06

## 2024-11-06 NOTE — TELEPHONE ENCOUNTER
Requested Prescriptions     Pending Prescriptions Disp Refills    pantoprazole (PROTONIX) 40 MG tablet 90 tablet 0     Sig: Take 1 tablet by mouth every morning (before breakfast)            Checked Correct Pharmacy: Yes    Any changes since last refill? No       Last Office Visit: 5/30/2024     Next Office Visit: 11/6/2024

## 2025-01-14 SDOH — HEALTH STABILITY: PHYSICAL HEALTH: ON AVERAGE, HOW MANY DAYS PER WEEK DO YOU ENGAGE IN MODERATE TO STRENUOUS EXERCISE (LIKE A BRISK WALK)?: 4 DAYS

## 2025-01-14 SDOH — HEALTH STABILITY: PHYSICAL HEALTH: ON AVERAGE, HOW MANY MINUTES DO YOU ENGAGE IN EXERCISE AT THIS LEVEL?: 30 MIN

## 2025-01-14 ASSESSMENT — LIFESTYLE VARIABLES
HOW MANY STANDARD DRINKS CONTAINING ALCOHOL DO YOU HAVE ON A TYPICAL DAY: PATIENT DOES NOT DRINK
HOW OFTEN DO YOU HAVE A DRINK CONTAINING ALCOHOL: NEVER
HOW MANY STANDARD DRINKS CONTAINING ALCOHOL DO YOU HAVE ON A TYPICAL DAY: 0
HOW OFTEN DO YOU HAVE SIX OR MORE DRINKS ON ONE OCCASION: 1
HOW OFTEN DO YOU HAVE A DRINK CONTAINING ALCOHOL: 1

## 2025-01-14 ASSESSMENT — PATIENT HEALTH QUESTIONNAIRE - PHQ9
SUM OF ALL RESPONSES TO PHQ9 QUESTIONS 1 & 2: 0
2. FEELING DOWN, DEPRESSED OR HOPELESS: NOT AT ALL
1. LITTLE INTEREST OR PLEASURE IN DOING THINGS: NOT AT ALL
SUM OF ALL RESPONSES TO PHQ QUESTIONS 1-9: 0

## 2025-01-15 NOTE — PROGRESS NOTES
,,,,2006    COLONOSCOPY  2011    Dr. Villanueva - benign polyps     COSMETIC SURGERY      FOOT SURGERY Right 01/15/2016    CORRECTION OF HAMMERTOES 1-5 RIGHT FOOT, WEIL OSTEOTOMY 2,3    FOOT SURGERY Right 2016    EXTENSOR TENDON LENGTHENING 4TH AND 5TH TOES RIGHT FOOT    FOOT SURGERY Right 2016    CORRECTION HAMMERTOES 1ST, 4TH AND 5TH DIGITS RIGHT FOOT WITH    FRACTURE SURGERY      To many to list    HERNIA REPAIR  ,,,    HYSTERECTOMY (CERVIX STATUS UNKNOWN)  1977    HYSTERECTOMY, VAGINAL      JOINT REPLACEMENT  2015    LEFT KNEE    KNEE SURGERY  2015    left knee replacement    LIPOMA RESECTION  2006    MASTECTOMY, BILATERAL Bilateral 03/15/2011    bilateral    NEUROMA SURGERY  ,,, ,     Left foot    OTHER SURGICAL HISTORY  , 1984 X 2, 1994 X 2, 2006    Bowel obstructions    PARTIAL HYSTERECTOMY (CERVIX NOT REMOVED)      RHINOPLASTY  1976    RHINOPLASTY      SMALL INTESTINE SURGERY   +    To many to list    THORACOTOMY      TONSILLECTOMY      UPPER GASTROINTESTINAL ENDOSCOPY  ,,,,,    VARICOSE VEIN SURGERY  2001        Social HIstory  Social History     Tobacco Use    Smoking status: Former     Current packs/day: 0.00     Average packs/day: 0.3 packs/day for 8.0 years (2.0 ttl pk-yrs)     Types: Cigarettes     Start date: 1980     Quit date: 1988     Years since quittin.0    Smokeless tobacco: Never    Tobacco comments:     Only smoked quarter pack a day if that   Substance Use Topics    Alcohol use: No    Drug use: No       Family History  Family History   Problem Relation Age of Onset    Cancer Mother         Breast cancer with metastatic disease- at 81    Cancer Father          at 80 of bladder cancer and eye cancer    Heart Attack Father     Cancer Brother          at 56 of throat,lung,bone spinal column and brain cancer       Allergies   Allergies   Allergen

## 2025-01-21 ENCOUNTER — OFFICE VISIT (OUTPATIENT)
Dept: PRIMARY CARE CLINIC | Age: 75
End: 2025-01-21

## 2025-01-21 ENCOUNTER — OFFICE VISIT (OUTPATIENT)
Dept: CARDIOLOGY CLINIC | Age: 75
End: 2025-01-21
Payer: MEDICARE

## 2025-01-21 ENCOUNTER — HOSPITAL ENCOUNTER (OUTPATIENT)
Dept: GENERAL RADIOLOGY | Age: 75
Discharge: HOME | End: 2025-01-21
Payer: MEDICARE

## 2025-01-21 VITALS
BODY MASS INDEX: 33.76 KG/M2 | SYSTOLIC BLOOD PRESSURE: 110 MMHG | OXYGEN SATURATION: 95 % | DIASTOLIC BLOOD PRESSURE: 72 MMHG | WEIGHT: 228.6 LBS | HEART RATE: 94 BPM

## 2025-01-21 VITALS
WEIGHT: 227 LBS | RESPIRATION RATE: 16 BRPM | HEART RATE: 76 BPM | OXYGEN SATURATION: 96 % | TEMPERATURE: 96.8 F | BODY MASS INDEX: 33.62 KG/M2 | DIASTOLIC BLOOD PRESSURE: 84 MMHG | HEIGHT: 69 IN | SYSTOLIC BLOOD PRESSURE: 128 MMHG

## 2025-01-21 DIAGNOSIS — R73.03 PREDIABETES: ICD-10-CM

## 2025-01-21 DIAGNOSIS — Z95.0 CARDIAC RESYNCHRONIZATION THERAPY PACEMAKER (CRT-P) IN PLACE: ICD-10-CM

## 2025-01-21 DIAGNOSIS — M25.511 RIGHT SHOULDER PAIN, UNSPECIFIED CHRONICITY: ICD-10-CM

## 2025-01-21 DIAGNOSIS — I10 ESSENTIAL HYPERTENSION: ICD-10-CM

## 2025-01-21 DIAGNOSIS — Z00.00 MEDICARE ANNUAL WELLNESS VISIT, SUBSEQUENT: Primary | ICD-10-CM

## 2025-01-21 DIAGNOSIS — R61 EXCESSIVE SWEATING: ICD-10-CM

## 2025-01-21 DIAGNOSIS — I48.0 PAROXYSMAL ATRIAL FIBRILLATION (HCC): ICD-10-CM

## 2025-01-21 DIAGNOSIS — Z78.0 POSTMENOPAUSE: ICD-10-CM

## 2025-01-21 DIAGNOSIS — K21.9 GASTROESOPHAGEAL REFLUX DISEASE, UNSPECIFIED WHETHER ESOPHAGITIS PRESENT: ICD-10-CM

## 2025-01-21 DIAGNOSIS — E78.2 MIXED HYPERLIPIDEMIA: ICD-10-CM

## 2025-01-21 DIAGNOSIS — I50.32 CHRONIC DIASTOLIC CONGESTIVE HEART FAILURE (HCC): Primary | ICD-10-CM

## 2025-01-21 DIAGNOSIS — R63.5 WEIGHT GAIN: ICD-10-CM

## 2025-01-21 DIAGNOSIS — E66.09 CLASS 1 OBESITY DUE TO EXCESS CALORIES WITH SERIOUS COMORBIDITY AND BODY MASS INDEX (BMI) OF 33.0 TO 33.9 IN ADULT: ICD-10-CM

## 2025-01-21 DIAGNOSIS — E66.811 CLASS 1 OBESITY DUE TO EXCESS CALORIES WITH SERIOUS COMORBIDITY AND BODY MASS INDEX (BMI) OF 33.0 TO 33.9 IN ADULT: ICD-10-CM

## 2025-01-21 DIAGNOSIS — E55.9 VITAMIN D DEFICIENCY: ICD-10-CM

## 2025-01-21 PROCEDURE — 1159F MED LIST DOCD IN RCRD: CPT | Performed by: INTERNAL MEDICINE

## 2025-01-21 PROCEDURE — G8417 CALC BMI ABV UP PARAM F/U: HCPCS | Performed by: INTERNAL MEDICINE

## 2025-01-21 PROCEDURE — 3074F SYST BP LT 130 MM HG: CPT | Performed by: INTERNAL MEDICINE

## 2025-01-21 PROCEDURE — G8399 PT W/DXA RESULTS DOCUMENT: HCPCS | Performed by: INTERNAL MEDICINE

## 2025-01-21 PROCEDURE — 99214 OFFICE O/P EST MOD 30 MIN: CPT | Performed by: INTERNAL MEDICINE

## 2025-01-21 PROCEDURE — G8427 DOCREV CUR MEDS BY ELIG CLIN: HCPCS | Performed by: INTERNAL MEDICINE

## 2025-01-21 PROCEDURE — 1123F ACP DISCUSS/DSCN MKR DOCD: CPT | Performed by: INTERNAL MEDICINE

## 2025-01-21 PROCEDURE — 1036F TOBACCO NON-USER: CPT | Performed by: INTERNAL MEDICINE

## 2025-01-21 PROCEDURE — 3078F DIAST BP <80 MM HG: CPT | Performed by: INTERNAL MEDICINE

## 2025-01-21 PROCEDURE — 1090F PRES/ABSN URINE INCON ASSESS: CPT | Performed by: INTERNAL MEDICINE

## 2025-01-21 PROCEDURE — 73030 X-RAY EXAM OF SHOULDER: CPT

## 2025-01-21 SDOH — ECONOMIC STABILITY: FOOD INSECURITY: WITHIN THE PAST 12 MONTHS, YOU WORRIED THAT YOUR FOOD WOULD RUN OUT BEFORE YOU GOT MONEY TO BUY MORE.: NEVER TRUE

## 2025-01-21 SDOH — ECONOMIC STABILITY: FOOD INSECURITY: WITHIN THE PAST 12 MONTHS, THE FOOD YOU BOUGHT JUST DIDN'T LAST AND YOU DIDN'T HAVE MONEY TO GET MORE.: NEVER TRUE

## 2025-01-21 NOTE — PROGRESS NOTES
Oncology)     Recommendations for Preventive Services Due: see orders and patient instructions/AVS.  Recommended screening schedule for the next 5-10 years is provided to the patient in written form: see Patient Instructions/AVS.     Reviewed and updated this visit:  Tobacco  Allergies  Meds  Problems  Med Hx  Surg Hx  Soc Hx  Fam Hx

## 2025-01-22 LAB
25(OH)D3 SERPL-MCNC: 36.5 NG/ML
ALBUMIN SERPL-MCNC: 4.5 G/DL (ref 3.4–5)
ALBUMIN/GLOB SERPL: 2 {RATIO} (ref 1.1–2.2)
ALP SERPL-CCNC: 80 U/L (ref 40–129)
ALT SERPL-CCNC: 19 U/L (ref 10–40)
ANION GAP SERPL CALCULATED.3IONS-SCNC: 12 MMOL/L (ref 3–16)
AST SERPL-CCNC: 21 U/L (ref 15–37)
BASOPHILS # BLD: 0.1 K/UL (ref 0–0.2)
BASOPHILS NFR BLD: 0.8 %
BILIRUB SERPL-MCNC: 0.3 MG/DL (ref 0–1)
BUN SERPL-MCNC: 15 MG/DL (ref 7–20)
CALCIUM SERPL-MCNC: 9.1 MG/DL (ref 8.3–10.6)
CHLORIDE SERPL-SCNC: 102 MMOL/L (ref 99–110)
CHOLEST SERPL-MCNC: 208 MG/DL (ref 0–199)
CO2 SERPL-SCNC: 25 MMOL/L (ref 21–32)
CREAT SERPL-MCNC: 0.8 MG/DL (ref 0.6–1.2)
DEPRECATED RDW RBC AUTO: 13.8 % (ref 12.4–15.4)
EOSINOPHIL # BLD: 0.1 K/UL (ref 0–0.6)
EOSINOPHIL NFR BLD: 1.7 %
EST. AVERAGE GLUCOSE BLD GHB EST-MCNC: 102.5 MG/DL
GFR SERPLBLD CREATININE-BSD FMLA CKD-EPI: 76 ML/MIN/{1.73_M2}
GLUCOSE SERPL-MCNC: 104 MG/DL (ref 70–99)
HBA1C MFR BLD: 5.2 %
HCT VFR BLD AUTO: 40.1 % (ref 36–48)
HDLC SERPL-MCNC: 54 MG/DL (ref 40–60)
HGB BLD-MCNC: 13.4 G/DL (ref 12–16)
LDLC SERPL CALC-MCNC: 119 MG/DL
LYMPHOCYTES # BLD: 1.5 K/UL (ref 1–5.1)
LYMPHOCYTES NFR BLD: 19.6 %
MCH RBC QN AUTO: 30.6 PG (ref 26–34)
MCHC RBC AUTO-ENTMCNC: 33.4 G/DL (ref 31–36)
MCV RBC AUTO: 91.7 FL (ref 80–100)
MONOCYTES # BLD: 0.3 K/UL (ref 0–1.3)
MONOCYTES NFR BLD: 4.4 %
NEUTROPHILS # BLD: 5.6 K/UL (ref 1.7–7.7)
NEUTROPHILS NFR BLD: 73.5 %
PLATELET # BLD AUTO: 292 K/UL (ref 135–450)
PMV BLD AUTO: 7.9 FL (ref 5–10.5)
POTASSIUM SERPL-SCNC: 4.2 MMOL/L (ref 3.5–5.1)
PROT SERPL-MCNC: 6.8 G/DL (ref 6.4–8.2)
RBC # BLD AUTO: 4.37 M/UL (ref 4–5.2)
SODIUM SERPL-SCNC: 139 MMOL/L (ref 136–145)
TRIGL SERPL-MCNC: 176 MG/DL (ref 0–150)
TSH SERPL DL<=0.005 MIU/L-ACNC: 0.99 UIU/ML (ref 0.27–4.2)
VLDLC SERPL CALC-MCNC: 35 MG/DL
WBC # BLD AUTO: 7.7 K/UL (ref 4–11)

## 2025-02-02 PROBLEM — R61 EXCESSIVE SWEATING: Status: ACTIVE | Noted: 2025-02-02

## 2025-02-02 PROBLEM — M25.511 RIGHT SHOULDER PAIN: Status: ACTIVE | Noted: 2025-02-02

## 2025-02-02 PROBLEM — E66.09 CLASS 1 OBESITY DUE TO EXCESS CALORIES WITH SERIOUS COMORBIDITY AND BODY MASS INDEX (BMI) OF 33.0 TO 33.9 IN ADULT: Status: ACTIVE | Noted: 2025-02-02

## 2025-02-02 PROBLEM — R63.5 WEIGHT GAIN: Status: ACTIVE | Noted: 2025-02-02

## 2025-02-02 PROBLEM — Z78.0 POSTMENOPAUSE: Status: ACTIVE | Noted: 2025-02-02

## 2025-02-02 PROBLEM — E66.811 CLASS 1 OBESITY DUE TO EXCESS CALORIES WITH SERIOUS COMORBIDITY AND BODY MASS INDEX (BMI) OF 33.0 TO 33.9 IN ADULT: Status: ACTIVE | Noted: 2025-02-02

## 2025-02-06 ENCOUNTER — HOSPITAL ENCOUNTER (OUTPATIENT)
Dept: GENERAL RADIOLOGY | Age: 75
Discharge: HOME | End: 2025-02-06
Payer: MEDICARE

## 2025-02-06 DIAGNOSIS — Z78.0 POSTMENOPAUSE: ICD-10-CM

## 2025-02-06 PROCEDURE — 77080 DXA BONE DENSITY AXIAL: CPT

## 2025-02-17 ENCOUNTER — OFFICE VISIT (OUTPATIENT)
Dept: ORTHOPEDIC SURGERY | Age: 75
End: 2025-02-17

## 2025-02-17 VITALS — HEIGHT: 69 IN | BODY MASS INDEX: 33.77 KG/M2 | WEIGHT: 228 LBS

## 2025-02-17 DIAGNOSIS — M54.6 ACUTE RIGHT-SIDED THORACIC BACK PAIN: Primary | ICD-10-CM

## 2025-02-17 DIAGNOSIS — S29.011A STRAIN OF CHEST WALL, INITIAL ENCOUNTER: ICD-10-CM

## 2025-02-17 DIAGNOSIS — M51.34 DEGENERATIVE DISC DISEASE, THORACIC: ICD-10-CM

## 2025-02-17 DIAGNOSIS — Z85.3 HISTORY OF RIGHT BREAST CANCER: ICD-10-CM

## 2025-02-17 DIAGNOSIS — Z79.01 CHRONIC ANTICOAGULATION: ICD-10-CM

## 2025-02-17 DIAGNOSIS — Z87.39 HISTORY OF OSTEOPOROSIS: ICD-10-CM

## 2025-02-17 DIAGNOSIS — M47.814 THORACIC SPONDYLOSIS: ICD-10-CM

## 2025-02-17 NOTE — PROGRESS NOTES
Chief Complaint:   Chief Complaint   Patient presents with    Back Pain     Thoracic - Thoracic pain and Right shoulder pain. Started last month with no apparent cause. First noticed it drying off after a shower. Mostly an ache.          History of Present Illness:       Patient is a 74 y.o. female presents with the above complaint. The symptoms began  within the past 2 monthsago and started without an injury.The patient describes a aching-pressure pain that does not radiate.  The symptoms are constant  and are show no change since the onset.      The symptoms localize primarily to the right  flank/posterior chest wall region and are worsened by  rotational and transitional movements . There is not new onset weakness or progressive weakness of the lower extremities that has developed. The patient denies new onset bowel or bladder dysfunction.  There is no history of previous thoracic spinal trauma.    There is no history of posterior chest wall trauma.  She denies pain with Valsalva.    The patient does not have history or orthopaedic lumbar spine surgery.    Pain levels: 6    There is no lower limb pain .     Work-up to date has included: X-ray shoulder, thoracic spine MRI in the remote past referenced below    Prior treatment has included acetaminophen-Tylenol. This patient reports little improvement with this treatment.    The patient has no history or autoimmune disease, inflammatory arthropathy or crystal arthropathy.      Past Medical History:        Past Medical History:   Diagnosis Date    Acute diastolic congestive heart failure (HCC) 11/26/2018    Allergic     SEASONAL    Anticoagulant long-term use     Atrial fibrillation (HCC) H/O    Bursitis 2012    Cancer (HCC) 2009 AND 2011    BREAST right    Chronic back pain     Essential hypertension 03/13/2018    Fractures     GERD (gastroesophageal reflux disease) 1983    Hammertoe     Migraine     Mixed hyperlipidemia 10/19/2020    Osteoarthritis          Past

## 2025-02-20 PROBLEM — Z00.00 MEDICARE ANNUAL WELLNESS VISIT, SUBSEQUENT: Status: RESOLVED | Noted: 2025-01-21 | Resolved: 2025-02-20

## 2025-02-21 ENCOUNTER — OFFICE VISIT (OUTPATIENT)
Dept: PRIMARY CARE CLINIC | Age: 75
End: 2025-02-21

## 2025-02-21 VITALS
RESPIRATION RATE: 16 BRPM | BODY MASS INDEX: 34.07 KG/M2 | OXYGEN SATURATION: 95 % | DIASTOLIC BLOOD PRESSURE: 86 MMHG | HEART RATE: 90 BPM | TEMPERATURE: 96.9 F | SYSTOLIC BLOOD PRESSURE: 120 MMHG | HEIGHT: 69 IN | WEIGHT: 230 LBS

## 2025-02-21 DIAGNOSIS — M85.80 OSTEOPENIA, UNSPECIFIED LOCATION: ICD-10-CM

## 2025-02-21 DIAGNOSIS — E66.09 CLASS 1 OBESITY DUE TO EXCESS CALORIES WITH SERIOUS COMORBIDITY AND BODY MASS INDEX (BMI) OF 33.0 TO 33.9 IN ADULT: ICD-10-CM

## 2025-02-21 DIAGNOSIS — E66.811 CLASS 1 OBESITY DUE TO EXCESS CALORIES WITH SERIOUS COMORBIDITY AND BODY MASS INDEX (BMI) OF 33.0 TO 33.9 IN ADULT: ICD-10-CM

## 2025-02-21 DIAGNOSIS — R63.5 WEIGHT GAIN: Primary | ICD-10-CM

## 2025-02-21 DIAGNOSIS — R61 EXCESSIVE SWEATING: ICD-10-CM

## 2025-02-21 RX ORDER — ALENDRONATE SODIUM 70 MG/1
70 TABLET ORAL
Qty: 4 TABLET | Refills: 5 | Status: SHIPPED | OUTPATIENT
Start: 2025-02-21

## 2025-02-21 RX ORDER — METOPROLOL SUCCINATE 100 MG/1
100 TABLET, EXTENDED RELEASE ORAL DAILY
Qty: 90 TABLET | Refills: 3 | Status: SHIPPED | OUTPATIENT
Start: 2025-02-21

## 2025-02-21 SDOH — ECONOMIC STABILITY: FOOD INSECURITY: WITHIN THE PAST 12 MONTHS, YOU WORRIED THAT YOUR FOOD WOULD RUN OUT BEFORE YOU GOT MONEY TO BUY MORE.: NEVER TRUE

## 2025-02-21 SDOH — ECONOMIC STABILITY: FOOD INSECURITY: WITHIN THE PAST 12 MONTHS, THE FOOD YOU BOUGHT JUST DIDN'T LAST AND YOU DIDN'T HAVE MONEY TO GET MORE.: NEVER TRUE

## 2025-02-21 ASSESSMENT — PATIENT HEALTH QUESTIONNAIRE - PHQ9
SUM OF ALL RESPONSES TO PHQ QUESTIONS 1-9: 0
SUM OF ALL RESPONSES TO PHQ9 QUESTIONS 1 & 2: 0
2. FEELING DOWN, DEPRESSED OR HOPELESS: NOT AT ALL
1. LITTLE INTEREST OR PLEASURE IN DOING THINGS: NOT AT ALL
SUM OF ALL RESPONSES TO PHQ QUESTIONS 1-9: 0

## 2025-02-21 NOTE — PATIENT INSTRUCTIONS
-Calcium with vitamin D 600mg-400 IU twice daily     -1500 calorie restriction  -low carbohydrate diet  -64 ounces of water per day  -[employer] Pal tracker  -log weight once a week   -Regular aerobic exercise to a goal of 150 minutes per week

## 2025-02-21 NOTE — TELEPHONE ENCOUNTER
Requested Prescriptions     Pending Prescriptions Disp Refills    metoprolol succinate (TOPROL XL) 100 MG extended release tablet 90 tablet 3     Sig: Take 1 tablet by mouth daily            Checked Correct Pharmacy: Yes    Any changes since last refill? No     Number: 90  Refills: 3    Last Fill Date 11/29/2024    Last OV: 01/21/2025 Provider: FL  Next OV: 5/22/2025 Provider: ANANDA    Last Labs: 01/21/2025    CBC:   Lab Results   Component Value Date    WBC 7.7 01/21/2025    HGB 13.4 01/21/2025    HCT 40.1 01/21/2025    MCV 91.7 01/21/2025     01/21/2025     CMP:   Lab Results   Component Value Date     01/21/2025    K 4.2 01/21/2025     01/21/2025    CO2 25 01/21/2025    BUN 15 01/21/2025    CREATININE 0.8 01/21/2025    GLUCOSE 104 (H) 01/21/2025    CALCIUM 9.1 01/21/2025    BILITOT 0.3 01/21/2025    ALKPHOS 80 01/21/2025    AST 21 01/21/2025    ALT 19 01/21/2025    LABGLOM 76 01/21/2025    GFRAA >60 08/02/2022    AGRATIO 2.0 01/21/2025    GLOB 2.7 05/18/2021          Lipid:   Lab Results   Component Value Date    CHOL 208 (H) 01/21/2025    TRIG 176 (H) 01/21/2025    HDL 54 01/21/2025     (H) 01/21/2025    VLDL 35 01/21/2025    CHOLHDLRATIO 3.9 07/25/2016     TSH:   Lab Results   Component Value Date    TSH 0.99 01/21/2025    TSHFT4 1.17 09/08/2020      Assessment / Plan: 01/21/2025 LONG LEES      Essential hypertension  Controlled on current dose of lisinopril metoprolol     Paroxysmal atrial fibrillation (HCC)  Remains in sinus.  Continue Eliquis, continue metoprolol     Cardiac resynchronization therapy pacemaker (CRT-P) in place  Working properly     Mixed hyperlipidemia  On Zetia, no myalgias,     --> Follow-up in 6 months with repeat echo     Follow up in 6 months.     I had the opportunity to review the clinical symptoms and presentation of Rosalva Sr.      Patient's allergies and medications were reviewed and updated. Patient's past medical, surgical, social and family history were

## 2025-02-21 NOTE — PROGRESS NOTES
Date of Visit: 2025    Rosalva Sr (:  1950) is a 74 y.o. female,  Established patient here for evaluation of the following chief complaint(s):  Weight Gain, Excessive Sweating, and Results      ASSESSMENT/PLAN:    Assessment & Plan  1. Weight gain  - BMI 33.97  - Adhere to a low-carbohydrate diet, restricting intake to 1500 calories per day  - Utilize [employer] for dietary tracking  - Engage in regular physical activity, aiming for 150 minutes per week  - Minimum daily water intake of 64 ounces  - Use over-the-counter meal replacement shakes or protein shakes as an alternative to regular meals    2. Class 1 obesity   - BMI 33.97  - Adhere to a low-carbohydrate diet, restricting intake to 1500 calories per day  - Utilize [employer] for dietary tracking  - Engage in regular physical activity, aiming for 150 minutes per week  - Minimum daily water intake of 64 ounces  - Use over-the-counter meal replacement shakes or protein shakes as an alternative to regular meals    3. Excessive sweating  - same  - labs unremarkable   - Referral to endocrinology proposed, but patient prefers to defer    4. Osteopenia  -Dexa scan done on 25 shows osteopenia of left hip  - start alendronate 70 mg weekly  - Continue vitamin D 1,000 IU daily  - start combination calcium 600 mg with vitamin D 400 IU twice daily        Return in about 6 months (around 2025) for obesity and osteopenia.            SUBJECTIVE:    History of Present Illness  The patient is a 74-year-old female who presents for a follow-up visit regarding weight gain, excessive sweating, and review of results.    She has been unable to rejoin the weight management program due to insurance limitations. Despite her efforts with [employer], she has only managed to lose 3 pounds. She prepares her meals at home and plans to increase her physical activity, particularly walking, as the weather improves.    Her excessive sweating remains

## 2025-02-27 ASSESSMENT — ENCOUNTER SYMPTOMS
SINUS PRESSURE: 0
CHEST TIGHTNESS: 0
BACK PAIN: 1
VOMITING: 0
NAUSEA: 0
WHEEZING: 0
SORE THROAT: 0
SHORTNESS OF BREATH: 0
DIARRHEA: 0
COUGH: 0
ABDOMINAL PAIN: 0
RHINORRHEA: 0
BLOOD IN STOOL: 0
CONSTIPATION: 0

## 2025-03-18 ENCOUNTER — OFFICE VISIT (OUTPATIENT)
Dept: ORTHOPEDIC SURGERY | Age: 75
End: 2025-03-18

## 2025-03-18 VITALS — HEIGHT: 69 IN | WEIGHT: 230 LBS | BODY MASS INDEX: 34.07 KG/M2

## 2025-03-18 DIAGNOSIS — M47.814 THORACIC SPONDYLOSIS: ICD-10-CM

## 2025-03-18 DIAGNOSIS — S29.019S THORACIC MYOFASCIAL STRAIN, SEQUELA: ICD-10-CM

## 2025-03-18 DIAGNOSIS — Z79.01 CHRONIC ANTICOAGULATION: ICD-10-CM

## 2025-03-18 DIAGNOSIS — M51.34 DEGENERATIVE DISC DISEASE, THORACIC: Primary | ICD-10-CM

## 2025-03-18 RX ORDER — METHYLPREDNISOLONE 4 MG/1
TABLET ORAL
Qty: 1 KIT | Refills: 0 | Status: SHIPPED | OUTPATIENT
Start: 2025-03-18

## 2025-03-18 NOTE — PROGRESS NOTES
Chief Complaint:   Chief Complaint   Patient presents with    Lower Back Pain     Lumbar - CT results today. Her back has been hurting a lot.           History of Present Illness:       Patient is a 74 y.o. female returns follow up for the above complaint. The patient was last seen approximately 1 monthsago. The symptoms show no change since the last visit. The patient has had further testing for the problem.      CT scan completed in the interim    Back: leg pain 100:0. Pain in thoracic back is aching in quality.    Pain levels:6.     The patient has not started supervised PT in the interim.    The patient denies new onset or progressive weakness of the lower extremities.  The patient denies new onset bowel or bladder dysfunction.    She continues on medical pain management as per previous inclusive of muscle relaxant-       Past Medical History:        Past Medical History:   Diagnosis Date    Acute diastolic congestive heart failure (HCC) 11/26/2018    Allergic     SEASONAL    Anticoagulant long-term use     Atrial fibrillation (HCC) H/O    Bursitis 2012    Cancer ([facility]) 2009 AND 2011    BREAST right    Chronic back pain     Essential hypertension 03/13/2018    Fractures     GERD (gastroesophageal reflux disease) 1983    [facility]     Migraine     Mixed hyperlipidemia 10/19/2020    Osteoarthritis         Present Medications:         Current Outpatient Medications   Medication Sig Dispense Refill    methylPREDNISolone (MEDROL, MILADY,) 4 MG tablet By mouth. 1 kit 0    alendronate (FOSAMAX) 70 MG tablet Take 1 tablet by mouth every 7 days 4 tablet 5    metoprolol succinate (TOPROL XL) 100 MG extended release tablet Take 1 tablet by mouth daily 90 tablet 3    pantoprazole (PROTONIX) 40 MG tablet Take 1 tablet by mouth every morning (before breakfast) 90 tablet 0    ezetimibe (ZETIA) 10 MG tablet TAKE 1 TABLET BY MOUTH EVERY DAY 90 tablet 3    lisinopril (PRINIVIL;ZESTRIL) 20 MG tablet Take 1 tablet by mouth daily 90

## 2025-03-27 ENCOUNTER — HOSPITAL ENCOUNTER (OUTPATIENT)
Dept: PHYSICAL THERAPY | Age: 75
Setting detail: THERAPIES SERIES
Discharge: HOME | End: 2025-03-27
Payer: MEDICARE

## 2025-03-27 DIAGNOSIS — M54.50 BILATERAL LOW BACK PAIN, UNSPECIFIED CHRONICITY, UNSPECIFIED WHETHER SCIATICA PRESENT: Primary | ICD-10-CM

## 2025-03-27 DIAGNOSIS — M54.6 BILATERAL THORACIC BACK PAIN, UNSPECIFIED CHRONICITY: ICD-10-CM

## 2025-03-27 PROCEDURE — 97110 THERAPEUTIC EXERCISES: CPT

## 2025-03-27 PROCEDURE — 97162 PT EVAL MOD COMPLEX 30 MIN: CPT

## 2025-03-27 NOTE — PLAN OF CARE
[facility] - Outpatient Rehabilitation and Therapy: [address], [place] [address] office: [phone] fax: [phone]     Physical Therapy Initial Evaluation Certification      Dear Iain Arteaga* ,    We had the pleasure of evaluating the following patient for physical therapy services at [facility] Physical Therapy.  A summary of our findings can be found in the initial assessment below.  This includes our plan of care.  If you have any questions or concerns regarding these findings, please do not hesitate to contact me at the office phone number listed above.  Thank you for the referral.     Physician Signature:_______________________________Date:__________________  By signing above (or electronic signature), therapist’s plan is approved by physician       Physical Therapy: TREATMENT/PROGRESS NOTE   Patient: Rosalva Sr (74 y.o. female)   Examination Date: 2025   :  1950 MRN: [id number]   Visit #: 1   Insurance Allowable Auth Needed   [place] []Yes    [x]No    Insurance: Payor: MEDICARE / Plan: MEDICARE PART A AND B / Product Type: *No Product type* /   Insurance ID: [id number] - (Medicare)  Secondary Insurance (if applicable): [place] BCBS   Treatment Diagnosis:     ICD-10-CM    1. Bilateral low back pain, unspecified chronicity, unspecified whether sciatica present  M54.50       2. Bilateral thoracic back pain, unspecified chronicity  M54.6          Medical Diagnosis:  Degenerative disc disease, thoracic [M51.34]  Thoracic spondylosis [M47.814]   Referring Physician: Iain Torres  PCP: Nabil Beard MD     Plan of care signed (Y/N):     Date of Patient follow up with Physician: not scheduled     Plan of Care Report: EVAL today  POC update due: (10 visits /OR AUTH LIMITS, whichever is less)  2025                                             Medical

## 2025-04-01 ENCOUNTER — HOSPITAL ENCOUNTER (OUTPATIENT)
Dept: PHYSICAL THERAPY | Age: 75
Setting detail: THERAPIES SERIES
Discharge: HOME | End: 2025-04-01
Payer: MEDICARE

## 2025-04-01 PROCEDURE — 97140 MANUAL THERAPY 1/> REGIONS: CPT

## 2025-04-01 PROCEDURE — 97110 THERAPEUTIC EXERCISES: CPT

## 2025-04-01 PROCEDURE — 97530 THERAPEUTIC ACTIVITIES: CPT

## 2025-04-01 NOTE — FLOWSHEET NOTE
[facility] - Outpatient Rehabilitation and Therapy: [address], [place] [address] office: [phone] fax: [phone]       Physical Therapy: TREATMENT/PROGRESS NOTE   Patient: Rosalva Sr (74 y.o. female)   Examination Date: 2025   :  1950 MRN: [id number]   Visit #: 2   Insurance Allowable Auth Needed   MN []Yes    [x]No    Insurance: Payor: MEDICARE / Plan: MEDICARE PART A AND B / Product Type: *No Product type* /   Insurance ID: [id number] - (Medicare)  Secondary Insurance (if applicable): [place] BCBS   Treatment Diagnosis:     ICD-10-CM    1. Bilateral low back pain, unspecified chronicity, unspecified whether sciatica present  M54.50       2. Bilateral thoracic back pain, unspecified chronicity  M54.6          Medical Diagnosis:  Degenerative disc disease, thoracic [M51.34]  Thoracic spondylosis [M47.814]   Referring Physician: Iain Torres*  PCP: Nabil Beard MD     Plan of care signed (Y/N):     Date of Patient follow up with Physician: not scheduled     Plan of Care Report: NO  POC update due: (10 visits /OR AUTH LIMITS, whichever is less)  2025                                             Medical History:  Comorbidities:  Cancer/Tumor  Hypertension  Pacemaker  COVID-19  Prior Surgeries: L knee TKA, B foot/ankle surgeries (4x on R), 7 hernia repairs  Relevant Medical History: hx of breast cancer and cervical cancer                                         Precautions/ Contra-indications:           Latex allergy:  NO  Pacemaker:    YES  Contraindications for Manipulation: None and unhealthy/ multiple comorbidities   Date of Surgery: N/A  Other:    Red Flags:  None    Suicide Screening:   The patient did not verbalize a primary behavioral concern, suicidal ideation, suicidal intent, or demonstrate suicidal behaviors.    Preferred Language for Healthcare:   [x] English       [] other:    SUBJECTIVE EXAMINATION     Patient stated

## 2025-04-07 ENCOUNTER — HOSPITAL ENCOUNTER (OUTPATIENT)
Dept: PHYSICAL THERAPY | Age: 75
Setting detail: THERAPIES SERIES
Discharge: HOME | End: 2025-04-07
Payer: MEDICARE

## 2025-04-07 PROCEDURE — 97110 THERAPEUTIC EXERCISES: CPT

## 2025-04-07 PROCEDURE — 97530 THERAPEUTIC ACTIVITIES: CPT

## 2025-04-07 PROCEDURE — 97140 MANUAL THERAPY 1/> REGIONS: CPT

## 2025-04-07 NOTE — FLOWSHEET NOTE
[facility] - Outpatient Rehabilitation and Therapy: [address], [place] [address] office: [phone] fax: [phone]       Physical Therapy: TREATMENT/PROGRESS NOTE   Patient: Rosalva Sr (74 y.o. female)   Examination Date: 2025   :  1950 MRN: [id number]   Visit #: 3   Insurance Allowable Auth Needed   MN []Yes    [x]No    Insurance: Payor: MEDICARE / Plan: MEDICARE PART A AND B / Product Type: *No Product type* /   Insurance ID: [id number] - (Medicare)  Secondary Insurance (if applicable): [place] BCBS   Treatment Diagnosis:     ICD-10-CM    1. Bilateral low back pain, unspecified chronicity, unspecified whether sciatica present  M54.50       2. Bilateral thoracic back pain, unspecified chronicity  M54.6          Medical Diagnosis:  Degenerative disc disease, thoracic [M51.34]  Thoracic spondylosis [M47.814]   Referring Physician: aIin Torres*  PCP: Nabil Beard MD     Plan of care signed (Y/N):     Date of Patient follow up with Physician: not scheduled     Plan of Care Report: NO  POC update due: (10 visits /OR AUTH LIMITS, whichever is less)  2025                                             Medical History:  Comorbidities:  Cancer/Tumor  Hypertension  Pacemaker  COVID-19  Prior Surgeries: L knee TKA, B foot/ankle surgeries (4x on R), 7 hernia repairs  Relevant Medical History: hx of breast cancer and cervical cancer                                         Precautions/ Contra-indications:           Latex allergy:  NO  Pacemaker:    YES  Contraindications for Manipulation: None and unhealthy/ multiple comorbidities   Date of Surgery: N/A  Other:    Red Flags:  None    Suicide Screening:   The patient did not verbalize a primary behavioral concern, suicidal ideation, suicidal intent, or demonstrate suicidal behaviors.    Preferred Language for Healthcare:   [x] English       [] other:    SUBJECTIVE EXAMINATION     Patient stated

## 2025-04-15 ENCOUNTER — HOSPITAL ENCOUNTER (OUTPATIENT)
Dept: PHYSICAL THERAPY | Age: 75
Setting detail: THERAPIES SERIES
Discharge: HOME | End: 2025-04-15
Payer: MEDICARE

## 2025-04-15 PROCEDURE — 97530 THERAPEUTIC ACTIVITIES: CPT

## 2025-04-15 PROCEDURE — 97110 THERAPEUTIC EXERCISES: CPT

## 2025-04-15 PROCEDURE — 97140 MANUAL THERAPY 1/> REGIONS: CPT

## 2025-04-15 NOTE — FLOWSHEET NOTE
[facility] - Outpatient Rehabilitation and Therapy: [address], [place] [address] office: [phone] fax: [phone]       Physical Therapy: TREATMENT/PROGRESS NOTE   Patient: Rosalva Sr (74 y.o. female)   Examination Date: 04/15/2025   :  1950 MRN: [id number]   Visit #: 4   Insurance Allowable Auth Needed   MN []Yes    [x]No    Insurance: Payor: MEDICARE / Plan: MEDICARE PART A AND B / Product Type: *No Product type* /   Insurance ID: [id number] - (Medicare)  Secondary Insurance (if applicable): [place] BC[place]   Treatment Diagnosis:     ICD-10-CM    1. Bilateral low back pain, unspecified chronicity, unspecified whether sciatica present  M54.50       2. Bilateral thoracic back pain, unspecified chronicity  M54.6          Medical Diagnosis:  Degenerative disc disease, thoracic [M51.34]  Thoracic spondylosis [M47.814]   Referring Physician: Iain Torres*  PCP: Nabil Beard MD     Plan of care signed (Y/N):     Date of Patient follow up with Physician: not scheduled     Plan of Care Report: NO  POC update due: (10 visits /OR AUTH LIMITS, whichever is less)  2025                                             Medical History:  Comorbidities:  Cancer/Tumor  Hypertension  Pacemaker  COVID-19  Prior Surgeries: L knee TKA, B foot/ankle surgeries (4x on R), 7 hernia repairs  Relevant Medical History: hx of breast cancer and cervical cancer                                         Precautions/ Contra-indications:           Latex allergy:  NO  Pacemaker:    YES  Contraindications for Manipulation: None and unhealthy/ multiple comorbidities   Date of Surgery: N/A  Other:    Red Flags:  None    Suicide Screening:   The patient did not verbalize a primary behavioral concern, suicidal ideation, suicidal intent, or demonstrate suicidal behaviors.    Preferred Language for Healthcare:   [x] English       [] other:    SUBJECTIVE EXAMINATION     Patient stated

## 2025-04-21 ENCOUNTER — APPOINTMENT (OUTPATIENT)
Dept: PHYSICAL THERAPY | Age: 75
End: 2025-04-21
Payer: MEDICARE

## 2025-04-25 ENCOUNTER — OFFICE VISIT (OUTPATIENT)
Dept: PRIMARY CARE CLINIC | Age: 75
End: 2025-04-25

## 2025-04-25 ENCOUNTER — RESULTS FOLLOW-UP (OUTPATIENT)
Dept: PRIMARY CARE CLINIC | Age: 75
End: 2025-04-25

## 2025-04-25 ENCOUNTER — HOSPITAL ENCOUNTER (OUTPATIENT)
Dept: GENERAL RADIOLOGY | Age: 75
Discharge: HOME | End: 2025-04-25
Payer: MEDICARE

## 2025-04-25 VITALS
OXYGEN SATURATION: 96 % | WEIGHT: 233 LBS | BODY MASS INDEX: 34.41 KG/M2 | SYSTOLIC BLOOD PRESSURE: 144 MMHG | DIASTOLIC BLOOD PRESSURE: 90 MMHG | HEART RATE: 87 BPM | RESPIRATION RATE: 16 BRPM | TEMPERATURE: 99 F

## 2025-04-25 DIAGNOSIS — R03.0 ELEVATED BLOOD PRESSURE READING: ICD-10-CM

## 2025-04-25 DIAGNOSIS — J06.9 UPPER RESPIRATORY TRACT INFECTION, UNSPECIFIED TYPE: Primary | ICD-10-CM

## 2025-04-25 DIAGNOSIS — J40 BRONCHITIS: ICD-10-CM

## 2025-04-25 LAB
INFLUENZA A ANTIBODY: NORMAL
INFLUENZA B ANTIBODY: NORMAL
Lab: 0
QC PASS/FAIL: NORMAL
SARS-COV-2 RDRP RESP QL NAA+PROBE: NEGATIVE

## 2025-04-25 PROCEDURE — 71046 X-RAY EXAM CHEST 2 VIEWS: CPT

## 2025-04-25 RX ORDER — ALBUTEROL SULFATE 90 UG/1
2 INHALANT RESPIRATORY (INHALATION) EVERY 6 HOURS PRN
Qty: 8.5 G | Refills: 0 | Status: SHIPPED | OUTPATIENT
Start: 2025-04-25

## 2025-04-25 RX ORDER — AZITHROMYCIN 250 MG/1
TABLET, FILM COATED ORAL
Qty: 1 PACKET | Refills: 0 | Status: SHIPPED | OUTPATIENT
Start: 2025-04-25 | End: 2025-05-05

## 2025-04-25 RX ORDER — FLUTICASONE PROPIONATE 50 MCG
2 SPRAY, SUSPENSION (ML) NASAL DAILY
Qty: 16 G | Refills: 0 | Status: SHIPPED | OUTPATIENT
Start: 2025-04-25

## 2025-04-25 RX ORDER — DEXTROMETHORPHAN HYDROBROMIDE AND PROMETHAZINE HYDROCHLORIDE 15; 6.25 MG/5ML; MG/5ML
5 SYRUP ORAL 4 TIMES DAILY PRN
Qty: 120 ML | Refills: 0 | Status: SHIPPED | OUTPATIENT
Start: 2025-04-25

## 2025-04-25 NOTE — PROGRESS NOTES
Date of Visit: 2025    Rosalva Sr (:  1950) is a 74 y.o. female,  Established patient here for evaluation of the following chief complaint(s):  Cough (Started 25 , covid negative), Congestion, Eye Drainage, and Generalized Body Aches      ASSESSMENT/PLAN:    Assessment & Plan  1. Upper respiratory infection  - Zithromax Z-Javier  - Flonase nasal spray 2 sprays each nostril daily  - Promethazine DM cough syrup 5 ml 4 times daily as needed  - Tylenol 650 mg 3 times daily as needed  - POCT Covid-19 Rapid done and is negative  - POCT influenza A/B are done and are negative    2. Bronchitis   - Chest x-ray  - Zithromax Z-Javier  - Promethazine DM cough syrup 5 mL four times daily as needed  - albuterol HFA inhaler 2 puffs every 6 hours as needed    3. Elevated blood pressure  - Blood pressure not usually elevated  - Patient prefers to follow up with cardiologist regarding blood pressure management        Return if symptoms worsen or fail to improve.    SUBJECTIVE:    History of Present Illness  The patient presents for evaluation of respiratory symptoms.    Symptoms began on Wednesday, 2025, characterized by watery eyes, sneezing, and a productive cough with phlegm ranging in color from medium yellow to dark yellow and green. Coughing spells, sore throat, runny nose, stuffy nose, shortness of breath, wheezing, occasional headaches, and intermittent chills are reported. No fever has been noted at home. Shortness of breath is attributed to congestive heart failure. Self-medication includes an unknown nasal spray and cetirizine, as recommended by her pharmacist. Vicks cough drops and Chloraseptic have been used for a sore throat. Despite these interventions, symptoms have worsened. Delsym or Robitussin have not been tried. Typically, good health is enjoyed, and illness is rare. Tylenol has been taken for symptoms.    Back pain is present, and orthopedics are being consulted for it. No additional

## 2025-05-04 ENCOUNTER — RESULTS FOLLOW-UP (OUTPATIENT)
Dept: PRIMARY CARE CLINIC | Age: 75
End: 2025-05-04

## 2025-05-04 PROBLEM — R03.0 ELEVATED BLOOD PRESSURE READING: Status: ACTIVE | Noted: 2025-05-04

## 2025-05-04 PROBLEM — J40 BRONCHITIS: Status: ACTIVE | Noted: 2025-05-04

## 2025-05-04 PROBLEM — J06.9 UPPER RESPIRATORY TRACT INFECTION: Status: ACTIVE | Noted: 2025-05-04

## 2025-05-14 NOTE — PROGRESS NOTES
hypertrophy. Septal motion is consistent with pacing. Normal wall motion. Indeterminate diastolic function.    Right Ventricle: Right ventricle size is normal.  Pacer wire visualized. Normal systolic function. TAPSE is 2.1 cm. RV Peak S' is 13 cm/s.    Aortic Valve: Mild sclerosis of the aortic valve cusps. Trace regurgitation. No stenosis.    Mitral Valve: Mildly thickened leaflet, at the anterior and posterior leaflets. Mild to moderate regurgitation. No stenosis noted.    Tricuspid Valve: Valve structure is normal. Mild regurgitation. The estimated RVSP is 22 mmHg. No stenosis noted.    Left Atrium: Left atrium is mildly dilated.     3. Stress Test: 2/19/2019  Summary    There is normal isotope uptake at stress and rest. There is no evidence of    myocardial ischemia or scar.    Normal LV size and systolic function.    Left ventricular ejection fraction of 75 %.    There are no regional wall motion abnormalities.    Normal myocardial perfusion study.      4. Cath 5/18/21  IMPRESSION:  1.  Normal left ventricular systolic function.  2.  Essentially normal coronary arteries.  3.  Successful Angio-Seal of right femoral arteriotomy site.    The MCOT, echocardiogram, stress test, and coronary angiography/PCI were reviewed by myself and used for my plan of care.    The CIED was interrogated and programmed and I supervised and reviewed all the data. All findings and changes are in device interrogation sheat and reflect my personal interpretation and changes and is scanned to Epic.       Thank you for allowing me to participate in the care of Rosalva Sr. All questions and concerns were addressed to the patient/family. Alternatives to my treatment were discussed.     Merle SMITH RN, am scribing for and in the presence of Dr. Chrystal Mcdonald. 05/22/25 12:26 PM  IGLESIA Seay Uma Lakshmanadoss MD, personally performed the services prescribed in this documentation as scribed by Ms.

## 2025-05-22 ENCOUNTER — OFFICE VISIT (OUTPATIENT)
Dept: CARDIOLOGY CLINIC | Age: 75
End: 2025-05-22

## 2025-05-22 ENCOUNTER — CLINICAL SUPPORT (OUTPATIENT)
Dept: CARDIOLOGY CLINIC | Age: 75
End: 2025-05-22

## 2025-05-22 VITALS
WEIGHT: 230.2 LBS | DIASTOLIC BLOOD PRESSURE: 84 MMHG | HEART RATE: 66 BPM | BODY MASS INDEX: 33.99 KG/M2 | SYSTOLIC BLOOD PRESSURE: 124 MMHG

## 2025-05-22 DIAGNOSIS — I47.19 ATRIAL TACHYCARDIA: ICD-10-CM

## 2025-05-22 DIAGNOSIS — I50.32 CHRONIC DIASTOLIC CONGESTIVE HEART FAILURE (HCC): ICD-10-CM

## 2025-05-22 DIAGNOSIS — I48.11 LONGSTANDING PERSISTENT ATRIAL FIBRILLATION (HCC): ICD-10-CM

## 2025-05-22 DIAGNOSIS — I48.3 TYPICAL ATRIAL FLUTTER (HCC): ICD-10-CM

## 2025-05-22 DIAGNOSIS — E78.2 MIXED HYPERLIPIDEMIA: ICD-10-CM

## 2025-05-22 DIAGNOSIS — Z98.890 HX OF ATRIOVENTRICULAR NODE ABLATION: ICD-10-CM

## 2025-05-22 DIAGNOSIS — I10 ESSENTIAL HYPERTENSION: ICD-10-CM

## 2025-05-22 DIAGNOSIS — Z95.0 CARDIAC RESYNCHRONIZATION THERAPY PACEMAKER (CRT-P) IN PLACE: Primary | ICD-10-CM

## 2025-05-22 RX ORDER — PANTOPRAZOLE SODIUM 40 MG/1
40 TABLET, DELAYED RELEASE ORAL
Qty: 90 TABLET | Refills: 3 | Status: SHIPPED | OUTPATIENT
Start: 2025-05-22

## 2025-07-01 ENCOUNTER — RESULTS FOLLOW-UP (OUTPATIENT)
Dept: CARDIOLOGY | Age: 75
End: 2025-07-01

## 2025-07-10 NOTE — PROGRESS NOTES
[facility]     Outpatient Cardiology         Patient Name:  Rosalva Sr  Requesting Physician: No admitting provider for patient encounter.  Primary Care Physician: Nabil Beard MD    Reason for Consultation/Chief Complaint:   Chief Complaint   Patient presents with    6 Month Follow-Up    Hypertension       History of Present Illness:    HPI     Rosalva Tomlinson a 74 y.o. female with PMH of breast CA, HLD, HFpEF, atrial flutter, afib s/p LHC (5/2021) showed normal coronaries, s/p total of 5 ablations most recent AVN ablation (8/30/23, Dr. Bloom),  S/p CRT-P with RV lead and CSP lead (8/2/23, Dr. Bloom).     Here for follow up for HFpEF, HTN, and HLD.   Afib, following with EP, on eliquis 5 mg BID, toprolol  mg daily, controlled, no side effects  HTN, lisinopril, no side effects.  Controlled  HFpEF, on lasix 20 mg daily, compensated, Jardiance was expensive, currently not taking it  HLD, zetia 10 mg daily no side effects, not on statins  MR, a lot better, trivial to mild now.  Asymptomatic.    PMH  Past Medical History:   Diagnosis Date    Acute diastolic congestive heart failure (HCC) 11/26/2018    Allergic     SEASONAL    Anticoagulant long-term use     Atrial fibrillation (HCC) H/O    Bursitis 2012    Cancer (HCC) 2009 AND 2011    BREAST right    Chronic back pain     Essential hypertension 03/13/2018    Fractures     GERD (gastroesophageal reflux disease) 1983    [place]     Migraine     Mixed hyperlipidemia 10/19/2020    Osteoarthritis        PSH  Past Surgical History:   Procedure Laterality Date    ABDOMEN SURGERY  To many    ANKLE FRACTURE SURGERY  2004,2008    APPENDECTOMY  03/1983    ATRIAL ABLATION SURGERY  X3    Cryoablation for atrial fib    AXILLARY SURGERY Right     lymph node    BREAST BIOPSY  01/07/2011    BREAST BIOPSY  07/12/2011    left breast bx - benign     BREAST CYST EXCISION  06/2003    BREAST LUMPECTOMY  03/30/2009    CARDIAC CATHETERIZATION

## 2025-07-15 ENCOUNTER — OFFICE VISIT (OUTPATIENT)
Dept: CARDIOLOGY CLINIC | Age: 75
End: 2025-07-15
Payer: MEDICARE

## 2025-07-15 VITALS
WEIGHT: 235 LBS | DIASTOLIC BLOOD PRESSURE: 80 MMHG | BODY MASS INDEX: 34.7 KG/M2 | SYSTOLIC BLOOD PRESSURE: 136 MMHG | HEART RATE: 93 BPM | OXYGEN SATURATION: 98 %

## 2025-07-15 DIAGNOSIS — I10 ESSENTIAL HYPERTENSION: ICD-10-CM

## 2025-07-15 DIAGNOSIS — I50.32 CHRONIC DIASTOLIC CHF (CONGESTIVE HEART FAILURE) (HCC): ICD-10-CM

## 2025-07-15 DIAGNOSIS — Z95.0 CARDIAC RESYNCHRONIZATION THERAPY PACEMAKER (CRT-P) IN PLACE: ICD-10-CM

## 2025-07-15 DIAGNOSIS — I34.0 NONRHEUMATIC MITRAL VALVE REGURGITATION: ICD-10-CM

## 2025-07-15 DIAGNOSIS — I48.0 PAROXYSMAL ATRIAL FIBRILLATION (HCC): Primary | ICD-10-CM

## 2025-07-15 PROCEDURE — 3079F DIAST BP 80-89 MM HG: CPT | Performed by: INTERNAL MEDICINE

## 2025-07-15 PROCEDURE — 1159F MED LIST DOCD IN RCRD: CPT | Performed by: INTERNAL MEDICINE

## 2025-07-15 PROCEDURE — 1090F PRES/ABSN URINE INCON ASSESS: CPT | Performed by: INTERNAL MEDICINE

## 2025-07-15 PROCEDURE — 1036F TOBACCO NON-USER: CPT | Performed by: INTERNAL MEDICINE

## 2025-07-15 PROCEDURE — 3075F SYST BP GE 130 - 139MM HG: CPT | Performed by: INTERNAL MEDICINE

## 2025-07-15 PROCEDURE — G8427 DOCREV CUR MEDS BY ELIG CLIN: HCPCS | Performed by: INTERNAL MEDICINE

## 2025-07-15 PROCEDURE — G8399 PT W/DXA RESULTS DOCUMENT: HCPCS | Performed by: INTERNAL MEDICINE

## 2025-07-15 PROCEDURE — G8417 CALC BMI ABV UP PARAM F/U: HCPCS | Performed by: INTERNAL MEDICINE

## 2025-07-15 PROCEDURE — 99214 OFFICE O/P EST MOD 30 MIN: CPT | Performed by: INTERNAL MEDICINE

## 2025-07-15 PROCEDURE — 1123F ACP DISCUSS/DSCN MKR DOCD: CPT | Performed by: INTERNAL MEDICINE

## 2025-07-15 NOTE — ASSESSMENT & PLAN NOTE
Asymptomatic, degree of regurgitation has improved, currently trivial to mild.  Repeat echo next year

## 2025-08-18 DIAGNOSIS — I10 ESSENTIAL HYPERTENSION: ICD-10-CM

## 2025-08-18 DIAGNOSIS — I10 BENIGN ESSENTIAL HTN: ICD-10-CM

## 2025-08-19 RX ORDER — FUROSEMIDE 20 MG/1
20 TABLET ORAL DAILY
Qty: 90 TABLET | Refills: 3 | Status: SHIPPED | OUTPATIENT
Start: 2025-08-19

## 2025-08-19 RX ORDER — LISINOPRIL 20 MG/1
20 TABLET ORAL DAILY
Qty: 90 TABLET | Refills: 3 | Status: SHIPPED | OUTPATIENT
Start: 2025-08-19